# Patient Record
Sex: FEMALE | NOT HISPANIC OR LATINO | Employment: OTHER | URBAN - METROPOLITAN AREA
[De-identification: names, ages, dates, MRNs, and addresses within clinical notes are randomized per-mention and may not be internally consistent; named-entity substitution may affect disease eponyms.]

---

## 2018-05-22 LAB
ABSOL LYMPHOCYTES (HISTORICAL): 0.8 K/UL (ref 0.5–4)
ALBUMIN SERPL BCP-MCNC: 3.5 G/DL (ref 3–5.2)
ALP SERPL-CCNC: 75 U/L (ref 43–122)
ALT SERPL W P-5'-P-CCNC: 20 U/L (ref 9–52)
ANION GAP SERPL CALCULATED.3IONS-SCNC: 7 MMOL/L (ref 5–14)
AST SERPL W P-5'-P-CCNC: 19 U/L (ref 14–36)
BASOPHILS # BLD AUTO: 0.1 K/UL (ref 0–0.1)
BASOPHILS # BLD AUTO: 1 % (ref 0–1)
BILIRUB SERPL-MCNC: 0.5 MG/DL
BUN SERPL-MCNC: 10 MG/DL (ref 5–25)
CALCIUM SERPL-MCNC: 9.1 MG/DL (ref 8.4–10.2)
CHLORIDE SERPL-SCNC: 101 MEQ/L (ref 97–108)
CO2 SERPL-SCNC: 27 MMOL/L (ref 22–30)
CREATINE, SERUM (HISTORICAL): 0.88 MG/DL (ref 0.6–1.2)
DEPRECATED RDW RBC AUTO: 14.7 %
EGFR (HISTORICAL): >60 ML/MIN/1.73 M2
EOSINOPHIL # BLD AUTO: 0.1 K/UL (ref 0–0.4)
EOSINOPHIL NFR BLD AUTO: 1 % (ref 0–6)
GLUCOSE SERPL-MCNC: 181 MG/DL (ref 70–99)
HCT VFR BLD AUTO: 45.1 % (ref 36–46)
HGB BLD-MCNC: 14.8 G/DL (ref 12–16)
LYMPHOCYTES NFR BLD AUTO: 14 % (ref 25–45)
MCH RBC QN AUTO: 27.7 PG (ref 26–34)
MCHC RBC AUTO-ENTMCNC: 32.8 % (ref 31–36)
MCV RBC AUTO: 84 FL (ref 80–100)
MONOCYTES # BLD AUTO: 0.6 K/UL (ref 0.2–0.9)
MONOCYTES NFR BLD AUTO: 11 % (ref 1–10)
NEUTROPHILS ABS COUNT (HISTORICAL): 4.3 K/UL (ref 1.8–7.8)
NEUTS SEG NFR BLD AUTO: 73 % (ref 45–65)
PLATELET # BLD AUTO: 191 K/MCL (ref 150–450)
POTASSIUM SERPL-SCNC: 4.6 MEQ/L (ref 3.6–5)
RBC # BLD AUTO: 5.35 M/MCL (ref 4–5.2)
SODIUM SERPL-SCNC: 135 MEQ/L (ref 137–147)
TOTAL PROTEIN (HISTORICAL): 6.1 G/DL (ref 5.9–8.4)
TSH SERPL DL<=0.05 MIU/L-ACNC: 2.31 UIU/ML (ref 0.47–4.68)
WBC # BLD AUTO: 5.8 K/MCL (ref 4.5–11)

## 2018-05-23 LAB
EST. AVERAGE GLUCOSE BLD GHB EST-MCNC: 163 MG/DL
HBA1C MFR BLD HPLC: 7.3 %

## 2018-07-10 DIAGNOSIS — E03.9 HYPOTHYROIDISM, UNSPECIFIED TYPE: Primary | ICD-10-CM

## 2018-07-10 RX ORDER — LEVOTHYROXINE SODIUM 0.05 MG/1
TABLET ORAL
Qty: 90 TABLET | Refills: 0 | Status: SHIPPED | OUTPATIENT
Start: 2018-07-10 | End: 2018-10-13 | Stop reason: SDUPTHER

## 2018-07-10 RX ORDER — METOPROLOL SUCCINATE 25 MG/1
TABLET, EXTENDED RELEASE ORAL
Qty: 180 TABLET | Refills: 0 | Status: SHIPPED | OUTPATIENT
Start: 2018-07-10 | End: 2018-10-13 | Stop reason: SDUPTHER

## 2018-08-27 DIAGNOSIS — E11.9 TYPE 2 DIABETES MELLITUS WITHOUT COMPLICATION, WITHOUT LONG-TERM CURRENT USE OF INSULIN (HCC): Primary | ICD-10-CM

## 2018-09-05 DIAGNOSIS — E78.00 HYPERCHOLESTEROLEMIA: Primary | ICD-10-CM

## 2018-09-05 RX ORDER — EZETIMIBE AND SIMVASTATIN 10; 40 MG/1; MG/1
TABLET ORAL
Refills: 4 | COMMUNITY
Start: 2018-06-28 | End: 2018-09-05 | Stop reason: SDUPTHER

## 2018-09-05 RX ORDER — EZETIMIBE AND SIMVASTATIN 10; 40 MG/1; MG/1
TABLET ORAL
Qty: 90 TABLET | Refills: 3 | Status: SHIPPED | OUTPATIENT
Start: 2018-09-05 | End: 2019-01-22 | Stop reason: RX

## 2018-09-14 ENCOUNTER — TELEPHONE (OUTPATIENT)
Dept: FAMILY MEDICINE CLINIC | Facility: CLINIC | Age: 83
End: 2018-09-14

## 2018-09-14 NOTE — TELEPHONE ENCOUNTER
Called spoke with daughter to inform her that her mother appointment on 11/23/18 at 9 am with dr Tracy Jerez is cancelled he will not be in the office so appointment was cancelled and rescheduled to 11/16/18 at 9 am daughter aware of new appointment

## 2018-10-13 DIAGNOSIS — E03.9 HYPOTHYROIDISM, UNSPECIFIED TYPE: ICD-10-CM

## 2018-10-13 RX ORDER — METOPROLOL SUCCINATE 25 MG/1
TABLET, EXTENDED RELEASE ORAL
Qty: 180 TABLET | Refills: 3 | Status: SHIPPED | OUTPATIENT
Start: 2018-10-13 | End: 2019-11-07 | Stop reason: SDUPTHER

## 2018-10-13 RX ORDER — LEVOTHYROXINE SODIUM 0.05 MG/1
TABLET ORAL
Qty: 90 TABLET | Refills: 3 | Status: SHIPPED | OUTPATIENT
Start: 2018-10-13 | End: 2019-11-07 | Stop reason: SDUPTHER

## 2018-11-15 ENCOUNTER — TELEPHONE (OUTPATIENT)
Dept: FAMILY MEDICINE CLINIC | Facility: CLINIC | Age: 83
End: 2018-11-15

## 2018-11-15 NOTE — TELEPHONE ENCOUNTER
Patient daughter called she said to cancel her appointment on 11/16/18 at 9 am and reschedule to another day so the appointment was cancelled and rescheduled to 12/5/18 at 930 am per daughter request to change the appointment

## 2018-12-04 RX ORDER — ATENOLOL 100 MG/1
TABLET ORAL
Refills: 2 | COMMUNITY
Start: 2018-11-02 | End: 2019-02-01 | Stop reason: SDUPTHER

## 2018-12-05 ENCOUNTER — OFFICE VISIT (OUTPATIENT)
Dept: FAMILY MEDICINE CLINIC | Facility: CLINIC | Age: 83
End: 2018-12-05
Payer: MEDICARE

## 2018-12-05 VITALS
SYSTOLIC BLOOD PRESSURE: 140 MMHG | RESPIRATION RATE: 18 BRPM | WEIGHT: 200.2 LBS | DIASTOLIC BLOOD PRESSURE: 90 MMHG | BODY MASS INDEX: 36.84 KG/M2 | HEART RATE: 80 BPM | HEIGHT: 62 IN

## 2018-12-05 DIAGNOSIS — E11.9 TYPE 2 DIABETES MELLITUS WITHOUT COMPLICATION, WITHOUT LONG-TERM CURRENT USE OF INSULIN (HCC): Primary | Chronic | ICD-10-CM

## 2018-12-05 DIAGNOSIS — I10 ESSENTIAL HYPERTENSION: Chronic | ICD-10-CM

## 2018-12-05 DIAGNOSIS — E03.8 OTHER SPECIFIED HYPOTHYROIDISM: Chronic | ICD-10-CM

## 2018-12-05 DIAGNOSIS — E78.49 OTHER HYPERLIPIDEMIA: Chronic | ICD-10-CM

## 2018-12-05 DIAGNOSIS — E55.9 VITAMIN D DEFICIENCY: Chronic | ICD-10-CM

## 2018-12-05 PROCEDURE — 99214 OFFICE O/P EST MOD 30 MIN: CPT | Performed by: FAMILY MEDICINE

## 2018-12-05 PROCEDURE — G0008 ADMIN INFLUENZA VIRUS VAC: HCPCS | Performed by: FAMILY MEDICINE

## 2018-12-05 PROCEDURE — 90662 IIV NO PRSV INCREASED AG IM: CPT | Performed by: FAMILY MEDICINE

## 2018-12-05 PROCEDURE — 83036 HEMOGLOBIN GLYCOSYLATED A1C: CPT | Performed by: FAMILY MEDICINE

## 2018-12-05 PROCEDURE — 36415 COLL VENOUS BLD VENIPUNCTURE: CPT | Performed by: FAMILY MEDICINE

## 2018-12-05 NOTE — PROGRESS NOTES
Assessment/Plan:    No problem-specific Assessment & Plan notes found for this encounter  Diagnoses and all orders for this visit:    Type 2 diabetes mellitus without complication, without long-term current use of insulin ([address] )  -     influenza vaccine, 3035-0490, high-dose, PF 0 5 mL, for patients 65 yr+ (FLUZONE HIGH-DOSE)  -     Hemoglobin A1C    Other specified hypothyroidism    Essential hypertension    Vitamin D deficiency    Other hyperlipidemia          Subjective:      Patient ID: Perla Serra is a 80 y o  female  PATIENT RETURNS FOR FOLLOW-UP OF CHRONIC MEDICAL CONDITIONS  NO HOSPITAL STAYS OR EMERGENCY VISITS RECENTLY  MEDS WERE REVIEWED AND NO SIDE EFFECTS  NO NEW ISSUES  UNLESS NOTED BELOW  NO NEW MEDICAL PROVIDER REPORTED  The following portions of the patient's history were reviewed and updated as appropriate: allergies, current medications, past family history, past medical history, past social history, past surgical history and problem list     Review of Systems   Constitutional: Negative for activity change and appetite change  HENT: Negative for voice change  Eyes: Negative for visual disturbance  Respiratory: Negative for chest tightness and shortness of breath  Cardiovascular: Negative for chest pain, palpitations and leg swelling  Gastrointestinal: Negative for abdominal pain, blood in stool, constipation and diarrhea  Genitourinary: Negative for dysuria, vaginal bleeding and vaginal discharge  Skin: Negative for rash  Neurological: Negative for dizziness  Psychiatric/Behavioral: Negative for dysphoric mood  Objective:  Vitals:    12/05/18 0851   BP: 140/90   BP Location: Left arm   Patient Position: Sitting   Cuff Size: Large   Pulse: 80   Resp: 18   Weight: 90 8 kg (200 lb 3 2 oz)   Height: 5' 1 81" (1 57 m)      Physical Exam   Constitutional: She appears well-developed and well-nourished  HENT:   Head: Normocephalic and atraumatic  Eyes: Conjunctivae are normal    Neck: Neck supple  No thyromegaly present  Cardiovascular: Normal rate, regular rhythm, normal heart sounds and intact distal pulses  No murmur heard  Pulmonary/Chest: Effort normal and breath sounds normal  No respiratory distress  Musculoskeletal: She exhibits no edema  Lymphadenopathy:     She has no cervical adenopathy  Skin: Skin is warm and dry  Psychiatric: She has a normal mood and affect  Her behavior is normal          Patient's chronic problems that were reviewed today are stable  Meds reviewed and no changes made  Appropriate labs and imaging were ordered  Preventive measures appropriate for age and sex were reviewed with patient  Immunizations were updated as appropriate

## 2018-12-06 LAB
EST. AVERAGE GLUCOSE BLD GHB EST-MCNC: 174 MG/DL
HBA1C MFR BLD: 7.7 % (ref 4.2–6.3)

## 2019-01-08 ENCOUNTER — OFFICE VISIT (OUTPATIENT)
Dept: FAMILY MEDICINE CLINIC | Facility: CLINIC | Age: 84
End: 2019-01-08
Payer: MEDICARE

## 2019-01-08 VITALS
SYSTOLIC BLOOD PRESSURE: 140 MMHG | WEIGHT: 200.2 LBS | HEIGHT: 61 IN | BODY MASS INDEX: 37.8 KG/M2 | DIASTOLIC BLOOD PRESSURE: 90 MMHG | HEART RATE: 68 BPM | TEMPERATURE: 96.9 F

## 2019-01-08 DIAGNOSIS — L97.911 LEG ULCER, RIGHT, LIMITED TO BREAKDOWN OF SKIN (HCC): Primary | ICD-10-CM

## 2019-01-08 DIAGNOSIS — R60.9 EDEMA, UNSPECIFIED TYPE: ICD-10-CM

## 2019-01-08 PROCEDURE — 99213 OFFICE O/P EST LOW 20 MIN: CPT | Performed by: FAMILY MEDICINE

## 2019-01-08 RX ORDER — FUROSEMIDE 20 MG/1
TABLET ORAL
Qty: 10 TABLET | Refills: 2 | Status: SHIPPED | OUTPATIENT
Start: 2019-01-08 | End: 2019-12-16

## 2019-01-08 RX ORDER — FUROSEMIDE 20 MG/1
20 TABLET ORAL 2 TIMES DAILY
Qty: 5 TABLET | Refills: 0 | Status: SHIPPED | OUTPATIENT
Start: 2019-01-08 | End: 2019-01-08 | Stop reason: SDUPTHER

## 2019-01-08 NOTE — PROGRESS NOTES
Assessment/Plan:    No problem-specific Assessment & Plan notes found for this encounter  Diagnoses and all orders for this visit:    Leg ulcer, right, limited to breakdown of skin (HCC)    Edema, unspecified type  -     furosemide (LASIX) 20 mg tablet; Take 1 tablet (20 mg total) by mouth 2 (two) times a day for 5 days          Subjective:      Patient ID: Marla Erickson is a 80 y o  female  Marden Rain comes today with a draining wound of the right lower extremity  Duration is 1 week  No history of trauma it is not particularly painful but has been continually weeping over the last week  The following portions of the patient's history were reviewed and updated as appropriate: allergies, current medications, past family history, past medical history, past social history, past surgical history and problem list     Review of Systems      Objective:  Vitals:    01/08/19 0935   BP: 140/90   BP Location: Left arm   Patient Position: Sitting   Cuff Size: Large   Pulse: 68   Temp: (!) 96 9 °F (36 1 °C)   TempSrc: Temporal   Weight: 90 8 kg (200 lb 3 2 oz)   Height: 5' 1" (1 549 m)      Physical Exam   Skin:   Two punctate open areas on the right anterior tibial region which are leaking serous fluid  Plus two edema bilaterally but that is old  We will use local care only and 5 days of a mild diuretic

## 2019-01-08 NOTE — PATIENT INSTRUCTIONS
Keep the leg elevated above horizontal at all times when sitting  No ointment is required  Keep it covered with a dry dressing to collect the fluid    This should heal in the next 3-4 weeks

## 2019-01-10 ENCOUNTER — HOSPITAL ENCOUNTER (EMERGENCY)
Facility: HOSPITAL | Age: 84
Discharge: HOME | End: 2019-01-11
Attending: EMERGENCY MEDICINE | Admitting: EMERGENCY MEDICINE
Payer: MEDICARE

## 2019-01-10 ENCOUNTER — APPOINTMENT (EMERGENCY)
Dept: CT IMAGING | Facility: HOSPITAL | Age: 84
End: 2019-01-10
Payer: MEDICARE

## 2019-01-10 ENCOUNTER — APPOINTMENT (EMERGENCY)
Dept: RADIOLOGY | Facility: HOSPITAL | Age: 84
End: 2019-01-10
Payer: MEDICARE

## 2019-01-10 DIAGNOSIS — S09.90XA MINOR HEAD INJURY: ICD-10-CM

## 2019-01-10 DIAGNOSIS — S80.02XA CONTUSION OF LEFT KNEE: Primary | ICD-10-CM

## 2019-01-10 PROCEDURE — 70450 CT HEAD/BRAIN W/O DYE: CPT

## 2019-01-10 PROCEDURE — 99284 EMERGENCY DEPT VISIT MOD MDM: CPT

## 2019-01-10 PROCEDURE — 73564 X-RAY EXAM KNEE 4 OR MORE: CPT

## 2019-01-10 PROCEDURE — 90471 IMMUNIZATION ADMIN: CPT

## 2019-01-10 RX ORDER — ACETAMINOPHEN 325 MG/1
650 TABLET ORAL ONCE
Status: COMPLETED | OUTPATIENT
Start: 2019-01-10 | End: 2019-01-11

## 2019-01-11 ENCOUNTER — TELEPHONE (OUTPATIENT)
Dept: FAMILY MEDICINE CLINIC | Facility: CLINIC | Age: 84
End: 2019-01-11

## 2019-01-11 VITALS
BODY MASS INDEX: 37.82 KG/M2 | DIASTOLIC BLOOD PRESSURE: 78 MMHG | HEART RATE: 91 BPM | OXYGEN SATURATION: 97 % | RESPIRATION RATE: 18 BRPM | SYSTOLIC BLOOD PRESSURE: 141 MMHG | WEIGHT: 200.18 LBS | TEMPERATURE: 97.4 F

## 2019-01-11 PROCEDURE — 90715 TDAP VACCINE 7 YRS/> IM: CPT | Performed by: EMERGENCY MEDICINE

## 2019-01-11 RX ORDER — TRAMADOL HYDROCHLORIDE 50 MG/1
50 TABLET ORAL EVERY 6 HOURS PRN
Qty: 15 TABLET | Refills: 0 | Status: SHIPPED | OUTPATIENT
Start: 2019-01-11 | End: 2019-01-21

## 2019-01-11 RX ADMIN — ACETAMINOPHEN 650 MG: 325 TABLET, FILM COATED ORAL at 00:11

## 2019-01-11 RX ADMIN — TETANUS TOXOID, REDUCED DIPHTHERIA TOXOID AND ACELLULAR PERTUSSIS VACCINE, ADSORBED 0.5 ML: 5; 2.5; 8; 8; 2.5 SUSPENSION INTRAMUSCULAR at 00:12

## 2019-01-11 NOTE — ED PROVIDER NOTES
History  Chief Complaint   Patient presents with    Fall     fall earlier tonight  left lower leg pain with weightbearing  left knee replacement hx and pain to the left knee area  head abrasion to the right side of the head after falling over vacuum cord  pradaxa blood thinner  80-year-old female with a history of atrial fibrillation on Pradaxa, hypertension presents to the emergency department complaining of left knee pain after a fall earlier this evening  Patient got up and did not use her walker and tripped over a vacuum cord  She did fall to her knees and did strike her head there was no loss of consciousness  She was able to get up and ambulate, however she continues to complain of left knee pain  No meds taken for the pain  She is having increasing difficulty bearing weight on the left leg  History provided by:  Patient and relative   used: No    Fall   Mechanism of injury: fall    Injury location:  Head/neck and leg  Head/neck injury location:  Head  Leg injury location:  L knee  Incident location:  Home  Time since incident:  4 hours  Arrived directly from scene: yes    Fall:     Fall occurred:  Tripped    Impact surface:  Carpet    Point of impact:  Knees  Tetanus status:  Unknown  Prior to arrival data:     Patient ambulatory at scene: yes      Blood loss:  None    Responsiveness at scene:  Alert    Orientation at scene:  Person, place, situation and time    Loss of consciousness: no      Amnesic to event: no    Associated symptoms: no abdominal pain, no back pain, no chest pain, no difficulty breathing, no headaches, no hearing loss, no loss of consciousness, no nausea, no neck pain, no seizures and no vomiting    Risk factors: anticoagulation therapy        Prior to Admission Medications   Prescriptions Last Dose Informant Patient Reported? Taking?    PRADAXA 150 MG capsu   Yes Yes   Sig: TK 1 C PO BID   ezetimibe-simvastatin (VYTORIN) 10-40 mg per tablet   No Yes Sig: One daily   furosemide (LASIX) 20 mg tablet   No Yes   Sig: TAKE 1 TABLET(20 MG) BY MOUTH TWICE DAILY FOR 5 DAYS   levothyroxine 50 mcg tablet   No Yes   Sig: TAKE 1 TABLET BY MOUTH DAILY   metFORMIN (GLUCOPHAGE) 500 mg tablet   No Yes   Sig: TAKE 1 TABLET BY MOUTH EVERY DAY   metoprolol succinate (TOPROL-XL) 25 mg 24 hr tablet   No Yes   Sig: TAKE 1 TABLET(25 MG) BY MOUTH EVERY 12 HOURS      Facility-Administered Medications: None       Past Medical History:   Diagnosis Date    Atrial fibrillation ([address] )     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    Cholecystitis     Disease of tonsils     Glaucoma 2008    NOS       Past Surgical History:   Procedure Laterality Date    CHOLECYSTECTOMY      EYE SURGERY Right 03/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    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       History reviewed  No pertinent family history  I have reviewed and agree with the history as documented  Social History   Substance Use Topics    Smoking status: Never Smoker    Smokeless tobacco: Never Used    Alcohol use Not on file        Review of Systems   Constitutional: Negative  Negative for chills, diaphoresis, fatigue and fever  HENT: Negative  Negative for congestion, hearing loss, rhinorrhea and sore throat  Eyes: Negative  Negative for discharge, redness and itching  Respiratory: Negative  Negative for apnea, cough, chest tightness, shortness of breath and wheezing  Cardiovascular: Negative for chest pain, palpitations and leg swelling  Gastrointestinal: Negative  Negative for abdominal pain, nausea and vomiting  Endocrine: Negative  Genitourinary: Negative  Negative for flank pain, frequency and urgency  Musculoskeletal: Negative  Negative for back pain and neck pain  Skin: Negative  Allergic/Immunologic: Negative  Neurological: Negative  Negative for dizziness, seizures, loss of consciousness, syncope, weakness, light-headedness, numbness and headaches  Hematological: Negative  All other systems reviewed and are negative  Physical Exam  Physical Exam   Constitutional: She is oriented to person, place, and time  She appears well-developed and well-nourished  Non-toxic appearance  She does not have a sickly appearance  She does not appear ill  No distress  HENT:   Head: Normocephalic  Head is with abrasion  Head is without raccoon's eyes and without Macias's sign  Right Ear: Tympanic membrane and external ear normal    Left Ear: Tympanic membrane and external ear normal    Nose: Nose normal    Mouth/Throat: Oropharynx is clear and moist    Eyes: Pupils are equal, round, and reactive to light  Conjunctivae and EOM are normal  Right eye exhibits no discharge  Left eye exhibits no discharge  No scleral icterus  Neck: Normal range of motion  Neck supple  No spinous process tenderness present  Cardiovascular: Normal rate and normal heart sounds  An irregularly irregular rhythm present  Exam reveals no gallop and no friction rub  No murmur heard  Pulmonary/Chest: Effort normal and breath sounds normal  No respiratory distress  She has no wheezes  She has no rales  She exhibits no tenderness  Abdominal: Soft  Bowel sounds are normal  She exhibits no distension and no mass  There is no tenderness  No hernia  Musculoskeletal: She exhibits no edema  Right hip: Normal         Left hip: Normal         Right knee: Normal  She exhibits normal range of motion, no swelling, no effusion, no ecchymosis, no deformity, no laceration, normal alignment, no LCL laxity, normal patellar mobility, no bony tenderness, normal meniscus and no MCL laxity  No tenderness found  Left knee: She exhibits decreased range of motion, swelling, effusion and ecchymosis  She exhibits no deformity, no laceration, no erythema, normal alignment, no LCL laxity and normal patellar mobility  Tenderness found  Patellar tendon tenderness noted          Legs:  Neurological: She is alert and oriented to person, place, and time  She has normal reflexes  She displays normal reflexes  No cranial nerve deficit  She exhibits normal muscle tone  Coordination normal    Skin: Skin is warm and dry  No rash noted  She is not diaphoretic  No erythema  No pallor  Psychiatric: She has a normal mood and affect  Nursing note and vitals reviewed  Vital Signs  ED Triage Vitals [01/10/19 2118]   Temperature Pulse Respirations Blood Pressure SpO2   (!) 97 4 °F (36 3 °C) 98 18 (!) 181/75 98 %      Temp Source Heart Rate Source Patient Position - Orthostatic VS BP Location FiO2 (%)   Oral Monitor Sitting Right arm --      Pain Score       7           Vitals:    01/10/19 2118 01/11/19 0004   BP: (!) 181/75 141/78   Pulse: 98 91   Patient Position - Orthostatic VS: Sitting Lying       Visual Acuity      ED Medications  Medications   tetanus-diphtheria-acellular pertussis (BOOSTRIX) IM injection 0 5 mL (0 5 mL Intramuscular Given 1/11/19 0012)   acetaminophen (TYLENOL) tablet 650 mg (650 mg Oral Given 1/11/19 0011)       Diagnostic Studies  Results Reviewed     None                 CT head without contrast   Final Result by Josee Dwyer MD (01/11 0102)      1  No intracranial hemorrhage or calvarial fracture  2   Chronic small vessel ischemic changes  3   Scattered punctate calcifications are seen which are nonspecific however may be seen in the setting of neurocysticercosis  Workstation performed: [id number]         XR knee 4+ views left injury   ED Interpretation by Rose Limon DO (01/11 0014)   No fracture                 Procedures  Procedures       Phone Contacts  ED Phone Contact    ED Course                               MDM  Number of Diagnoses or Management Options  Diagnosis management comments: 35-year-old female presents with ongoing left knee pain after a mechanical fall earlier this evening  She is on Pradaxa and also hit her head  There was no loss of consciousness    On exam she appears well in no distress  She does have a small abrasion to the right forehead  She has a moderate size prepatellar effusion to the left knee with ecchymosis and decreased range of motion  Will x-ray left knee, provide Tylenol for pain  Will update tetanus and also CT head to rule out intracranial bleeding since she is on Pradaxa  Amount and/or Complexity of Data Reviewed  Tests in the radiology section of CPT®: ordered and reviewed      CritCare Time    Disposition  Final diagnoses:   Contusion of left knee   Minor head injury     Time reflects when diagnosis was documented in both MDM as applicable and the Disposition within this note     Time User Action Codes Description Comment    1/11/2019  1:06 AM Tai SMALL Add [S80 02XA] Contusion of left knee     1/11/2019  1:06 AM Tai SMALL Add [S09 90XA] Minor head injury       ED Disposition     ED Disposition Condition Comment    Discharge  Cammy Rodrigues discharge to home/self care  Condition at discharge: Good        Follow-up Information     Follow up With Specialties Details Why Merrill Elder MD [facility] Medicine Schedule an appointment as soon as possible for a visit in 2 days If symptoms worsen [address] [place] [address]  [phone]            Patient's Medications   Discharge Prescriptions    TRAMADOL (ULTRAM) 50 MG TABLET    Take 1 tablet (50 mg total) by mouth every 6 (six) hours as needed for moderate pain for up to 10 days       Start Date: 1/11/2019 End Date: 1/21/2019       Order Dose: 50 mg       Quantity: 15 tablet    Refills: 0     No discharge procedures on file      ED Provider  Electronically Signed by           Noemi Garcia DO  01/11/19 2706

## 2019-01-11 NOTE — TELEPHONE ENCOUNTER
Patient daughter Stephanie Holt called she said her mother fell yesterday and she hurt her left knee and she also hit her head they did a ct scan and showed nothing wrong daughter is requesting for her to have at home physical therapy ordered and she would like to speak with dr Yue Kumari  Patient daughter Stephanie Holt can be reached at [phone]   TY

## 2019-01-11 NOTE — DISCHARGE INSTRUCTIONS
Contusion in Adults   WHAT YOU NEED TO KNOW:   A contusion is a bruise that appears on your skin after an injury  A bruise happens when small blood vessels tear but skin does not  When blood vessels tear, blood leaks into nearby tissue, such as soft tissue or muscle  DISCHARGE INSTRUCTIONS:   Return to the emergency department if:   · You have new trouble moving the injured area  · You have tingling or numbness in or near the injured area  · Your hand or foot below the bruise gets cold or turns pale  Contact your healthcare provider if:   · You find a new lump in the injured area  · Your symptoms do not improve with treatment after 4 to 5 days  · You have questions or concerns about your condition or care  Medicines: You may need any of the following:  · NSAIDs  help decrease swelling and pain or fever  This medicine is available with or without a doctor's order  NSAIDs can cause stomach bleeding or kidney problems in certain people  If you take blood thinner medicine, always ask your healthcare provider if NSAIDs are safe for you  Always read the medicine label and follow directions  · Prescription pain medicine  may be given  Do not wait until the pain is severe before you take your medicine  · Take your medicine as directed  Contact your healthcare provider if you think your medicine is not helping or if you have side effects  Tell him of her if you are allergic to any medicine  Keep a list of the medicines, vitamins, and herbs you take  Include the amounts, and when and why you take them  Bring the list or the pill bottles to follow-up visits  Carry your medicine list with you in case of an emergency  Follow up with your healthcare provider as directed: You may need to return within a week to check your injury again  Write down your questions so you remember to ask them during your visits    Help a contusion heal:   · Rest the injured area  or use it less than usual  If you bruised your leg or foot, you may need crutches or a cane to help you walk  This will help you keep weight off your injured body part  · Apply ice  to decrease swelling and pain  Ice may also help prevent tissue damage  Use an ice pack, or put crushed ice in a plastic bag  Cover it with a towel and place it on your bruise for 15 to 20 minutes every hour or as directed  · Use compression  to support the area and decrease swelling  Wrap an elastic bandage around the area over the bruised muscle  Make sure the bandage is not too tight  You should be able to fit 1 finger between the bandage and your skin  · Elevate (raise) your injured body part  above the level of your heart to help decrease pain and swelling  Use pillows, blankets, or rolled towels to elevate the area as often as you can  · Do not drink alcohol  as directed  Alcohol may slow healing  · Do not stretch injured muscles  right after your injury  Ask your healthcare provider when and how you may safely stretch after your injury  Gentle stretches can help increase your flexibility  · Do not massage the area or put heating pads  on the bruise right after your injury  Heat and massage may slow healing  Your healthcare provider may tell you to apply heat after several days  At that time, heat will start to help the injury heal   Prevent another contusion:   · Stretch and warm up before you play sports or exercise  · Wear protective gear when you play sports  Examples are shin guards and padding  · If you begin a new physical activity, start slowly to give your body a chance to adjust   © 2017 [employer] Information is for End User's use only and may not be sold, redistributed or otherwise used for commercial purposes  All illustrations and images included in CareNotes® are the copyrighted property of [employer]  or Yaakov Ferris  The above information is an  only   It is not intended as medical advice for individual conditions or treatments  Talk to your doctor, nurse or pharmacist before following any medical regimen to see if it is safe and effective for you  Head Injury   WHAT YOU NEED TO KNOW:   A head injury is most often caused by a blow to the head  This may occur from a fall, bicycle injury, sports injury, being struck in the head, or a motor vehicle accident  DISCHARGE INSTRUCTIONS:   Call 911 or have someone else call for any of the following:   · You cannot be woken  · You have a seizure  · You stop responding to others or you faint  · You have blurry or double vision  · Your speech becomes slurred or confused  · You have arm or leg weakness, loss of feeling, or new problems with coordination  · Your pupils are larger than usual or one pupil is a different size than the other  · You have blood or clear fluid coming out of your ears or nose  Return to the emergency department if:   · You have repeated or forceful vomiting  · You feel confused  · Your headache gets worse or becomes severe  · You or someone caring for you notices that you are harder to wake than usual   Contact your healthcare provider if:   · Your symptoms last longer than 6 weeks after the injury  · You have questions or concerns about your condition or care  Medicines:   · Acetaminophen  decreases pain  Acetaminophen is available without a doctor's order  Ask how much to take and how often to take it  Follow directions  Acetaminophen can cause liver damage if not taken correctly  · Take your medicine as directed  Contact your healthcare provider if you think your medicine is not helping or if you have side effects  Tell him or her if you are allergic to any medicine  Keep a list of the medicines, vitamins, and herbs you take  Include the amounts, and when and why you take them  Bring the list or the pill bottles to follow-up visits   Carry your medicine list with you in case of an emergency  Self-care:   · Rest  or do quiet activities for 24 to 48 hours  Limit your time watching TV, using the computer, or doing tasks that require a lot of thinking  Slowly return to your normal activities as directed  Do not play sports or do activities that may cause you to get hit in the head  Ask your healthcare provider when you can return to sports  · Apply ice  on your head for 15 to 20 minutes every hour or as directed  Use an ice pack, or put crushed ice in a plastic bag  Cover it with a towel before you apply it to your skin  Ice helps prevent tissue damage and decreases swelling and pain  · Have someone stay with you for 24 hours  or as directed  This person can monitor you for complications and call 160  When you are awake the person should ask you a few questions to see if you are thinking clearly  An example would be to ask your name or your address  Prevent another head injury:   · Wear a helmet that fits properly  Do this when you play sports, or ride a bike, scooter, or skateboard  Helmets help decrease your risk of a serious head injury  Talk to your healthcare provider about other ways you can protect yourself if you play sports  · Wear your seat belt every time you are in a car  This helps to decrease your risk for a head injury if you are in a car accident  Follow up with your healthcare provider as directed:  Write down your questions so you remember to ask them during your visits  © 2017 [employer] Information is for End User's use only and may not be sold, redistributed or otherwise used for commercial purposes  All illustrations and images included in CareNotes® are the copyrighted property of A D [employer] M [employer]  or [employer]akov Ferris  The above information is an  only  It is not intended as medical advice for individual conditions or treatments   Talk to your doctor, nurse or pharmacist before following any medical regimen to see if it is safe and effective for you

## 2019-01-22 ENCOUNTER — TELEPHONE (OUTPATIENT)
Dept: FAMILY MEDICINE CLINIC | Facility: CLINIC | Age: 84
End: 2019-01-22

## 2019-01-22 ENCOUNTER — TREATMENT (OUTPATIENT)
Dept: FAMILY MEDICINE CLINIC | Facility: CLINIC | Age: 84
End: 2019-01-22

## 2019-01-22 DIAGNOSIS — E78.49 OTHER HYPERLIPIDEMIA: Primary | ICD-10-CM

## 2019-01-22 DIAGNOSIS — E78.00 HYPERCHOLESTEROLEMIA: ICD-10-CM

## 2019-01-22 RX ORDER — ATORVASTATIN CALCIUM 40 MG/1
40 TABLET, FILM COATED ORAL DAILY
Qty: 90 TABLET | Refills: 3 | Status: SHIPPED | OUTPATIENT
Start: 2019-01-22 | End: 2019-02-13 | Stop reason: SDUPTHER

## 2019-01-22 NOTE — TELEPHONE ENCOUNTER
Ines Molina [phone] calling for Joshua Archer was notified that med was changed from ezetimibe-simvastatin to atorvastatin     erx to [employer]

## 2019-02-01 DIAGNOSIS — I48.91 ATRIAL FIBRILLATION, UNSPECIFIED TYPE (HCC): Primary | ICD-10-CM

## 2019-02-01 RX ORDER — ATENOLOL 100 MG/1
TABLET ORAL
Qty: 180 CAPSULE | Refills: 0 | Status: SHIPPED | OUTPATIENT
Start: 2019-02-01 | End: 2019-05-09 | Stop reason: SDUPTHER

## 2019-02-13 DIAGNOSIS — E78.00 HYPERCHOLESTEROLEMIA: ICD-10-CM

## 2019-02-13 DIAGNOSIS — E78.49 OTHER HYPERLIPIDEMIA: ICD-10-CM

## 2019-02-13 RX ORDER — EZETIMIBE AND SIMVASTATIN 10; 40 MG/1; MG/1
TABLET ORAL
Qty: 90 TABLET | Refills: 3 | Status: SHIPPED | OUTPATIENT
Start: 2019-02-13 | End: 2019-02-13

## 2019-02-13 RX ORDER — ATORVASTATIN CALCIUM 40 MG/1
40 TABLET, FILM COATED ORAL DAILY
Qty: 90 TABLET | Refills: 3 | Status: SHIPPED | OUTPATIENT
Start: 2019-02-13 | End: 2019-08-05 | Stop reason: SDUPTHER

## 2019-02-13 NOTE — TELEPHONE ENCOUNTER
[facility] is calling patient is currently taking Vytorin 10/40 and pharmacy received a rx for Atrovastin they will like to confirm it? ?  Which med should she take?    devendra alaniz

## 2019-02-16 DIAGNOSIS — R60.9 EDEMA, UNSPECIFIED TYPE: ICD-10-CM

## 2019-02-17 RX ORDER — FUROSEMIDE 20 MG/1
TABLET ORAL
Qty: 30 TABLET | Refills: 6 | Status: SHIPPED | OUTPATIENT
Start: 2019-02-17 | End: 2019-12-16

## 2019-02-18 DIAGNOSIS — E78.49 OTHER HYPERLIPIDEMIA: Primary | Chronic | ICD-10-CM

## 2019-02-18 RX ORDER — EZETIMIBE AND SIMVASTATIN 10; 40 MG/1; MG/1
1 TABLET ORAL DAILY
Qty: 90 TABLET | Refills: 3 | Status: SHIPPED | OUTPATIENT
Start: 2019-02-18 | End: 2019-12-16

## 2019-02-18 RX ORDER — EZETIMIBE AND SIMVASTATIN 10; 40 MG/1; MG/1
TABLET ORAL
COMMUNITY
Start: 2018-01-30 | End: 2019-02-18 | Stop reason: SDUPTHER

## 2019-02-27 LAB — LEFT EYE DIABETIC RETINOPATHY: NORMAL

## 2019-05-09 DIAGNOSIS — I48.91 ATRIAL FIBRILLATION, UNSPECIFIED TYPE (HCC): ICD-10-CM

## 2019-05-09 RX ORDER — DABIGATRAN ETEXILATE 150 MG/1
150 CAPSULE, COATED PELLETS ORAL 2 TIMES DAILY
Qty: 180 CAPSULE | Refills: 3 | Status: SHIPPED | OUTPATIENT
Start: 2019-05-09 | End: 2020-09-08 | Stop reason: ALTCHOICE

## 2019-06-07 LAB
LEFT EYE DIABETIC RETINOPATHY: NORMAL
RIGHT EYE DIABETIC RETINOPATHY: NORMAL

## 2019-06-10 PROBLEM — E11.621 TYPE 2 DIABETES MELLITUS WITH FOOT ULCER (CODE) (HCC): Status: ACTIVE | Noted: 2019-06-10

## 2019-07-03 RX ORDER — BLOOD-GLUCOSE METER
KIT MISCELLANEOUS
COMMUNITY
Start: 2014-07-21 | End: 2019-12-16

## 2019-07-03 RX ORDER — IPRATROPIUM BROMIDE 21 UG/1
SPRAY, METERED NASAL 3 TIMES DAILY
COMMUNITY
Start: 2016-10-20 | End: 2019-07-09

## 2019-07-03 RX ORDER — CHOLECALCIFEROL (VITAMIN D3) 25 MCG
1 CAPSULE ORAL
COMMUNITY
Start: 2015-05-21

## 2019-07-09 ENCOUNTER — OFFICE VISIT (OUTPATIENT)
Dept: FAMILY MEDICINE CLINIC | Facility: CLINIC | Age: 84
End: 2019-07-09
Payer: MEDICARE

## 2019-07-09 VITALS
DIASTOLIC BLOOD PRESSURE: 80 MMHG | RESPIRATION RATE: 18 BRPM | HEART RATE: 60 BPM | BODY MASS INDEX: 36.82 KG/M2 | SYSTOLIC BLOOD PRESSURE: 140 MMHG | HEIGHT: 61 IN | WEIGHT: 195 LBS

## 2019-07-09 DIAGNOSIS — I48.20 CHRONIC ATRIAL FIBRILLATION (HCC): Chronic | ICD-10-CM

## 2019-07-09 DIAGNOSIS — E78.49 OTHER HYPERLIPIDEMIA: Chronic | ICD-10-CM

## 2019-07-09 DIAGNOSIS — E11.621 TYPE 2 DIABETES MELLITUS WITH FOOT ULCER (CODE) (HCC): ICD-10-CM

## 2019-07-09 DIAGNOSIS — E11.9 TYPE 2 DIABETES MELLITUS WITHOUT COMPLICATION, WITHOUT LONG-TERM CURRENT USE OF INSULIN (HCC): Primary | Chronic | ICD-10-CM

## 2019-07-09 DIAGNOSIS — E03.8 OTHER SPECIFIED HYPOTHYROIDISM: Chronic | ICD-10-CM

## 2019-07-09 DIAGNOSIS — I10 ESSENTIAL HYPERTENSION: Chronic | ICD-10-CM

## 2019-07-09 DIAGNOSIS — E55.9 VITAMIN D DEFICIENCY: Chronic | ICD-10-CM

## 2019-07-09 LAB
ALBUMIN SERPL BCP-MCNC: 2.7 G/DL (ref 3.5–5)
ALP SERPL-CCNC: 417 U/L (ref 46–116)
ALT SERPL W P-5'-P-CCNC: 37 U/L (ref 12–78)
ANION GAP SERPL CALCULATED.3IONS-SCNC: 6 MMOL/L (ref 4–13)
AST SERPL W P-5'-P-CCNC: 39 U/L (ref 5–45)
BASOPHILS # BLD AUTO: 0.04 THOUSANDS/ΜL (ref 0–0.1)
BASOPHILS NFR BLD AUTO: 1 % (ref 0–1)
BILIRUB SERPL-MCNC: 0.64 MG/DL (ref 0.2–1)
BUN SERPL-MCNC: 9 MG/DL (ref 5–25)
CALCIUM SERPL-MCNC: 9.1 MG/DL (ref 8.3–10.1)
CHLORIDE SERPL-SCNC: 100 MMOL/L (ref 100–108)
CO2 SERPL-SCNC: 27 MMOL/L (ref 21–32)
CREAT SERPL-MCNC: 1.05 MG/DL (ref 0.6–1.3)
EOSINOPHIL # BLD AUTO: 0.09 THOUSAND/ΜL (ref 0–0.61)
EOSINOPHIL NFR BLD AUTO: 2 % (ref 0–6)
ERYTHROCYTE [DISTWIDTH] IN BLOOD BY AUTOMATED COUNT: 14.5 % (ref 11.6–15.1)
EST. AVERAGE GLUCOSE BLD GHB EST-MCNC: 192 MG/DL
GFR SERPL CREATININE-BSD FRML MDRD: 47 ML/MIN/1.73SQ M
GLUCOSE SERPL-MCNC: 254 MG/DL (ref 65–140)
HBA1C MFR BLD: 8.3 % (ref 4.2–6.3)
HCT VFR BLD AUTO: 47.9 % (ref 34.8–46.1)
HGB BLD-MCNC: 14.9 G/DL (ref 11.5–15.4)
IMM GRANULOCYTES # BLD AUTO: 0.03 THOUSAND/UL (ref 0–0.2)
IMM GRANULOCYTES NFR BLD AUTO: 1 % (ref 0–2)
LYMPHOCYTES # BLD AUTO: 0.85 THOUSANDS/ΜL (ref 0.6–4.47)
LYMPHOCYTES NFR BLD AUTO: 17 % (ref 14–44)
MCH RBC QN AUTO: 26.8 PG (ref 26.8–34.3)
MCHC RBC AUTO-ENTMCNC: 31.1 G/DL (ref 31.4–37.4)
MCV RBC AUTO: 86 FL (ref 82–98)
MONOCYTES # BLD AUTO: 0.63 THOUSAND/ΜL (ref 0.17–1.22)
MONOCYTES NFR BLD AUTO: 13 % (ref 4–12)
NEUTROPHILS # BLD AUTO: 3.36 THOUSANDS/ΜL (ref 1.85–7.62)
NEUTS SEG NFR BLD AUTO: 66 % (ref 43–75)
NRBC BLD AUTO-RTO: 0 /100 WBCS
PLATELET # BLD AUTO: 181 THOUSANDS/UL (ref 149–390)
PMV BLD AUTO: 11.7 FL (ref 8.9–12.7)
POTASSIUM SERPL-SCNC: 4.3 MMOL/L (ref 3.5–5.3)
PROT SERPL-MCNC: 7.1 G/DL (ref 6.4–8.2)
RBC # BLD AUTO: 5.57 MILLION/UL (ref 3.81–5.12)
SODIUM SERPL-SCNC: 133 MMOL/L (ref 136–145)
TSH SERPL DL<=0.05 MIU/L-ACNC: 2.81 UIU/ML (ref 0.36–3.74)
WBC # BLD AUTO: 5 THOUSAND/UL (ref 4.31–10.16)

## 2019-07-09 PROCEDURE — 36415 COLL VENOUS BLD VENIPUNCTURE: CPT | Performed by: FAMILY MEDICINE

## 2019-07-09 PROCEDURE — 83036 HEMOGLOBIN GLYCOSYLATED A1C: CPT | Performed by: FAMILY MEDICINE

## 2019-07-09 PROCEDURE — 80053 COMPREHEN METABOLIC PANEL: CPT | Performed by: FAMILY MEDICINE

## 2019-07-09 PROCEDURE — 99214 OFFICE O/P EST MOD 30 MIN: CPT | Performed by: FAMILY MEDICINE

## 2019-07-09 PROCEDURE — 84443 ASSAY THYROID STIM HORMONE: CPT | Performed by: FAMILY MEDICINE

## 2019-07-09 PROCEDURE — 85025 COMPLETE CBC W/AUTO DIFF WBC: CPT | Performed by: FAMILY MEDICINE

## 2019-07-09 NOTE — PROGRESS NOTES
Assessment/Plan:    No problem-specific Assessment & Plan notes found for this encounter  Diagnoses and all orders for this visit:    Type 2 diabetes mellitus without complication, without long-term current use of insulin (HCC)    Other specified hypothyroidism    Essential hypertension    Vitamin D deficiency    Other hyperlipidemia          Subjective:      Patient ID: Patricio Emmanuel is a 80 y o  female  PATIENT RETURNS FOR FOLLOW-UP OF CHRONIC MEDICAL CONDITIONS  NO HOSPITAL STAYS OR EMERGENCY VISITS RECENTLY  MEDS WERE REVIEWED AND NO SIDE EFFECTS  NO NEW ISSUES  UNLESS NOTED BELOW  NO NEW MEDICAL PROVIDER REPORTED  THE CHRONIC DISEASES LISTED ABOVE ARE STABLE AND UNCHANGED/ THE PLAN OF CARE FOR THOSE WILL REMAIN UNCHANGED UNLESS NOTED BELOW  Notices lump in suprapubic area ; non tender : 2 wks ; The following portions of the patient's history were reviewed and updated as appropriate: allergies, current medications, past family history, past medical history, past social history, past surgical history and problem list     Review of Systems   Constitutional: Negative for activity change and appetite change  HENT: Negative for voice change  Eyes: Negative for visual disturbance  Respiratory: Negative for chest tightness and shortness of breath  Cardiovascular: Negative for chest pain, palpitations and leg swelling  Gastrointestinal: Negative for abdominal pain, blood in stool, constipation and diarrhea  Genitourinary: Negative for dysuria, vaginal bleeding and vaginal discharge  Skin: Negative for rash  Neurological: Negative for dizziness  Psychiatric/Behavioral: Negative for dysphoric mood           Objective:  Vitals:    07/09/19 1429   BP: 140/80   BP Location: Left arm   Patient Position: Sitting   Cuff Size: Large   Pulse: 60   Resp: 18   Weight: 88 5 kg (195 lb)   Height: 5' 1" (1 549 m)      Physical Exam   Constitutional: She appears well-developed and well-nourished  HENT:   Head: Normocephalic and atraumatic  Eyes: Conjunctivae are normal    Neck: Neck supple  No thyromegaly present  Cardiovascular: Normal rate, regular rhythm, normal heart sounds and intact distal pulses  No murmur heard  Pulmonary/Chest: Effort normal and breath sounds normal  No respiratory distress  Musculoskeletal: She exhibits no edema  Lymphadenopathy:     She has no cervical adenopathy  Skin: Skin is warm and dry  Psychiatric: She has a normal mood and affect  Her behavior is normal          Patient's chronic problems that were reviewed today are stable  Meds reviewed and no changes made  Appropriate labs and imaging were ordered  Preventive measures appropriate for age and sex were reviewed with patient  Immunizations were updated as appropriate

## 2019-07-10 ENCOUNTER — TELEPHONE (OUTPATIENT)
Dept: FAMILY MEDICINE CLINIC | Facility: CLINIC | Age: 84
End: 2019-07-10

## 2019-07-10 ENCOUNTER — TREATMENT (OUTPATIENT)
Dept: FAMILY MEDICINE CLINIC | Facility: CLINIC | Age: 84
End: 2019-07-10

## 2019-07-10 DIAGNOSIS — R79.89 ABNORMAL LIVER FUNCTION TEST: Primary | ICD-10-CM

## 2019-07-10 NOTE — RESULT ENCOUNTER NOTE
Spoke with patient daughter Maura Hicks and informed her on patient lab results, also gave daughter address to have blood drawn in [facility]

## 2019-07-17 ENCOUNTER — APPOINTMENT (OUTPATIENT)
Dept: LAB | Facility: CLINIC | Age: 84
End: 2019-07-17
Payer: MEDICARE

## 2019-07-17 DIAGNOSIS — R79.89 ABNORMAL LIVER FUNCTION TEST: ICD-10-CM

## 2019-07-17 PROCEDURE — 84080 ASSAY ALKALINE PHOSPHATASES: CPT

## 2019-07-17 PROCEDURE — 36415 COLL VENOUS BLD VENIPUNCTURE: CPT

## 2019-07-17 PROCEDURE — 84075 ASSAY ALKALINE PHOSPHATASE: CPT

## 2019-07-19 LAB
ALP BONE CFR SERPL: 25 % (ref 14–68)
ALP INTEST CFR SERPL: 2 % (ref 0–18)
ALP LIVER CFR SERPL: 73 % (ref 18–85)
ALP SERPL-CCNC: 488 IU/L (ref 39–117)

## 2019-07-21 ENCOUNTER — TREATMENT (OUTPATIENT)
Dept: FAMILY MEDICINE CLINIC | Facility: CLINIC | Age: 84
End: 2019-07-21

## 2019-07-21 DIAGNOSIS — R79.89 ELEVATED LIVER FUNCTION TESTS: Primary | Chronic | ICD-10-CM

## 2019-07-22 ENCOUNTER — TELEPHONE (OUTPATIENT)
Dept: FAMILY MEDICINE CLINIC | Facility: CLINIC | Age: 84
End: 2019-07-22

## 2019-07-22 NOTE — TELEPHONE ENCOUNTER
----- Message from Sandra Rose MD sent at 7/21/2019  2:07 PM EDT -----  Call patient's daughter  I scheduled her for lab work in about 3-4 weeks she is aware  Expedite getting it done

## 2019-07-22 NOTE — TELEPHONE ENCOUNTER
Spoke to patients daughter Víctor Esteban  Advised of previous bloodwork message   Lab script mailed out

## 2019-08-05 DIAGNOSIS — E78.49 OTHER HYPERLIPIDEMIA: ICD-10-CM

## 2019-08-05 RX ORDER — ATORVASTATIN CALCIUM 40 MG/1
40 TABLET, FILM COATED ORAL DAILY
Qty: 90 TABLET | Refills: 3 | Status: SHIPPED | OUTPATIENT
Start: 2019-08-05 | End: 2019-12-16

## 2019-08-21 ENCOUNTER — APPOINTMENT (OUTPATIENT)
Dept: LAB | Facility: CLINIC | Age: 84
End: 2019-08-21
Payer: MEDICARE

## 2019-08-21 DIAGNOSIS — R79.89 ELEVATED LIVER FUNCTION TESTS: Chronic | ICD-10-CM

## 2019-08-21 LAB
ALBUMIN SERPL BCP-MCNC: 2.5 G/DL (ref 3.5–5)
ALP SERPL-CCNC: 608 U/L (ref 46–116)
ALT SERPL W P-5'-P-CCNC: 65 U/L (ref 12–78)
ANION GAP SERPL CALCULATED.3IONS-SCNC: 5 MMOL/L (ref 4–13)
AST SERPL W P-5'-P-CCNC: 73 U/L (ref 5–45)
BILIRUB SERPL-MCNC: 1.38 MG/DL (ref 0.2–1)
BUN SERPL-MCNC: 9 MG/DL (ref 5–25)
CALCIUM SERPL-MCNC: 8.9 MG/DL (ref 8.3–10.1)
CHLORIDE SERPL-SCNC: 103 MMOL/L (ref 100–108)
CO2 SERPL-SCNC: 28 MMOL/L (ref 21–32)
CREAT SERPL-MCNC: 0.97 MG/DL (ref 0.6–1.3)
GFR SERPL CREATININE-BSD FRML MDRD: 51 ML/MIN/1.73SQ M
GGT SERPL-CCNC: 916 U/L (ref 5–85)
GLUCOSE P FAST SERPL-MCNC: 181 MG/DL (ref 65–99)
POTASSIUM SERPL-SCNC: 4.1 MMOL/L (ref 3.5–5.3)
PROT SERPL-MCNC: 7.1 G/DL (ref 6.4–8.2)
SODIUM SERPL-SCNC: 136 MMOL/L (ref 136–145)

## 2019-08-21 PROCEDURE — 80053 COMPREHEN METABOLIC PANEL: CPT

## 2019-08-21 PROCEDURE — 82977 ASSAY OF GGT: CPT

## 2019-08-21 PROCEDURE — 36415 COLL VENOUS BLD VENIPUNCTURE: CPT

## 2019-08-22 ENCOUNTER — TREATMENT (OUTPATIENT)
Dept: FAMILY MEDICINE CLINIC | Facility: CLINIC | Age: 84
End: 2019-08-22

## 2019-08-22 DIAGNOSIS — R79.89 ABNORMAL LIVER FUNCTION TEST: Primary | Chronic | ICD-10-CM

## 2019-08-22 NOTE — PROGRESS NOTES
Assessment/Plan:     Patient has demonstrated progressive increase in liver function studies over the last several months  She is asymptomatic  We did however change her statin a back in February  Because of expense  We will secure a liver ultrasound and for follow up from there we will hold the statin   There are no diagnoses linked to this encounter  Subjective:     Patient ID: Lazaro Sams is a 80 y o  female      HPI    Review of Systems      Objective:     Physical Exam

## 2019-08-30 ENCOUNTER — TELEPHONE (OUTPATIENT)
Dept: FAMILY MEDICINE CLINIC | Facility: CLINIC | Age: 84
End: 2019-08-30

## 2019-08-30 NOTE — TELEPHONE ENCOUNTER
Pt scheduled for u/s right upper quad on 09/03/19 at [facility]  Pt is aware

## 2019-09-03 ENCOUNTER — HOSPITAL ENCOUNTER (OUTPATIENT)
Dept: ULTRASOUND IMAGING | Facility: HOSPITAL | Age: 84
Discharge: HOME | End: 2019-09-03
Payer: MEDICARE

## 2019-09-03 DIAGNOSIS — R79.89 ABNORMAL LIVER FUNCTION TEST: ICD-10-CM

## 2019-09-03 PROCEDURE — 76705 ECHO EXAM OF ABDOMEN: CPT

## 2019-09-05 ENCOUNTER — TREATMENT (OUTPATIENT)
Dept: FAMILY MEDICINE CLINIC | Facility: CLINIC | Age: 84
End: 2019-09-05

## 2019-09-05 DIAGNOSIS — R79.89 ABNORMAL LIVER FUNCTION TEST: Primary | Chronic | ICD-10-CM

## 2019-09-05 NOTE — PROGRESS NOTES
Assessment/Plan:    No problem-specific Assessment & Plan notes found for this encounter  Diagnoses and all orders for this visit:    Abnormal liver function test        Her are:  Phosphatase continues to rise now we see a beginning rise in bilirubin  She remains asymptomatic ultrasound does not show any obvious reason for same  At this point I believe she may have an obstructing lesion in her common bile duct that has not show any dilation to this point  I discussed at length with the daughter  Our plan is to repeat the liver function studies and is still elevating plan a GI consult for possible ERCP I did tell the daughter that we could be dealing with either a stone or possibly a common bile duct obstructing neoplasm  Obviously at her age a major GI procedure would not be advisable  Subjective:      Patient ID: Dutch Camp is a 80 y o  female  HPI    The following portions of the patient's history were reviewed and updated as appropriate: allergies, current medications, past family history, past medical history, past social history, past surgical history and problem list     Review of Systems      Objective: There were no vitals filed for this visit     Physical Exam

## 2019-09-06 ENCOUNTER — APPOINTMENT (OUTPATIENT)
Dept: LAB | Facility: CLINIC | Age: 84
End: 2019-09-06
Payer: MEDICARE

## 2019-09-06 ENCOUNTER — TELEPHONE (OUTPATIENT)
Dept: OTHER | Facility: OTHER | Age: 84
End: 2019-09-06

## 2019-09-06 DIAGNOSIS — R79.89 ABNORMAL LIVER FUNCTION TEST: Chronic | ICD-10-CM

## 2019-09-06 LAB
ALBUMIN SERPL BCP-MCNC: 2.5 G/DL (ref 3.5–5)
ALP SERPL-CCNC: 644 U/L (ref 46–116)
ALT SERPL W P-5'-P-CCNC: 78 U/L (ref 12–78)
ANION GAP SERPL CALCULATED.3IONS-SCNC: 8 MMOL/L (ref 4–13)
AST SERPL W P-5'-P-CCNC: 81 U/L (ref 5–45)
BILIRUB SERPL-MCNC: 2.15 MG/DL (ref 0.2–1)
BUN SERPL-MCNC: 10 MG/DL (ref 5–25)
CALCIUM SERPL-MCNC: 8.9 MG/DL (ref 8.3–10.1)
CHLORIDE SERPL-SCNC: 100 MMOL/L (ref 100–108)
CO2 SERPL-SCNC: 26 MMOL/L (ref 21–32)
CREAT SERPL-MCNC: 0.96 MG/DL (ref 0.6–1.3)
GFR SERPL CREATININE-BSD FRML MDRD: 52 ML/MIN/1.73SQ M
GLUCOSE SERPL-MCNC: 282 MG/DL (ref 65–140)
POTASSIUM SERPL-SCNC: 4.2 MMOL/L (ref 3.5–5.3)
PROT SERPL-MCNC: 6.9 G/DL (ref 6.4–8.2)
SODIUM SERPL-SCNC: 134 MMOL/L (ref 136–145)

## 2019-09-06 PROCEDURE — 36415 COLL VENOUS BLD VENIPUNCTURE: CPT

## 2019-09-06 PROCEDURE — 80053 COMPREHEN METABOLIC PANEL: CPT

## 2019-09-09 ENCOUNTER — DOCUMENTATION (OUTPATIENT)
Dept: FAMILY MEDICINE CLINIC | Facility: CLINIC | Age: 84
End: 2019-09-09

## 2019-09-11 ENCOUNTER — TELEPHONE (OUTPATIENT)
Dept: FAMILY MEDICINE CLINIC | Facility: CLINIC | Age: 84
End: 2019-09-11

## 2019-10-07 ENCOUNTER — APPOINTMENT (OUTPATIENT)
Dept: LAB | Facility: CLINIC | Age: 84
End: 2019-10-07
Payer: MEDICARE

## 2019-10-07 ENCOUNTER — TRANSCRIBE ORDERS (OUTPATIENT)
Dept: LAB | Facility: CLINIC | Age: 84
End: 2019-10-07

## 2019-10-07 DIAGNOSIS — R94.5 ABNORMAL RESULTS OF LIVER FUNCTION STUDIES: Primary | ICD-10-CM

## 2019-10-07 DIAGNOSIS — R94.5 ABNORMAL RESULTS OF LIVER FUNCTION STUDIES: ICD-10-CM

## 2019-10-07 LAB
ALBUMIN SERPL BCP-MCNC: 2.7 G/DL (ref 3.5–5)
ALP SERPL-CCNC: 361 U/L (ref 46–116)
ALT SERPL W P-5'-P-CCNC: 33 U/L (ref 12–78)
ANION GAP SERPL CALCULATED.3IONS-SCNC: 4 MMOL/L (ref 4–13)
AST SERPL W P-5'-P-CCNC: 38 U/L (ref 5–45)
BILIRUB SERPL-MCNC: 1.08 MG/DL (ref 0.2–1)
BUN SERPL-MCNC: 9 MG/DL (ref 5–25)
CALCIUM SERPL-MCNC: 9.5 MG/DL (ref 8.3–10.1)
CHLORIDE SERPL-SCNC: 97 MMOL/L (ref 100–108)
CO2 SERPL-SCNC: 29 MMOL/L (ref 21–32)
CREAT SERPL-MCNC: 0.89 MG/DL (ref 0.6–1.3)
GFR SERPL CREATININE-BSD FRML MDRD: 57 ML/MIN/1.73SQ M
GLUCOSE P FAST SERPL-MCNC: 280 MG/DL (ref 65–99)
POTASSIUM SERPL-SCNC: 4.6 MMOL/L (ref 3.5–5.3)
PROT SERPL-MCNC: 7.2 G/DL (ref 6.4–8.2)
SODIUM SERPL-SCNC: 130 MMOL/L (ref 136–145)

## 2019-10-07 PROCEDURE — 36415 COLL VENOUS BLD VENIPUNCTURE: CPT

## 2019-10-07 PROCEDURE — 84165 PROTEIN E-PHORESIS SERUM: CPT | Performed by: PATHOLOGY

## 2019-10-07 PROCEDURE — 86038 ANTINUCLEAR ANTIBODIES: CPT

## 2019-10-07 PROCEDURE — 86235 NUCLEAR ANTIGEN ANTIBODY: CPT

## 2019-10-07 PROCEDURE — 80053 COMPREHEN METABOLIC PANEL: CPT

## 2019-10-07 PROCEDURE — 86256 FLUORESCENT ANTIBODY TITER: CPT

## 2019-10-07 PROCEDURE — 84165 PROTEIN E-PHORESIS SERUM: CPT

## 2019-10-08 LAB
ACTIN IGG SERPL-ACNC: 9 UNITS (ref 0–19)
ALBUMIN SERPL ELPH-MCNC: 3.36 G/DL (ref 3.5–5)
ALBUMIN SERPL ELPH-MCNC: 49.4 % (ref 52–65)
ALPHA1 GLOB SERPL ELPH-MCNC: 0.39 G/DL (ref 0.1–0.4)
ALPHA1 GLOB SERPL ELPH-MCNC: 5.8 % (ref 2.5–5)
ALPHA2 GLOB SERPL ELPH-MCNC: 0.84 G/DL (ref 0.4–1.2)
ALPHA2 GLOB SERPL ELPH-MCNC: 12.3 % (ref 7–13)
BETA GLOB ABNORMAL SERPL ELPH-MCNC: 0.42 G/DL (ref 0.4–0.8)
BETA1 GLOB SERPL ELPH-MCNC: 6.2 % (ref 5–13)
BETA2 GLOB SERPL ELPH-MCNC: 6.4 % (ref 2–8)
BETA2+GAMMA GLOB SERPL ELPH-MCNC: 0.44 G/DL (ref 0.2–0.5)
GAMMA GLOB ABNORMAL SERPL ELPH-MCNC: 1.35 G/DL (ref 0.5–1.6)
GAMMA GLOB SERPL ELPH-MCNC: 19.9 % (ref 12–22)
IGG/ALB SER: 0.98 {RATIO} (ref 1.1–1.8)
MITOCHONDRIA M2 IGG SER-ACNC: 34 UNITS (ref 0–20)
PROT PATTERN SERPL ELPH-IMP: ABNORMAL
PROT SERPL-MCNC: 6.8 G/DL (ref 6.4–8.2)

## 2019-10-09 LAB — RYE IGE QN: NEGATIVE

## 2019-11-07 DIAGNOSIS — E11.9 TYPE 2 DIABETES MELLITUS WITHOUT COMPLICATION, WITHOUT LONG-TERM CURRENT USE OF INSULIN (HCC): ICD-10-CM

## 2019-11-07 DIAGNOSIS — E03.9 HYPOTHYROIDISM, UNSPECIFIED TYPE: ICD-10-CM

## 2019-11-07 RX ORDER — LEVOTHYROXINE SODIUM 0.05 MG/1
50 TABLET ORAL DAILY
Qty: 90 TABLET | Refills: 3 | Status: SHIPPED | OUTPATIENT
Start: 2019-11-07 | End: 2019-12-31 | Stop reason: SDUPTHER

## 2019-11-07 RX ORDER — METOPROLOL SUCCINATE 25 MG/1
25 TABLET, EXTENDED RELEASE ORAL EVERY 12 HOURS
Qty: 180 TABLET | Refills: 3 | Status: SHIPPED | OUTPATIENT
Start: 2019-11-07 | End: 2021-05-11 | Stop reason: ALTCHOICE

## 2019-11-29 ENCOUNTER — TELEPHONE (OUTPATIENT)
Dept: FAMILY MEDICINE CLINIC | Facility: CLINIC | Age: 84
End: 2019-11-29

## 2019-11-29 NOTE — TELEPHONE ENCOUNTER
Daughter called that patient has a bad cough and when she woke up this morning she had a blood mess in her diaper and went through to the bed  I advised Daughter to take her to [facility] or ER due to the bleeding  Patient seemed to be alert when speaking to the daughter now, patient was sitting in her chair  Daughter will call with an update if something should change

## 2019-12-16 ENCOUNTER — OFFICE VISIT (OUTPATIENT)
Dept: FAMILY MEDICINE CLINIC | Facility: CLINIC | Age: 84
End: 2019-12-16
Payer: MEDICARE

## 2019-12-16 ENCOUNTER — APPOINTMENT (OUTPATIENT)
Dept: LAB | Facility: CLINIC | Age: 84
End: 2019-12-16
Payer: MEDICARE

## 2019-12-16 VITALS
WEIGHT: 186 LBS | HEIGHT: 60 IN | DIASTOLIC BLOOD PRESSURE: 70 MMHG | RESPIRATION RATE: 17 BRPM | BODY MASS INDEX: 36.52 KG/M2 | HEART RATE: 79 BPM | SYSTOLIC BLOOD PRESSURE: 130 MMHG | OXYGEN SATURATION: 97 %

## 2019-12-16 DIAGNOSIS — R79.89 ABNORMAL LIVER FUNCTION TEST: ICD-10-CM

## 2019-12-16 DIAGNOSIS — E11.9 TYPE 2 DIABETES MELLITUS WITHOUT COMPLICATION, WITHOUT LONG-TERM CURRENT USE OF INSULIN (HCC): Primary | Chronic | ICD-10-CM

## 2019-12-16 DIAGNOSIS — I87.2 CHRONIC VENOUS STASIS DERMATITIS: ICD-10-CM

## 2019-12-16 DIAGNOSIS — Z74.09 MOBILITY POOR: ICD-10-CM

## 2019-12-16 DIAGNOSIS — R26.2 AMBULATORY DYSFUNCTION: ICD-10-CM

## 2019-12-16 DIAGNOSIS — E03.9 HYPOTHYROIDISM, UNSPECIFIED TYPE: ICD-10-CM

## 2019-12-16 DIAGNOSIS — E11.9 TYPE 2 DIABETES MELLITUS WITHOUT COMPLICATION, WITHOUT LONG-TERM CURRENT USE OF INSULIN (HCC): Chronic | ICD-10-CM

## 2019-12-16 DIAGNOSIS — E78.49 OTHER HYPERLIPIDEMIA: ICD-10-CM

## 2019-12-16 LAB
ALBUMIN SERPL BCP-MCNC: 2.9 G/DL (ref 3.5–5)
ALP SERPL-CCNC: 137 U/L (ref 46–116)
ALT SERPL W P-5'-P-CCNC: 20 U/L (ref 12–78)
ANION GAP SERPL CALCULATED.3IONS-SCNC: 7 MMOL/L (ref 4–13)
AST SERPL W P-5'-P-CCNC: 18 U/L (ref 5–45)
BASOPHILS # BLD AUTO: 0.06 THOUSANDS/ΜL (ref 0–0.1)
BASOPHILS NFR BLD AUTO: 1 % (ref 0–1)
BILIRUB SERPL-MCNC: 1.04 MG/DL (ref 0.2–1)
BUN SERPL-MCNC: 13 MG/DL (ref 5–25)
CALCIUM SERPL-MCNC: 9.3 MG/DL (ref 8.3–10.1)
CHLORIDE SERPL-SCNC: 102 MMOL/L (ref 100–108)
CHOLEST SERPL-MCNC: 273 MG/DL (ref 50–200)
CO2 SERPL-SCNC: 27 MMOL/L (ref 21–32)
CREAT SERPL-MCNC: 1 MG/DL (ref 0.6–1.3)
EOSINOPHIL # BLD AUTO: 0.07 THOUSAND/ΜL (ref 0–0.61)
EOSINOPHIL NFR BLD AUTO: 1 % (ref 0–6)
ERYTHROCYTE [DISTWIDTH] IN BLOOD BY AUTOMATED COUNT: 13.8 % (ref 11.6–15.1)
EST. AVERAGE GLUCOSE BLD GHB EST-MCNC: 192 MG/DL
GFR SERPL CREATININE-BSD FRML MDRD: 49 ML/MIN/1.73SQ M
GLUCOSE P FAST SERPL-MCNC: 216 MG/DL (ref 65–99)
HBA1C MFR BLD: 8.3 % (ref 4.2–6.3)
HCT VFR BLD AUTO: 47.1 % (ref 34.8–46.1)
HDLC SERPL-MCNC: 59 MG/DL
HGB BLD-MCNC: 14.8 G/DL (ref 11.5–15.4)
IMM GRANULOCYTES # BLD AUTO: 0.11 THOUSAND/UL (ref 0–0.2)
IMM GRANULOCYTES NFR BLD AUTO: 1 % (ref 0–2)
LDLC SERPL CALC-MCNC: 194 MG/DL (ref 0–100)
LYMPHOCYTES # BLD AUTO: 0.72 THOUSANDS/ΜL (ref 0.6–4.47)
LYMPHOCYTES NFR BLD AUTO: 7 % (ref 14–44)
MCH RBC QN AUTO: 27.5 PG (ref 26.8–34.3)
MCHC RBC AUTO-ENTMCNC: 31.4 G/DL (ref 31.4–37.4)
MCV RBC AUTO: 88 FL (ref 82–98)
MONOCYTES # BLD AUTO: 1.09 THOUSAND/ΜL (ref 0.17–1.22)
MONOCYTES NFR BLD AUTO: 11 % (ref 4–12)
NEUTROPHILS # BLD AUTO: 8.01 THOUSANDS/ΜL (ref 1.85–7.62)
NEUTS SEG NFR BLD AUTO: 79 % (ref 43–75)
NONHDLC SERPL-MCNC: 214 MG/DL
NRBC BLD AUTO-RTO: 0 /100 WBCS
PLATELET # BLD AUTO: 221 THOUSANDS/UL (ref 149–390)
PMV BLD AUTO: 11.1 FL (ref 8.9–12.7)
POTASSIUM SERPL-SCNC: 4.4 MMOL/L (ref 3.5–5.3)
PROT SERPL-MCNC: 7.3 G/DL (ref 6.4–8.2)
RBC # BLD AUTO: 5.38 MILLION/UL (ref 3.81–5.12)
SODIUM SERPL-SCNC: 136 MMOL/L (ref 136–145)
T4 FREE SERPL-MCNC: 1.39 NG/DL (ref 0.76–1.46)
TRIGL SERPL-MCNC: 100 MG/DL
TSH SERPL DL<=0.05 MIU/L-ACNC: 4.39 UIU/ML (ref 0.36–3.74)
WBC # BLD AUTO: 10.06 THOUSAND/UL (ref 4.31–10.16)

## 2019-12-16 PROCEDURE — 85025 COMPLETE CBC W/AUTO DIFF WBC: CPT

## 2019-12-16 PROCEDURE — 99204 OFFICE O/P NEW MOD 45 MIN: CPT | Performed by: PHYSICIAN ASSISTANT

## 2019-12-16 PROCEDURE — 83036 HEMOGLOBIN GLYCOSYLATED A1C: CPT

## 2019-12-16 PROCEDURE — 84443 ASSAY THYROID STIM HORMONE: CPT

## 2019-12-16 PROCEDURE — 84439 ASSAY OF FREE THYROXINE: CPT

## 2019-12-16 PROCEDURE — 36415 COLL VENOUS BLD VENIPUNCTURE: CPT

## 2019-12-16 PROCEDURE — 80053 COMPREHEN METABOLIC PANEL: CPT

## 2019-12-16 PROCEDURE — 80061 LIPID PANEL: CPT

## 2019-12-16 RX ORDER — TRIAMCINOLONE ACETONIDE 5 MG/G
CREAM TOPICAL DAILY PRN
Qty: 30 G | Refills: 0 | Status: SHIPPED | OUTPATIENT
Start: 2019-12-16 | End: 2020-01-14 | Stop reason: HOSPADM

## 2019-12-16 RX ORDER — URSODIOL 500 MG/1
500 TABLET, FILM COATED ORAL 2 TIMES DAILY
Refills: 5 | COMMUNITY
Start: 2019-11-07 | End: 2019-12-16

## 2019-12-16 NOTE — PROGRESS NOTES
New Patient    Gideon Finn 80 y o  female   Date:  12/16/2019      Assessment and Plan:    Sue Peter was seen today for establish care, leg pain and knee pain  Diagnoses and all orders for this visit:    Type 2 diabetes mellitus without complication, without long-term current use of insulin (HCC)  -     Microalbumin / creatinine urine ratio  -     Lipid panel; Future  -     Comprehensive metabolic panel; Future  -     HEMOGLOBIN A1C W/ EAG ESTIMATION; Future  - check labs, pending results would encourage BG monitoring at home     Hypothyroidism, unspecified type  -     TSH, 3rd generation; Future  -     T4, free; Future  - synthroid    Other hyperlipidemia  - off statin due to LFTs    Abnormal liver function test  -     CBC and differential; Future  - obtain records from GI     Chronic venous stasis dermatitis  -     triamcinolone (KENALOG) 0 5 % cream; Apply topically daily as needed (dry skin of lower extremities)  - elevated legs as able  - encourage compression stockings  - keeping legs moisturized    Ambulatory dysfunction  -     Ambulatory referral to Physical Therapy; Future    Mobility poor  -     Ambulatory referral to Physical Therapy; Future  - very inactive and deconditioned  - daughter explains - "I sit mostly all day so I let her do the same"         HPI:  Chief Complaint   Patient presents with   [address]     pt is fasting- would like to do A1C as lab--- red spot on right arm    Leg Pain     left leg as been bothering her off and on- will get dressed while sitting to eat breakfast will complain it is hurting- from leg- hip - has happned a couple of time in the last week     Knee Pain     daughter stated she has a hard time getting up and going- had two new knees in the 66's      HPI  Patient is a 81 yo female who presents with daughter whom she lives with to establish care  She was seeing Dr Frieda Knox, daughter thinks last seen him maybe in June   She has been living with daughter since Feb  She goes to eye doctor regularly  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 and L eye with injection for glaucoma  She does not check BG regularly  "My last pcp said dont worry about it"  She takes metformin with breakfast  She does not have a heart doctor  She has been rate controlled and anticoagulated for a fib  She has a red spot on hand and was reassured that is was a small bruise  She recently went to [facility] in [facility] for elevated LFTs  She was sent for an abd MRI  She was put on ursodiol  She is no longer taking lipitor due to LFTs  She had foot exam in Aug 2019 at podiatry  She goes every 3 months  She walks with walker  She has refills through [facility]  She [facility] center  She goes to senior center often, not as much lately due to cold weather  No falls  She sits most days  ROS: Review of Systems   Reason unable to perform ROS: Lummi, daughter answers all questions  Past Medical History:   Diagnosis Date    [facility]     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    Cholecystitis     Disease of tonsils     Glaucoma 2008    NOS     Patient Active Problem List   Diagnosis    Type 2 diabetes mellitus without complication, without long-term current use of insulin ([facility] )    Other specified hypothyroidism    Essential hypertension    Other hyperlipidemia    Vitamin D deficiency    Leg ulcer, right, limited to breakdown of skin ([address] )    Type 2 diabetes mellitus with foot ulcer (CODE) ([facility] [address] )    Chronic atrial fibrillation    Abnormal liver function test    Generalized osteoarthritis       Past Surgical History:   Procedure Laterality Date    CHOLECYSTECTOMY      EYE SURGERY Right 03/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    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       Social History     Socioeconomic History    Marital status:       Spouse name: None    Number of children: None    Years of education: None    Highest education level: None   Occupational History    None   Social Needs    Financial resource strain: None    Food insecurity:     Worry: None     Inability: None    Transportation needs:     Medical: None     Non-medical: None   Tobacco Use    Smoking status: Never Smoker    Smokeless tobacco: Never Used   Substance and Sexual Activity    Alcohol use: Not Currently    Drug use: Never    Sexual activity: Not Currently   Lifestyle    Physical activity:     Days per week: None     Minutes per session: None    Stress: None   Relationships    Social connections:     Talks on phone: None     Gets together: None     Attends Congregational service: None     Active member of club or organization: None     Attends meetings of clubs or organizations: None     Relationship status: None    Intimate partner violence:     Fear of current or ex partner: None     Emotionally abused: None     Physically abused: None     Forced sexual activity: None   Other Topics Concern    None   Social History Narrative    None       Family History   Problem Relation Age of Onset    Breast cancer Sister     No Known Problems Mother     No Known Problems Father        Allergies   Allergen Reactions    Aspirin GI Intolerance    Penicillins          Current Outpatient Medications:     Cholecalciferol (VITAMIN D-3) 1000 units CAPS, ONE DAILY, Disp: , Rfl:     dabigatran etexilate (PRADAXA) 150 mg capsu, Take 1 capsule (150 mg total) by mouth 2 (two) times a day for 90 days, Disp: 180 capsule, Rfl: 3    glucose blood test strip, tests once daily by fingerstick route, Disp: , Rfl:     levothyroxine 50 mcg tablet, Take 1 tablet (50 mcg total) by mouth daily, Disp: 90 tablet, Rfl: 3    metFORMIN (GLUCOPHAGE) 500 mg tablet, Take 1 tablet (500 mg total) by mouth daily, Disp: 90 tablet, Rfl: 3    metoprolol succinate (TOPROL-XL) 25 mg 24 hr tablet, Take 1 tablet (25 mg total) by mouth every 12 (twelve) hours, Disp: 180 tablet, Rfl: 3    OMEGA-3 FATTY ACIDS PO, 2 (two) times a day , Disp: , Rfl:     triamcinolone (KENALOG) 0 5 % cream, Apply topically daily as needed (dry skin of lower extremities), Disp: 30 g, Rfl: 0      Physical Exam:  /70   Pulse 79   Resp 17   Ht 5' (1 524 m)   Wt 84 4 kg (186 lb)   SpO2 97%   BMI 36 33 kg/m²     Physical Exam   Constitutional: She is oriented to person, place, and time  She appears well-developed and well-nourished  No distress  obese   HENT:   Head: Normocephalic and atraumatic  Right Ear: External ear normal    Left Ear: External ear normal    Nose: Nose normal    Mouth/Throat: Oropharynx is clear and moist    Eyes: Pupils are equal, round, and reactive to light  Conjunctivae are normal    Neck: Normal range of motion  Neck supple  No JVD present  Cardiovascular: Normal rate and normal heart sounds  Exam reveals no friction rub  Irregularly irregular   Pulmonary/Chest: Effort normal  No respiratory distress  She has no wheezes  Abdominal: Soft  She exhibits no distension  There is no tenderness  There is no guarding  Musculoskeletal: She exhibits edema (1+ nonpitting)  She exhibits no deformity  Ambulates with walker   Neurological: She is alert and oriented to person, place, and time  Skin: Skin is warm and dry  No rash noted  eccymosis of hands  Venous stasis with dermatities b/l LE   Psychiatric: She has a normal mood and affect   Her behavior is normal          Labs:  Lab Results   Component Value Date    WBC 10 06 12/16/2019    HGB 14 8 12/16/2019    HCT 47 1 (H) 12/16/2019    MCV 88 12/16/2019     12/16/2019     Lab Results   Component Value Date     (L) 05/22/2018    K 4 4 12/16/2019     12/16/2019    CO2 27 12/16/2019    ANIONGAP 7 05/22/2018    BUN 13 12/16/2019    CREATININE 1 00 12/16/2019    GLUCOSE 181 (H) 05/22/2018    GLUF 216 (H) 12/16/2019    CALCIUM 9 3 12/16/2019    AST 18 12/16/2019    ALT 20 12/16/2019    ALKPHOS 137 (H) 12/16/2019    PROT 6 1 05/22/2018    BILITOT 0 5 05/22/2018    EGFR 49 12/16/2019

## 2019-12-19 ENCOUNTER — TRANSCRIBE ORDERS (OUTPATIENT)
Dept: LAB | Facility: CLINIC | Age: 84
End: 2019-12-19

## 2019-12-19 ENCOUNTER — APPOINTMENT (OUTPATIENT)
Dept: LAB | Facility: CLINIC | Age: 84
End: 2019-12-19
Payer: MEDICARE

## 2019-12-19 DIAGNOSIS — E13.69 OTHER SPECIFIED DIABETES MELLITUS WITH OTHER SPECIFIED COMPLICATION, UNSPECIFIED WHETHER LONG TERM INSULIN USE (HCC): Primary | ICD-10-CM

## 2019-12-19 LAB
CREAT UR-MCNC: 98.5 MG/DL
MICROALBUMIN UR-MCNC: 10.6 MG/L (ref 0–20)
MICROALBUMIN/CREAT 24H UR: 11 MG/G CREATININE (ref 0–30)

## 2019-12-19 PROCEDURE — 82043 UR ALBUMIN QUANTITATIVE: CPT | Performed by: PHYSICIAN ASSISTANT

## 2019-12-19 PROCEDURE — 82570 ASSAY OF URINE CREATININE: CPT | Performed by: PHYSICIAN ASSISTANT

## 2019-12-31 ENCOUNTER — OFFICE VISIT (OUTPATIENT)
Dept: FAMILY MEDICINE CLINIC | Facility: CLINIC | Age: 84
End: 2019-12-31
Payer: MEDICARE

## 2019-12-31 VITALS
TEMPERATURE: 97.8 F | HEART RATE: 72 BPM | RESPIRATION RATE: 18 BRPM | SYSTOLIC BLOOD PRESSURE: 132 MMHG | WEIGHT: 188 LBS | OXYGEN SATURATION: 97 % | DIASTOLIC BLOOD PRESSURE: 70 MMHG | BODY MASS INDEX: 36.72 KG/M2

## 2019-12-31 DIAGNOSIS — H54.40 BLINDNESS OF ONE EYE: ICD-10-CM

## 2019-12-31 DIAGNOSIS — E11.65 TYPE 2 DIABETES MELLITUS WITH HYPERGLYCEMIA, WITHOUT LONG-TERM CURRENT USE OF INSULIN (HCC): ICD-10-CM

## 2019-12-31 DIAGNOSIS — N18.30 CKD (CHRONIC KIDNEY DISEASE) STAGE 3, GFR 30-59 ML/MIN (HCC): ICD-10-CM
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
## 2019-12-31 DIAGNOSIS — Z74.1 REQUIRES ASSISTANCE WITH ACTIVITIES OF DAILY LIVING (ADL): ICD-10-CM

## 2019-12-31 DIAGNOSIS — R79.89 HIGH SERUM THYROID STIMULATING HORMONE (TSH): ICD-10-CM

## 2019-12-31 DIAGNOSIS — E78.2 MIXED HYPERLIPIDEMIA: Primary | ICD-10-CM

## 2019-12-31 DIAGNOSIS — E03.9 HYPOTHYROIDISM, UNSPECIFIED TYPE: ICD-10-CM

## 2019-12-31 DIAGNOSIS — Z74.09 POOR MOBILITY: ICD-10-CM

## 2019-12-31 PROCEDURE — G0439 PPPS, SUBSEQ VISIT: HCPCS | Performed by: PHYSICIAN ASSISTANT

## 2019-12-31 PROCEDURE — 99215 OFFICE O/P EST HI 40 MIN: CPT | Performed by: PHYSICIAN ASSISTANT

## 2019-12-31 RX ORDER — LANCETS
EACH MISCELLANEOUS
Qty: 204 EACH | Refills: 0 | Status: ON HOLD | OUTPATIENT
Start: 2019-12-31 | End: 2020-01-10 | Stop reason: SDUPTHER

## 2019-12-31 RX ORDER — URSODIOL 500 MG/1
500 TABLET, FILM COATED ORAL 2 TIMES DAILY
COMMUNITY
End: 2020-01-14 | Stop reason: HOSPADM

## 2019-12-31 RX ORDER — LANCETS
EACH MISCELLANEOUS
Qty: 200 EACH | Refills: 0 | Status: SHIPPED | OUTPATIENT
Start: 2019-12-31 | End: 2019-12-31

## 2019-12-31 RX ORDER — LEVOTHYROXINE SODIUM 0.07 MG/1
75 TABLET ORAL DAILY
Qty: 90 TABLET | Refills: 1 | Status: SHIPPED | OUTPATIENT
Start: 2019-12-31

## 2019-12-31 NOTE — PROGRESS NOTES
Routine Follow-up    Dayanna Limon 80 y o  female   Date:  12/31/2019      Assessment and Plan:    Diego Saucedo was seen today for follow-up and medicare wellness visit  Diagnoses and all orders for this visit:    Mixed hyperlipidemia  - off statin due to LFT abnormalities    Hypothyroidism, unspecified type; high tsh  -     levothyroxine 75 mcg tablet; Take 1 tablet (75 mcg total) by mouth daily  - increase from 50 mcg, repeat labs in 3 months     Type 2 diabetes mellitus with hyperglycemia, without long-term current use of insulin ([facility])  -     Glucometer  -     Discontinue: Lancets (ACCU-CHEK MULTICLIX) lancets; Check BG twice daily  -     Glucometer test strips  -     Ambulatory referral to complex care management program; Future  - start BG monitoring at home, daughter is a diabetic herself and can check patient's BG twice daily    CKD (chronic kidney disease) stage 3, GFR 30-59 ml/min ([facility])  -     Ambulatory referral to complex care management program; Future  - not on NSAIDs due to blood thinner  - improve diabetes and ensure BP at goal     Poor mobility  -     Ambulatory referral to complex care management program; Future  - recommend PT, will have attending place home PT orders    Blindness of one eye  -     Ambulatory referral to complex care management program; Future    Requires assistance with activities of daily living (ADL)  -     Ambulatory referral to complex care management program; Future    Caregiving-bathing  -     Ambulatory referral to complex care management program; Future      Depression Screening Follow-up Plan: Patient's depression screening was positive with a PHQ-2 score of 3  Their PHQ-9 score was 11  Patient assessed for underlying major depression  They have no active suicidal ideations  Brief counseling provided and recommend additional follow-up/re-evaluation next office visit  Patient declines further evaluation by mental health professional and/or medications   They have no active suicidal ideations  Brief counseling provided and recommend additional follow-up/re-evaluation at next office visit  Patient's depressive symptoms likely due to other medical condition  Would recommend treatment of underlying condition  Will continue to monitor at next office visit  BMI Counseling: Body mass index is 36 72 kg/m²  The BMI is above normal  Nutrition recommendations include decreasing portion sizes, encouraging healthy choices of fruits and vegetables, limiting drinks that contain sugar, moderation in carbohydrate intake and reducing intake of saturated and trans fat  HPI:  Chief Complaint   Patient presents with    Follow-up     Wondering if taking Vitamin D and Fish Oil is necessary    Medicare Wellness Visit     HPI   Patient is a 81 yo female who presents with daughter to review labs after establishing care 2 weeks ago  Patient was seeing Dr Glynn in the past but has been living with her daughter since February and this is closer to home  Her labs reveal poor control of diabetes, has been on metformin 500 daily and has not had BG testing supplies at home  Her LFTs have continued to improve  She has follow up with GI in January  She has been off the statin but unfortunately her cholesterol is high now  The daughter dropped off the MRI abd that she had done recently, awaiting to be scanned into her chart as it needed to be sent to medical records  Patient's labs reveal GFR 40-50s at baseline but normal Cr  At last appt, the daughter was given referral for PT due to poor mobility and lack of physical activity  The daughter was contacted but did not arrange anything yet as she is worried about transport due to patient being legally blind and difficulty of patient getting in and out of car  The daughter had to help her raise her legs to get in car   The patient has been living with daughter who lives on a one story home with 3 steps to get into house which patient uses walker with assistance of daughter to get into home  The daughter does her shopping and helps her bathe  The patient and daughter admits that she is depressed at times - "how could you not be when you cant do anything and can hardly see?"  They both are against being on more medication and defer antidepressant  The daughter and son are looking into assisted living facilities  The daughter became tearful because she hates to do that for mom but daughter expresses fatigue and burden  ROS: Review of Systems   Reason unable to perform ROS: daughter provides most of history, patient hard of hearing  Constitutional: Negative for appetite change, diaphoresis, fever and unexpected weight change  HENT: Positive for hearing loss  Eyes: Positive for visual disturbance  Respiratory: Negative  Cardiovascular: Positive for leg swelling  Negative for chest pain and palpitations  Gastrointestinal: Negative  Genitourinary: Negative for decreased urine volume, dysuria and hematuria  Skin: Negative for rash (dry skin along LE improved with steroid cream) and wound  Neurological: Positive for weakness (uses walker to ambulate)  Negative for dizziness, seizures and syncope  Psychiatric/Behavioral: Positive for dysphoric mood  Negative for agitation, hallucinations, self-injury and suicidal ideas  Confusion: forgetful at times  The patient is not nervous/anxious          Past Medical History:   Diagnosis Date    Atrial fibrillation ([facility] [address] )     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    Cholecystitis     Disease of tonsils     Glaucoma 2008    NOS     Patient Active Problem List   Diagnosis    Type 2 diabetes mellitus with hyperglycemia, without long-term current use of insulin ([address] )    Other specified hypothyroidism    Essential hypertension    Other hyperlipidemia    Vitamin D deficiency    Leg ulcer, right, limited to breakdown of skin ([address] )    Type 2 diabetes mellitus with [facility] ulcer (CODE) ([facility])    Chronic atrial fibrillation    Abnormal liver function test    Generalized osteoarthritis    CKD (chronic kidney disease) stage 3, GFR 30-59 ml/min ([facility])       Past Surgical History:   Procedure Laterality Date    CHOLECYSTECTOMY      EYE SURGERY Right 03/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    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       Social History     Socioeconomic History    Marital status:       Spouse name: None    Number of children: None    Years of education: None    Highest education level: None   Occupational History    None   Social Needs    Financial resource strain: None    Food insecurity:     Worry: None     Inability: None    Transportation needs:     Medical: No     Non-medical: No   Tobacco Use    Smoking status: Never Smoker    Smokeless tobacco: Never Used   Substance and Sexual Activity    Alcohol use: Not Currently    Drug use: Never    Sexual activity: Not Currently   Lifestyle    Physical activity:     Days per week: None     Minutes per session: None    Stress: None   Relationships    Social connections:     Talks on phone: None     Gets together: None     Attends Druze service: None     Active member of club or organization: None     Attends meetings of clubs or organizations: None     Relationship status: None    Intimate partner violence:     Fear of current or ex partner: None     Emotionally abused: None     Physically abused: None     Forced sexual activity: None   Other Topics Concern    None   Social History Narrative    None       Family History   Problem Relation Age of Onset    Breast cancer Sister     No Known Problems Mother     No Known Problems Father        Allergies   Allergen Reactions    Aspirin GI Intolerance    Penicillins          Current Outpatient Medications:     Cholecalciferol (VITAMIN D-3) 1000 units CAPS, ONE DAILY, Disp: , Rfl:     dabigatran etexilate (PRADAXA) 150 mg capsu, Take 1 capsule (150 mg total) by mouth 2 (two) times a day for 90 days, Disp: 180 capsule, Rfl: 3    levothyroxine 75 mcg tablet, Take 1 tablet (75 mcg total) by mouth daily, Disp: 90 tablet, Rfl: 1    metFORMIN (GLUCOPHAGE) 500 mg tablet, Take 1 tablet (500 mg total) by mouth daily (Patient taking differently: Take 500 mg by mouth 2 (two) times a day with meals ), Disp: 90 tablet, Rfl: 3    metoprolol succinate (TOPROL-XL) 25 mg 24 hr tablet, Take 1 tablet (25 mg total) by mouth every 12 (twelve) hours, Disp: 180 tablet, Rfl: 3    OMEGA-3 FATTY ACIDS PO, 2 (two) times a day , Disp: , Rfl:     triamcinolone (KENALOG) 0 5 % cream, Apply topically daily as needed (dry skin of lower extremities), Disp: 30 g, Rfl: 0    ursodiol (ACTIGALL) 500 MG tablet, Take 500 mg by mouth 2 (two) times a day, Disp: , Rfl:     ACCU-CHEK FASTCLIX LANCETS MISC, CHECK BG TWICE DAILY, Disp: 204 each, Rfl: 0    glucose blood test strip, tests once daily by fingerstick route, Disp: , Rfl:       Physical Exam:  /70 (BP Location: Left arm, Patient Position: Sitting)   Pulse 72   Temp 97 8 °F (36 6 °C) (Oral)   Resp 18   Wt 85 3 kg (188 lb)   SpO2 97%   BMI 36 72 kg/m²     Physical Exam   Constitutional: She is oriented to person, place, and time  She appears well-developed and well-nourished  No distress  obese   HENT:   Head: Normocephalic and atraumatic  Right Ear: External ear normal    Left Ear: External ear normal    Mouth/Throat: Oropharynx is clear and moist    Eyes: Conjunctivae are normal    strabismus   Cardiovascular: Normal rate and regular rhythm  No murmur heard  Pulmonary/Chest: Effort normal  No respiratory distress  She has no wheezes  Abdominal: Soft  She exhibits no distension  Musculoskeletal: She exhibits edema (1+ )  Slow to stand from seated position, ambulates with walker   Neurological: She is alert and oriented to person, place, and time  Skin: Skin is warm and dry  No rash noted  Psychiatric: She has a normal mood and affect   Her behavior is normal  Labs:  Lab Results   Component Value Date    WBC 10 06 12/16/2019    HGB 14 8 12/16/2019    HCT 47 1 (H) 12/16/2019    MCV 88 12/16/2019     12/16/2019     Lab Results   Component Value Date     (L) 05/22/2018    K 4 4 12/16/2019     12/16/2019    CO2 27 12/16/2019    ANIONGAP 7 05/22/2018    BUN 13 12/16/2019    CREATININE 1 00 12/16/2019    GLUCOSE 181 (H) 05/22/2018    GLUF 216 (H) 12/16/2019    CALCIUM 9 3 12/16/2019    AST 18 12/16/2019    ALT 20 12/16/2019    ALKPHOS 137 (H) 12/16/2019    PROT 6 1 05/22/2018    BILITOT 0 5 05/22/2018    EGFR 49 12/16/2019

## 2019-12-31 NOTE — PATIENT INSTRUCTIONS
Medicare Preventive Visit Patient Instructions  Thank you for completing your Welcome to Medicare Visit or Medicare Annual Wellness Visit today  Your next wellness visit will be due in one year (12/31/2020)  The screening/preventive services that you may require over the next 5-10 years are detailed below  Some tests may not apply to you based off risk factors and/or age  Screening tests ordered at today's visit but not completed yet may show as past due  Also, please note that scanned in results may not display below  Preventive Screenings:  Service Recommendations Previous Testing/Comments   Colorectal Cancer Screening  * Colonoscopy    * Fecal Occult Blood Test (FOBT)/Fecal Immunochemical Test (FIT)  * Fecal DNA/Cologuard Test  * Flexible Sigmoidoscopy Age: 54-65 years old   Colonoscopy: every 10 years (may be performed more frequently if at higher risk)  OR  FOBT/FIT: every 1 year  OR  Cologuard: every 3 years  OR  Sigmoidoscopy: every 5 years  Screening may be recommended earlier than age 48 if at higher risk for colorectal cancer  Also, an individualized decision between you and your healthcare provider will decide whether screening between the ages of 74-80 would be appropriate  Colonoscopy: Not on file  FOBT/FIT: Not on file  Cologuard: Not on file  Sigmoidoscopy: Not on file    Screening Not Indicated     Breast Cancer Screening Age: 36 years old  Frequency: every 1-2 years  Not required if history of left and right mastectomy Mammogram: Not on file       Cervical Cancer Screening Between the ages of 21-29, pap smear recommended once every 3 years  Between the ages of 33-67, can perform pap smear with HPV co-testing every 5 years     Recommendations may differ for women with a history of total hysterectomy, cervical cancer, or abnormal pap smears in past  Pap Smear: Not on file    Screening Not Indicated   Hepatitis C Screening Once for adults born between 1945 and 1965  More frequently in patients at high risk for Hepatitis C Hep C Antibody: Not on file       Diabetes Screening 1-2 times per year if you're at risk for diabetes or have pre-diabetes Fasting glucose: 216 mg/dL   A1C: 8 3 %    Screening Not Indicated  History Diabetes   Cholesterol Screening Once every 5 years if you don't have a lipid disorder  May order more often based on risk factors  Lipid panel: 12/16/2019    Screening Not Indicated  History Lipid Disorder     Other Preventive Screenings Covered by Medicare:  1  Abdominal Aortic Aneurysm (AAA) Screening: covered once if your at risk  You're considered to be at risk if you have a family history of AAA  2  Lung Cancer Screening: covers low dose CT scan once per year if you meet all of the following conditions: (1) Age 50-69; (2) No signs or symptoms of lung cancer; (3) Current smoker or have quit smoking within the last 15 years; (4) You have a tobacco smoking history of at least 30 pack years (packs per day multiplied by number of years you smoked); (5) You get a written order from a healthcare provider  3  Glaucoma Screening: covered annually if you're considered high risk: (1) You have diabetes OR (2) Family history of glaucoma OR (3)  aged 48 and older OR (3)  American aged 72 and older  3  Osteoporosis Screening: covered every 2 years if you meet one of the following conditions: (1) You're estrogen deficient and at risk for osteoporosis based off medical history and other findings; (2) Have a vertebral abnormality; (3) On glucocorticoid therapy for more than 3 months; (4) Have primary hyperparathyroidism; (5) On osteoporosis medications and need to assess response to drug therapy  · Last bone density test (DXA Scan): Not on file  5  HIV Screening: covered annually if you're between the age of 12-76  Also covered annually if you are younger than 13 and older than 72 with risk factors for HIV infection   For pregnant patients, it is covered up to 3 times per pregnancy  Immunizations:  Immunization Recommendations   Influenza Vaccine Annual influenza vaccination during flu season is recommended for all persons aged >= 6 months who do not have contraindications   Pneumococcal Vaccine (Prevnar and Pneumovax)  * Prevnar = PCV13  * Pneumovax = PPSV23   Adults 25-60 years old: 1-3 doses may be recommended based on certain risk factors  Adults 72 years old: Prevnar (PCV13) vaccine recommended followed by Pneumovax (PPSV23) vaccine  If already received PPSV23 since turning 65, then PCV13 recommended at least one year after PPSV23 dose  Hepatitis B Vaccine 3 dose series if at intermediate or high risk (ex: diabetes, end stage renal disease, liver disease)   Tetanus (Td) Vaccine - COST NOT COVERED BY MEDICARE PART B Following completion of primary series, a booster dose should be given every 10 years to maintain immunity against tetanus  Td may also be given as tetanus wound prophylaxis  Tdap Vaccine - COST NOT COVERED BY MEDICARE PART B Recommended at least once for all adults  For pregnant patients, recommended with each pregnancy  Shingles Vaccine (Shingrix) - COST NOT COVERED BY MEDICARE PART B  2 shot series recommended in those aged 48 and above     Health Maintenance Due:  There are no preventive care reminders to display for this patient  Immunizations Due:      Topic Date Due    Hepatitis B Vaccine (1 of 3 - Risk 3-dose series) 11/03/1946     Advance Directives   What are advance directives? Advance directives are legal documents that state your wishes and plans for medical care  These plans are made ahead of time in case you lose your ability to make decisions for yourself  Advance directives can apply to any medical decision, such as the treatments you want, and if you want to donate organs  What are the types of advance directives? There are many types of advance directives, and each state has rules about how to use them   You may choose a combination of any of the following:  · Living will: This is a written record of the treatment you want  You can also choose which treatments you do not want, which to limit, and which to stop at a certain time  This includes surgery, medicine, IV fluid, and tube feedings  · Durable power of  for healthcare [facility]): This is a written record that states who you want to make healthcare choices for you when you are unable to make them for yourself  This person, called a proxy, is usually a family member or a friend  You may choose more than 1 proxy  · Do not resuscitate (DNR) order:  A DNR order is used in case your heart stops beating or you stop breathing  It is a request not to have certain forms of treatment, such as CPR  A DNR order may be included in other types of advance directives  · Medical directive: This covers the care that you want if you are in a coma, near death, or unable to make decisions for yourself  You can list the treatments you want for each condition  Treatment may include pain medicine, surgery, blood transfusions, dialysis, IV or tube feedings, and a ventilator (breathing machine)  · Values history: This document has questions about your views, beliefs, and how you feel and think about life  This information can help others choose the care that you would choose  Why are advance directives important? An advance directive helps you control your care  Although spoken wishes may be used, it is better to have your wishes written down  Spoken wishes can be misunderstood, or not followed  Treatments may be given even if you do not want them  An advance directive may make it easier for your family to make difficult choices about your care  Depression   Depression  is a medical condition that causes feelings of sadness or hopelessness that do not go away  Depression may cause you to lose interest in things you used to enjoy  These feelings may interfere with your daily life    Call your local emergency number (911 in the [address]) if:   · You think about harming yourself or someone else  · You have done something on purpose to hurt yourself  The following resources are available at any time to help you, if needed:   · [address]: 7-223.955.4171 (6-034-130-BDPE)   · Suicide Hotline: 6-481.572.3345 (7-814-FQSINGB)   · For a list of international numbers: https://save org/find-help/international-resources/  Treatment for depression may include medicine to relieve depression  Medicine is often used together with therapy  Therapy is a way for you to talk about your feelings and anything that may be causing depression  Therapy can be done alone or in a group  It may also be done with family members or a significant other  · Get regular physical activity  · Create a regular sleep schedule  · Eat a variety of healthy foods  · Do not drink alcohol or use drugs  Urinary Incontinence   Urinary incontinence (UI)  is when you lose control of your bladder  UI develops because your bladder cannot store or empty urine properly  The 3 most common types of UI are stress incontinence, urge incontinence, or both  Medicines:   · May be given to help strengthen your bladder control  Report any side effects of medication to your healthcare provider  Do pelvic muscle exercises often:  Your pelvic muscles help you stop urinating  Squeeze these muscles tight for 5 seconds, then relax for 5 seconds  Gradually work up to squeezing for 10 seconds  Do 3 sets of 15 repetitions a day, or as directed  This will help strengthen your pelvic muscles and improve bladder control  Train your bladder:  Go to the bathroom at set times, such as every 2 hours, even if you do not feel the urge to go  You can also try to hold your urine when you feel the urge to go  For example, hold your urine for 5 minutes when you feel the urge to go  As that becomes easier, hold your urine for 10 minutes     Self-care: · Keep a UI record  Write down how often you leak urine and how much you leak  Make a note of what you were doing when you leaked urine  · Drink liquids as directed  You may need to limit the amount of liquid you drink to help control your urine leakage  Do not drink any liquid right before you go to bed  Limit or do not have drinks that contain caffeine or alcohol  · Prevent constipation  Eat a variety of high-fiber foods  Good examples are high-fiber cereals, beans, vegetables, and whole-grain breads  Walking is the best way to trigger your intestines to have a bowel movement  · Exercise regularly and maintain a healthy weight  Weight loss and exercise will decrease pressure on your bladder and help you control your leakage  · Use a catheter as directed  to help empty your bladder  A catheter is a tiny, plastic tube that is put into your bladder to drain your urine  · Go to behavior therapy as directed  Behavior therapy may be used to help you learn to control your urge to urinate  Weight Management   Why it is important to manage your weight:  Being overweight increases your risk of health conditions such as heart disease, high blood pressure, type 2 diabetes, and certain types of cancer  It can also increase your risk for osteoarthritis, sleep apnea, and other respiratory problems  Aim for a slow, steady weight loss  Even a small amount of weight loss can lower your risk of health problems  How to lose weight safely:  A safe and healthy way to lose weight is to eat fewer calories and get regular exercise  You can lose up about 1 pound a week by decreasing the number of calories you eat by 500 calories each day  Healthy meal plan for weight management:  A healthy meal plan includes a variety of foods, contains fewer calories, and helps you stay healthy  A healthy meal plan includes the following:  · Eat whole-grain foods more often  A healthy meal plan should contain fiber   Fiber is the part of grains, fruits, and vegetables that is not broken down by your body  Whole-grain foods are healthy and provide extra fiber in your diet  Some examples of whole-grain foods are whole-wheat breads and pastas, oatmeal, brown rice, and bulgur  · Eat a variety of vegetables every day  Include dark, leafy greens such as spinach, kale, meliton greens, and mustard greens  Eat yellow and orange vegetables such as carrots, sweet potatoes, and winter squash  · Eat a variety of fruits every day  Choose fresh or canned fruit (canned in its own juice or light syrup) instead of juice  Fruit juice has very little or no fiber  · Eat low-fat dairy foods  Drink fat-free (skim) milk or 1% milk  Eat fat-free yogurt and low-fat cottage cheese  Try low-fat cheeses such as mozzarella and other reduced-fat cheeses  · Choose meat and other protein foods that are low in fat  Choose beans or other legumes such as split peas or lentils  Choose fish, skinless poultry (chicken or turkey), or lean cuts of red meat (beef or pork)  Before you cook meat or poultry, cut off any visible fat  · Use less fat and oil  Try baking foods instead of frying them  Add less fat, such as margarine, sour cream, regular salad dressing and mayonnaise to foods  Eat fewer high-fat foods  Some examples of high-fat foods include french fries, doughnuts, ice cream, and cakes  · Eat fewer sweets  Limit foods and drinks that are high in sugar  This includes candy, cookies, regular soda, and sweetened drinks  Exercise:  Exercise at least 30 minutes per day on most days of the week  Some examples of exercise include walking, biking, dancing, and swimming  You can also fit in more physical activity by taking the stairs instead of the elevator or parking farther away from stores  Ask your healthcare provider about the best exercise plan for you        © Copyright [employer] 2018 Information is for End User's use only and may not be sold, redistributed or otherwise used for commercial purposes   All illustrations and images included in CareNotes® are the copyrighted property of A D A M , Inc  or [facility] Yessica Hilton

## 2019-12-31 NOTE — PROGRESS NOTES
Assessment and Plan:     Problem List Items Addressed This Visit        Endocrine    Type 2 diabetes mellitus with hyperglycemia, without long-term current use of insulin ([facility])    Relevant Orders    Glucometer    Glucometer test strips    Ambulatory referral to complex care management program    Hemoglobin A1C       Genitourinary    CKD (chronic kidney disease) stage 3, GFR 30-59 ml/min ([facility])    Relevant Orders    Ambulatory referral to complex care management program       Other    Abnormal liver function test - Primary (Chronic)      Other Visit Diagnoses     Hypothyroidism, unspecified type        Relevant Medications    levothyroxine 75 mcg tablet    Other Relevant Orders    TSH, 3rd generation    T4, free    Poor mobility        Relevant Orders    Ambulatory referral to complex care management program    Blindness of one eye        Relevant Orders    Ambulatory referral to complex care management program    Requires assistance with activities of daily living (ADL)        Relevant Orders    Ambulatory referral to complex care management program    Caregiving-bathing        Relevant Orders    Ambulatory referral to complex care management program    High serum thyroid stimulating hormone (TSH)        Relevant Orders    TSH, 3rd generation    T4, free           Preventive health issues were discussed with patient, and age appropriate screening tests were ordered as noted in patient's After Visit Summary  Personalized health advice and appropriate referrals for health education or preventive services given if needed, as noted in patient's After Visit Summary       History of Present Illness:     Patient presents for Medicare Annual Wellness visit    Patient Care Team:  Laura Aquino PA-C as PCP - General (Family Medicine)     Problem List:     Patient Active Problem List   Diagnosis    Type 2 diabetes mellitus with hyperglycemia, without long-term current use of insulin ([facility] [address] )    Other specified hypothyroidism    Essential hypertension    Other hyperlipidemia    Vitamin D deficiency    Leg ulcer, right, limited to breakdown of skin (HCC)    Type 2 diabetes mellitus with foot ulcer (CODE) ([facility])    Chronic atrial fibrillation    Abnormal liver function test    Generalized osteoarthritis    CKD (chronic kidney disease) stage 3, GFR 30-59 ml/min ([facility])      Past Medical and Surgical History:     Past Medical History:   Diagnosis Date    Atrial fibrillation ([address] )     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    Cholecystitis     Disease of tonsils     Glaucoma 2008    NOS     Past Surgical History:   Procedure Laterality Date    CHOLECYSTECTOMY      EYE SURGERY Right 03/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    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      Family History:     Family History   Problem Relation Age of Onset    Breast cancer Sister     No Known Problems Mother     No Known Problems Father       Social History:     Social History     Socioeconomic History    Marital status:       Spouse name: None    Number of children: None    Years of education: None    Highest education level: None   Occupational History    None   Social Needs    Financial resource strain: None    Food insecurity:     Worry: None     Inability: None    Transportation needs:     Medical: No     Non-medical: No   Tobacco Use    Smoking status: Never Smoker    Smokeless tobacco: Never Used   Substance and Sexual Activity    Alcohol use: Not Currently    Drug use: Never    Sexual activity: Not Currently   Lifestyle    Physical activity:     Days per week: None     Minutes per session: None    Stress: None   Relationships    Social connections:     Talks on phone: None     Gets together: None     Attends Buddhist service: None     Active member of club or organization: None     Attends meetings of clubs or organizations: None     Relationship status: None    Intimate partner violence:     Fear of current or ex partner: None Emotionally abused: None     Physically abused: None     Forced sexual activity: None   Other Topics Concern    None   Social History Narrative    None       Medications and Allergies:     Current Outpatient Medications   Medication Sig Dispense Refill    Cholecalciferol (VITAMIN D-3) 1000 units CAPS ONE DAILY      dabigatran etexilate (PRADAXA) 150 mg capsu Take 1 capsule (150 mg total) by mouth 2 (two) times a day for 90 days 180 capsule 3    levothyroxine 75 mcg tablet Take 1 tablet (75 mcg total) by mouth daily 90 tablet 1    metFORMIN (GLUCOPHAGE) 500 mg tablet Take 1 tablet (500 mg total) by mouth daily (Patient taking differently: Take 500 mg by mouth 2 (two) times a day with meals ) 90 tablet 3    metoprolol succinate (TOPROL-XL) 25 mg 24 hr tablet Take 1 tablet (25 mg total) by mouth every 12 (twelve) hours 180 tablet 3    OMEGA-3 FATTY ACIDS PO 2 (two) times a day       triamcinolone (KENALOG) 0 5 % cream Apply topically daily as needed (dry skin of lower extremities) 30 g 0    ursodiol (ACTIGALL) 500 MG tablet Take 500 mg by mouth 2 (two) times a day      ACCU-CHEK FASTCLIX LANCETS MISC CHECK BG TWICE DAILY 204 each 0    glucose blood test strip tests once daily by fingerstick route       No current facility-administered medications for this visit  Allergies   Allergen Reactions    Aspirin GI Intolerance    Penicillins       Immunizations:     Immunization History   Administered Date(s) Administered    INFLUENZA 09/22/2015, 10/20/2016, 10/27/2017, 12/05/2018    Influenza Split 10/01/2010, 10/07/2011, 11/07/2013    Influenza Split High Dose Preservative Free IM 10/11/2019    Influenza TIV (IM) 09/14/2007, 10/16/2008    Influenza, high dose seasonal 0 5 mL 12/05/2018    Pneumococcal Conjugate 13-Valent 04/23/2015    Pneumococcal Polysaccharide PPV23 10/06/2003    Tdap 01/11/2019      Health Maintenance: There are no preventive care reminders to display for this patient  Topic Date Due    Hepatitis B Vaccine (1 of 3 - Risk 3-dose series) 11/03/1946      Medicare Health Risk Assessment:     /70 (BP Location: Left arm, Patient Position: Sitting)   Pulse 72   Temp 97 8 °F (36 6 °C) (Oral)   Resp 18   Wt 85 3 kg (188 lb)   SpO2 97%   BMI 36 72 kg/m²      Sana Perez is here for her Subsequent Wellness visit  Health Risk Assessment:   Patient rates overall health as good  Patient feels that their physical health rating is slightly worse  Eyesight was rated as slightly worse  Hearing was rated as slightly worse  Patient feels that their emotional and mental health rating is same  Pain experienced in the last 7 days has been some  Patient's pain rating has been 5/10  Patient states that she has experienced no weight loss or gain in last 6 months  Depression Screening:   PHQ-2 Score: 3  PHQ-9 Score: 11      Fall Risk Screening: In the past year, patient has experienced: no history of falling in past year      Urinary Incontinence Screening:   Patient has leaked urine accidently in the last six months  Home Safety:  Patient does not have trouble with stairs inside or outside of their home  Patient has working smoke alarms and has working carbon monoxide detector  Home safety hazards include: none  Nutrition:   Current diet is Frequent junk food and Regular  Medications:   Patient is currently taking over-the-counter supplements  OTC medications include: see medication list  Patient is not able to manage medications  Activities of Daily Living (ADLs)/Instrumental Activities of Daily Living (IADLs):   Walk and transfer into and out of bed and chair?: Yes  Dress and groom yourself?: Yes    Bathe or shower yourself?: No    Feed yourself?  Yes  Do your laundry/housekeeping?: No  Manage your money, pay your bills and track your expenses?: No  Make your own meals?: No    Do your own shopping?: No    Previous Hospitalizations:   Any hospitalizations or ED visits within the last 12 months?: No      Advance Care Planning:   Living will: Yes    Advanced directive: Yes      Comments: Daughter is going to get living will from sibling who has copy of it   Given AD sheet to complete    PREVENTIVE SCREENINGS      Cardiovascular Screening:    General: Screening Not Indicated and History Lipid Disorder      Diabetes Screening:     General: Screening Not Indicated and History Diabetes      Colorectal Cancer Screening:     General: Screening Not Indicated      Breast Cancer Screening:     General: Screening Not Indicated      Cervical Cancer Screening:    General: Screening Not Indicated      Osteoporosis Screening:    General: Risks and Benefits Discussed and Patient Declines      Lung Cancer Screening:     General: Screening Not Indicated      Hepatitis C Screening:    General: Patient Declines      Preventive Screening Comments: Daughter unsure of patient had aaa screen done in past, MRI abd awaiting upload to chart and will review       Mahendra Smith PA-C

## 2020-01-02 DIAGNOSIS — Z71.89 COMPLEX CARE COORDINATION: Primary | ICD-10-CM

## 2020-01-04 ENCOUNTER — APPOINTMENT (EMERGENCY)
Dept: CT IMAGING | Facility: HOSPITAL | Age: 85
DRG: 689 | End: 2020-01-04
Payer: MEDICARE

## 2020-01-04 ENCOUNTER — APPOINTMENT (EMERGENCY)
Dept: RADIOLOGY | Facility: HOSPITAL | Age: 85
DRG: 689 | End: 2020-01-04
Payer: MEDICARE

## 2020-01-04 ENCOUNTER — HOSPITAL ENCOUNTER (INPATIENT)
Facility: HOSPITAL | Age: 85
LOS: 3 days | Discharge: NURSING, REHAB OR LONG-TERM CARE | DRG: 689 | End: 2020-01-07
Attending: EMERGENCY MEDICINE | Admitting: INTERNAL MEDICINE
Payer: MEDICARE

## 2020-01-04 DIAGNOSIS — N39.0 ACUTE UTI: Primary | ICD-10-CM

## 2020-01-04 DIAGNOSIS — R41.0 DELIRIUM: ICD-10-CM

## 2020-01-04 DIAGNOSIS — E11.65 TYPE 2 DIABETES MELLITUS WITH HYPERGLYCEMIA, WITHOUT LONG-TERM CURRENT USE OF INSULIN (HCC): ICD-10-CM

## 2020-01-04 DIAGNOSIS — R47.01 MIXED APHASIA: ICD-10-CM

## 2020-01-04 DIAGNOSIS — N30.00 ACUTE CYSTITIS WITHOUT HEMATURIA: ICD-10-CM

## 2020-01-04 DIAGNOSIS — R73.9 HYPERGLYCEMIA: ICD-10-CM

## 2020-01-04 DIAGNOSIS — R29.90 STROKE-LIKE SYMPTOMS: ICD-10-CM

## 2020-01-04 DIAGNOSIS — I48.91 A-FIB (HCC): ICD-10-CM

## 2020-01-04 PROBLEM — R41.82 CHANGE IN MENTAL STATUS: Status: ACTIVE | Noted: 2020-01-04

## 2020-01-04 PROBLEM — N18.30 CKD (CHRONIC KIDNEY DISEASE) STAGE 3, GFR 30-59 ML/MIN (HCC): Status: RESOLVED | Noted: 2019-12-31 | Resolved: 2020-01-04

## 2020-01-04 LAB
ANION GAP SERPL CALCULATED.3IONS-SCNC: 5 MMOL/L (ref 4–13)
BACTERIA UR QL AUTO: ABNORMAL /HPF
BASOPHILS # BLD MANUAL: 0 THOUSAND/UL (ref 0–0.1)
BASOPHILS NFR MAR MANUAL: 0 % (ref 0–1)
BILIRUB UR QL STRIP: ABNORMAL
BUN SERPL-MCNC: 12 MG/DL (ref 5–25)
CALCIUM SERPL-MCNC: 9.2 MG/DL (ref 8.3–10.1)
CHLORIDE SERPL-SCNC: 97 MMOL/L (ref 100–108)
CLARITY UR: ABNORMAL
CO2 SERPL-SCNC: 30 MMOL/L (ref 21–32)
COLOR UR: ABNORMAL
CREAT SERPL-MCNC: 0.99 MG/DL (ref 0.6–1.3)
EOSINOPHIL # BLD MANUAL: 0.07 THOUSAND/UL (ref 0–0.4)
EOSINOPHIL NFR BLD MANUAL: 1 % (ref 0–6)
ERYTHROCYTE [DISTWIDTH] IN BLOOD BY AUTOMATED COUNT: 14.4 % (ref 11.6–15.1)
GFR SERPL CREATININE-BSD FRML MDRD: 50 ML/MIN/1.73SQ M
GLUCOSE SERPL-MCNC: 154 MG/DL (ref 65–140)
GLUCOSE SERPL-MCNC: 178 MG/DL (ref 65–140)
GLUCOSE SERPL-MCNC: 239 MG/DL (ref 65–140)
GLUCOSE UR STRIP-MCNC: ABNORMAL MG/DL
HCT VFR BLD AUTO: 42.1 % (ref 34.8–46.1)
HGB BLD-MCNC: 13.4 G/DL (ref 11.5–15.4)
HGB UR QL STRIP.AUTO: ABNORMAL
KETONES UR STRIP-MCNC: ABNORMAL MG/DL
LEUKOCYTE ESTERASE UR QL STRIP: NEGATIVE
LYMPHOCYTES # BLD AUTO: 0.82 THOUSAND/UL (ref 0.6–4.47)
LYMPHOCYTES # BLD AUTO: 11 % (ref 14–44)
MCH RBC QN AUTO: 27.3 PG (ref 26.8–34.3)
MCHC RBC AUTO-ENTMCNC: 31.8 G/DL (ref 31.4–37.4)
MCV RBC AUTO: 86 FL (ref 82–98)
MONOCYTES # BLD AUTO: 0.52 THOUSAND/UL (ref 0–1.22)
MONOCYTES NFR BLD: 7 % (ref 4–12)
MYELOCYTES NFR BLD MANUAL: 1 % (ref 0–1)
NEUTROPHILS # BLD MANUAL: 5.96 THOUSAND/UL (ref 1.85–7.62)
NEUTS BAND NFR BLD MANUAL: 1 % (ref 0–8)
NEUTS SEG NFR BLD AUTO: 79 % (ref 43–75)
NITRITE UR QL STRIP: POSITIVE
NON-SQ EPI CELLS URNS QL MICRO: ABNORMAL /HPF
NRBC BLD AUTO-RTO: 0 /100 WBCS
NT-PROBNP SERPL-MCNC: 2611 PG/ML
PH UR STRIP.AUTO: 7 [PH] (ref 4.5–8)
PLATELET # BLD AUTO: 226 THOUSANDS/UL (ref 149–390)
PLATELET BLD QL SMEAR: ADEQUATE
PMV BLD AUTO: 11.3 FL (ref 8.9–12.7)
POTASSIUM SERPL-SCNC: 4.3 MMOL/L (ref 3.5–5.3)
PROT UR STRIP-MCNC: ABNORMAL MG/DL
RBC # BLD AUTO: 4.91 MILLION/UL (ref 3.81–5.12)
RBC #/AREA URNS AUTO: ABNORMAL /HPF
RBC MORPH BLD: NORMAL
SODIUM SERPL-SCNC: 132 MMOL/L (ref 136–145)
SP GR UR STRIP.AUTO: 1.02 (ref 1–1.03)
TOTAL CELLS COUNTED SPEC: 100
TROPONIN I SERPL-MCNC: <0.02 NG/ML
UROBILINOGEN UR QL STRIP.AUTO: >=8 E.U./DL
WBC # BLD AUTO: 7.45 THOUSAND/UL (ref 4.31–10.16)
WBC #/AREA URNS AUTO: ABNORMAL /HPF

## 2020-01-04 PROCEDURE — 99223 1ST HOSP IP/OBS HIGH 75: CPT | Performed by: INTERNAL MEDICINE

## 2020-01-04 PROCEDURE — 87186 SC STD MICRODIL/AGAR DIL: CPT | Performed by: INTERNAL MEDICINE

## 2020-01-04 PROCEDURE — 71046 X-RAY EXAM CHEST 2 VIEWS: CPT

## 2020-01-04 PROCEDURE — 81001 URINALYSIS AUTO W/SCOPE: CPT

## 2020-01-04 PROCEDURE — 81003 URINALYSIS AUTO W/O SCOPE: CPT

## 2020-01-04 PROCEDURE — 99285 EMERGENCY DEPT VISIT HI MDM: CPT

## 2020-01-04 PROCEDURE — 84484 ASSAY OF TROPONIN QUANT: CPT | Performed by: EMERGENCY MEDICINE

## 2020-01-04 PROCEDURE — 85027 COMPLETE CBC AUTOMATED: CPT | Performed by: EMERGENCY MEDICINE

## 2020-01-04 PROCEDURE — 99285 EMERGENCY DEPT VISIT HI MDM: CPT | Performed by: EMERGENCY MEDICINE

## 2020-01-04 PROCEDURE — 82948 REAGENT STRIP/BLOOD GLUCOSE: CPT

## 2020-01-04 PROCEDURE — 87086 URINE CULTURE/COLONY COUNT: CPT | Performed by: INTERNAL MEDICINE

## 2020-01-04 PROCEDURE — 85007 BL SMEAR W/DIFF WBC COUNT: CPT | Performed by: EMERGENCY MEDICINE

## 2020-01-04 PROCEDURE — 36415 COLL VENOUS BLD VENIPUNCTURE: CPT | Performed by: EMERGENCY MEDICINE

## 2020-01-04 PROCEDURE — 87077 CULTURE AEROBIC IDENTIFY: CPT | Performed by: INTERNAL MEDICINE

## 2020-01-04 PROCEDURE — 83880 ASSAY OF NATRIURETIC PEPTIDE: CPT | Performed by: EMERGENCY MEDICINE

## 2020-01-04 PROCEDURE — 93005 ELECTROCARDIOGRAM TRACING: CPT

## 2020-01-04 PROCEDURE — 80048 BASIC METABOLIC PNL TOTAL CA: CPT | Performed by: EMERGENCY MEDICINE

## 2020-01-04 PROCEDURE — 70450 CT HEAD/BRAIN W/O DYE: CPT

## 2020-01-04 RX ORDER — SODIUM CHLORIDE 9 MG/ML
50 INJECTION, SOLUTION INTRAVENOUS CONTINUOUS
Status: DISCONTINUED | OUTPATIENT
Start: 2020-01-04 | End: 2020-01-07 | Stop reason: HOSPADM

## 2020-01-04 RX ORDER — URSODIOL 300 MG/1
300 CAPSULE ORAL 2 TIMES DAILY
Status: DISCONTINUED | OUTPATIENT
Start: 2020-01-04 | End: 2020-01-07 | Stop reason: HOSPADM

## 2020-01-04 RX ORDER — ATORVASTATIN CALCIUM 10 MG/1
20 TABLET, FILM COATED ORAL
Status: DISCONTINUED | OUTPATIENT
Start: 2020-01-04 | End: 2020-01-07 | Stop reason: HOSPADM

## 2020-01-04 RX ORDER — LEVOTHYROXINE SODIUM 0.07 MG/1
75 TABLET ORAL
Status: DISCONTINUED | OUTPATIENT
Start: 2020-01-05 | End: 2020-01-07 | Stop reason: HOSPADM

## 2020-01-04 RX ORDER — DABIGATRAN ETEXILATE 150 MG/1
150 CAPSULE, COATED PELLETS ORAL 2 TIMES DAILY
Status: DISCONTINUED | OUTPATIENT
Start: 2020-01-04 | End: 2020-01-07 | Stop reason: HOSPADM

## 2020-01-04 RX ORDER — LEVOFLOXACIN 5 MG/ML
250 INJECTION, SOLUTION INTRAVENOUS EVERY 24 HOURS
Status: DISCONTINUED | OUTPATIENT
Start: 2020-01-04 | End: 2020-01-06

## 2020-01-04 RX ORDER — LORAZEPAM 2 MG/ML
1 INJECTION INTRAMUSCULAR AS NEEDED
Status: COMPLETED | OUTPATIENT
Start: 2020-01-04 | End: 2020-01-06

## 2020-01-04 RX ORDER — LABETALOL 20 MG/4 ML (5 MG/ML) INTRAVENOUS SYRINGE
10 EVERY 6 HOURS PRN
Status: DISCONTINUED | OUTPATIENT
Start: 2020-01-04 | End: 2020-01-07

## 2020-01-04 RX ORDER — HYDRALAZINE HYDROCHLORIDE 20 MG/ML
10 INJECTION INTRAMUSCULAR; INTRAVENOUS EVERY 6 HOURS PRN
Status: DISCONTINUED | OUTPATIENT
Start: 2020-01-04 | End: 2020-01-04

## 2020-01-04 RX ORDER — METOPROLOL SUCCINATE 25 MG/1
25 TABLET, EXTENDED RELEASE ORAL DAILY
Status: DISCONTINUED | OUTPATIENT
Start: 2020-01-05 | End: 2020-01-07 | Stop reason: HOSPADM

## 2020-01-04 RX ORDER — POLYETHYLENE GLYCOL 3350 17 G/17G
17 POWDER, FOR SOLUTION ORAL DAILY PRN
Status: DISCONTINUED | OUTPATIENT
Start: 2020-01-04 | End: 2020-01-07 | Stop reason: HOSPADM

## 2020-01-04 RX ADMIN — SODIUM CHLORIDE 50 ML/HR: 0.9 INJECTION, SOLUTION INTRAVENOUS at 17:19

## 2020-01-04 RX ADMIN — DABIGATRAN ETEXILATE MESYLATE 150 MG: 150 CAPSULE ORAL at 17:16

## 2020-01-04 RX ADMIN — INSULIN LISPRO 1 UNITS: 100 INJECTION, SOLUTION INTRAVENOUS; SUBCUTANEOUS at 18:15

## 2020-01-04 RX ADMIN — URSODIOL 300 MG: 300 CAPSULE ORAL at 17:16

## 2020-01-04 RX ADMIN — INSULIN LISPRO 1 UNITS: 100 INJECTION, SOLUTION INTRAVENOUS; SUBCUTANEOUS at 21:43

## 2020-01-04 RX ADMIN — LEVOFLOXACIN 250 MG: 5 INJECTION, SOLUTION INTRAVENOUS at 17:19

## 2020-01-04 RX ADMIN — ATORVASTATIN CALCIUM 20 MG: 10 TABLET, FILM COATED ORAL at 17:15

## 2020-01-04 NOTE — H&P
H&P- Kylie Mehta 11/3/1927, 80 y o  female MRN: [id number]    Unit/Bed#: ED 23 Encounter: [id number]    Primary Care Provider: Edilma Herrmann PA-C   Date and time admitted to hospital: 1/4/2020 12:11 PM    * Stroke-like symptoms  Assessment & Plan  Presents with confusion and drowsiness  Head CT normal   No focal weakness  Admit with stroke pathway  MRI pending  Permissive hypertension for now  labetalol 10 mg IV PRN for SBP>220and DBP>120  Continue Pradaxa and statin  Neurology consult and follow recommendation  Change in mental status  Assessment & Plan  Presents with sudden onset of altered mental status with confusion of 1 day duration  Admitted with stroke pathway  DDx:  UTI, antibiotics initiated  Gentle hydration  Medication list revised  No new medication or OTC  Check bladder scan and give bowel regimen  Abnormal liver function test  Assessment & Plan  Has established GI follow-up as an outpatient  Continue to monitor  Chronic atrial fibrillation  Assessment & Plan  Rate controlled on metoprolol, continue anticoagulation with Pradaxa  Essential hypertension  Assessment & Plan  BP stable, continue current management  Type 2 diabetes mellitus with hyperglycemia, without long-term current use of insulin (HCC)  Assessment & Plan  Lab Results   Component Value Date    HGBA1C 8 3 (H) 12/16/2019     No results for input(s): POCGLU in the last 72 hours  Blood Sugar Average: Last 72 hrs:  Hold home medications  Check glucose 4 times daily with corrective insulin for now and if random blood glucose is persistently greater than 200 initiate long-acting insulin 15 units HS  VTE Prophylaxis: Dabigatran (Pradaxa)  / sequential compression device   Code Status:  DNR DNI  POLST: There is no POLST form on file for this patient (pre-hospital)    Anticipated Length of Stay:  Patient will be admitted on an Inpatient basis with an anticipated length of stay of  greater than 2 midnights  Justification for Hospital Stay:  Altered mental status, UTI    Total Time for Visit, including Counseling / Coordination of Care: 45 minutes  Greater than 50% of this total time spent on direct patient counseling and coordination of care  History of Present Illness:    Enid Silver is a 80 y o  female who presents with change in mental status  Patient was in her usual state of health until yesterday afternoon when she started to have confusion  Patient had no prior stroke or TIA in the past  No focal weakness reported  No fall  Past medical history significant for AFib, hypertension, diabetes, hyperlipidemia, hypothyroidism  Patient evaluated in ED with head CT and showed no acute abnormality and suggested to observe patient for acute confusional state secondary to possibly UTI  During my evaluation a patient was confused and disoriented to place person and time, sleepy and easily awakeable  Review of Systems:    Review of Systems   Constitutional: Positive for activity change  Negative for appetite change, chills, diaphoresis, fever and unexpected weight change  HENT: Negative  Eyes: Negative  Respiratory: Negative  Cardiovascular: Negative  Gastrointestinal: Negative  Genitourinary: Negative  Musculoskeletal: Negative  Neurological: Negative for dizziness, tremors, seizures, syncope, facial asymmetry, speech difficulty, weakness, light-headedness, numbness and headaches  Psychiatric/Behavioral: Positive for confusion  Negative for agitation, behavioral problems, decreased concentration, dysphoric mood, self-injury and sleep disturbance  The patient is not nervous/anxious and is not hyperactive          Past Medical and Surgical History:     Past Medical History:   Diagnosis Date    Atrial fibrillation ([address] )     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 Authorizing Provider   Cholecalciferol (VITAMIN D-3) 1000 units CAPS ONE DAILY 5/21/15  Yes Historical Provider, MD   dabigatran etexilate (PRADAXA) 150 mg capsu Take 1 capsule (150 mg total) by mouth 2 (two) times a day for 90 days 5/9/19 1/4/20 Yes Simran Hardwick MD   levothyroxine 75 mcg tablet Take 1 tablet (75 mcg total) by mouth daily 12/31/19  Yes Irene Dietz PA-C   metFORMIN (GLUCOPHAGE) 500 mg tablet Take 1 tablet (500 mg total) by mouth daily  Patient taking differently: Take 500 mg by mouth 2 (two) times a day with meals  11/7/19  Yes Simran Hardwick MD   metoprolol succinate (TOPROL-XL) 25 mg 24 hr tablet Take 1 tablet (25 mg total) by mouth every 12 (twelve) hours 11/7/19  Yes Simran Hardwick MD   ursodiol (ACTIGALL) 500 MG tablet Take 500 mg by mouth 2 (two) times a day   Yes Historical Provider, MD Hellen Buckley Manolo BG TWICE DAILY 12/31/19   Irene Dietz PA-C   glucose blood test strip tests once daily by fingerstick route 3/21/13   Historical Provider, MD   OMEGA-3 FATTY ACIDS PO 2 (two) times a day  6/1/05   Historical Provider, MD   triamcinolone (KENALOG) 0 5 % cream Apply topically daily as needed (dry skin of lower extremities) 12/16/19   Irene Dietz PA-C     I have reviewed home medications with patient personally  Allergies:    Allergies   Allergen Reactions    Aspirin GI Intolerance    Penicillins        Social History:     Substance Use History:   Social History     Substance and Sexual Activity   Alcohol Use Not Currently     Social History     Tobacco Use   Smoking Status Never Smoker   Smokeless Tobacco Never Used     Social History     Substance and Sexual Activity   Drug Use Never       Family History:    non-contributory    Physical Exam:     Vitals:   Blood Pressure: (!) 178/80 (01/04/20 1410)  Pulse: 91 (01/04/20 1410)  Temperature: 97 9 °F (36 6 °C) (01/04/20 1215)  Temp Source: Oral (01/04/20 1215)  Respirations: 20 (01/04/20 1410)  Weight - Scale: 84 8 kg (186 lb 15 2 oz) (01/04/20 1215)  SpO2: 97 % (01/04/20 1410)    Physical Exam    General appearance: alert, appears stated age and cooperative  Skin: Skin color, texture, turgor normal  No rashes or lesions  Head: Normocephalic, without obvious abnormality, atraumatic  Eyes: conjunctivae/corneas clear  PERRL, EOM's intact  Lungs: clear to auscultation bilaterally  Heart: regular rate and rhythm, S1, S2 normal, no murmur, click, rub or gallop  Abdomen: soft, non-tender; bowel sounds normal; no masses,  no organomegaly  Back: symmetric, no curvature  ROM normal  No CVA tenderness  Extremities: extremities normal, atraumatic, no cyanosis or edema  Neurologic: Mental status: alertness: alert, orientation: Disoriented to person place and time, affect: flat  Cranial nerves: normal  Sensory: normal  Motor: grossly normal  Reflexes: 2+ and symmetric  Psychiatric:  Flat affect    Additional Data:     Lab Results: I have personally reviewed pertinent reports  Results from last 7 days   Lab Units 01/04/20  1243   WBC Thousand/uL 7 45   HEMOGLOBIN g/dL 13 4   HEMATOCRIT % 42 1   PLATELETS Thousands/uL 226   BANDS PCT % 1   LYMPHO PCT % 11*   MONO PCT % 7   EOS PCT % 1     Results from last 7 days   Lab Units 01/04/20  1243   SODIUM mmol/L 132*   POTASSIUM mmol/L 4 3   CHLORIDE mmol/L 97*   CO2 mmol/L 30   BUN mg/dL 12   CREATININE mg/dL 0 99   ANION GAP mmol/L 5   CALCIUM mg/dL 9 2   GLUCOSE RANDOM mg/dL 239*                       Imaging: I have personally reviewed pertinent reports  CT head without contrast   Final Result by Beatriz Torres MD (01/04 5377)      No acute intracranial abnormality  Microangiopathic changes       Stable scattered parenchymal calcifications as may be seen in neurocysticercosis  Workstation performed: [id number]         XR chest 2 views   ED Interpretation by Jameson Benitez MD (01/04 3173)   Primary reviewed  No acute abnormality  EKG, Pathology, and Other Studies Reviewed on Admission: yes    Allscripts / Epic Records Reviewed: Yes     ** Please Note: This note has been constructed using a voice recognition system   **

## 2020-01-04 NOTE — ASSESSMENT & PLAN NOTE
Presents with confusion and drowsiness  Head CT normal   No focal weakness  Admit with stroke pathway  Family confirmed that patient refused for MRI in the past and asked if we can do other imaging study instead  Repeat CT in 24 hours  Continue Pradaxa and statin  Neurology consult and follow recommendation

## 2020-01-04 NOTE — ED PROVIDER NOTES
History  Chief Complaint   Patient presents with    Aphasia     Pt brought in by EMS  Daughter called reporting that pt has not been able to verbaly express herself or make sense since yesterday at 15:00  Per EMS pt's daughter reports that she thought it would just go away  Denies any injuries  Pt on Pradaxa      80year-old female last seen normal yesterday at 3:00 p m  Presents for evaluation of confusion with the family's to be aphasia  Patient is unable to express her thoughts or answer questions appropriately  Patient was also found to be trying to place her hearing aid in her eyeball  Patient unable provide any meaningful history at this time and does not answer questions appropriately  She has no dysarthria      History provided by:  Relative  History limited by:  Mental status change      Prior to Admission Medications   Prescriptions Last Dose Informant Patient Reported? Taking?    ACCU-CHEK FASTCLIX LANCETS MISC Unknown at Unknown time  No No   Sig: CHECK BG TWICE DAILY   Cholecalciferol (VITAMIN D-3) 1000 units CAPS 2020 at Unknown time  Yes Yes   Si capsule    OMEGA-3 FATTY ACIDS PO 2020 at Unknown time  Yes Yes   Si (two) times a day    dabigatran etexilate (PRADAXA) 150 mg capsu 2020 at Unknown time  No Yes   Sig: Take 1 capsule (150 mg total) by mouth 2 (two) times a day for 90 days   glucose blood test strip Unknown at Unknown time  Yes No   Sig: tests once daily by fingerstick route   levothyroxine 75 mcg tablet 2020 at Unknown time  No Yes   Sig: Take 1 tablet (75 mcg total) by mouth daily   metFORMIN (GLUCOPHAGE) 500 mg tablet 2020 at Unknown time  No Yes   Sig: Take 1 tablet (500 mg total) by mouth daily   Patient taking differently: Take 500 mg by mouth 2 (two) times a day with meals    metoprolol succinate (TOPROL-XL) 25 mg 24 hr tablet 2020 at Unknown time  No Yes   Sig: Take 1 tablet (25 mg total) by mouth every 12 (twelve) hours   triamcinolone (KENALOG) 0 5 % cream Past Week at Unknown time  No Yes   Sig: Apply topically daily as needed (dry skin of lower extremities)   ursodiol (ACTIGALL) 500 MG tablet 1/4/2020 at Unknown time  Yes Yes   Sig: Take 500 mg by mouth 2 (two) times a day      Facility-Administered Medications: None       Past Medical History:   Diagnosis Date    Atrial fibrillation ([address] )     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    Cholecystitis     Disease of tonsils     Glaucoma 2008    NOS       Past Surgical History:   Procedure Laterality Date    CHOLECYSTECTOMY      EYE SURGERY Right 03/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    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       Family History   Problem Relation Age of Onset    Breast cancer Sister     No Known Problems Mother     No Known Problems Father      I have reviewed and agree with the history as documented  Social History     Tobacco Use    Smoking status: Never Smoker    Smokeless tobacco: Never Used   Substance Use Topics    Alcohol use: Not Currently    Drug use: Never        Review of Systems   Unable to perform ROS: Mental status change       Physical Exam  Physical Exam   Constitutional: She appears well-developed and well-nourished  HENT:   Mouth/Throat: No oropharyngeal exudate  TMs normal bilaterally no pharyngeal erythema no rhinorrhea nontender palpation of sinuses, normal looking turbinates   Eyes: Conjunctivae and EOM are normal    Neck: Normal range of motion  Neck supple  No meningeal signs   Cardiovascular: Normal rate, regular rhythm, normal heart sounds and intact distal pulses  Pulmonary/Chest: Effort normal and breath sounds normal  No respiratory distress  She has no wheezes  She has no rales  She exhibits no tenderness  Abdominal: Soft  Bowel sounds are normal  She exhibits no distension and no mass  There is no tenderness  No hernia  No cvat   Musculoskeletal: Normal range of motion  She exhibits no edema  Lymphadenopathy:     She has no cervical adenopathy  Neurological: She is alert  Unable to perform adequate neuro exam at or obtain NIH stroke scale given patient is unable to follow commands  Skin: No rash noted  No erythema  No edema   Psychiatric: She has a normal mood and affect  Her behavior is normal    Nursing note and vitals reviewed        Vital Signs  ED Triage Vitals   Temperature Pulse Respirations Blood Pressure SpO2   01/04/20 1215 01/04/20 1215 01/04/20 1215 01/04/20 1216 01/04/20 1215   97 9 °F (36 6 °C) 89 16 164/85 98 %      Temp Source Heart Rate Source Patient Position - Orthostatic VS BP Location FiO2 (%)   01/04/20 1215 01/04/20 1215 01/04/20 1215 01/04/20 1215 --   Oral Monitor Sitting Right arm       Pain Score       01/04/20 1530       No Pain           Vitals:    01/04/20 1530 01/04/20 1600 01/04/20 1700 01/04/20 1800   BP: 169/82 (!) 153/112 (!) 197/95 (!) 171/94   Pulse: 83 82 76 71   Patient Position - Orthostatic VS: Sitting Sitting Sitting Sitting         Visual Acuity  Visual Acuity      Most Recent Value   L Pupil Size (mm)  2   R Pupil Size (mm)  -- [false eye]          ED Medications  Medications   dabigatran etexilate (PRADAXA) capsule 150 mg (150 mg Oral Given 1/4/20 1716)   levothyroxine tablet 75 mcg (has no administration in time range)   metoprolol succinate (TOPROL-XL) 24 hr tablet 25 mg (has no administration in time range)   ursodiol (ACTIGALL) capsule 300 mg (300 mg Oral Given 1/4/20 1716)   sodium chloride 0 9 % infusion (50 mL/hr Intravenous New Bag 1/4/20 1719)   levofloxacin (LEVAQUIN) IVPB (premix) 250 mg (250 mg Intravenous New Bag 1/4/20 1719)   atorvastatin (LIPITOR) tablet 20 mg (20 mg Oral Given 1/4/20 1715)   insulin lispro (HumaLOG) 100 units/mL subcutaneous injection 1-5 Units (1 Units Subcutaneous Given 1/4/20 1815)   polyethylene glycol (MIRALAX) packet 17 g (has no administration in time range)   LORazepam (ATIVAN) 2 mg/mL injection 1 mg (has no administration in time range)   hydrALAZINE (APRESOLINE) injection 10 mg (has no administration in time range)       Diagnostic Studies  Results Reviewed     Procedure Component Value Units Date/Time    Urine Microscopic [[id number]]  (Abnormal) Collected:  01/04/20 1407    Lab Status:  Final result Specimen:  Urine, Other Updated:  01/04/20 1429     RBC, UA None Seen /hpf      WBC, UA 1-2 /hpf      Epithelial Cells Occasional /hpf      Bacteria, UA Innumerable /hpf     POCT urinalysis dipstick [[id number]]  (Abnormal) Resulted:  01/04/20 1412    Lab Status:  Final result Specimen:  Urine Updated:  01/04/20 1412    Urine Macroscopic, POC [[id number]]  (Abnormal) Collected:  01/04/20 1407    Lab Status:  Final result Specimen:  Urine Updated:  01/04/20 1408     Color, UA Sweta     Clarity, UA Slightly Cloudy     pH, UA 7 0     Leukocytes, UA Negative     Nitrite, UA Positive     Protein, UA Trace mg/dl      Glucose,  (1/2%) mg/dl      Ketones, UA Trace mg/dl      Urobilinogen, UA >=8 0 E U /dl      Bilirubin, UA Interference- unable to analyze     Blood, UA Trace     Specific [place], UA 1 020    Narrative:       CLINITEK RESULT    CBC and differential [[id number]] Collected:  01/04/20 1243    Lab Status:  Final result Specimen:  Blood from Arm, Left Updated:  01/04/20 1351     WBC 7 45 Thousand/uL      RBC 4 91 Million/uL      Hemoglobin 13 4 g/dL      Hematocrit 42 1 %      MCV 86 fL      MCH 27 3 pg      MCHC 31 8 g/dL      RDW 14 4 %      MPV 11 3 fL      Platelets 958 Thousands/uL      nRBC 0 /100 WBCs     Narrative: This is an appended report  These results have been appended to a previously verified report      Basic metabolic panel [[id number]]  (Abnormal) Collected:  01/04/20 1243    Lab Status:  Final result Specimen:  Blood from Arm, Left Updated:  01/04/20 1323     Sodium 132 mmol/L      Potassium 4 3 mmol/L      Chloride 97 mmol/L      CO2 30 mmol/L      ANION GAP 5 mmol/L      BUN 12 mg/dL      Creatinine 0 99 mg/dL      Glucose 239 mg/dL      Calcium 9 2 mg/dL      eGFR 50 ml/min/1 73sq m     Narrative:       [place] guidelines for Chronic Kidney Disease (CKD):     Stage 1 with normal or high GFR (GFR > 90 mL/min/1 73 square meters)    Stage 2 Mild CKD (GFR = 60-89 mL/min/1 73 square meters)    Stage 3A Moderate CKD (GFR = 45-59 mL/min/1 73 square meters)    Stage 3B Moderate CKD (GFR = 30-44 mL/min/1 73 square meters)    Stage 4 Severe CKD (GFR = 15-29 mL/min/1 73 square meters)    Stage 5 End Stage CKD (GFR <15 mL/min/1 73 square meters)  Note: GFR calculation is accurate only with a steady state creatinine    NT-BNP PRO [[id number]]  (Abnormal) Collected:  01/04/20 1243    Lab Status:  Final result Specimen:  Blood from Arm, Left Updated:  01/04/20 1323     NT-proBNP 2,611 pg/mL     Troponin I [[id number]]  (Normal) Collected:  01/04/20 1243    Lab Status:  Final result Specimen:  Blood from Arm, Left Updated:  01/04/20 1321     Troponin I <0 02 ng/mL                  CT head without contrast   Final Result by Cheryl Stephens MD (01/04 1337)      No acute intracranial abnormality  Microangiopathic changes  Stable scattered parenchymal calcifications as may be seen in neurocysticercosis  Workstation performed: [id number]         XR chest 2 views   ED Interpretation by Jameson Benitez MD (01/04 4573)   Primary reviewed  No acute abnormality        VAS carotid complete study    (Results Pending)   MRI inpatient order    (Results Pending)              Procedures  ECG 12 Lead Documentation Only  Date/Time: 1/4/2020 1:08 PM  Performed by: Jameson Benitez MD  Authorized by: Jameson Benitez MD     ECG reviewed by me, the ED Provider: yes    Patient location:  ED  Rate:     ECG rate:  81    ECG rate assessment: normal    Rhythm:     Rhythm: atrial fibrillation    Ectopy:     Ectopy: none    QRS:     QRS axis:  Normal    QRS intervals:  Normal  Conduction:     Conduction: abnormal      Abnormal conduction: complete RBBB    ST segments:     ST segments:  Normal  T waves:     T waves: normal               ED Course  ED Course as of Jan 04 1903   Sat Jan 04, 2020   1431 Nitrite, UA(!): Positive                               MDM  Number of Diagnoses or Management Options  Diagnosis management comments: Aphasia/altered mental status-will do CT head rule out acute CNS pathology, cardiac/septic workup,        Disposition  Final diagnoses:   Acute UTI   Delirium   Hyperglycemia   A-fib ([address] )     Time reflects when diagnosis was documented in both MDM as applicable and the Disposition within this note     Time User Action Codes Description Comment    1/4/2020  2:39 PM Kady Loyd Add [N39 0] Acute UTI     1/4/2020  2:39 PM Kady Loyd Add [R41 0] Delirium     1/4/2020  2:39 PM Kady Harp Add [R73 9] Hyperglycemia     1/4/2020  2:39 PM Allegra Harrell J Add [I48 91] A-fib ([facility] [address] )     1/4/2020  3:09 PM Stewart Jv Add [R29 90] Stroke-like symptoms     1/4/2020  3:12 PM Stewart Carias Add [E11 65] Type 2 diabetes mellitus with hyperglycemia, without long-term current use of insulin [facility])       ED Disposition     ED Disposition Condition Date/Time Comment    Admit Stable Sat Jan 4, 2020  2:39 PM Case was discussed with Dr Celina Richards and the patient's admission status was agreed to be Admission Status: inpatient status to the service of Dr Jayy Bailey           Follow-up Information    None         Current Discharge Medication List      CONTINUE these medications which have NOT CHANGED    Details   Cholecalciferol (VITAMIN D-3) 1000 units CAPS 1 capsule       dabigatran etexilate (PRADAXA) 150 mg capsu Take 1 capsule (150 mg total) by mouth 2 (two) times a day for 90 days  Qty: 180 capsule, Refills: 3    Associated Diagnoses: Atrial fibrillation, unspecified type (HCC)      levothyroxine 75 mcg tablet Take 1 tablet (75 mcg total) by mouth daily  Qty: 90 tablet, Refills: 1    Associated Diagnoses: Hypothyroidism, unspecified type      metFORMIN (GLUCOPHAGE) 500 mg tablet Take 1 tablet (500 mg total) by mouth daily  Qty: 90 tablet, Refills: 3    Associated Diagnoses: Type 2 diabetes mellitus without complication, without long-term current use of insulin ([facility])      metoprolol succinate (TOPROL-XL) 25 mg 24 hr tablet Take 1 tablet (25 mg total) by mouth every 12 (twelve) hours  Qty: 180 tablet, Refills: 3    Associated Diagnoses: Hypothyroidism, unspecified type      OMEGA-3 FATTY ACIDS PO 2 (two) times a day       triamcinolone (KENALOG) 0 5 % cream Apply topically daily as needed (dry skin of lower extremities)  Qty: 30 g, Refills: 0    Associated Diagnoses: Chronic venous stasis dermatitis      ursodiol (ACTIGALL) 500 MG tablet Take 500 mg by mouth 2 (two) times a day      ACCU-CHEK FASTCLIX LANCETS MISC CHECK BG TWICE DAILY  Qty: 204 each, Refills: 0    Associated Diagnoses: Type 2 diabetes mellitus with hyperglycemia, without long-term current use of insulin (HCC)      glucose blood test strip tests once daily by fingerstick route           No discharge procedures on file      ED Provider  Electronically Signed by           Lazaro Almaraz MD  01/04/20 6600

## 2020-01-04 NOTE — ASSESSMENT & PLAN NOTE
Lab Results   Component Value Date    HGBA1C 8 3 (H) 12/16/2019       No results for input(s): POCGLU in the last 72 hours  Blood Sugar Average: Last 72 hrs:  Hold home medications  Check glucose 4 times daily with corrective insulin for now and if random blood glucose is persistently greater than 200 initiate long-acting insulin 15 units HS

## 2020-01-04 NOTE — ASSESSMENT & PLAN NOTE
Presents with sudden onset of altered mental status with confusion of 1 day duration  Admitted with stroke pathway  DDx:  UTI, antibiotics initiated  Gentle hydration  Medication list revised  No new medication or OTC  Check bladder scan and give bowel regimen

## 2020-01-05 PROBLEM — R47.01 MIXED APHASIA: Status: ACTIVE | Noted: 2020-01-04

## 2020-01-05 PROBLEM — R74.01 TRANSAMINITIS: Status: ACTIVE | Noted: 2019-08-22

## 2020-01-05 LAB
ALBUMIN SERPL BCP-MCNC: 2.1 G/DL (ref 3.5–5)
ALP SERPL-CCNC: 183 U/L (ref 46–116)
ALT SERPL W P-5'-P-CCNC: 28 U/L (ref 12–78)
ANION GAP SERPL CALCULATED.3IONS-SCNC: 9 MMOL/L (ref 4–13)
AST SERPL W P-5'-P-CCNC: 31 U/L (ref 5–45)
ATRIAL RATE: 288 BPM
BILIRUB SERPL-MCNC: 1.5 MG/DL (ref 0.2–1)
BUN SERPL-MCNC: 10 MG/DL (ref 5–25)
CALCIUM SERPL-MCNC: 8.6 MG/DL (ref 8.3–10.1)
CHLORIDE SERPL-SCNC: 100 MMOL/L (ref 100–108)
CHOLEST SERPL-MCNC: 234 MG/DL (ref 50–200)
CO2 SERPL-SCNC: 25 MMOL/L (ref 21–32)
CREAT SERPL-MCNC: 0.87 MG/DL (ref 0.6–1.3)
ERYTHROCYTE [DISTWIDTH] IN BLOOD BY AUTOMATED COUNT: 14.4 % (ref 11.6–15.1)
EST. AVERAGE GLUCOSE BLD GHB EST-MCNC: 197 MG/DL
GFR SERPL CREATININE-BSD FRML MDRD: 58 ML/MIN/1.73SQ M
GLUCOSE SERPL-MCNC: 134 MG/DL (ref 65–140)
GLUCOSE SERPL-MCNC: 175 MG/DL (ref 65–140)
GLUCOSE SERPL-MCNC: 209 MG/DL (ref 65–140)
GLUCOSE SERPL-MCNC: 214 MG/DL (ref 65–140)
HBA1C MFR BLD: 8.5 % (ref 4.2–6.3)
HCT VFR BLD AUTO: 38.1 % (ref 34.8–46.1)
HDLC SERPL-MCNC: 32 MG/DL
HGB BLD-MCNC: 12.1 G/DL (ref 11.5–15.4)
LDLC SERPL CALC-MCNC: 184 MG/DL (ref 0–100)
MAGNESIUM SERPL-MCNC: 1.6 MG/DL (ref 1.6–2.6)
MCH RBC QN AUTO: 27.1 PG (ref 26.8–34.3)
MCHC RBC AUTO-ENTMCNC: 31.8 G/DL (ref 31.4–37.4)
MCV RBC AUTO: 85 FL (ref 82–98)
NRBC BLD AUTO-RTO: 0 /100 WBCS
PHOSPHATE SERPL-MCNC: 2.7 MG/DL (ref 2.3–4.1)
PLATELET # BLD AUTO: 203 THOUSANDS/UL (ref 149–390)
PMV BLD AUTO: 10.6 FL (ref 8.9–12.7)
POTASSIUM SERPL-SCNC: 4.2 MMOL/L (ref 3.5–5.3)
PROT SERPL-MCNC: 6.1 G/DL (ref 6.4–8.2)
QRS AXIS: 123 DEGREES
QRSD INTERVAL: 130 MS
QT INTERVAL: 374 MS
QTC INTERVAL: 434 MS
RBC # BLD AUTO: 4.47 MILLION/UL (ref 3.81–5.12)
SODIUM SERPL-SCNC: 134 MMOL/L (ref 136–145)
T WAVE AXIS: -1 DEGREES
TRIGL SERPL-MCNC: 92 MG/DL
VENTRICULAR RATE: 81 BPM
WBC # BLD AUTO: 7.24 THOUSAND/UL (ref 4.31–10.16)

## 2020-01-05 PROCEDURE — 97163 PT EVAL HIGH COMPLEX 45 MIN: CPT

## 2020-01-05 PROCEDURE — 99232 SBSQ HOSP IP/OBS MODERATE 35: CPT | Performed by: INTERNAL MEDICINE

## 2020-01-05 PROCEDURE — 84100 ASSAY OF PHOSPHORUS: CPT | Performed by: INTERNAL MEDICINE

## 2020-01-05 PROCEDURE — 80053 COMPREHEN METABOLIC PANEL: CPT | Performed by: INTERNAL MEDICINE

## 2020-01-05 PROCEDURE — 83735 ASSAY OF MAGNESIUM: CPT | Performed by: INTERNAL MEDICINE

## 2020-01-05 PROCEDURE — 93010 ELECTROCARDIOGRAM REPORT: CPT | Performed by: INTERNAL MEDICINE

## 2020-01-05 PROCEDURE — 83036 HEMOGLOBIN GLYCOSYLATED A1C: CPT | Performed by: INTERNAL MEDICINE

## 2020-01-05 PROCEDURE — 80061 LIPID PANEL: CPT | Performed by: INTERNAL MEDICINE

## 2020-01-05 PROCEDURE — 82948 REAGENT STRIP/BLOOD GLUCOSE: CPT

## 2020-01-05 PROCEDURE — 99223 1ST HOSP IP/OBS HIGH 75: CPT | Performed by: PSYCHIATRY & NEUROLOGY

## 2020-01-05 PROCEDURE — 92610 EVALUATE SWALLOWING FUNCTION: CPT

## 2020-01-05 PROCEDURE — 85025 COMPLETE CBC W/AUTO DIFF WBC: CPT | Performed by: INTERNAL MEDICINE

## 2020-01-05 RX ADMIN — INSULIN LISPRO 1 UNITS: 100 INJECTION, SOLUTION INTRAVENOUS; SUBCUTANEOUS at 13:06

## 2020-01-05 RX ADMIN — URSODIOL 300 MG: 300 CAPSULE ORAL at 09:24

## 2020-01-05 RX ADMIN — ATORVASTATIN CALCIUM 20 MG: 10 TABLET, FILM COATED ORAL at 17:20

## 2020-01-05 RX ADMIN — LEVOFLOXACIN 250 MG: 5 INJECTION, SOLUTION INTRAVENOUS at 17:21

## 2020-01-05 RX ADMIN — URSODIOL 300 MG: 300 CAPSULE ORAL at 17:21

## 2020-01-05 RX ADMIN — DABIGATRAN ETEXILATE MESYLATE 150 MG: 150 CAPSULE ORAL at 17:21

## 2020-01-05 RX ADMIN — METOPROLOL SUCCINATE 25 MG: 25 TABLET, EXTENDED RELEASE ORAL at 09:23

## 2020-01-05 RX ADMIN — INSULIN LISPRO 2 UNITS: 100 INJECTION, SOLUTION INTRAVENOUS; SUBCUTANEOUS at 22:46

## 2020-01-05 RX ADMIN — DABIGATRAN ETEXILATE MESYLATE 150 MG: 150 CAPSULE ORAL at 09:24

## 2020-01-05 RX ADMIN — LEVOTHYROXINE SODIUM 75 MCG: 75 TABLET ORAL at 06:40

## 2020-01-05 RX ADMIN — INSULIN LISPRO 1 UNITS: 100 INJECTION, SOLUTION INTRAVENOUS; SUBCUTANEOUS at 17:28

## 2020-01-05 NOTE — ASSESSMENT & PLAN NOTE
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

## 2020-01-05 NOTE — ASSESSMENT & PLAN NOTE
She has been maintained on Pradaxa  If indeed her MRI is positive, as her clinical exam suggests, this would represent a Pradaxa failure and she will need to be transitioned to another agent

## 2020-01-05 NOTE — PHYSICAL THERAPY NOTE
PHYSICAL THERAPY EVALUATION  NAME: Gideon Finn  AGE:   80 y o  MRN:  [id number]  ADMIT DX: Delirium [R41 0]  A-fib ([facility] [address] ) [I48 91]  Hyperglycemia [R73 9]  Acute UTI [N39 0]  Stroke-like symptoms [R29 90]  Type 2 diabetes mellitus with hyperglycemia, without long-term current use of insulin ([facility] [address] ) [E11 65]    PMH:   Past Medical History:   Diagnosis Date    Atrial fibrillation ([facility] [address] )     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    Cholecystitis     Disease of tonsils     Glaucoma 2008    NOS     LENGTH OF STAY: 1       01/05/20 0910   Pain Assessment   Pain Assessment No/denies pain   Pain Score No Pain   Home Living   Type of [address] [place] [address] Two level; Able to live on main level with bedroom/bathroom;Stairs to enter with rails  (2-3 MIKIE, able to use RW on steps)   Bathroom Shower/Tub Walk-in shower   Bathroom Equipment Commode; Shower chair   [facility]  (rollator)   Additional Comments Per daughter, pt ambulates with primarily mod I and use of rollator  Occasionally requires min A for sit to stand from certain surfaces  Prior Function   Lives With Daughter; Other (Comment)  (and son in law)   Receives Help From Family   ADL Assistance Needs assistance   IADLs Needs assistance   Falls in the last 6 months 0   Comments Daughter reports pt goes to [facility] with her occasionally to play bingo  Restrictions/Precautions   Weight Bearing Precautions Per Order No   Other Precautions Impulsive;Agitated;Cognitive; Bed Alarm;Multiple lines;Telemetry; Fall Risk;Hard of hearing   General   Additional Pertinent History CT of head (-) for acute intracranial abnormality; MRI pending   Family/Caregiver Present Yes   Cognition   Overall Cognitive Status Impaired   Arousal/Participation Uncooperative   Orientation Level Oriented to person;Disoriented to place; Disoriented to time;Disoriented to situation   Memory Decreased long term memory;Decreased recall of biographical information;Decreased short term memory;Decreased recall of recent events;Decreased recall of precautions   Following Commands Follows one step commands inconsistently   Comments Pt identified by name and hospital bracelet  Becomes agitated during mobility, swinging arms at therapist    RLE Assessment   RLE Assessment X   Strength RLE   RLE Overall Strength 4/5  (functionally)   LLE Assessment   LLE Assessment X   Strength LLE   LLE Overall Strength 4-/5  (functionally)   Coordination   Movements are Fluid and Coordinated 0   Bed Mobility   Supine to Sit 3  Moderate assistance   Additional items Assist x 1;HOB elevated; Bedrails; Increased time required;Verbal cues;LE management   Sit to Supine 3  Moderate assistance   Additional items Assist x 1; Increased time required;Verbal cues;LE management   Transfers   Sit to Stand 3  Moderate assistance   Additional items Assist x 1; Increased time required;Verbal cues   Stand to Sit 4  Minimal assistance   Additional items Assist x 1; Increased time required;Verbal cues   Stand pivot 4  Minimal assistance   Additional items Assist x 1; Increased time required;Verbal cues; Impulsive  (2nd assist as standby as pt is agitated/needs redirection)   Ambulation/Elevation   Gait pattern Improper Weight shift; Antalgic; Forward Flexion;Decreased foot clearance;Decreased L stance; Inconsistent john; Shuffling; Short stride; Excessively slow   Gait Assistance 4  Minimal assist   Additional items Assist x 1;Verbal cues; Tactile cues  (significant redirection required)   Assistive Device Rolling walker   Distance 3` x1 and 4` x1 (sidesteps along EOB)   Balance   Static Sitting Good   Dynamic Sitting Fair   Static Standing Fair -   Dynamic Standing Fair -   Ambulatory Poor +   Endurance Deficit   Endurance Deficit Yes   Endurance Deficit Description limited standing tolerance, fatigue   Activity Tolerance   Activity Tolerance Patient limited by fatigue;Treatment limited secondary to agitation   Nurse Made Aware Per RN, pt appropriate to evaluate   Assessment   Prognosis Fair   Problem List Decreased strength;Decreased endurance; Impaired balance;Decreased mobility; Decreased coordination;Decreased cognition; Impaired judgement;Decreased safety awareness; Impaired hearing   Goals   Patient Goals Pt unable to state at this time due to mental status  STG Expiration Date 01/14/20   Short Term Goal #1 Pt will be able to: (1) perform bed mobility with min A (2) perform sit to stand with supervision (3) ambulate at least 100` with supervision and use of RW (4) initiate HEP (5) increase standing balance by 1 grade   PT Treatment Day 0   Plan   Treatment/Interventions Functional transfer training;LE strengthening/ROM; Therapeutic exercise; Endurance training;Cognitive reorientation;Patient/family training;Equipment eval/education; Bed mobility;Gait training   PT Frequency   (3-5x/week)   Recommendation   Recommendation Short-term skilled PT   Equipment Recommended Walker   Modified [place]ryst[place] Scale   Modified [place] Scale 4   Barthel Index   Feeding 5   Bathing 0   Grooming Score 0   Dressing Score 0   Bladder Score 0   Bowels Score 0   Toilet Use Score 5   Transfers (Bed/Chair) Score 5   Mobility (Level Surface) Score 0   Stairs Score 0   Barthel Index Score 15       Assessment: Pt is a 80 y o  female seen for PT evaluation s/p admit to [address] [place] [address] on 1/4/2020 w/ Stroke-like symptoms  Order placed for PT  Comorbidities affecting pt's physical performance at time of assessment listed above  Personal factors affecting pt at time of IE include: steps to enter environment, advanced age, behavioral pattern, inability to ambulate household distances and limited insight into impairments  Prior to admission, pt was was independent w/ all functional mobility w/ rollator, lived in one floor environment, had 2-3 MIKIE (-) railing and lived with daughter and son-in-law   Upon evaluation: Pt requires mod A for bed mobility, mod A for sit to stand, and min A for ambulation with RW  Pt is impulsive, has difficulty following commands, and requires significant redirection to task  Standby of 2nd person for safety with mobility  Pt became agitated and was swinging UEs at therapist    (Please find full objective findings from PT assessment regarding body systems outlined above)  Impairments and limitations also listed above, especially due to  weakness, impaired balance, decreased endurance, impaired coordination, gait deviations, decreased activity tolerance, decreased safety awareness, impaired judgement, fall risk and decreased cognition  The following objective measures performed on IE also reveal limitations: Barthel Index 15/100; Modified [place] 4 (moderate/severe disability)  Pt's clinical presentation is currently unstable/unpredictable seen in pt's presentation of decreased safety awareness, impulsivity, fall risk, agitation, and lack of insight into deficits  Pt to benefit from continued skilled PT tx while in hospital and upon DC to address deficits as defined above and maximize level of functional mobility  From PT/mobility standpoint, recommendation at time of d/c would be inpatient rehab pending progress in order to maximize pt's functional independence and consistency w/ mobility in order to facilitate return to PLOF  Recommend progression of ambulation and initiation of HEP as appropriate        Ronnell Silva, PT,DPT

## 2020-01-05 NOTE — PROGRESS NOTES
Progress Note - Jessica Floyd 11/3/1927, 80 y o  female MRN: [id number]    Unit/Bed#: E4 -01 Encounter: [id number]    Primary Care Provider: Nuria Benjamin PA-C   Date and time admitted to hospital: 1/4/2020 12:11 PM        * Stroke-like symptoms  Assessment & Plan  Stroke-like symptoms vs encephalopathy from UTI  MRI and neurologic consult pending  Continue levofloxacin for possibility of UTI  CKD (chronic kidney disease) stage 3, GFR 30-59 ml/min ([facility])  Assessment & Plan  CKD 3 at baseline    Results from last 7 days   Lab Units 01/05/20  0610 01/04/20  1243   BUN mg/dL 10 12   CREATININE mg/dL 0 87 0 99       Transaminitis  Assessment & Plan  Transaminitis reportedly has follow-up with gastroenterology as outpatient  Continue ursodiol    Results from last 7 days   Lab Units 01/05/20  0610   AST U/L 31   ALT U/L 28   TOTAL BILIRUBIN mg/dL 1 50*       Chronic atrial fibrillation  Assessment & Plan  Rate controlled on metoprolol, continue anticoagulation with Pradaxa  Essential hypertension  Assessment & Plan  Essential hypertension continue metoprolol    Type 2 diabetes mellitus with hyperglycemia, without long-term current use of insulin [facility])  Assessment & Plan  Lab Results   Component Value Date    HGBA1C 8 5 (H) 01/05/2020     Recent Labs     01/04/20  1738 01/04/20 2054 01/05/20  1149   POCGLU 154* 178* 209*     Diabetes mellitus on metformin PTA  Continue sliding scale only for now  VTE Pharmacologic Prophylaxis: Dabigatran (Pradaxa)    Patient Centered Rounds: I have performed bedside rounds with nursing staff today  Discussions with Specialists or Other Care Team Provider:   Education and Discussions with Family / Patient:  Daughter    Time Spent for Care: 25 mins  More than 50% of total time spent on counseling and coordination of care as described above      Current Length of Stay: 1 day(s)  Current Patient Status: Inpatient     Certification Statement: The patient will continue to require additional inpatient hospital stay due to Stroke-like symptoms  Discharge Plan / Estimated Discharge Date:     Code Status: Level 3 - DNAR and DNI  ______________________________________________________________________________    Subjective:   Patient seen and examined  More awake today  Ate breakfast    Objective:   Vitals: Blood pressure 150/82, pulse 86, temperature 97 6 °F (36 4 °C), temperature source Tympanic Core, resp  rate 18, height 5' (1 524 m), weight 88 3 kg (194 lb 10 7 oz), SpO2 94 %      Physical Exam:   General appearance: alert, appears stated age and cooperative  Head: Normocephalic, without obvious abnormality, atraumatic  Lungs: diminished breath sounds  Heart: irregularly irregular rhythm  Abdomen: soft, non-tender, positive bowel sounds   Back: negative  Extremities: extremities atraumatic, no cyanosis or edema  Neurologic:  Awake and conversant    Additional Data:   Labs:  Results from last 7 days   Lab Units 01/05/20  0610 01/04/20  1243   WBC Thousand/uL 7 24 7 45   HEMOGLOBIN g/dL 12 1 13 4   HEMATOCRIT % 38 1 42 1   MCV fL 85 86   TOTAL NEUT ABS Thousand/uL  --  5 96   BANDS PCT %  --  1   PLATELETS Thousands/uL 203 226     Results from last 7 days   Lab Units 01/05/20  0610 01/04/20  1243   SODIUM mmol/L 134* 132*   POTASSIUM mmol/L 4 2 4 3   CHLORIDE mmol/L 100 97*   CO2 mmol/L 25 30   ANION GAP mmol/L 9 5   BUN mg/dL 10 12   CREATININE mg/dL 0 87 0 99   CALCIUM mg/dL 8 6 9 2   ALBUMIN g/dL 2 1*  --    TOTAL BILIRUBIN mg/dL 1 50*  --    ALK PHOS U/L 183*  --    ALT U/L 28  --    AST U/L 31  --    EGFR ml/min/1 73sq m 58 50   GLUCOSE RANDOM mg/dL 134 239*     Results from last 7 days   Lab Units 01/05/20  0610   MAGNESIUM mg/dL 1 6   PHOSPHORUS mg/dL 2 7     Results from last 7 days   Lab Units 01/04/20  1243   TROPONIN I ng/mL <0 02     Results from last 7 days   Lab Units 01/04/20  1243   NT-PRO BNP pg/mL 2,611*          Results from last 7 days   Lab Units 01/05/20  1149 01/04/20 2054 01/04/20  1738   POC GLUCOSE mg/dl 209* 178* 154*     Results from last 7 days   Lab Units 01/05/20  0610   HEMOGLOBIN A1C % 8 5*         * I Have Reviewed All Lab Data Listed Above  Cultures:               Imaging:  Imaging Reports Reviewed Today Include:   Xr Chest 2 Views    Result Date: 1/5/2020  Impression: No acute cardiopulmonary disease  Workstation performed: [id number]     Ct Head Without Contrast    Result Date: 1/4/2020  Impression: No acute intracranial abnormality  Microangiopathic changes  Stable scattered parenchymal calcifications as may be seen in neurocysticercosis  Workstation performed: [id number]     Scheduled Meds:  Current Facility-Administered Medications:  atorvastatin 20 mg Oral Daily With Pacheco Metz MD    dabigatran etexilate 150 mg Oral BID Jens Whipple MD    insulin lispro 1-5 Units Subcutaneous 4x Daily (AC & HS) Jens Whipple MD    Labetalol HCl 10 mg Intravenous Q6H PRN Jens Whipple MD    levofloxacin 250 mg Intravenous Q24H Jens Whipple MD Last Rate: 250 mg (01/04/20 1719)   levothyroxine 75 mcg Oral Early Morning Jens Whipple MD    LORazepam 1 mg Intravenous PRN Terry Sharpe DO    metoprolol succinate 25 mg Oral Daily Jens Whipple MD    polyethylene glycol 17 g Oral Daily PRN Jens Whipple MD    sodium chloride 50 mL/hr Intravenous Continuous Jens Whipple MD Last Rate: 50 mL/hr (01/04/20 1719)   ursodiol 300 mg Oral BID MD Jasper Corey DO  [facility] Internal Medicine  Hospitalist    ** Please Note: This note has been constructed using a voice recognition system   **

## 2020-01-05 NOTE — ASSESSMENT & PLAN NOTE
Lab Results   Component Value Date    HGBA1C 8 5 (H) 01/05/2020     Recent Labs     01/04/20  1738 01/04/20 2054 01/05/20  1149   POCGLU 154* 178* 209*     Diabetes mellitus on metformin PTA  Continue sliding scale only for now

## 2020-01-05 NOTE — CONSULTS
Consulted for stroke  During visit PT didn't answer any questions, dtr present who provided diet hx  PT currently with decreased po intake compared to diet hx  Prior to admission PT with good appetite, eats 3 meals + snack  Reviewed weight hx records, no wt  change concerns  Reviewed labs: 1/5/20 Na 134, total bili 1 50, , 178, cholesterol 234, HDL 32,   Goal diet CCD1, cardiac  For now decreased CCD2 to [address], continue with 2gm Na and added glucerna shakes BID to aid with meeting estimated needs  Will continue to monitor po intake

## 2020-01-05 NOTE — SPEECH THERAPY NOTE
Speech Language/Pathology  Speech/Language Pathology  Assessment    Patient Name: Wilfrido Cole  ZAJWH'B Date: 1/5/2020     Problem List  Principal Problem:    Stroke-like symptoms  Active Problems:    Type 2 diabetes mellitus with hyperglycemia, without long-term current use of insulin (HCC)    Essential hypertension    Chronic atrial fibrillation    Abnormal liver function test    Change in mental status    Past Medical History  Past Medical History:   Diagnosis Date    Atrial fibrillation ([address] )     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    Cholecystitis     Disease of tonsils     Glaucoma 2008    NOS     Past Surgical History  Past Surgical History:   Procedure Laterality Date    CHOLECYSTECTOMY      EYE SURGERY Right 03/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    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        Bedside Swallow Evaluation:    Summary:  Pt presents w/ no facial asymmetry at rest  Not following most commands  Hearing aids are at home (pt also has glass R eye and poor vision in left)  Pt tolerating dry cheerios (her preference) , banana, pills w/ thin, and thin liquids  Daughter states she has never had any swallowing issues  States pt does not talk much bit has not been speaking since this episode  Nurse reported UTI  Recommendations:  Diet: Regular (daughter is ordering her preferences)  Liquid: thin  Meds:whole w/ thin as tolerated  Supervision: full  Visual impairment  Positioning:Upright  Strategies: Pt to take PO/Meds only when fully alert and upright  Oral care  Aspiration precautions  Eval only, No f/u tx indicated  Pleae reconsult if status changesor concerns arise  Posted sign re visual and hearing impaired  Consider consult w/:  Nutrition    Reason for consult:  R/o aspiration  Determine safest and least restrictive diet  Change in mental status  New neuro event  Current diet:  regular  Premorbid diet[de-identified]  regular  Previous VBS:  none  O2 requirement:  none  Voice/Speech:  Vocalized x 1  [place]  Social:  Home w/ daughter  Follows commands:  Inconsistent w/ basic            Cognitive Status:  alert  Oral [facility] exam:  Dentition:upper and lower dentures  Labial strength and ROM:appears wnl  Lingual strength and ROM:appears wnl  Mandibular strength and ROM:appears wnl  Secretion management:wnl    Items administered:  Noted above  Oral stage: wnl  Lip closure:+  Mastication:+  Bolus formation:+  Bolus control:+  Transfer:+  Oral residue:-  Pocketing:-    Pharyngeal stage:wnl  Swallow promptness:+  Laryngeal rise:+  Wet voice:-  Throat clear:-  Cough:-  Secondary swallows:-  Audible swallows:-  No overt s/s aspiration    Esophageal stage:  No s/s reported    Results d/w:  Pt, nursing, family                      History of Present Illness:  Elham Benton is a 80 y o  female who presents with change in mental status  Patient was in her usual state of health until yesterday afternoon when she started to have confusion  Patient had no prior stroke or TIA in the past  No focal weakness reported  No fall  Past medical history significant for AFib, hypertension, diabetes, hyperlipidemia, hypothyroidism  Patient evaluated in ED with head CT and showed no acute abnormality and suggested to observe patient for acute confusional state secondary to possibly UTI  During my evaluation a patient was confused and disoriented to place person and time, sleepy and easily awakeable  CT head neg acute  CXR and  MRI pending

## 2020-01-05 NOTE — PLAN OF CARE
Problem: Potential for Falls  Goal: Patient will remain free of falls  Description  INTERVENTIONS:  - Assess patient frequently for physical needs  -  Identify cognitive and physical deficits and behaviors that affect risk of falls  -  [place] fall precautions as indicated by assessment   - Educate patient/family on patient safety including physical limitations  - Instruct patient to call for assistance with activity based on assessment  - Modify environment to reduce risk of injury  - Consider OT/PT consult to assist with strengthening/mobility  Outcome: Progressing     Problem: Prexisting or High Potential for Compromised Skin Integrity  Goal: Skin integrity is maintained or improved  Description  INTERVENTIONS:  - Identify patients at risk for skin breakdown  - Assess and monitor skin integrity  - Assess and monitor nutrition and hydration status  - Monitor labs   - Assess for incontinence   - Turn and reposition patient  - Assist with mobility/ambulation  - Relieve pressure over bony prominences  - Avoid friction and shearing  - Provide appropriate hygiene as needed including keeping skin clean and dry  - Evaluate need for skin moisturizer/barrier cream  - Collaborate with interdisciplinary team   - Patient/family teaching  - Consider wound care consult   Outcome: Progressing     Problem: Nutrition/Hydration-ADULT  Goal: Nutrient/Hydration intake appropriate for improving, restoring or maintaining nutritional needs  Description  Monitor and assess patient's nutrition/hydration status for malnutrition  Collaborate with interdisciplinary team and initiate plan and interventions as ordered  Monitor patient's weight and dietary intake as ordered or per policy  Utilize nutrition screening tool and intervene as necessary  Determine patient's food preferences and provide high-protein, high-caloric foods as appropriate       INTERVENTIONS:  - Monitor oral intake, urinary output, labs, and treatment plans  - Assess nutrition and hydration status and recommend course of action  - Evaluate amount of meals eaten  - Assist patient with eating if necessary   - Allow adequate time for meals  - Recommend/ encourage appropriate diets, oral nutritional supplements, and vitamin/mineral supplements  - Order, calculate, and assess calorie counts as needed  - Recommend, monitor, and adjust tube feedings and TPN/PPN based on assessed needs  - Assess need for intravenous fluids  - Provide specific nutrition/hydration education as appropriate  - Include patient/family/caregiver in decisions related to nutrition  Outcome: Progressing     Problem: Neurological Deficit  Goal: Neurological status is stable or improving  Description  Interventions:  - Monitor and assess patient's level of consciousness, motor function, sensory function, and level of assistance needed for ADLs  - Monitor and report changes from baseline  Collaborate with interdisciplinary team to initiate plan and implement interventions as ordered  - Provide and maintain a safe environment  - Consider seizure precautions  - Consider fall precautions  - Consider aspiration precautions  - Consider bleeding precautions  Outcome: Progressing     Problem: Activity Intolerance/Impaired Mobility  Goal: Mobility/activity is maintained at optimum level for patient  Description  Interventions:  - Assess and monitor patient  barriers to mobility and need for assistive/adaptive devices  - Assess patient's emotional response to limitations  - Collaborate with interdisciplinary team and initiate plans and interventions as ordered  - Encourage independent activity per ability   - Maintain proper body alignment  - Perform active/passive rom as tolerated/ordered    - Plan activities to conserve energy   - Turn patient as appropriate  Outcome: Progressing     Problem: Communication Impairment  Goal: Ability to express needs and understand communication  Description  Assess patient's communication skills and ability to understand information  Patient will demonstrate use of effective communication techniques, alternative methods of communication and understanding even if not able to speak  - Encourage communication and provide alternate methods of communication as needed  - Collaborate with case management/ for discharge needs  - Include patient/family/caregiver in decisions related to communication  Outcome: Progressing     Problem: Potential for Aspiration  Goal: Non-ventilated patient's risk of aspiration is minimized  Description  Assess and monitor vital signs, respiratory status, and labs (WBC)  Monitor for signs of aspiration (tachypnea, cough, rales, wheezing, cyanosis, fever)  - Assess and monitor patient's ability to swallow  - Place patient up in chair to eat if possible  - HOB up at 90 degrees to eat if unable to get patient up into chair   - Supervise patient during oral intake  - Instruct patient/ family to take small bites  - Instruct patient/ family to take small single sips when taking liquids  - Follow patient-specific strategies generated by speech pathologist   Outcome: Progressing     Problem: Nutrition  Goal: Nutrition/Hydration status is improving  Description  Monitor and assess patient's nutrition/hydration status for malnutrition (ex- brittle hair, bruises, dry skin, pale skin and conjunctiva, muscle wasting, smooth red tongue, and disorientation)  Collaborate with interdisciplinary team and initiate plan and interventions as ordered  Monitor patient's weight and dietary intake as ordered or per policy  Utilize nutrition screening tool and intervene per policy  Determine patient's food preferences and provide high-protein, high-caloric foods as appropriate  - Assist patient with eating   - Allow adequate time for meals   - Encourage patient to take dietary supplement as ordered    - Collaborate with clinical nutritionist   - Include patient/family/caregiver in decisions related to nutrition    Outcome: Progressing     Problem: CARDIOVASCULAR - ADULT  Goal: Absence of cardiac dysrhythmias or at baseline rhythm  Description  INTERVENTIONS:  - Continuous cardiac monitoring, vital signs, obtain 12 lead EKG if ordered  - Administer antiarrhythmic and heart rate control medications as ordered  - Monitor electrolytes and administer replacement therapy as ordered  Outcome: Progressing     Problem: GENITOURINARY - ADULT  Goal: Maintains or returns to baseline urinary function  Description  INTERVENTIONS:  - Assess urinary function  - Encourage oral fluids to ensure adequate hydration if ordered  - Administer IV fluids as ordered to ensure adequate hydration  - Administer ordered medications as needed  - Offer frequent toileting  - Follow urinary retention protocol if ordered  Outcome: Progressing  Goal: Absence of urinary retention  Description  INTERVENTIONS:  - Assess patient's ability to void and empty bladder  - Monitor I/O  - Bladder scan as needed  - Discuss with physician/AP medications to alleviate retention as needed  - Discuss catheterization for long term situations as appropriate   Outcome: Progressing     Problem: METABOLIC, FLUID AND ELECTROLYTES - ADULT  Goal: Fluid balance maintained  Description  INTERVENTIONS:  - Monitor labs   - Monitor I/O and WT  - Instruct patient on fluid and nutrition as appropriate  - Assess for signs & symptoms of volume excess or deficit  Outcome: Progressing     Problem: SKIN/TISSUE INTEGRITY - ADULT  Goal: Skin integrity remains intact  Description  INTERVENTIONS  - Identify patients at risk for skin breakdown  - Assess and monitor skin integrity  - Assess and monitor nutrition and hydration status  - Monitor labs (i e  albumin)  - Assess for incontinence   - Turn and reposition patient  - Assist with mobility/ambulation  - Relieve pressure over bony prominences  - Avoid friction and shearing  - Provide appropriate hygiene as needed including keeping skin clean and dry  - Evaluate need for skin moisturizer/barrier cream  - Collaborate with interdisciplinary team (i e  Nutrition, Rehabilitation, etc )   - Patient/family teaching  Outcome: Progressing

## 2020-01-05 NOTE — ASSESSMENT & PLAN NOTE
Transaminitis reportedly has follow-up with gastroenterology as outpatient    Continue ursodiol    Results from last 7 days   Lab Units 01/05/20  0610   AST U/L 31   ALT U/L 28   TOTAL BILIRUBIN mg/dL 1 50*

## 2020-01-05 NOTE — ASSESSMENT & PLAN NOTE
Stroke-like symptoms vs encephalopathy from UTI  MRI and neurologic consult pending  Continue levofloxacin for possibility of UTI

## 2020-01-05 NOTE — PLAN OF CARE
Problem: Potential for Falls  Goal: Patient will remain free of falls  Description  INTERVENTIONS:  - Assess patient frequently for physical needs  -  Identify cognitive and physical deficits and behaviors that affect risk of falls  -  [place] fall precautions as indicated by assessment   - Educate patient/family on patient safety including physical limitations  - Instruct patient to call for assistance with activity based on assessment  - Modify environment to reduce risk of injury  - Consider OT/PT consult to assist with strengthening/mobility  Outcome: Progressing     Problem: Prexisting or High Potential for Compromised Skin Integrity  Goal: Skin integrity is maintained or improved  Description  INTERVENTIONS:  - Identify patients at risk for skin breakdown  - Assess and monitor skin integrity  - Assess and monitor nutrition and hydration status  - Monitor labs   - Assess for incontinence   - Turn and reposition patient  - Assist with mobility/ambulation  - Relieve pressure over bony prominences  - Avoid friction and shearing  - Provide appropriate hygiene as needed including keeping skin clean and dry  - Evaluate need for skin moisturizer/barrier cream  - Collaborate with interdisciplinary team   - Patient/family teaching  - Consider wound care consult   Outcome: Progressing     Problem: Nutrition/Hydration-ADULT  Goal: Nutrient/Hydration intake appropriate for improving, restoring or maintaining nutritional needs  Description  Monitor and assess patient's nutrition/hydration status for malnutrition  Collaborate with interdisciplinary team and initiate plan and interventions as ordered  Monitor patient's weight and dietary intake as ordered or per policy  Utilize nutrition screening tool and intervene as necessary  Determine patient's food preferences and provide high-protein, high-caloric foods as appropriate       INTERVENTIONS:  - Monitor oral intake, urinary output, labs, and treatment plans  - Assess nutrition and hydration status and recommend course of action  - Evaluate amount of meals eaten  - Assist patient with eating if necessary   - Allow adequate time for meals  - Recommend/ encourage appropriate diets, oral nutritional supplements, and vitamin/mineral supplements  - Order, calculate, and assess calorie counts as needed  - Recommend, monitor, and adjust tube feedings and TPN/PPN based on assessed needs  - Assess need for intravenous fluids  - Provide specific nutrition/hydration education as appropriate  - Include patient/family/caregiver in decisions related to nutrition  Outcome: Progressing     Problem: Neurological Deficit  Goal: Neurological status is stable or improving  Description  Interventions:  - Monitor and assess patient's level of consciousness, motor function, sensory function, and level of assistance needed for ADLs  - Monitor and report changes from baseline  Collaborate with interdisciplinary team to initiate plan and implement interventions as ordered  - Provide and maintain a safe environment  - Consider seizure precautions  - Consider fall precautions  - Consider aspiration precautions  - Consider bleeding precautions  Outcome: Progressing     Problem: Activity Intolerance/Impaired Mobility  Goal: Mobility/activity is maintained at optimum level for patient  Description  Interventions:  - Assess and monitor patient  barriers to mobility and need for assistive/adaptive devices  - Assess patient's emotional response to limitations  - Collaborate with interdisciplinary team and initiate plans and interventions as ordered  - Encourage independent activity per ability   - Maintain proper body alignment  - Perform active/passive rom as tolerated/ordered    - Plan activities to conserve energy   - Turn patient as appropriate  Outcome: Progressing     Problem: Communication Impairment  Goal: Ability to express needs and understand communication  Description  Assess patient's communication skills and ability to understand information  Patient will demonstrate use of effective communication techniques, alternative methods of communication and understanding even if not able to speak  - Encourage communication and provide alternate methods of communication as needed  - Collaborate with case management/ for discharge needs  - Include patient/family/caregiver in decisions related to communication  Outcome: Progressing     Problem: Potential for Aspiration  Goal: Non-ventilated patient's risk of aspiration is minimized  Description  Assess and monitor vital signs, respiratory status, and labs (WBC)  Monitor for signs of aspiration (tachypnea, cough, rales, wheezing, cyanosis, fever)  - Assess and monitor patient's ability to swallow  - Place patient up in chair to eat if possible  - HOB up at 90 degrees to eat if unable to get patient up into chair   - Supervise patient during oral intake  - Instruct patient/ family to take small bites  - Instruct patient/ family to take small single sips when taking liquids  - Follow patient-specific strategies generated by speech pathologist   Outcome: Progressing     Problem: Nutrition  Goal: Nutrition/Hydration status is improving  Description  Monitor and assess patient's nutrition/hydration status for malnutrition (ex- brittle hair, bruises, dry skin, pale skin and conjunctiva, muscle wasting, smooth red tongue, and disorientation)  Collaborate with interdisciplinary team and initiate plan and interventions as ordered  Monitor patient's weight and dietary intake as ordered or per policy  Utilize nutrition screening tool and intervene per policy  Determine patient's food preferences and provide high-protein, high-caloric foods as appropriate  - Assist patient with eating   - Allow adequate time for meals   - Encourage patient to take dietary supplement as ordered    - Collaborate with clinical nutritionist   - Include patient/family/caregiver in decisions related to nutrition    Outcome: Progressing     Problem: CARDIOVASCULAR - ADULT  Goal: Absence of cardiac dysrhythmias or at baseline rhythm  Description  INTERVENTIONS:  - Continuous cardiac monitoring, vital signs, obtain 12 lead EKG if ordered  - Administer antiarrhythmic and heart rate control medications as ordered  - Monitor electrolytes and administer replacement therapy as ordered  Outcome: Progressing     Problem: GENITOURINARY - ADULT  Goal: Maintains or returns to baseline urinary function  Description  INTERVENTIONS:  - Assess urinary function  - Encourage oral fluids to ensure adequate hydration if ordered  - Administer IV fluids as ordered to ensure adequate hydration  - Administer ordered medications as needed  - Offer frequent toileting  - Follow urinary retention protocol if ordered  Outcome: Progressing  Goal: Absence of urinary retention  Description  INTERVENTIONS:  - Assess patient's ability to void and empty bladder  - Monitor I/O  - Bladder scan as needed  - Discuss with physician/AP medications to alleviate retention as needed  - Discuss catheterization for long term situations as appropriate   Outcome: Progressing     Problem: METABOLIC, FLUID AND ELECTROLYTES - ADULT  Goal: Fluid balance maintained  Description  INTERVENTIONS:  - Monitor labs   - Monitor I/O and WT  - Instruct patient on fluid and nutrition as appropriate  - Assess for signs & symptoms of volume excess or deficit  Outcome: Progressing     Problem: SKIN/TISSUE INTEGRITY - ADULT  Goal: Skin integrity remains intact  Description  INTERVENTIONS  - Identify patients at risk for skin breakdown  - Assess and monitor skin integrity  - Assess and monitor nutrition and hydration status  - Monitor labs (i e  albumin)  - Assess for incontinence   - Turn and reposition patient  - Assist with mobility/ambulation  - Relieve pressure over bony prominences  - Avoid friction and shearing  - Provide appropriate hygiene as needed including keeping skin clean and dry  - Evaluate need for skin moisturizer/barrier cream  - Collaborate with interdisciplinary team (i e  Nutrition, Rehabilitation, etc )   - Patient/family teaching  Outcome: Progressing

## 2020-01-05 NOTE — ASSESSMENT & PLAN NOTE
There is no previous documented baseline cognitive exam on this patient that I was able to review in her outpatient chart  I suspected baseline this patient has deficits in both memory, processing, as well as problem-solving  The change in mental status suggested by her family is actually related to her aphasia, see above

## 2020-01-05 NOTE — ASSESSMENT & PLAN NOTE
CKD 3 at baseline    Results from last 7 days   Lab Units 01/05/20  0610 01/04/20  1243   BUN mg/dL 10 12   CREATININE mg/dL 0 87 0 99

## 2020-01-05 NOTE — CONSULTS
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 as well as hearing  Review of previous medical records was completed  Family, specifically the patient's daughter and other family members, were present at the bedside for history and examination    * Mixed aphasia, global  Assessment & Plan  The patient's exam at this time is consistent with that of a global/mixed component aphasia  She had very little spontaneous speech, there was some dysarthria  She however had even less understanding of the use of language or even pantomining at this point  She also appeared to have a field cut however given her difficulty hearing at baseline coupled with her current aphasia and her pre-existing vision loss in her right eye it was difficult to assess her field cut  As noted below her MRI is pending  Is likely however that she has sustained a cortical infarct  Her family's report of compliant medication use suggest and this would be a Pradaxa failure  We will wait for her MRI before switching her  Change in mental status  Assessment & Plan  There is no previous documented baseline cognitive exam on this patient that I was able to review in her outpatient chart  I suspected baseline this patient has deficits in both memory, processing, as well as problem-solving  The change in mental status suggested by her family is actually related to her aphasia, see above  Chronic atrial fibrillation  Assessment & Plan  She has been maintained on Pradaxa    If indeed her MRI is positive, as her clinical exam suggests, this would represent a Pradaxa failure and she will need to be transitioned to another agent  Type 2 diabetes mellitus with hyperglycemia, without long-term current use of insulin [facility])  Assessment & Plan  Her family doctor has been following her blood sugars  Her A1c is currently a 8 5  HPI: Alexandra Jimenez is a right handed  80 y o  female who  currently lives with her daughter in their family home  The family do report that or the mother is beginning to have some memory loss, But they report that she he is to be doing well, considering her hearing and vision difficulties  They have recently noticed that her ambulation has declined as well and although they have a script to try and take her to PT it is difficult the again because of her sensory loss  It is in the background of this that the patient was brought to the ED about mid day yesterday on the 4th because they report that is since January 3rd she clearly seemed to be confused and was not talking to them she did not seem to be responding to things that they word telling her or talking with her about  There report that they have been compliant with her meds and they thought this might pass if she rested and had a good night's sleep on the night of the 3rd  However when she still remains at uncommunicative on the 4th they brought her in  The patient is unable to contribute any history  ROS: 12 system cued query:  She is unable to participate either through of verbal or nonverbal means of query and answers  She appears comfortable in the bed the staff nurse reports there have been no adverse events        Historical Information     Past Medical History:   Diagnosis Date    Atrial fibrillation ([address] )     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    Cholecystitis     Disease of tonsils     Glaucoma 2008    NOS     Past Surgical History:   Procedure Laterality Date    CHOLECYSTECTOMY      EYE SURGERY Right 03/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    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 Social History  she is  she lives with her daughter  Family History:   Family History   Problem Relation Age of Onset    Breast cancer Sister     No Known Problems Mother     No Known Problems Father          Allergies   Allergen Reactions    Aspirin GI Intolerance    Penicillins      Meds:all current active meds have been reviewed and She is only on Pradaxa  She is compliant outpatient notes indicate that they had been changing her statin around as the patient was reporting complaints of abdominal pain and her LFT is were increasing  Scheduled Meds:  Current Facility-Administered Medications:  atorvastatin 20 mg Oral Daily With Dinner   dabigatran etexilate 150 mg Oral BID   insulin lispro 1-5 Units Subcutaneous 4x Daily (AC & HS)   Labetalol HCl 10 mg Intravenous Q6H PRN   levofloxacin 250 mg Intravenous Q24H   levothyroxine 75 mcg Oral Early Morning   LORazepam 1 mg Intravenous PRN   metoprolol succinate 25 mg Oral Daily   polyethylene glycol 17 g Oral Daily PRN   sodium chloride 50 mL/hr Intravenous Continuous   ursodiol 300 mg Oral BID     PRN Meds: Labetalol HCl    LORazepam    polyethylene glycol      Physical Exam:   Objective   Vitals:Blood pressure 150/82, pulse 86, temperature 97 6 °F (36 4 °C), temperature source Tympanic Core, resp  rate 18, height 5' (1 524 m), weight 88 3 kg (194 lb 10 7 oz), SpO2 94 %  ,Body mass index is 38 02 kg/m²  Patient was examined in bed her family is at the bedside  General:  She appeared sleeping initially  , appears stated age   Head: Normocephalic, without obvious abnormality, atraumatic  Oral exam: lips, mucosa, and tongue moist;   Neck: no carotid bruit,   Lungs: clear to auscultation ant  bilaterally  Heart: regular rate and rhythm, S1, S2 normal, no murmur appreciated,   Abdomen: soft, +BS    Extremities: atraumatic, +1 edema her feet bilaterally      Neurologic:   Mental status:  With tactile stimulation she clearly was more awake and attentive  She was largely nonverbal with the exception of reporting no when the examiner assist her great toes and feet bilaterally  Other than that she remained silent  She did not show any frustration despite attempts the family and the examiner to communicate with this patient  She did not follow-up pantomime gestures  Because of her vision loss she is unable to read  Also she has significant get hearing loss bilaterally  CN Exam:  She has a glass OD, the left had a clouded cornea we were unable to scan her EOMs there traditional meth that is she appeared to have a neglect or a field cut of her right side during examination  No clear sensory loss or acute volitional motor weakness was appreciated  (No PP on face), her family reports her hearing is markedly diminished we were unable to assess  I was unable to assess her oral cavity or her shoulder shrug completely  Her speech "no! " When her feet were touched was clear  Motor:  She appeared to have age-appropriate power x 4 limbs, she had a more controlled descent with her left arm than her right  She briskly withdrew both feet with Peretich stimulation  Sensory: intact  X 4 limbs,  (not PP tested)  Cerebellar:  No gross ataxia appreciated  DTR's:  Diminished x4,  Plantars:  Reflexively withdrew bilaterally  Gait:  Her gait is limited at baseline  It was not attempted at this time  Lab Results:   I have personally reviewed pertinent reports    , CBC:   Results from last 7 days   Lab Units 01/05/20  0610 01/04/20  1243   WBC Thousand/uL 7 24 7 45   RBC Million/uL 4 47 4 91   HEMOGLOBIN g/dL 12 1 13 4   HEMATOCRIT % 38 1 42 1   MCV fL 85 86   PLATELETS Thousands/uL 203 226   , BMP/CMP:   Results from last 7 days   Lab Units 01/05/20  0610 01/04/20  1243   SODIUM mmol/L 134* 132*   POTASSIUM mmol/L 4 2 4 3   CHLORIDE mmol/L 100 97*   CO2 mmol/L 25 30   BUN mg/dL 10 12   CREATININE mg/dL 0 87 0 99   CALCIUM mg/dL 8 6 9 2   AST U/L 31  --    ALT U/L 28  --    ALK PHOS U/L 183*  --    EGFR ml/min/1 73sq m 58 50   , Vitamin B12:   , HgBA1C:   Results from last 7 days   Lab Units 01/05/20  0610   HEMOGLOBIN A1C % 8 5*   , TSH:   , Coagulation:   , Lipid Profile:   Results from last 7 days   Lab Units 01/05/20  0610   HDL mg/dL 32*   LDL CALC mg/dL 184*   TRIGLYCERIDES mg/dL 92        Imaging Studies: I have personally reviewed pertinent films in PACS and Note on her CT head that there is generalized atrophy appreciate but no large or lacunar territory infarcts noted  her MRI is pending for likely tomorrow  Carotid Dopplers are also pending  EEG, Echo, Pathology, and Other Studies: Her echocardiogram is pending  Dictation voice to text software has been used in the creation of this document  Please consider this in light of any contextual or grammatical errors

## 2020-01-05 NOTE — PLAN OF CARE
Problem: PHYSICAL THERAPY ADULT  Goal: Performs mobility at highest level of function for planned discharge setting  See evaluation for individualized goals  Description  Treatment/Interventions: Functional transfer training, LE strengthening/ROM, Therapeutic exercise, Endurance training, Cognitive reorientation, Patient/family training, Equipment eval/education, Bed mobility, Gait training  Equipment Recommended: Nyra Deal       See flowsheet documentation for full assessment, interventions and recommendations  Note:   Prognosis: Fair  Problem List: Decreased strength, Decreased endurance, Impaired balance, Decreased mobility, Decreased coordination, Decreased cognition, Impaired judgement, Decreased safety awareness, Impaired hearing  Assessment: Pt is a 80 y o  female seen for PT evaluation s/p admit to [address] [place] [address] on 1/4/2020 w/ Stroke-like symptoms  Order placed for PT  Comorbidities affecting pt's physical performance at time of assessment listed above  Personal factors affecting pt at time of IE include: steps to enter environment, advanced age, behavioral pattern, inability to ambulate household distances and limited insight into impairments  Prior to admission, pt was was independent w/ all functional mobility w/ rollator, lived in one floor environment, had 2-3 MIKIE (-) railing and lived with daughter and son-in-law  Upon evaluation: Pt requires mod A for bed mobility, mod A for sit to stand, and min A for ambulation with RW  Pt is impulsive, has difficulty following commands, and requires significant redirection to task  Standby of 2nd person for safety with mobility  Pt became agitated and was swinging UEs at therapist    (Please find full objective findings from PT assessment regarding body systems outlined above)   Impairments and limitations also listed above, especially due to  weakness, impaired balance, decreased endurance, impaired coordination, gait deviations, decreased activity tolerance, decreased safety awareness, impaired judgement, fall risk and decreased cognition  The following objective measures performed on IE also reveal limitations: Barthel Index 15/100; Modified Krystyna 4 (moderate/severe disability)  Pt's clinical presentation is currently unstable/unpredictable seen in pt's presentation of decreased safety awareness, impulsivity, fall risk, agitation, and lack of insight into deficits  Pt to benefit from continued skilled PT tx while in hospital and upon DC to address deficits as defined above and maximize level of functional mobility  From PT/mobility standpoint, recommendation at time of d/c would be inpatient rehab pending progress in order to maximize pt's functional independence and consistency w/ mobility in order to facilitate return to PLOF  Recommend progression of ambulation and initiation of HEP as appropriate  Recommendation: Short-term skilled PT          See flowsheet documentation for full assessment

## 2020-01-06 ENCOUNTER — APPOINTMENT (INPATIENT)
Dept: NON INVASIVE DIAGNOSTICS | Facility: HOSPITAL | Age: 85
DRG: 689 | End: 2020-01-06
Payer: MEDICARE

## 2020-01-06 ENCOUNTER — APPOINTMENT (INPATIENT)
Dept: MRI IMAGING | Facility: HOSPITAL | Age: 85
DRG: 689 | End: 2020-01-06
Payer: MEDICARE

## 2020-01-06 PROBLEM — N39.0 UTI (URINARY TRACT INFECTION): Status: ACTIVE | Noted: 2020-01-06

## 2020-01-06 PROBLEM — G92.8 TOXIC METABOLIC ENCEPHALOPATHY: Status: ACTIVE | Noted: 2020-01-06

## 2020-01-06 LAB
ANION GAP SERPL CALCULATED.3IONS-SCNC: 8 MMOL/L (ref 4–13)
BUN SERPL-MCNC: 9 MG/DL (ref 5–25)
CALCIUM SERPL-MCNC: 8.5 MG/DL (ref 8.3–10.1)
CHLORIDE SERPL-SCNC: 99 MMOL/L (ref 100–108)
CO2 SERPL-SCNC: 27 MMOL/L (ref 21–32)
CREAT SERPL-MCNC: 0.85 MG/DL (ref 0.6–1.3)
ERYTHROCYTE [DISTWIDTH] IN BLOOD BY AUTOMATED COUNT: 14.6 % (ref 11.6–15.1)
GFR SERPL CREATININE-BSD FRML MDRD: 60 ML/MIN/1.73SQ M
GLUCOSE SERPL-MCNC: 151 MG/DL (ref 65–140)
GLUCOSE SERPL-MCNC: 160 MG/DL (ref 65–140)
GLUCOSE SERPL-MCNC: 164 MG/DL (ref 65–140)
GLUCOSE SERPL-MCNC: 238 MG/DL (ref 65–140)
GLUCOSE SERPL-MCNC: 266 MG/DL (ref 65–140)
HCT VFR BLD AUTO: 42 % (ref 34.8–46.1)
HGB BLD-MCNC: 13.5 G/DL (ref 11.5–15.4)
MCH RBC QN AUTO: 27.3 PG (ref 26.8–34.3)
MCHC RBC AUTO-ENTMCNC: 32.1 G/DL (ref 31.4–37.4)
MCV RBC AUTO: 85 FL (ref 82–98)
PLATELET # BLD AUTO: 231 THOUSANDS/UL (ref 149–390)
PMV BLD AUTO: 9.9 FL (ref 8.9–12.7)
POTASSIUM SERPL-SCNC: 3.8 MMOL/L (ref 3.5–5.3)
RBC # BLD AUTO: 4.94 MILLION/UL (ref 3.81–5.12)
SODIUM SERPL-SCNC: 134 MMOL/L (ref 136–145)
WBC # BLD AUTO: 6.47 THOUSAND/UL (ref 4.31–10.16)

## 2020-01-06 PROCEDURE — 97116 GAIT TRAINING THERAPY: CPT

## 2020-01-06 PROCEDURE — 99232 SBSQ HOSP IP/OBS MODERATE 35: CPT | Performed by: INTERNAL MEDICINE

## 2020-01-06 PROCEDURE — 93880 EXTRACRANIAL BILAT STUDY: CPT | Performed by: SURGERY

## 2020-01-06 PROCEDURE — 82948 REAGENT STRIP/BLOOD GLUCOSE: CPT

## 2020-01-06 PROCEDURE — 85027 COMPLETE CBC AUTOMATED: CPT | Performed by: INTERNAL MEDICINE

## 2020-01-06 PROCEDURE — 99233 SBSQ HOSP IP/OBS HIGH 50: CPT | Performed by: PSYCHIATRY & NEUROLOGY

## 2020-01-06 PROCEDURE — 70551 MRI BRAIN STEM W/O DYE: CPT

## 2020-01-06 PROCEDURE — 93306 TTE W/DOPPLER COMPLETE: CPT

## 2020-01-06 PROCEDURE — 97530 THERAPEUTIC ACTIVITIES: CPT

## 2020-01-06 PROCEDURE — 93880 EXTRACRANIAL BILAT STUDY: CPT

## 2020-01-06 PROCEDURE — 97110 THERAPEUTIC EXERCISES: CPT

## 2020-01-06 PROCEDURE — 97167 OT EVAL HIGH COMPLEX 60 MIN: CPT

## 2020-01-06 PROCEDURE — 80048 BASIC METABOLIC PNL TOTAL CA: CPT | Performed by: INTERNAL MEDICINE

## 2020-01-06 RX ORDER — LEVOFLOXACIN 250 MG/1
250 TABLET ORAL EVERY 24 HOURS
Status: DISCONTINUED | OUTPATIENT
Start: 2020-01-07 | End: 2020-01-07 | Stop reason: HOSPADM

## 2020-01-06 RX ORDER — NYSTATIN 100000 [USP'U]/G
1 POWDER TOPICAL 2 TIMES DAILY
Status: DISCONTINUED | OUTPATIENT
Start: 2020-01-06 | End: 2020-01-07 | Stop reason: HOSPADM

## 2020-01-06 RX ORDER — INSULIN GLARGINE 100 [IU]/ML
10 INJECTION, SOLUTION SUBCUTANEOUS
Status: DISCONTINUED | OUTPATIENT
Start: 2020-01-06 | End: 2020-01-07 | Stop reason: HOSPADM

## 2020-01-06 RX ADMIN — INSULIN LISPRO 2 UNITS: 100 INJECTION, SOLUTION INTRAVENOUS; SUBCUTANEOUS at 13:18

## 2020-01-06 RX ADMIN — SODIUM CHLORIDE 50 ML/HR: 0.9 INJECTION, SOLUTION INTRAVENOUS at 12:09

## 2020-01-06 RX ADMIN — METOPROLOL SUCCINATE 25 MG: 25 TABLET, EXTENDED RELEASE ORAL at 08:16

## 2020-01-06 RX ADMIN — INSULIN LISPRO 1 UNITS: 100 INJECTION, SOLUTION INTRAVENOUS; SUBCUTANEOUS at 08:16

## 2020-01-06 RX ADMIN — DABIGATRAN ETEXILATE MESYLATE 150 MG: 150 CAPSULE ORAL at 08:16

## 2020-01-06 RX ADMIN — DABIGATRAN ETEXILATE MESYLATE 150 MG: 150 CAPSULE ORAL at 17:48

## 2020-01-06 RX ADMIN — LORAZEPAM 1 MG: 2 INJECTION INTRAMUSCULAR; INTRAVENOUS at 12:29

## 2020-01-06 RX ADMIN — URSODIOL 300 MG: 300 CAPSULE ORAL at 17:48

## 2020-01-06 RX ADMIN — ATORVASTATIN CALCIUM 20 MG: 10 TABLET, FILM COATED ORAL at 17:48

## 2020-01-06 RX ADMIN — LEVOTHYROXINE SODIUM 75 MCG: 75 TABLET ORAL at 05:50

## 2020-01-06 RX ADMIN — NYSTATIN 1 APPLICATION: 100000 POWDER TOPICAL at 17:49

## 2020-01-06 RX ADMIN — INSULIN GLARGINE 10 UNITS: 100 INJECTION, SOLUTION SUBCUTANEOUS at 22:34

## 2020-01-06 RX ADMIN — INSULIN LISPRO 1 UNITS: 100 INJECTION, SOLUTION INTRAVENOUS; SUBCUTANEOUS at 22:34

## 2020-01-06 RX ADMIN — URSODIOL 300 MG: 300 CAPSULE ORAL at 08:16

## 2020-01-06 RX ADMIN — LORAZEPAM 1 MG: 2 INJECTION INTRAMUSCULAR; INTRAVENOUS at 19:38

## 2020-01-06 RX ADMIN — LEVOFLOXACIN 250 MG: 5 INJECTION, SOLUTION INTRAVENOUS at 16:43

## 2020-01-06 RX ADMIN — INSULIN LISPRO 2 UNITS: 100 INJECTION, SOLUTION INTRAVENOUS; SUBCUTANEOUS at 17:48

## 2020-01-06 NOTE — PLAN OF CARE
Problem: PHYSICAL THERAPY ADULT  Goal: Performs mobility at highest level of function for planned discharge setting  See evaluation for individualized goals  Description  Treatment/Interventions: Functional transfer training, LE strengthening/ROM, Therapeutic exercise, Endurance training, Cognitive reorientation, Patient/family training, Equipment eval/education, Bed mobility, Gait training  Equipment Recommended: Shaw Bass       See flowsheet documentation for full assessment, interventions and recommendations  Outcome: Progressing  Note:   Prognosis: Fair  Problem List: Decreased strength, Decreased endurance, Impaired balance, Decreased mobility, Decreased cognition, Impaired judgement, Decreased safety awareness, Obesity, Pain, Impaired hearing, Impaired vision  Assessment: Pt  Continues to demonstrates impaired ability to perform transfers and ambulation on levels with use of rw  PT is requiring min assist x2 for transfers and mod assist x2 for ambulation on levels with use of rw due to gait deviaitions, decreased activity tolerance, impaired balance, safety mobility, and  Discomfort in R knee  Pt able to progress with ambulation distances to 10' x2 with one seated rest beaks due to fatigue  Pt tolerated sitting activities at edge of bed x 15 minutes with close supervision-cg x1  Pt demonstrates l lateral lean which pt is able to correct with verbal and or tactile cues  Pt demonstrates stable sitting balance with forward reaching activities at edge of bed  Pt performs seated b/l le arom exercises with verbal cues  Improved command following when hearing aides inserted  Pt remained seated out of bed in recliner with chair  alram activated, call bell in reach  Recommend STR at d/c  Recommendation: Short-term skilled PT     PT - OK to Discharge: Yes(to rehab when medically cleared  )    See flowsheet documentation for full assessment

## 2020-01-06 NOTE — PROGRESS NOTES
Progress Note - Neurology   Shaylee Martini 80 y o  female [id number]  Unit/Bed#: East 4 /E4 -*    Assessment:  Shaylee Martini is a 80 y o  female with a history of Afib compliant on Pradaxa, DM2 (A1C 8 5), HLD () and memory loss who presented with new onset mixed global aphasia, visual field cut and decreased ambulation  Aphasia has since resolved with minimal residual dysarthria  Left visual field cut and difficulty ambulating; however, remain  Suspect UTI to be the etiology of altered state/aphasia; however continue to be concerned about possible infarct with Pradaxa failure with residual neurological deficits  Stroke work-up pending  Plan:  - Acute ischemic stroke protocol  - Continue Pradaxa 150mg bid  - Continue Atorvastatin   - Carotid duplex pending  - MRI brain without contrast pending   - Telemetry  - Secondary risk factor modification  - Goal of normotension and euglycemia   - PT/OT/ST  - Stroke Education  - Frequent neurological checks  - Stat CT head with acute worsening in neuro exam/mental status  - Notify neurology with any changes in examination  Results:  - CTH negative for acute abnormality  -   - HbA1C 8 5  - TSH obtained 12/16: 4 39  - Ecoli UTI  Continue Levofloxacin    Subjective:   I feel like my usual self  The patient's daughter-in-law is at bedside  Urine culture positive for Ecoli    Past Medical History:   Diagnosis Date    Atrial fibrillation ([address] )     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    Cholecystitis     Disease of tonsils     Glaucoma 2008    NOS     Past Surgical History:   Procedure Laterality Date    CHOLECYSTECTOMY      EYE SURGERY Right 03/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    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     Family History   Problem Relation Age of Onset    Breast cancer Sister     No Known Problems Mother     No Known Problems Father      Social History     Socioeconomic History    Marital status:       Spouse name: None    Number of children: None    Years of education: None    Highest education level: None   Occupational History    None   Social Needs    Financial resource strain: None    Food insecurity:     Worry: None     Inability: None    Transportation needs:     Medical: No     Non-medical: No   Tobacco Use    Smoking status: Never Smoker    Smokeless tobacco: Never Used   Substance and Sexual Activity    Alcohol use: Not Currently    Drug use: Never    Sexual activity: Not Currently   Lifestyle    Physical activity:     Days per week: None     Minutes per session: None    Stress: None   Relationships    Social connections:     Talks on phone: None     Gets together: None     Attends [employer] service: None     Active member of club or organization: None     Attends meetings of clubs or organizations: None     Relationship status: None    Intimate partner violence:     Fear of current or ex partner: None     Emotionally abused: None     Physically abused: None     Forced sexual activity: None   Other Topics Concern    None   Social History Narrative    None     Medications: Reviewed in detail by me  ROS: Review of Systems A 12 point ROS was completed and though limited due to patient's memory impairment all systems were negative aside from those listed below:  - fatigue  - poor chronic vision with R glass eye  - Bilateral hearing deficit  - Difficulty with ambulation  - Poor memory    Vitals: /79 (BP Location: Left arm)   Pulse 87   Temp (!) 97 2 °F (36 2 °C) (Tympanic)   Resp 18   Ht 5' (1 524 m)   Wt 88 3 kg (194 lb 10 7 oz)   SpO2 97%   BMI 38 02 kg/m²     Physical Exam:   Physical Exam   Constitutional: She appears well-developed and well-nourished  No distress  HENT:   Head: Normocephalic and atraumatic  Eyes: Conjunctivae are normal  Right eye exhibits no discharge  Left eye exhibits no discharge  No scleral icterus  Glass right eye  Left eye PERRL   Neck: Normal range of motion   Neck supple  Cardiovascular: Exam reveals no gallop and no friction rub  No murmur heard  Irregularly irregular   Pulmonary/Chest: Effort normal and breath sounds normal  No stridor  No respiratory distress  She has no wheezes  She has no rales  She exhibits no tenderness  Musculoskeletal: Normal range of motion  She exhibits no edema, tenderness or deformity  Lymphadenopathy:     She has no cervical adenopathy  Neurological: She is alert  She has normal strength  Finger-nose-finger test: Unable to complete due to poor vision  Skin: Skin is warm and dry  No rash noted  No erythema  Psychiatric:   Cooperative, pleasant     Neurologic Exam     Mental Status   Attention: normal  Concentration: normal    Speech: (Minimal dysarthria  No aphasia )  Level of consciousness: alert  Able to perform simple calculations  Able to name object  Able to repeat  Oriented to person and place  Initially not oriented to year, year of birth or age; however upon repeat exam was able to recall year and knew that her prior answer about her age was incorrect  Able to follow one step commands without difficulty (as long as she could hear the direction)  Able to spell WORLD forwards but not backwards  Unable to interpret proverb correctly  Cranial Nerves     CN II   Visual acuity: (Poor vision at baseline  Glass R eye  )  Left visual field deficit: upper temporal and lower temporal quadrant(s)    CN III, IV, VI   Pupils: (L eye PERRL)    CN V   Facial sensation intact  CN VII   Facial expression full, symmetric  CN VIII   Hearing: impaired    Motor Exam   Muscle bulk: normal  Overall muscle tone: normal    Strength   Strength 5/5 throughout       Sensory Exam   Light touch intact throughout with single stimulus  Extinction with light touch to BLEs     Gait, Coordination, and Reflexes     Gait  Gait: (Deferred at this time)    Coordination   Finger-nose-finger test: Unable to complete due to poor vision  Reflexes   Right plantar: normal  Left plantar: normal      Labs: Reviewed in detail by me  Imaging: I have personally reviewed pertinent imaging and PACS reports  VTE Prophylaxis: Pradaxa    Total time spent today 35 minutes  Greater than 50% of total time was spent with the patient and / or family counseling and / or coordination of care   A description of the counseling / coordination of care: discussing patient's progress with patient, daughter in law and primary team, as well as discussing suspected etiology and plan of care

## 2020-01-06 NOTE — PLAN OF CARE
Problem: Potential for Falls  Goal: Patient will remain free of falls  Description  INTERVENTIONS:  - Assess patient frequently for physical needs  -  Identify cognitive and physical deficits and behaviors that affect risk of falls  -  [place] fall precautions as indicated by assessment   - Educate patient/family on patient safety including physical limitations  - Instruct patient to call for assistance with activity based on assessment  - Modify environment to reduce risk of injury  - Consider OT/PT consult to assist with strengthening/mobility  1/6/2020 0110 by Peter Saini RN  Outcome: Progressing  1/6/2020 0102 by Peter Saini RN  Outcome: Progressing     Problem: Prexisting or High Potential for Compromised Skin Integrity  Goal: Skin integrity is maintained or improved  Description  INTERVENTIONS:  - Identify patients at risk for skin breakdown  - Assess and monitor skin integrity  - Assess and monitor nutrition and hydration status  - Monitor labs   - Assess for incontinence   - Turn and reposition patient  - Assist with mobility/ambulation  - Relieve pressure over bony prominences  - Avoid friction and shearing  - Provide appropriate hygiene as needed including keeping skin clean and dry  - Evaluate need for skin moisturizer/barrier cream  - Collaborate with interdisciplinary team   - Patient/family teaching  - Consider wound care consult   1/6/2020 0110 by Peter Saini RN  Outcome: Progressing  1/6/2020 0102 by Peter Saini RN  Outcome: Progressing     Problem: Nutrition/Hydration-ADULT  Goal: Nutrient/Hydration intake appropriate for improving, restoring or maintaining nutritional needs  Description  Monitor and assess patient's nutrition/hydration status for malnutrition  Collaborate with interdisciplinary team and initiate plan and interventions as ordered  Monitor patient's weight and dietary intake as ordered or per policy   Utilize nutrition screening tool and intervene as necessary  Determine patient's food preferences and provide high-protein, high-caloric foods as appropriate  INTERVENTIONS:  - Monitor oral intake, urinary output, labs, and treatment plans  - Assess nutrition and hydration status and recommend course of action  - Evaluate amount of meals eaten  - Assist patient with eating if necessary   - Allow adequate time for meals  - Recommend/ encourage appropriate diets, oral nutritional supplements, and vitamin/mineral supplements  - Order, calculate, and assess calorie counts as needed  - Recommend, monitor, and adjust tube feedings and TPN/PPN based on assessed needs  - Assess need for intravenous fluids  - Provide specific nutrition/hydration education as appropriate  - Include patient/family/caregiver in decisions related to nutrition  1/6/2020 0110 by Weldon Boeck, RN  Outcome: Progressing  1/6/2020 0102 by Weldon Boeck, RN  Outcome: Progressing     Problem: Neurological Deficit  Goal: Neurological status is stable or improving  Description  Interventions:  - Monitor and assess patient's level of consciousness, motor function, sensory function, and level of assistance needed for ADLs  - Monitor and report changes from baseline  Collaborate with interdisciplinary team to initiate plan and implement interventions as ordered  - Provide and maintain a safe environment  - Consider seizure precautions  - Consider fall precautions  - Consider aspiration precautions  - Consider bleeding precautions  1/6/2020 0110 by Weldon Boeck, RN  Outcome: Progressing  1/6/2020 0102 by Weldon Boeck, RN  Outcome: Progressing     Problem: Activity Intolerance/Impaired Mobility  Goal: Mobility/activity is maintained at optimum level for patient  Description  Interventions:  - Assess and monitor patient  barriers to mobility and need for assistive/adaptive devices  - Assess patient's emotional response to limitations    - Collaborate with interdisciplinary team and initiate plans and interventions as ordered  - Encourage independent activity per ability   - Maintain proper body alignment  - Perform active/passive rom as tolerated/ordered  - Plan activities to conserve energy   - Turn patient as appropriate  1/6/2020 0110 by Ashley Ferrell RN  Outcome: Progressing  1/6/2020 0102 by Ashley Ferrell RN  Outcome: Progressing     Problem: Communication Impairment  Goal: Ability to express needs and understand communication  Description  Assess patient's communication skills and ability to understand information  Patient will demonstrate use of effective communication techniques, alternative methods of communication and understanding even if not able to speak  - Encourage communication and provide alternate methods of communication as needed  - Collaborate with case management/ for discharge needs  - Include patient/family/caregiver in decisions related to communication  1/6/2020 0110 by Ashley Ferrell RN  Outcome: Progressing  1/6/2020 0102 by Ashley Ferrell RN  Outcome: Progressing     Problem: Potential for Aspiration  Goal: Non-ventilated patient's risk of aspiration is minimized  Description  Assess and monitor vital signs, respiratory status, and labs (WBC)  Monitor for signs of aspiration (tachypnea, cough, rales, wheezing, cyanosis, fever)  - Assess and monitor patient's ability to swallow  - Place patient up in chair to eat if possible  - HOB up at 90 degrees to eat if unable to get patient up into chair   - Supervise patient during oral intake  - Instruct patient/ family to take small bites  - Instruct patient/ family to take small single sips when taking liquids    - Follow patient-specific strategies generated by speech pathologist   1/6/2020 0110 by Ashley Ferrell RN  Outcome: Progressing  1/6/2020 0102 by Ashley Ferrell RN  Outcome: Progressing     Problem: Nutrition  Goal: Nutrition/Hydration status is improving  Description  Monitor and assess patient's nutrition/hydration status for malnutrition (ex- brittle hair, bruises, dry skin, pale skin and conjunctiva, muscle wasting, smooth red tongue, and disorientation)  Collaborate with interdisciplinary team and initiate plan and interventions as ordered  Monitor patient's weight and dietary intake as ordered or per policy  Utilize nutrition screening tool and intervene per policy  Determine patient's food preferences and provide high-protein, high-caloric foods as appropriate  - Assist patient with eating   - Allow adequate time for meals   - Encourage patient to take dietary supplement as ordered  - Collaborate with clinical nutritionist   - Include patient/family/caregiver in decisions related to nutrition    1/6/2020 0110 by Maverick Valdivia RN  Outcome: Progressing  1/6/2020 0102 by Maverick Valdivia RN  Outcome: Progressing     Problem: CARDIOVASCULAR - ADULT  Goal: Absence of cardiac dysrhythmias or at baseline rhythm  Description  INTERVENTIONS:  - Continuous cardiac monitoring, vital signs, obtain 12 lead EKG if ordered  - Administer antiarrhythmic and heart rate control medications as ordered  - Monitor electrolytes and administer replacement therapy as ordered  1/6/2020 0110 by Maverick Valdivia RN  Outcome: Progressing  1/6/2020 0102 by Maverick Valdivia RN  Outcome: Progressing     Problem: GENITOURINARY - ADULT  Goal: Maintains or returns to baseline urinary function  Description  INTERVENTIONS:  - Assess urinary function  - Encourage oral fluids to ensure adequate hydration if ordered  - Administer IV fluids as ordered to ensure adequate hydration  - Administer ordered medications as needed  - Offer frequent toileting  - Follow urinary retention protocol if ordered  1/6/2020 0110 by Maverick Valdivia RN  Outcome: Progressing  1/6/2020 0102 by Maverick Valdivia RN  Outcome: Progressing  Goal: Absence of urinary retention  Description  INTERVENTIONS:  - Assess patient's ability to void and empty bladder  - Monitor I/O  - Bladder scan as needed  - Discuss with physician/AP medications to alleviate retention as needed  - Discuss catheterization for long term situations as appropriate   1/6/2020 0110 by Georges Miner RN  Outcome: Progressing  1/6/2020 0102 by Georges Miner RN  Outcome: Progressing     Problem: METABOLIC, FLUID AND ELECTROLYTES - ADULT  Goal: Fluid balance maintained  Description  INTERVENTIONS:  - Monitor labs   - Monitor I/O and WT  - Instruct patient on fluid and nutrition as appropriate  - Assess for signs & symptoms of volume excess or deficit  1/6/2020 0110 by Georges Miner RN  Outcome: Progressing  1/6/2020 0102 by Georges Miner RN  Outcome: Progressing     Problem: SKIN/TISSUE INTEGRITY - ADULT  Goal: Skin integrity remains intact  Description  INTERVENTIONS  - Identify patients at risk for skin breakdown  - Assess and monitor skin integrity  - Assess and monitor nutrition and hydration status  - Monitor labs (i e  albumin)  - Assess for incontinence   - Turn and reposition patient  - Assist with mobility/ambulation  - Relieve pressure over bony prominences  - Avoid friction and shearing  - Provide appropriate hygiene as needed including keeping skin clean and dry  - Evaluate need for skin moisturizer/barrier cream  - Collaborate with interdisciplinary team (i e  Nutrition, Rehabilitation, etc )   - Patient/family teaching  1/6/2020 0110 by Georges Miner RN  Outcome: Progressing  1/6/2020 0102 by Georges Miner RN  Outcome: Progressing

## 2020-01-06 NOTE — PLAN OF CARE
Problem: Potential for Falls  Goal: Patient will remain free of falls  Description  INTERVENTIONS:  - Assess patient frequently for physical needs  -  Identify cognitive and physical deficits and behaviors that affect risk of falls  -  [place] fall precautions as indicated by assessment   - Educate patient/family on patient safety including physical limitations  - Instruct patient to call for assistance with activity based on assessment  - Modify environment to reduce risk of injury  - Consider OT/PT consult to assist with strengthening/mobility  Outcome: Progressing     Problem: Prexisting or High Potential for Compromised Skin Integrity  Goal: Skin integrity is maintained or improved  Description  INTERVENTIONS:  - Identify patients at risk for skin breakdown  - Assess and monitor skin integrity  - Assess and monitor nutrition and hydration status  - Monitor labs   - Assess for incontinence   - Turn and reposition patient  - Assist with mobility/ambulation  - Relieve pressure over bony prominences  - Avoid friction and shearing  - Provide appropriate hygiene as needed including keeping skin clean and dry  - Evaluate need for skin moisturizer/barrier cream  - Collaborate with interdisciplinary team   - Patient/family teaching  - Consider wound care consult   Outcome: Progressing     Problem: Nutrition/Hydration-ADULT  Goal: Nutrient/Hydration intake appropriate for improving, restoring or maintaining nutritional needs  Description  Monitor and assess patient's nutrition/hydration status for malnutrition  Collaborate with interdisciplinary team and initiate plan and interventions as ordered  Monitor patient's weight and dietary intake as ordered or per policy  Utilize nutrition screening tool and intervene as necessary  Determine patient's food preferences and provide high-protein, high-caloric foods as appropriate       INTERVENTIONS:  - Monitor oral intake, urinary output, labs, and treatment plans  - Assess nutrition and hydration status and recommend course of action  - Evaluate amount of meals eaten  - Assist patient with eating if necessary   - Allow adequate time for meals  - Recommend/ encourage appropriate diets, oral nutritional supplements, and vitamin/mineral supplements  - Order, calculate, and assess calorie counts as needed  - Recommend, monitor, and adjust tube feedings and TPN/PPN based on assessed needs  - Assess need for intravenous fluids  - Provide specific nutrition/hydration education as appropriate  - Include patient/family/caregiver in decisions related to nutrition  Outcome: Progressing     Problem: Neurological Deficit  Goal: Neurological status is stable or improving  Description  Interventions:  - Monitor and assess patient's level of consciousness, motor function, sensory function, and level of assistance needed for ADLs  - Monitor and report changes from baseline  Collaborate with interdisciplinary team to initiate plan and implement interventions as ordered  - Provide and maintain a safe environment  - Consider seizure precautions  - Consider fall precautions  - Consider aspiration precautions  - Consider bleeding precautions  Outcome: Progressing     Problem: Activity Intolerance/Impaired Mobility  Goal: Mobility/activity is maintained at optimum level for patient  Description  Interventions:  - Assess and monitor patient  barriers to mobility and need for assistive/adaptive devices  - Assess patient's emotional response to limitations  - Collaborate with interdisciplinary team and initiate plans and interventions as ordered  - Encourage independent activity per ability   - Maintain proper body alignment  - Perform active/passive rom as tolerated/ordered    - Plan activities to conserve energy   - Turn patient as appropriate  Outcome: Progressing     Problem: Communication Impairment  Goal: Ability to express needs and understand communication  Description  Assess patient's communication skills and ability to understand information  Patient will demonstrate use of effective communication techniques, alternative methods of communication and understanding even if not able to speak  - Encourage communication and provide alternate methods of communication as needed  - Collaborate with case management/ for discharge needs  - Include patient/family/caregiver in decisions related to communication  Outcome: Progressing     Problem: Potential for Aspiration  Goal: Non-ventilated patient's risk of aspiration is minimized  Description  Assess and monitor vital signs, respiratory status, and labs (WBC)  Monitor for signs of aspiration (tachypnea, cough, rales, wheezing, cyanosis, fever)  - Assess and monitor patient's ability to swallow  - Place patient up in chair to eat if possible  - HOB up at 90 degrees to eat if unable to get patient up into chair   - Supervise patient during oral intake  - Instruct patient/ family to take small bites  - Instruct patient/ family to take small single sips when taking liquids  - Follow patient-specific strategies generated by speech pathologist   Outcome: Progressing     Problem: Nutrition  Goal: Nutrition/Hydration status is improving  Description  Monitor and assess patient's nutrition/hydration status for malnutrition (ex- brittle hair, bruises, dry skin, pale skin and conjunctiva, muscle wasting, smooth red tongue, and disorientation)  Collaborate with interdisciplinary team and initiate plan and interventions as ordered  Monitor patient's weight and dietary intake as ordered or per policy  Utilize nutrition screening tool and intervene per policy  Determine patient's food preferences and provide high-protein, high-caloric foods as appropriate  - Assist patient with eating   - Allow adequate time for meals   - Encourage patient to take dietary supplement as ordered    - Collaborate with clinical nutritionist   - Include patient/family/caregiver in decisions related to nutrition    Outcome: Progressing     Problem: CARDIOVASCULAR - ADULT  Goal: Absence of cardiac dysrhythmias or at baseline rhythm  Description  INTERVENTIONS:  - Continuous cardiac monitoring, vital signs, obtain 12 lead EKG if ordered  - Administer antiarrhythmic and heart rate control medications as ordered  - Monitor electrolytes and administer replacement therapy as ordered  Outcome: Progressing     Problem: GENITOURINARY - ADULT  Goal: Maintains or returns to baseline urinary function  Description  INTERVENTIONS:  - Assess urinary function  - Encourage oral fluids to ensure adequate hydration if ordered  - Administer IV fluids as ordered to ensure adequate hydration  - Administer ordered medications as needed  - Offer frequent toileting  - Follow urinary retention protocol if ordered  Outcome: Progressing  Goal: Absence of urinary retention  Description  INTERVENTIONS:  - Assess patient's ability to void and empty bladder  - Monitor I/O  - Bladder scan as needed  - Discuss with physician/AP medications to alleviate retention as needed  - Discuss catheterization for long term situations as appropriate   Outcome: Progressing     Problem: METABOLIC, FLUID AND ELECTROLYTES - ADULT  Goal: Fluid balance maintained  Description  INTERVENTIONS:  - Monitor labs   - Monitor I/O and WT  - Instruct patient on fluid and nutrition as appropriate  - Assess for signs & symptoms of volume excess or deficit  Outcome: Progressing     Problem: SKIN/TISSUE INTEGRITY - ADULT  Goal: Skin integrity remains intact  Description  INTERVENTIONS  - Identify patients at risk for skin breakdown  - Assess and monitor skin integrity  - Assess and monitor nutrition and hydration status  - Monitor labs (i e  albumin)  - Assess for incontinence   - Turn and reposition patient  - Assist with mobility/ambulation  - Relieve pressure over bony prominences  - Avoid friction and shearing  - Provide appropriate hygiene as needed including keeping skin clean and dry  - Evaluate need for skin moisturizer/barrier cream  - Collaborate with interdisciplinary team (i e  Nutrition, Rehabilitation, etc )   - Patient/family teaching  Outcome: Progressing

## 2020-01-06 NOTE — SOCIAL WORK
Cm met with pt and two daughters at bedside to discuss cm role and dcp needs  Pt lives with daughter Tequila Fernandes in a [address] with [address] to enter the house  PT uses a rollator and a standard walker to ambulate  Independent with most ADL's  Daughter helps shower, driving and cooking  Pt has no prior hx of SNF or HHC  Denies any hx of MH or D/A IP stays  PT uses [facility] pharmacy in [facility] for her rx needs  Pt has a living and her POA is he daughter Jeanette Pereyra and son Reji aCnales  Copy is in the chart  PCP is Jim Gonzales  Patient/caregiver received discharge checklist   Content reviewed  Patient/caregiver encouraged to participate in discharge plan of care prior to discharge home  A post acute care recommendation was made by your care team for STR  Discussed Freedom of Choice with caregiver  List of agencies given to caregiver via in person  caregiver aware the list is custom filtered for them by preference  and that [facility] post acute providers are designated  Choices given by daughter as follow: 1) [employer] 2) [facility] 3) [facility] if pt does not meet criteria for [facility]  Cece form  ARC made aware and is following for appropriateness  CM reviewed d/c planning process including the following: identifying help at home, patient preference for d/c planning needs, [facility] to [facility] program, availability of treatment team to discuss questions or concerns patient and/or family may have regarding understanding medications and recognizing signs and symptoms once discharged  CM also encouraged patient to follow up with all recommended appointments after discharge  Patient advised of importance for patient and family to participate in managing patients medical well being

## 2020-01-06 NOTE — PHYSICAL THERAPY NOTE
Physical Therapy Treatment Note     01/06/20 1025   Pain Assessment   Pain Assessment No/denies pain   Pain Score No Pain   Restrictions/Precautions   Other Precautions Cognitive; Bed Alarm; Chair Alarm;Multiple lines;Telemetry; Fall Risk;Hard of hearing;Visual impairment   General   Chart Reviewed Yes   Family/Caregiver Present Yes  (daughter)   Cognition   Overall Cognitive Status Impaired   Arousal/Participation Responsive; Cooperative   Attention Attends with cues to redirect   Orientation Level Oriented to person;Oriented to place   Memory Decreased recall of precautions;Decreased recall of recent events;Decreased short term memory   Following Commands Follows one step commands with increased time or repetition   Subjective   Subjective Pt  offers no c /o pain  Pt seateda t edge of bed upon arrival pt agreeable to PT  Transfers   Sit to Stand 4  Minimal assistance   Additional items Assist x 2; Increased time required;Verbal cues   Stand to Sit 4  Minimal assistance   Additional items Assist x 2;Armrests; Increased time required;Verbal cues   Ambulation/Elevation   Gait pattern Improper Weight shift; Forward Flexion;Decreased foot clearance; Foward flexed; Short stride; Step to;Excessively slow;Decreased R stance; Antalgic   Gait Assistance 3  Moderate assist   Additional items Assist x 2;Verbal cues   Assistive Device Rolling walker   Distance 10' x2 one seasted rest break between gait trials  Balance   Static Sitting Fair   Dynamic Sitting Fair   Static Standing Poor +   Dynamic Standing Poor   Ambulatory Poor   Endurance Deficit   Endurance Deficit Yes   Endurance Deficit Description fatigue, pain   Activity Tolerance   Activity Tolerance Patient limited by pain; Patient limited by fatigue   Nurse Made Aware VASU Lester   Exercises   Hip Flexion Sitting;10 reps;AROM; Bilateral   Hip Abduction Sitting;10 reps;AROM; Bilateral   Hip Adduction Sitting;10 reps;AROM; Bilateral  (isometric hip add  )   Knee AROM Long Arc Quad Sitting;10 reps;AROM; Bilateral   Ankle Pumps Sitting;10 reps;AROM; Bilateral  (heel/ toe raises seated at edge of bed  )   Assessment   Prognosis Fair   Problem List Decreased strength;Decreased endurance; Impaired balance;Decreased mobility; Decreased cognition; Impaired judgement;Decreased safety awareness; Obesity;Pain; Impaired hearing; Impaired vision   Assessment Pt  Continues to demonstrates impaired ability to perform transfers and ambulation on levels with use of rw  PT is requiring min assist x2 for transfers and mod assist x2 for ambulation on levels with use of rw due to gait deviaitions, decreased activity tolerance, impaired balance, safety mobility, and  Discomfort in R knee  Pt able to progress with ambulation distances to 10' x2 with one seated rest beaks due to fatigue  Pt tolerated sitting activities at edge of bed x 15 minutes with close supervision-cg x1  Pt demonstrates l lateral lean which pt is able to correct with verbal and or tactile cues  Pt demonstrates stable sitting balance with forward reaching activities at edge of bed  Pt performs seated b/l le arom exercises with verbal cues  Improved command following when hearing aides inserted  Pt remained seated out of bed in recliner with chair  alram activated, call bell in reach  Recommend STR at d/c  Goals   Patient Goals pt dd not state, daughter goals is for pt to go to rehab  STG Expiration Date 01/14/20   PT Treatment Day 1   Plan   Treatment/Interventions Functional transfer training;LE strengthening/ROM; Therapeutic exercise; Endurance training;Patient/family training;Equipment eval/education; Bed mobility;Gait training;Spoke to nursing;OT;Family   PT Frequency   (3-5x/week)   Recommendation   Recommendation Short-term skilled PT   PT - OK to Discharge Yes  (to rehab when medically cleared  )        01/06/20 1025   Pain Assessment   Pain Assessment No/denies pain   Pain Score No Pain   Restrictions/Precautions   Other Precautions Cognitive; Bed Alarm; Chair Alarm;Multiple lines;Telemetry; Fall Risk;Hard of hearing;Visual impairment   General   Chart Reviewed Yes   Family/Caregiver Present Yes  (daughter)   Cognition   Overall Cognitive Status Impaired   Arousal/Participation Responsive; Cooperative   Attention Attends with cues to redirect   Orientation Level Oriented to person;Oriented to place   Memory Decreased recall of precautions;Decreased recall of recent events;Decreased short term memory   Following Commands Follows one step commands with increased time or repetition   Subjective   Subjective Pt  offers no c /o pain  Pt seateda t edge of bed upon arrival pt agreeable to PT  Transfers   Sit to Stand 4  Minimal assistance   Additional items Assist x 2; Increased time required;Verbal cues   Stand to Sit 4  Minimal assistance   Additional items Assist x 2;Armrests; Increased time required;Verbal cues   Ambulation/Elevation   Gait pattern Improper Weight shift; Forward Flexion;Decreased foot clearance; Foward flexed; Short stride; Step to;Excessively slow;Decreased R stance; Antalgic   Gait Assistance 3  Moderate assist   Additional items Assist x 2;Verbal cues   Assistive Device Rolling walker   Distance 10' x2 one seasted rest break between gait trials  Balance   Static Sitting Fair   Dynamic Sitting Fair   Static Standing Poor +   Dynamic Standing Poor   Ambulatory Poor   Endurance Deficit   Endurance Deficit Yes   Endurance Deficit Description fatigue, pain   Activity Tolerance   Activity Tolerance Patient limited by pain; Patient limited by fatigue   Nurse Made Aware VASU Lester   Exercises   Hip Flexion Sitting;10 reps;AROM; Bilateral   Hip Abduction Sitting;10 reps;AROM; Bilateral   Hip Adduction Sitting;10 reps;AROM; Bilateral  (isometric hip add  )   Knee AROM Long Arc Quad Sitting;10 reps;AROM; Bilateral   Ankle Pumps Sitting;10 reps;AROM; Bilateral  (heel/ toe raises seated at edge of bed  )   Assessment   Prognosis Fair Problem List Decreased strength;Decreased endurance; Impaired balance;Decreased mobility; Decreased cognition; Impaired judgement;Decreased safety awareness; Obesity;Pain; Impaired hearing; Impaired vision   Assessment Pt  Continues to demonstrates impaired ability to perform transfers and ambulation on levels with use of rw  PT is requiring min assist x2 for transfers and mod assist x2 for ambulation on levels with use of rw due to gait deviaitions, decreased activity tolerance, impaired balance, safety mobility, and  Discomfort in R knee  Pt able to progress with ambulation distances to 10' x2 with one seated rest beaks due to fatigue  Pt tolerated sitting activities at edge of bed x 15 minutes with close supervision-cg x1  Pt demonstrates l lateral lean which pt is able to correct with verbal and or tactile cues  Pt demonstrates stable sitting balance with forward reaching activities at edge of bed  Pt performs seated b/l le arom exercises with verbal cues  Improved command following when hearing aides inserted  Pt remained seated out of bed in recliner with chair  alram activated, call bell in reach  Recommend STR at d/c  Goals   Patient Goals pt dd not state, daughter goals is for pt to go to rehab  STG Expiration Date 01/14/20   PT Treatment Day 1   Plan   Treatment/Interventions Functional transfer training;LE strengthening/ROM; Therapeutic exercise; Endurance training;Patient/family training;Equipment eval/education; Bed mobility;Gait training;Spoke to nursing;OT;Family   PT Frequency   (3-5x/week)   Recommendation   Recommendation Short-term skilled PT   PT - OK to Discharge Yes  (to rehab when medically cleared  )         Ya Luke PTA

## 2020-01-06 NOTE — OCCUPATIONAL THERAPY NOTE
[address] Evaluation     Patient Name: Gideon Finn  ACQLO'N Date: 1/6/2020  Problem List  Principal Problem:    Mixed aphasia, global  Active Problems:    Type 2 diabetes mellitus with hyperglycemia, without long-term current use of insulin ([facility] [address] )    Essential hypertension    Chronic atrial fibrillation    Transaminitis    CKD (chronic kidney disease) stage 3, GFR 30-59 ml/min ([facility])    Change in mental status    Past Medical History  Past Medical History:   Diagnosis Date    Atrial fibrillation ([facility] [address] )     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    Cholecystitis     Disease of tonsils     Glaucoma 2008    NOS     Past Surgical History  Past Surgical History:   Procedure Laterality Date    CHOLECYSTECTOMY      EYE SURGERY Right 03/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    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 01/06/20 1019   Note Type   Note type Eval/Treat   Restrictions/Precautions   Weight Bearing Precautions Per Order No   Other Precautions Multiple lines;Telemetry; Fall Risk;Pain; Impulsive; Chair Alarm;Cognitive; Bed Alarm;Hard of hearing;Visual impairment   Pain Assessment   Pain Assessment No/denies pain   Pain Score No Pain   Home Living   Type of Home House   Home Layout Two level; Able to live on main level with bedroom/bathroom; Performs ADLs on one level;Stairs to enter with rails  (2-3 MIKIE w/ able to position RW on steps)   Bathroom Shower/Tub Walk-in shower   Bathroom Toilet Standard   Bathroom Equipment Grab bars in shower; Shower chair;Grab bars around toilet   [address]  (rollator)   Additional Comments pt reports is supervision w/ use of rollator in the home; pt w/ supervision for ADLs from daughter   Prior Function   Level of [place] Independent with ADLs and functional mobility   Lives With Daughter  (son in law)   Receives Help From [address]  (supervision)   IADLs Needs assistance   Falls in the last 6 months 0   Vocational Retired   Comments pt goes to senior center w/ daughter several times a week however lately daughter reports more difficulty in getting her out of the house   Lifestyle   Autonomy per pt supervision w/ ADLs, supervision functional transfers and mobility w/ Rollator, assist w/ IADLs and transport   Reciprocal Relationships daughters, son in law   Service to Others retired raised the kids   Intrinsic Gratification watching and listening to TV   Subjective   Subjective "I can't hear very well"   ADL   Where Assessed Chair   Eating Assistance 5  Supervision/Setup   Grooming Assistance 4  Minimal Assistance   UB Bathing Assistance 4  Minimal Assistance   LB [address] 3  Moderate Assistance    [facility] [address] 4  Minimal Assistance   LB Dressing Assistance 3  Moderate [address]  3  Moderate Assistance   Bed Mobility   Supine to Sit 3  Moderate assistance   Additional items Assist x 1; Increased time required;Verbal cues;LE management;HOB elevated; Bedrails   Additional Comments increased time to complete w/ cues for safety; lateral lean to the left   Transfers   Sit to Stand 4  Minimal assistance   Additional items Assist x 2; Increased time required;Verbal cues;Armrests   Stand to Sit 4  Minimal assistance   Additional items Assist x 2; Increased time required;Verbal cues;Armrests   Additional Comments VCs for hand placement and positioning and safety   Functional Mobility   Functional Mobility 4  Minimal assistance   Additional Comments assist x2 w/ increased time to complete   Additional items Rolling walker   Balance   Static Sitting Fair +   Dynamic Sitting Fair   Static Standing Fair -   Dynamic Standing Fair -   Ambulatory Poor +   Activity Tolerance   Activity Tolerance Patient limited by fatigue;Treatment limited secondary to medical complications (Comment)   Nurse Made Aware appropriate to see per Amber BALDWIN Assessment   RUE Assessment WFL  (3+/5) LUE Assessment   LUE Assessment WFL  (3+/5)   Hand Function   Gross Motor Coordination Functional   Fine Motor Coordination Impaired  (increased time to complete)   Sensation   Light Touch No apparent deficits   Vision-Basic Assessment   Current Vision   (pt has an artificial eye in Left, poorer vision in R)   Vision - Complex Assessment   Ocular Range of Motion [facility]   Additional Comments reports can see body shapes but not the clock    Cognition   Overall Cognitive Status Impaired   Arousal/Participation Responsive; Cooperative   Attention Attends with cues to redirect   Orientation Level Oriented to person;Oriented to time;Oriented to place; Disoriented to situation   Memory Decreased short term memory;Decreased recall of recent events;Decreased recall of precautions   Following Commands Follows one step commands with increased time or repetition   Comments pt very [place], daughter came in mid session and provided hearing aides, decreased insight and safety awareness   Assessment   Limitation Decreased ADL status; Decreased Safe judgement during ADL;Decreased UE strength;Decreased cognition;Decreased sensation;Decreased endurance;Decreased self-care trans;Decreased high-level ADLs   Prognosis Good   Assessment Pt is a 80 y o  female seen for OT evaluation s/p admit to [facility] on 1/4/2020 w/ change In mental status and confusion; Mixed aphasia  Comorbidities affecting pt's functional performance at time of assessment include: a-fib, HTN, DM, hyperlipidemia, possible UTI, vision loss R eye  Ct head: (-) acute, MRI pending  Personal factors affecting pt at time of IE include: impaired vision artifical eye in L eye; hard of hearing  Prior to admission, pt was living w/ daughter only alone 1 hour at time at most, daughter reports supervision for ADLs, supervision functional transfers and mobility w/ RW, assist w/ IADLs   Upon evaluation: Pt requires MIN assist x2 sit<>stand w/ VCs for hand placement and positioning, MIN assist x2 functional mobility w/ RW w/ increased time, MOD assist x1 supine>sit bed mobility, MIN assist UB ADLs, MOD assist LB ADLs, MAX assist toileting 2* the following deficits impacting occupational performance: decreased strength and endurance, impaired balance, impaired functional reach, visual deficits, hard of hearing, expressive and receptive aphasia, impaired STM, increased processing time  Pt to benefit from continued skilled OT tx while in the hospital to address deficits as defined above and maximize level of functional independence w ADL's and functional mobility  Occupational Performance areas to address include: grooming, bathing/shower, toilet hygiene, dressing, health maintenance, functional mobility, clothing management, cleaning and meal prep, home safety education, formal cognitive assessment  From OT standpoint, recommendation at time of d/c would be short term rehab  Goals   Patient Goals pt did not state goal, daughter reports goal is have pt to go to rehab   LTG Time Frame 10-14   Long Term Goal please see below goals   Plan   Treatment Interventions ADL retraining;Functional transfer training;UE strengthening/ROM; Endurance training;Cognitive reorientation;Patient/family training;Equipment evaluation/education; Compensatory technique education; Activityengagement; Energy conservation   Goal Expiration Date 01/20/20   OT Treatment Day 1   OT Frequency 3-5x/wk   Additional Treatment Session   Start Time 1006   End Time 1019   Treatment Assessment Pt while seated EOB during session completed functional reaching tasks w/ increased time due to visual deficits  Pt w/ left lateral lean at times and daughter reports she has been doing that the past few days  Pt w/ MIN assist x2 sit<>stand w/ VCs for hand placement and positioning and safety and pt w/ MIN assist x2 functional mobility w/ transfer to recliner chair  Pt seated in recliner chair w/ LEs elevated at end of session and chair alarm intact   Pt continues to be limited due to decreased strength and endurance, impaired balance, impaired activity tolerance, impaired vision, decreased safety awareness, impulsive  Recommend STR when medically stable     Additional Treatment Day 1   Recommendation   OT Discharge Recommendation Short Term Rehab   OT - OK to Discharge Yes  (to rehab when medically stable)   Barthel Index   Feeding 5   Bathing 0   Grooming Score 0   Dressing Score 5   Bladder Score 5   Bowels Score 5   Toilet Use Score 5   Transfers (Bed/Chair) Score 5   Mobility (Level Surface) Score 0   Stairs Score 0   Barthel Index Score 30   Modified Sebastian Scale   Modified Sebastian Scale 4     Occupational Therapy Goals to be met in 10-14 days:  1) Pt will improve activity tolerance to G for 30 min txment sessions to enhance ADLs  2) Pt will complete ADLs/self care w/ supervision  3) Pt will complete toileting w/ supervision w/ G hygiene/thoroughness using DME PRN  4) Pt will improve functional transfers on/off all surfaces using DME PRN w/ G balance/safety including toileting w/ supervision  5) Pt will improve fx'l mobility during I/ADl/leisure tasks using DME PRN w/ g balance/safety w/ supervision  6) Pt will engage in ongoing cognitive assessment w/ G participation to A w/ safe d/c planning/recommendations  7) Pt will demonstrate G carryover of pt/caregiver education and training as appropriate w/ mod I  w/ G tolerance  8) Pt will engage in depression screen/leisure interest checklist w/ G participation to monitor s/s depression and ID 3 positive coping strategies to A w/ emotional regulation and management  9) Pt will demonstrate 100% carryover of E C  techniques w/ mod I t/o fx'l I/ADL/leisure tasks w/o cues s/p skilled education  10) Pt will demonstrate improved bed mobility to Min assist to enhance ADLs  11) Pt will demonstrate 100% carryover of LHAE for LB ADLs/self care and leisure s/p skilled education w/ mod I and G participation  12) Pt will demonstrate improved standing tolerance to 3-5 minutes during functional tasks w/ no LOB to enhance ADL performance  13) Pt will demonstrate improved b/l UE strength by 1 MMT grade to enhance ADLS and functional transfers     Documentation completed by: Baldomero Martin MS, OTR/L

## 2020-01-06 NOTE — PLAN OF CARE
Problem: OCCUPATIONAL THERAPY ADULT  Goal: Performs self-care activities at highest level of function for planned discharge setting  See evaluation for individualized goals  Description  Treatment Interventions: ADL retraining, Functional transfer training, UE strengthening/ROM, Endurance training, Cognitive reorientation, Patient/family training, Equipment evaluation/education, Compensatory technique education, Activityengagement, Energy conservation          See flowsheet documentation for full assessment, interventions and recommendations  Note:   Limitation: Decreased ADL status, Decreased Safe judgement during ADL, Decreased UE strength, Decreased cognition, Decreased sensation, Decreased endurance, Decreased self-care trans, Decreased high-level ADLs  Prognosis: Good  Assessment: Pt is a 80 y o  female seen for OT evaluation s/p admit to [facility] on 1/4/2020 w/ change In mental status and confusion; Mixed aphasia  Comorbidities affecting pt's functional performance at time of assessment include: a-fib, HTN, DM, hyperlipidemia, possible UTI, vision loss R eye  Ct head: (-) acute, MRI pending  Personal factors affecting pt at time of IE include: impaired vision artifical eye in L eye; hard of hearing  Prior to admission, pt was living w/ daughter only alone 1 hour at time at most, daughter reports supervision for ADLs, supervision functional transfers and mobility w/ RW, assist w/ IADLs  Upon evaluation: Pt requires MIN assist x2 sit<>stand w/ VCs for hand placement and positioning, MIN assist x2 functional mobility w/ RW w/ increased time, MOD assist x1 supine>sit bed mobility, MIN assist UB ADLs, MOD assist LB ADLs, MAX assist toileting 2* the following deficits impacting occupational performance: decreased strength and endurance, impaired balance, impaired functional reach, visual deficits, hard of hearing, expressive and receptive aphasia, impaired STM, increased processing time   Pt to benefit from continued skilled OT tx while in the hospital to address deficits as defined above and maximize level of functional independence w ADL's and functional mobility  Occupational Performance areas to address include: grooming, bathing/shower, toilet hygiene, dressing, health maintenance, functional mobility, clothing management, cleaning and meal prep, home safety education, formal cognitive assessment  From OT standpoint, recommendation at time of d/c would be short term rehab       OT Discharge Recommendation: Short Term Rehab  OT - OK to Discharge: Yes(to rehab when medically stable)

## 2020-01-06 NOTE — PLAN OF CARE
Problem: Potential for Falls  Goal: Patient will remain free of falls  Description  INTERVENTIONS:  - Assess patient frequently for physical needs  -  Identify cognitive and physical deficits and behaviors that affect risk of falls  -  [place] fall precautions as indicated by assessment   - Educate patient/family on patient safety including physical limitations  - Instruct patient to call for assistance with activity based on assessment  - Modify environment to reduce risk of injury  - Consider OT/PT consult to assist with strengthening/mobility  Outcome: Progressing     Problem: Prexisting or High Potential for Compromised Skin Integrity  Goal: Skin integrity is maintained or improved  Description  INTERVENTIONS:  - Identify patients at risk for skin breakdown  - Assess and monitor skin integrity  - Assess and monitor nutrition and hydration status  - Monitor labs   - Assess for incontinence   - Turn and reposition patient  - Assist with mobility/ambulation  - Relieve pressure over bony prominences  - Avoid friction and shearing  - Provide appropriate hygiene as needed including keeping skin clean and dry  - Evaluate need for skin moisturizer/barrier cream  - Collaborate with interdisciplinary team   - Patient/family teaching  - Consider wound care consult   Outcome: Progressing     Problem: Nutrition/Hydration-ADULT  Goal: Nutrient/Hydration intake appropriate for improving, restoring or maintaining nutritional needs  Description  Monitor and assess patient's nutrition/hydration status for malnutrition  Collaborate with interdisciplinary team and initiate plan and interventions as ordered  Monitor patient's weight and dietary intake as ordered or per policy  Utilize nutrition screening tool and intervene as necessary  Determine patient's food preferences and provide high-protein, high-caloric foods as appropriate       INTERVENTIONS:  - Monitor oral intake, urinary output, labs, and treatment plans  - Assess nutrition and hydration status and recommend course of action  - Evaluate amount of meals eaten  - Assist patient with eating if necessary   - Allow adequate time for meals  - Recommend/ encourage appropriate diets, oral nutritional supplements, and vitamin/mineral supplements  - Order, calculate, and assess calorie counts as needed  - Recommend, monitor, and adjust tube feedings and TPN/PPN based on assessed needs  - Assess need for intravenous fluids  - Provide specific nutrition/hydration education as appropriate  - Include patient/family/caregiver in decisions related to nutrition  Outcome: Progressing     Problem: Neurological Deficit  Goal: Neurological status is stable or improving  Description  Interventions:  - Monitor and assess patient's level of consciousness, motor function, sensory function, and level of assistance needed for ADLs  - Monitor and report changes from baseline  Collaborate with interdisciplinary team to initiate plan and implement interventions as ordered  - Provide and maintain a safe environment  - Consider seizure precautions  - Consider fall precautions  - Consider aspiration precautions  - Consider bleeding precautions  Outcome: Progressing     Problem: Activity Intolerance/Impaired Mobility  Goal: Mobility/activity is maintained at optimum level for patient  Description  Interventions:  - Assess and monitor patient  barriers to mobility and need for assistive/adaptive devices  - Assess patient's emotional response to limitations  - Collaborate with interdisciplinary team and initiate plans and interventions as ordered  - Encourage independent activity per ability   - Maintain proper body alignment  - Perform active/passive rom as tolerated/ordered    - Plan activities to conserve energy   - Turn patient as appropriate  Outcome: Progressing     Problem: Communication Impairment  Goal: Ability to express needs and understand communication  Description  Assess patient's communication skills and ability to understand information  Patient will demonstrate use of effective communication techniques, alternative methods of communication and understanding even if not able to speak  - Encourage communication and provide alternate methods of communication as needed  - Collaborate with case management/ for discharge needs  - Include patient/family/caregiver in decisions related to communication  Outcome: Progressing     Problem: Potential for Aspiration  Goal: Non-ventilated patient's risk of aspiration is minimized  Description  Assess and monitor vital signs, respiratory status, and labs (WBC)  Monitor for signs of aspiration (tachypnea, cough, rales, wheezing, cyanosis, fever)  - Assess and monitor patient's ability to swallow  - Place patient up in chair to eat if possible  - HOB up at 90 degrees to eat if unable to get patient up into chair   - Supervise patient during oral intake  - Instruct patient/ family to take small bites  - Instruct patient/ family to take small single sips when taking liquids  - Follow patient-specific strategies generated by speech pathologist   Outcome: Progressing     Problem: Nutrition  Goal: Nutrition/Hydration status is improving  Description  Monitor and assess patient's nutrition/hydration status for malnutrition (ex- brittle hair, bruises, dry skin, pale skin and conjunctiva, muscle wasting, smooth red tongue, and disorientation)  Collaborate with interdisciplinary team and initiate plan and interventions as ordered  Monitor patient's weight and dietary intake as ordered or per policy  Utilize nutrition screening tool and intervene per policy  Determine patient's food preferences and provide high-protein, high-caloric foods as appropriate  - Assist patient with eating   - Allow adequate time for meals   - Encourage patient to take dietary supplement as ordered    - Collaborate with clinical nutritionist   - Include patient/family/caregiver in decisions related to nutrition    Outcome: Progressing     Problem: CARDIOVASCULAR - ADULT  Goal: Absence of cardiac dysrhythmias or at baseline rhythm  Description  INTERVENTIONS:  - Continuous cardiac monitoring, vital signs, obtain 12 lead EKG if ordered  - Administer antiarrhythmic and heart rate control medications as ordered  - Monitor electrolytes and administer replacement therapy as ordered  Outcome: Progressing     Problem: GENITOURINARY - ADULT  Goal: Maintains or returns to baseline urinary function  Description  INTERVENTIONS:  - Assess urinary function  - Encourage oral fluids to ensure adequate hydration if ordered  - Administer IV fluids as ordered to ensure adequate hydration  - Administer ordered medications as needed  - Offer frequent toileting  - Follow urinary retention protocol if ordered  Outcome: Progressing  Goal: Absence of urinary retention  Description  INTERVENTIONS:  - Assess patient's ability to void and empty bladder  - Monitor I/O  - Bladder scan as needed  - Discuss with physician/AP medications to alleviate retention as needed  - Discuss catheterization for long term situations as appropriate   Outcome: Progressing     Problem: METABOLIC, FLUID AND ELECTROLYTES - ADULT  Goal: Fluid balance maintained  Description  INTERVENTIONS:  - Monitor labs   - Monitor I/O and WT  - Instruct patient on fluid and nutrition as appropriate  - Assess for signs & symptoms of volume excess or deficit  Outcome: Progressing     Problem: SKIN/TISSUE INTEGRITY - ADULT  Goal: Skin integrity remains intact  Description  INTERVENTIONS  - Identify patients at risk for skin breakdown  - Assess and monitor skin integrity  - Assess and monitor nutrition and hydration status  - Monitor labs (i e  albumin)  - Assess for incontinence   - Turn and reposition patient  - Assist with mobility/ambulation  - Relieve pressure over bony prominences  - Avoid friction and shearing  - Provide appropriate hygiene as needed including keeping skin clean and dry  - Evaluate need for skin moisturizer/barrier cream  - Collaborate with interdisciplinary team (i e  Nutrition, Rehabilitation, etc )   - Patient/family teaching  Outcome: Progressing

## 2020-01-07 ENCOUNTER — HOSPITAL ENCOUNTER (INPATIENT)
Facility: HOSPITAL | Age: 85
LOS: 7 days | Discharge: NURSING, REHAB OR LONG-TERM CARE | DRG: 092 | End: 2020-01-14
Attending: FAMILY MEDICINE | Admitting: FAMILY MEDICINE
Payer: MEDICARE

## 2020-01-07 VITALS
OXYGEN SATURATION: 98 % | BODY MASS INDEX: 38.22 KG/M2 | RESPIRATION RATE: 20 BRPM | SYSTOLIC BLOOD PRESSURE: 161 MMHG | HEIGHT: 60 IN | WEIGHT: 194.67 LBS | HEART RATE: 88 BPM | TEMPERATURE: 97.9 F | DIASTOLIC BLOOD PRESSURE: 89 MMHG

## 2020-01-07 DIAGNOSIS — K59.01 SLOW TRANSIT CONSTIPATION: Primary | ICD-10-CM

## 2020-01-07 DIAGNOSIS — E78.2 MIXED HYPERLIPIDEMIA: ICD-10-CM

## 2020-01-07 DIAGNOSIS — R41.0 DISORIENTATION: ICD-10-CM

## 2020-01-07 DIAGNOSIS — E11.65 TYPE 2 DIABETES MELLITUS WITH HYPERGLYCEMIA, WITHOUT LONG-TERM CURRENT USE OF INSULIN (HCC): ICD-10-CM

## 2020-01-07 LAB
BACTERIA UR CULT: ABNORMAL
GLUCOSE SERPL-MCNC: 127 MG/DL (ref 65–140)
GLUCOSE SERPL-MCNC: 139 MG/DL (ref 65–140)
GLUCOSE SERPL-MCNC: 161 MG/DL (ref 65–140)
GLUCOSE SERPL-MCNC: 227 MG/DL (ref 65–140)

## 2020-01-07 PROCEDURE — 1123F ACP DISCUSS/DSCN MKR DOCD: CPT | Performed by: FAMILY MEDICINE

## 2020-01-07 PROCEDURE — 99233 SBSQ HOSP IP/OBS HIGH 50: CPT | Performed by: PHYSICIAN ASSISTANT

## 2020-01-07 PROCEDURE — 82948 REAGENT STRIP/BLOOD GLUCOSE: CPT

## 2020-01-07 PROCEDURE — 99239 HOSP IP/OBS DSCHRG MGMT >30: CPT | Performed by: INTERNAL MEDICINE

## 2020-01-07 PROCEDURE — 99223 1ST HOSP IP/OBS HIGH 75: CPT | Performed by: PHYSICAL MEDICINE & REHABILITATION

## 2020-01-07 RX ORDER — ATORVASTATIN CALCIUM 20 MG/1
20 TABLET, FILM COATED ORAL
Status: DISCONTINUED | OUTPATIENT
Start: 2020-01-08 | End: 2020-01-08

## 2020-01-07 RX ORDER — POLYETHYLENE GLYCOL 3350 17 G/17G
17 POWDER, FOR SOLUTION ORAL DAILY PRN
Status: DISCONTINUED | OUTPATIENT
Start: 2020-01-07 | End: 2020-01-14 | Stop reason: HOSPADM

## 2020-01-07 RX ORDER — LEVOFLOXACIN 250 MG/1
250 TABLET ORAL EVERY 24 HOURS
Status: CANCELLED | OUTPATIENT
Start: 2020-01-08 | End: 2020-01-08

## 2020-01-07 RX ORDER — URSODIOL 300 MG/1
300 CAPSULE ORAL 2 TIMES DAILY
Status: DISCONTINUED | OUTPATIENT
Start: 2020-01-08 | End: 2020-01-14 | Stop reason: HOSPADM

## 2020-01-07 RX ORDER — LEVOTHYROXINE SODIUM 0.07 MG/1
75 TABLET ORAL
Status: DISCONTINUED | OUTPATIENT
Start: 2020-01-08 | End: 2020-01-14 | Stop reason: HOSPADM

## 2020-01-07 RX ORDER — ATORVASTATIN CALCIUM 10 MG/1
20 TABLET, FILM COATED ORAL
Status: CANCELLED | OUTPATIENT
Start: 2020-01-08

## 2020-01-07 RX ORDER — LEVOFLOXACIN 250 MG/1
250 TABLET ORAL EVERY 24 HOURS
Qty: 1 TABLET | Refills: 0 | Status: SHIPPED | OUTPATIENT
Start: 2020-01-08 | End: 2020-01-14 | Stop reason: HOSPADM

## 2020-01-07 RX ORDER — DABIGATRAN ETEXILATE 75 MG/1
150 CAPSULE, COATED PELLETS ORAL 2 TIMES DAILY
Status: DISCONTINUED | OUTPATIENT
Start: 2020-01-08 | End: 2020-01-14 | Stop reason: HOSPADM

## 2020-01-07 RX ORDER — URSODIOL 300 MG/1
300 CAPSULE ORAL 2 TIMES DAILY
Status: CANCELLED | OUTPATIENT
Start: 2020-01-07

## 2020-01-07 RX ORDER — LEVOTHYROXINE SODIUM 0.07 MG/1
75 TABLET ORAL
Status: CANCELLED | OUTPATIENT
Start: 2020-01-08

## 2020-01-07 RX ORDER — INSULIN GLARGINE 100 [IU]/ML
10 INJECTION, SOLUTION SUBCUTANEOUS
Status: DISCONTINUED | OUTPATIENT
Start: 2020-01-07 | End: 2020-01-14 | Stop reason: HOSPADM

## 2020-01-07 RX ORDER — INSULIN GLARGINE 100 [IU]/ML
10 INJECTION, SOLUTION SUBCUTANEOUS
Status: CANCELLED | OUTPATIENT
Start: 2020-01-07

## 2020-01-07 RX ORDER — NYSTATIN 100000 [USP'U]/G
1 POWDER TOPICAL 2 TIMES DAILY
Status: CANCELLED | OUTPATIENT
Start: 2020-01-07

## 2020-01-07 RX ORDER — DABIGATRAN ETEXILATE 150 MG/1
150 CAPSULE, COATED PELLETS ORAL 2 TIMES DAILY
Status: CANCELLED | OUTPATIENT
Start: 2020-01-07

## 2020-01-07 RX ORDER — METOPROLOL SUCCINATE 25 MG/1
25 TABLET, EXTENDED RELEASE ORAL DAILY
Status: DISCONTINUED | OUTPATIENT
Start: 2020-01-08 | End: 2020-01-14 | Stop reason: HOSPADM

## 2020-01-07 RX ORDER — LEVOFLOXACIN 250 MG/1
250 TABLET ORAL EVERY 24 HOURS
Status: DISCONTINUED | OUTPATIENT
Start: 2020-01-08 | End: 2020-01-08

## 2020-01-07 RX ORDER — NYSTATIN 100000 [USP'U]/G
1 POWDER TOPICAL 2 TIMES DAILY
Status: DISCONTINUED | OUTPATIENT
Start: 2020-01-08 | End: 2020-01-14 | Stop reason: HOSPADM

## 2020-01-07 RX ORDER — POLYETHYLENE GLYCOL 3350 17 G/17G
17 POWDER, FOR SOLUTION ORAL DAILY PRN
Status: CANCELLED | OUTPATIENT
Start: 2020-01-07

## 2020-01-07 RX ORDER — METOPROLOL SUCCINATE 25 MG/1
25 TABLET, EXTENDED RELEASE ORAL DAILY
Status: CANCELLED | OUTPATIENT
Start: 2020-01-08

## 2020-01-07 RX ORDER — ATORVASTATIN CALCIUM 20 MG/1
20 TABLET, FILM COATED ORAL
Qty: 30 TABLET | Refills: 0 | Status: SHIPPED | OUTPATIENT
Start: 2020-01-08 | End: 2020-09-24

## 2020-01-07 RX ADMIN — METOPROLOL SUCCINATE 25 MG: 25 TABLET, EXTENDED RELEASE ORAL at 09:57

## 2020-01-07 RX ADMIN — URSODIOL 300 MG: 300 CAPSULE ORAL at 09:57

## 2020-01-07 RX ADMIN — LEVOFLOXACIN 250 MG: 250 TABLET, FILM COATED ORAL at 16:59

## 2020-01-07 RX ADMIN — ATORVASTATIN CALCIUM 20 MG: 10 TABLET, FILM COATED ORAL at 16:58

## 2020-01-07 RX ADMIN — INSULIN LISPRO 1 UNITS: 100 INJECTION, SOLUTION INTRAVENOUS; SUBCUTANEOUS at 09:56

## 2020-01-07 RX ADMIN — LEVOTHYROXINE SODIUM 75 MCG: 75 TABLET ORAL at 06:00

## 2020-01-07 RX ADMIN — DABIGATRAN ETEXILATE MESYLATE 150 MG: 150 CAPSULE ORAL at 16:58

## 2020-01-07 RX ADMIN — INSULIN LISPRO 2 UNITS: 100 INJECTION, SOLUTION INTRAVENOUS; SUBCUTANEOUS at 13:36

## 2020-01-07 RX ADMIN — POLYETHYLENE GLYCOL 3350 17 G: 17 POWDER, FOR SOLUTION ORAL at 21:56

## 2020-01-07 RX ADMIN — URSODIOL 300 MG: 300 CAPSULE ORAL at 16:59

## 2020-01-07 RX ADMIN — NYSTATIN 1 APPLICATION: 100000 POWDER TOPICAL at 09:56

## 2020-01-07 RX ADMIN — INSULIN GLARGINE 10 UNITS: 100 INJECTION, SOLUTION SUBCUTANEOUS at 21:48

## 2020-01-07 RX ADMIN — TUBERCULIN PURIFIED PROTEIN DERIVATIVE 5 UNITS: 5 INJECTION INTRADERMAL at 20:16

## 2020-01-07 RX ADMIN — DABIGATRAN ETEXILATE MESYLATE 150 MG: 150 CAPSULE ORAL at 09:57

## 2020-01-07 NOTE — PROGRESS NOTES
Progress Note - Neurology   Paige Aleman 80 y o  female [id number]  Unit/Bed#: [facility] 4 /E4 -*    Assessment:  Paige Aleman is a 80 y o  female who presented with Acute encephalopathy with mixed global aphasia, visual field cut and decreased ambulation  Patient's symptoms resolved last evening and she was at baseline  MRI negative for acute ischemia  Etiology of initial encephalopathy secondary to UTI, now likely secondary to recent Ativan  Patient has chronic visual impairment and suspect left sided deficit to also be chronic  No further neurologic recommendations at this time  Plan:  Acute Metabolic Encephalopathy in the setting of UTI, improved  - Continue Levofloxacin  - Will discontinue MRA head and neck    Visual deficit  - Chronic in the setting of glaucoma    Afib  - Continue Pradaxa 150mg bid    HLD  - Continue Atorvastatin     Ambulation dysfunction  - Likely worsened in the setting of acute encephalopathy  - PT/OT/ST    Results:  - CTH negative for acute abnormality  -   - HbA1C 8 5  - TSH obtained 12/16: 4 39  - Ecoli UTI  Continue Levofloxacin  - Carotid duplex with <50% stenosis bilaterally  - MRI brain negative for acute ischemia    Subjective:   Patient's daughter and daughter in law present at bedside  Daughter Sulaiman Le states patient was back to baseline last night  They note that she was then given Ativan and has been confused since  Patient oriented to person and hospital  More confused today than yesterday  No new complaints      Past Medical History:   Diagnosis Date    Atrial fibrillation ([address] )     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    Cholecystitis     Disease of tonsils     Glaucoma 2008    NOS     Past Surgical History:   Procedure Laterality Date    CHOLECYSTECTOMY      EYE SURGERY Right 03/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    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     Family History   Problem Relation Age of Onset    Breast cancer Sister     No Known Problems Mother  No Known Problems Father      Social History     Socioeconomic History    Marital status:      Spouse name: None    Number of children: None    Years of education: None    Highest education level: None   Occupational History    None   Social Needs    Financial resource strain: None    Food insecurity:     Worry: None     Inability: None    Transportation needs:     Medical: No     Non-medical: No   Tobacco Use    Smoking status: Never Smoker    Smokeless tobacco: Never Used   Substance and Sexual Activity    Alcohol use: Not Currently    Drug use: Never    Sexual activity: Not Currently   Lifestyle    Physical activity:     Days per week: None     Minutes per session: None    Stress: None   Relationships    Social connections:     Talks on phone: None     Gets together: None     Attends Mandaen service: None     Active member of club or organization: None     Attends meetings of clubs or organizations: None     Relationship status: None    Intimate partner violence:     Fear of current or ex partner: None     Emotionally abused: None     Physically abused: None     Forced sexual activity: None   Other Topics Concern    None   Social History Narrative    None     Medications: Reviewed in detail by me  ROS: Review of Systems A 12 point ROS was completed and though limited due to patient's memory impairment all systems were negative aside from those listed below:   - fatigue  - Glaucoma in L eye, R glass eye  - Bilateral hearing deficit  - Difficulty with ambulation  - Poor memory  - confusion    Vitals: /90 (BP Location: Left arm)   Pulse 99   Temp 98 °F (36 7 °C) (Temporal)   Resp 20   Ht 5' (1 524 m)   Wt 88 3 kg (194 lb 10 7 oz)   SpO2 97%   BMI 38 02 kg/m²     Physical Exam:   Physical Exam   Constitutional: She appears well-developed and well-nourished  No distress  HENT:   Head: Normocephalic and atraumatic     Eyes: Conjunctivae are normal  Right eye exhibits no discharge  Left eye exhibits no discharge  No scleral icterus  Glass right eye  Left eye PERRL   Neck: Normal range of motion  Neck supple  Cardiovascular: Exam reveals no gallop and no friction rub  No murmur heard  Irregularly irregular   Pulmonary/Chest: Effort normal and breath sounds normal  No stridor  No respiratory distress  She has no wheezes  She has no rales  She exhibits no tenderness  Musculoskeletal: Normal range of motion  She exhibits no edema, tenderness or deformity  Lymphadenopathy:     She has no cervical adenopathy  Neurological: She is alert  She has normal strength  Finger-nose-finger test: Unable to complete due to poor vision  Skin: Skin is warm and dry  No rash noted  No erythema  Psychiatric:   Cooperative, pleasant     Neurologic Exam     Mental Status   Attention: normal  Concentration: normal    Speech: (Minimal dysarthria  No aphasia )  Level of consciousness: alert  Able to perform simple calculations  Able to name object  Able to repeat  Oriented to person and hospital  Not oriented to year or age  Able to follow one step commands without difficulty (as long as she could hear the direction)  Cranial Nerves     CN II   Visual acuity: (Poor vision at baseline  Glass R eye  )  Left visual field deficit: inconsistent  CN III, IV, VI   Pupils: (L eye PERRL)    CN VII   Facial expression full, symmetric  CN VIII   Hearing: impaired    Motor Exam   Muscle bulk: normal  Overall muscle tone: normal    Strength   Strength 5/5 throughout  Sensory Exam   Light touch normal      Gait, Coordination, and Reflexes     Gait  Gait: (Deferred at this time)    Coordination   Finger-nose-finger test: Unable to complete due to poor vision  Labs: Reviewed in detail by me  Imaging: I have personally reviewed pertinent imaging and PACS reports  VTE Prophylaxis: Pradaxa    Total time spent today 35 minutes   Greater than 50% of total time was spent with the patient and / or family counseling and / or coordination of care   A description of the counseling / coordination of care: discussing symptoms and etiology with daughter and daughter in law

## 2020-01-07 NOTE — CONSULTS
PHYSICAL MEDICINE AND REHABILITATION CONSULT NOTE  Derrick Younger 80 y o  female MRN: [id number]  Unit/Bed#: E4 -01 Encounter: [id number]    Requested by (Physician/Service): Sanjeev Ann DO  Reason for Consultation:  Assessment of rehabilitation needs  Reason for Hospitalization:  Delirium [R41 0]  A-fib ([facility] [address] ) [I48 91]  Hyperglycemia [R73 9]  Acute UTI [N39 0]  Stroke-like symptoms [R29 90]  Type 2 diabetes mellitus with hyperglycemia, without long-term current use of insulin ([facility] [address] ) [E11 65]  Rehabilitation Diagnosis: debility following UTI    History of Present Illness:  Derrick Younger is a 80 y o  female who  has a past medical history of Atrial fibrillation ([facility] [address] ), Blindness (2008), Cholecystitis, Disease of tonsils, and Glaucoma (2008)  who presented to the [address] on 1/4/20 with AMS  CTH without acute intracranial abnormalities  She was found to have UTI and started on antibiotics  Neurology was consulted for concern for CVA, and MRI without acute infarct, but did show chronic microangiopathic changes  Carotid dopplers with <50% stenosis bilaterally  PM&R consulted for rehabilitation recommendations  Restrictions include:  none    Hospital Complications/Comorbidities:   Complications: As above  Comorbidities: As above    Morbid Obesity (BMI > 40) no     Last Weight Last BMI   Wt Readings from Last 1 Encounters:   01/04/20 88 3 kg (194 lb 10 7 oz)    Body mass index is 38 02 kg/m²  Functional History:     Prior to Admission: Mod I with walker  Set up for shower/dressing        Present:  Physical Therapy Occupational Therapy Speech Therapy   01/06/20 1019    Note Type   Note type Eval/Treat   Restrictions/Precautions   Weight Bearing Precautions Per Order No   Other Precautions Multiple lines;Telemetry; Fall Risk;Pain; Impulsive; Chair Alarm;Cognitive; Bed Alarm;Hard of hearing;Visual impairment   Pain Assessment   Pain Assessment No/denies pain   Pain Score No Pain   Home Living   Type of [address] Two level; Able to live on main level with bedroom/bathroom; Performs ADLs on one level;Stairs to enter with rails  (2-3 MIKIE w/ able to position RW on steps)   Bathroom Shower/Tub Walk-in shower   Bathroom Toilet Standard   Bathroom Equipment Grab bars in shower; Shower chair;Grab bars around toilet   [address]  (rollator)   Additional Comments pt reports is supervision w/ use of rollator in the home; pt w/ supervision for ADLs from daughter   Prior Function   Level of [place] Independent with ADLs and functional mobility   Lives With Daughter  (son in law)   [place] [address] Help From [address]  (supervision)   IADLs Needs assistance   Falls in the last 6 months 0   Vocational Retired   Comments pt goes to senior center w/ daughter several times a week however lately daughter reports more difficulty in getting her out of the house   Lifestyle   Autonomy per pt supervision w/ ADLs, supervision functional transfers and mobility w/ Rollator, assist w/ IADLs and transport   Reciprocal Relationships daughters, son in law   Service to Others retired raised the kids   Intrinsic Gratification watching and listening to TV   Subjective   Subjective "I can't hear very well"   ADL   Where Assessed Chair   Eating Assistance 5  Supervision/Setup   Grooming Assistance 4  Minimal Assistance   UB [address] 4  Minimal Assistance   LB [address] 3  Moderate Assistance    [facility] [address] 4  Minimal Assistance   LB Dressing Assistance 3  Moderate [address]  3  Moderate Assistance   Bed Mobility   Supine to Sit 3  Moderate assistance   Additional items Assist x 1; Increased time required;Verbal cues;LE management;HOB elevated; Bedrails   Additional Comments increased time to complete w/ cues for safety; lateral lean to the left   Transfers   Sit to Stand 4  Minimal assistance   Additional items Assist x 2; Increased time required;Verbal cues;Armrests   Stand to Sit 4  Minimal assistance   Additional items Assist x 2; Increased time required;Verbal cues;Armrests   Additional Comments VCs for hand placement and positioning and safety   Functional Mobility   Functional Mobility 4  Minimal assistance   Additional Comments assist x2 w/ increased time to complete   Additional items Rolling walker        01/05/20 0910   Pain Assessment   Pain Assessment No/denies pain   Pain Score No Pain   Home Living   Type of Home House   Home Layout Two level; Able to live on main level with bedroom/bathroom;Stairs to enter with rails  (2-3 MIKIE, able to use RW on steps)   Bathroom Shower/Tub Walk-in shower   Bathroom Equipment Commode; Shower chair   [address]  (rollator)   Additional Comments Per daughter, pt ambulates with primarily mod I and use of rollator  Occasionally requires min A for sit to stand from certain surfaces  Prior Function   Lives With Daughter; Other (Comment)  (and son in law)   Receives Help From Family   ADL Assistance Needs assistance   IADLs Needs assistance   Falls in the last 6 months 0   Comments Daughter reports pt goes to [employer] with her occasionally to play bingo  Restrictions/Precautions   Weight Bearing Precautions Per Order No   Other Precautions Impulsive;Agitated;Cognitive; Bed Alarm;Multiple lines;Telemetry; Fall Risk;Hard of hearing   General   Additional Pertinent History CT of head (-) for acute intracranial abnormality; MRI pending   Family/Caregiver Present Yes   Cognition   Overall Cognitive Status Impaired   Arousal/Participation Uncooperative   Orientation Level Oriented to person;Disoriented to place; Disoriented to time;Disoriented to situation   Memory Decreased long term memory;Decreased recall of biographical information;Decreased short term memory;Decreased recall of recent events;Decreased recall of precautions   Following Commands Follows one step commands inconsistently   Comments Pt identified by name and hospital bracelet  Becomes agitated during mobility, swinging arms at therapist    RLE Assessment   RLE Assessment X   Strength RLE   RLE Overall Strength 4/5  (functionally)   LLE Assessment   LLE Assessment X   Strength LLE   LLE Overall Strength 4-/5  (functionally)   Coordination   Movements are Fluid and Coordinated 0   Bed Mobility   Supine to Sit 3  Moderate assistance   Additional items Assist x 1;HOB elevated; Bedrails; Increased time required;Verbal cues;LE management   Sit to Supine 3  Moderate assistance   Additional items Assist x 1; Increased time required;Verbal cues;LE management   Transfers   Sit to Stand 3  Moderate assistance   Additional items Assist x 1; Increased time required;Verbal cues   Stand to Sit 4  Minimal assistance   Additional items Assist x 1; Increased time required;Verbal cues   Stand pivot 4  Minimal assistance   Additional items Assist x 1; Increased time required;Verbal cues; Impulsive  (2nd assist as standby as pt is agitated/needs redirection)   Ambulation/Elevation   Gait pattern Improper Weight shift; Antalgic; Forward Flexion;Decreased foot clearance;Decreased L stance; Inconsistent john; Shuffling; Short stride; Excessively slow   Gait Assistance 4  Minimal assist   Additional items Assist x 1;Verbal cues; Tactile cues  (significant redirection required)   Assistive Device Rolling walker   Distance 3` x1 and 4` x1 (sidesteps along EOB)           Past Medical History:   Past Surgical History:   Family History:     Past Medical History:   Diagnosis Date    Atrial fibrillation ([facility] [address] )     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    Cholecystitis     Disease of tonsils     Glaucoma 2008    NOS    Past Surgical History:   Procedure Laterality Date    CHOLECYSTECTOMY      EYE SURGERY Right 03/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    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     Family History   Problem Relation Age of Onset    Breast cancer Sister     No Known Problems Mother     No Known Problems Father           Medications:    Current Facility-Administered Medications:     atorvastatin (LIPITOR) tablet 20 mg, 20 mg, Oral, Daily With Sharad Rhodes MD, 20 mg at 01/06/20 1748    dabigatran etexilate (PRADAXA) capsule 150 mg, 150 mg, Oral, BID, Kat Deshpande MD, 150 mg at 01/07/20 0957    insulin glargine (LANTUS) subcutaneous injection 10 Units 0 1 mL, 10 Units, Subcutaneous, HS, Bart Perdomo DO, 10 Units at 01/06/20 2234    insulin lispro (HumaLOG) 100 units/mL subcutaneous injection 1-5 Units, 1-5 Units, Subcutaneous, 4x Daily (AC & HS), 2 Units at 01/07/20 1336 **AND** Fingerstick Glucose (POCT), , , 4x Daily AC and at bedtime, Kat Deshpande MD    Labetalol HCl (NORMODYNE) injection 10 mg, 10 mg, Intravenous, Q6H PRN, Kat Deshpande MD    levofloxacin [facility]) tablet 250 mg, 250 mg, Oral, Q24H, Bart ePrdomo DO    levothyroxine tablet 75 mcg, 75 mcg, Oral, Early Morning, Kat Deshpande MD, 75 mcg at 01/07/20 0600    metoprolol succinate (TOPROL-XL) 24 hr tablet 25 mg, 25 mg, Oral, Daily, Kat Deshpande MD, 25 mg at 01/07/20 0957    nystatin (MYCOSTATIN) powder 1 application, 1 application, Topical, BID, Bart Perdomo DO, 1 application at 92/21/14 0956    polyethylene glycol (MIRALAX) packet 17 g, 17 g, Oral, Daily PRN, Kat Deshpande MD    sodium chloride 0 9 % infusion, 50 mL/hr, Intravenous, Continuous, Kat Deshpande MD, Last Rate: 50 mL/hr at 01/06/20 2241, 50 mL/hr at 01/06/20 2241    ursodiol (ACTIGALL) capsule 300 mg, 300 mg, Oral, BID, Kat Deshpande MD, 300 mg at 01/07/20 0957    Allergies: Allergies   Allergen Reactions    Aspirin GI Intolerance    Penicillins         Social History:    Social History     Socioeconomic History    Marital status:       Spouse name: None    Number of children: None    Years of education: None    Highest education level: None   Occupational History    None   Social Needs    Financial resource strain: None    Food insecurity:     Worry: None     Inability: None    Transportation needs:     Medical: No     Non-medical: No   Tobacco Use    Smoking status: Never Smoker    Smokeless tobacco: Never Used   Substance and Sexual Activity    Alcohol use: Not Currently    Drug use: Never    Sexual activity: Not Currently   Lifestyle    Physical activity:     Days per week: None     Minutes per session: None    Stress: None   Relationships    Social connections:     Talks on phone: None     Gets together: None     Attends Temple service: None     Active member of club or organization: None     Attends meetings of clubs or organizations: None     Relationship status: None    Intimate partner violence:     Fear of current or ex partner: None     Emotionally abused: None     Physically abused: None     Forced sexual activity: None   Other Topics Concern    None   Social History Narrative    None        Elo Arguello lives with daughter and son in-law  3 MIKIE  1st floor setup  24/7 supervision available  Review of Systems: no fever/chills, chest pain SOB, cough, nausea, vomiting  Complete ROS obtained and otherwise negative       Physical Exam:  Vital Signs:      Temp:  [97 8 °F (36 6 °C)-98 6 °F (37 °C)] 98 1 °F (36 7 °C)  HR:  [] 96  Resp:  [18-20] 20  BP: (148-181)/() 152/88   Intake/Output Summary (Last 24 hours) at 1/7/2020 1447  Last data filed at 1/7/2020 0701  Gross per 24 hour   Intake 806 67 ml   Output    Net 806 67 ml        Laboratory:      Lab Results   Component Value Date    HGB 13 5 01/06/2020    HGB 14 8 05/22/2018    HCT 42 0 01/06/2020    HCT 45 1 05/22/2018    WBC 6 47 01/06/2020    WBC 5 8 05/22/2018     Lab Results   Component Value Date    BUN 9 01/06/2020    BUN 10 05/22/2018     (L) 05/22/2018    K 3 8 01/06/2020    K 4 6 05/22/2018    CL 99 (L) 01/06/2020     05/22/2018    GLUCOSE 181 (H) 05/22/2018 CREATININE 0 85 01/06/2020     No results found for: PROTIME, INR     GEN: NAD, sitting comfortably in bed  Head: NCAT, no gross lesions  Eyes: right glass eye; left eye pupil reactive, EOMI  Throat: clear, no thrush, MM dry  Pulm: CTAB, no rales/wheezes  CV: RRR, normal s1/s2  Abd: soft, NTND, obese  Ext: no pedal edema bilaterally, distal extremities warm and well perfused  Psych: appearing confused  Skin: no observable rashes  Neuro: alert, oriented to person, month and year; not oriented to place (says "Scientology")  Follows simple commands with repeated verbal and visual cues  Right  Left  Site  Right  Left  Site    5 5  S Ab: Shoulder Abductors  4  4  HF: Hip Flexors    5 5  EF: Elbow Flexors       5  5  EE: Elbow Extensors  5-  5-  KE: Knee Extensors    5  5  WE: Wrist Extensors  5  5  DR: Dorsi Flexors    5  5  FF: Finger Flexors  5  5  PF: Plantar Flexors    5  5  HI: Hand Intrinsics  4  4  EHL: Extensor Hallucis Longus     Able to perform finger to nose without dysmetria  Negative marie/babinski bilateral    Imaging: Reviewed  Xr Chest 2 Views    Result Date: 1/5/2020  Impression: No acute cardiopulmonary disease  Workstation performed: [id number]     Ct Head Without Contrast    Result Date: 1/4/2020  Impression: No acute intracranial abnormality  Microangiopathic changes  Stable scattered parenchymal calcifications as may be seen in neurocysticercosis  Workstation performed: [id number]     Mri Brain Wo Contrast    Result Date: 1/7/2020  Impression: Examination significantly degraded by patient motion  The patient could not tolerate completion of the examination  Diffusion imaging is diagnostic and demonstrates no evidence of acute ischemia  No mass or acute hemorrhage identified  Moderate to advanced chronic microangiopathic change within the brain parenchyma   Workstation performed: [id number]       Assessment and Recommendations:  80 y o  female with afib, CKD, HTN, DM2, who was admitted for confusion, likely secondary to UTI  MRI brain without acute infarct  PM&R consulted for rehabilitation recommendations  Impairments:  Impaired functional mobility and ability to perform ADL's  Impaired cognition and speech    Recommendations:  1  Rehab  - continue PT/OT/SLP while inpatient  - pending medical stability, recommend discharge to subacute facility (TCF vs SNF), versus home with home therapies    - at this time, does not meet medical necessity for inpatient acute rehabilitation   - goal supervision or mod I for mobility and ADLs    2  DM2  - on lantus,SSI    3  UTI - likely cause of encephalopathy  - on levaquin   - monitor for urinary retention; recommend check PVRs   - anticipate mentation to improve with treatment of UTI; hold sedating medications     4  afib  - on pradaxa  - on toprol-XL     5  DVT ppx  - on pradaxa  - SCDs      Thank you for allowing the PM&R service to participate in the care of this patient  We will continue to follow [address] [place] [address] progress with you  Please do not hesitate to call with questions or concerns    ** Please Note: Fluency Direct voice to text software may have been used in the creation of this document   **

## 2020-01-07 NOTE — PROGRESS NOTES
RN called report to recveiving facility [facility]  RN gave report to receiving RN, reported pt's increased confusion from receiving a dose of Ativan prior to her MRI, and has since remains confused  Reported pt's pmh, bs, utd on vaccinations  Daughter also historian for mother     between 430 or 530

## 2020-01-07 NOTE — PLAN OF CARE
Problem: Potential for Falls  Goal: Patient will remain free of falls  Description  INTERVENTIONS:  - Assess patient frequently for physical needs  -  Identify cognitive and physical deficits and behaviors that affect risk of falls  -  [place] fall precautions as indicated by assessment   - Educate patient/family on patient safety including physical limitations  - Instruct patient to call for assistance with activity based on assessment  - Modify environment to reduce risk of injury  - Consider OT/PT consult to assist with strengthening/mobility  Outcome: Progressing     Problem: Prexisting or High Potential for Compromised Skin Integrity  Goal: Skin integrity is maintained or improved  Description  INTERVENTIONS:  - Identify patients at risk for skin breakdown  - Assess and monitor skin integrity  - Assess and monitor nutrition and hydration status  - Monitor labs   - Assess for incontinence   - Turn and reposition patient  - Assist with mobility/ambulation  - Relieve pressure over bony prominences  - Avoid friction and shearing  - Provide appropriate hygiene as needed including keeping skin clean and dry  - Evaluate need for skin moisturizer/barrier cream  - Collaborate with interdisciplinary team   - Patient/family teaching  - Consider wound care consult   Outcome: Progressing     Problem: Nutrition/Hydration-ADULT  Goal: Nutrient/Hydration intake appropriate for improving, restoring or maintaining nutritional needs  Description  Monitor and assess patient's nutrition/hydration status for malnutrition  Collaborate with interdisciplinary team and initiate plan and interventions as ordered  Monitor patient's weight and dietary intake as ordered or per policy  Utilize nutrition screening tool and intervene as necessary  Determine patient's food preferences and provide high-protein, high-caloric foods as appropriate       INTERVENTIONS:  - Monitor oral intake, urinary output, labs, and treatment plans  - Assess nutrition and hydration status and recommend course of action  - Evaluate amount of meals eaten  - Assist patient with eating if necessary   - Allow adequate time for meals  - Recommend/ encourage appropriate diets, oral nutritional supplements, and vitamin/mineral supplements  - Order, calculate, and assess calorie counts as needed  - Recommend, monitor, and adjust tube feedings and TPN/PPN based on assessed needs  - Assess need for intravenous fluids  - Provide specific nutrition/hydration education as appropriate  - Include patient/family/caregiver in decisions related to nutrition  Outcome: Progressing     Problem: Neurological Deficit  Goal: Neurological status is stable or improving  Description  Interventions:  - Monitor and assess patient's level of consciousness, motor function, sensory function, and level of assistance needed for ADLs  - Monitor and report changes from baseline  Collaborate with interdisciplinary team to initiate plan and implement interventions as ordered  - Provide and maintain a safe environment  - Consider seizure precautions  - Consider fall precautions  - Consider aspiration precautions  - Consider bleeding precautions  Outcome: Progressing     Problem: Activity Intolerance/Impaired Mobility  Goal: Mobility/activity is maintained at optimum level for patient  Description  Interventions:  - Assess and monitor patient  barriers to mobility and need for assistive/adaptive devices  - Assess patient's emotional response to limitations  - Collaborate with interdisciplinary team and initiate plans and interventions as ordered  - Encourage independent activity per ability   - Maintain proper body alignment  - Perform active/passive rom as tolerated/ordered    - Plan activities to conserve energy   - Turn patient as appropriate  Outcome: Progressing     Problem: Communication Impairment  Goal: Ability to express needs and understand communication  Description  Assess patient's communication skills and ability to understand information  Patient will demonstrate use of effective communication techniques, alternative methods of communication and understanding even if not able to speak  - Encourage communication and provide alternate methods of communication as needed  - Collaborate with case management/ for discharge needs  - Include patient/family/caregiver in decisions related to communication  Outcome: Progressing     Problem: Potential for Aspiration  Goal: Non-ventilated patient's risk of aspiration is minimized  Description  Assess and monitor vital signs, respiratory status, and labs (WBC)  Monitor for signs of aspiration (tachypnea, cough, rales, wheezing, cyanosis, fever)  - Assess and monitor patient's ability to swallow  - Place patient up in chair to eat if possible  - HOB up at 90 degrees to eat if unable to get patient up into chair   - Supervise patient during oral intake  - Instruct patient/ family to take small bites  - Instruct patient/ family to take small single sips when taking liquids  - Follow patient-specific strategies generated by speech pathologist   Outcome: Progressing     Problem: Nutrition  Goal: Nutrition/Hydration status is improving  Description  Monitor and assess patient's nutrition/hydration status for malnutrition (ex- brittle hair, bruises, dry skin, pale skin and conjunctiva, muscle wasting, smooth red tongue, and disorientation)  Collaborate with interdisciplinary team and initiate plan and interventions as ordered  Monitor patient's weight and dietary intake as ordered or per policy  Utilize nutrition screening tool and intervene per policy  Determine patient's food preferences and provide high-protein, high-caloric foods as appropriate  - Assist patient with eating   - Allow adequate time for meals   - Encourage patient to take dietary supplement as ordered    - Collaborate with clinical nutritionist   - Include patient/family/caregiver in decisions related to nutrition    Outcome: Progressing     Problem: CARDIOVASCULAR - ADULT  Goal: Absence of cardiac dysrhythmias or at baseline rhythm  Description  INTERVENTIONS:  - Continuous cardiac monitoring, vital signs, obtain 12 lead EKG if ordered  - Administer antiarrhythmic and heart rate control medications as ordered  - Monitor electrolytes and administer replacement therapy as ordered  Outcome: Progressing     Problem: GENITOURINARY - ADULT  Goal: Maintains or returns to baseline urinary function  Description  INTERVENTIONS:  - Assess urinary function  - Encourage oral fluids to ensure adequate hydration if ordered  - Administer IV fluids as ordered to ensure adequate hydration  - Administer ordered medications as needed  - Offer frequent toileting  - Follow urinary retention protocol if ordered  Outcome: Progressing  Goal: Absence of urinary retention  Description  INTERVENTIONS:  - Assess patient's ability to void and empty bladder  - Monitor I/O  - Bladder scan as needed  - Discuss with physician/AP medications to alleviate retention as needed  - Discuss catheterization for long term situations as appropriate   Outcome: Progressing     Problem: METABOLIC, FLUID AND ELECTROLYTES - ADULT  Goal: Fluid balance maintained  Description  INTERVENTIONS:  - Monitor labs   - Monitor I/O and WT  - Instruct patient on fluid and nutrition as appropriate  - Assess for signs & symptoms of volume excess or deficit  Outcome: Progressing     Problem: SKIN/TISSUE INTEGRITY - ADULT  Goal: Skin integrity remains intact  Description  INTERVENTIONS  - Identify patients at risk for skin breakdown  - Assess and monitor skin integrity  - Assess and monitor nutrition and hydration status  - Monitor labs (i e  albumin)  - Assess for incontinence   - Turn and reposition patient  - Assist with mobility/ambulation  - Relieve pressure over bony prominences  - Avoid friction and shearing  - Provide appropriate hygiene as needed including keeping skin clean and dry  - Evaluate need for skin moisturizer/barrier cream  - Collaborate with interdisciplinary team (i e  Nutrition, Rehabilitation, etc )   - Patient/family teaching  Outcome: Progressing

## 2020-01-07 NOTE — DISCHARGE INSTRUCTIONS
Urinary Traction Infection in Older Adults   WHAT YOU NEED TO KNOW:   A urinary tract infection (UTI) is caused by bacteria that get inside your urinary tract  Your urinary tract includes your kidneys, ureters, bladder, and urethra  Urine is made in your kidneys, and it flows from the ureters to the bladder  Urine leaves the bladder through the urethra  A UTI is more common in your lower urinary tract, which includes your bladder and urethra  DISCHARGE INSTRUCTIONS:   Return to the emergency department if:   · You are urinating very little or not at all  · You are vomiting  · You have a high fever with shaking chills  · You have side or back pain that gets worse  Contact your healthcare provider if:   · You have a fever  · You are a woman and you have increased white or yellow discharge from your vagina  · You do not feel better after 2 days of taking antibiotics  · You have questions or concerns about your condition or care  Medicines:   · Medicines  help treat the bacterial infection or decrease pain and burning when you urinate  You may also need medicines to decrease the urge to urinate often  Your healthcare provider may recommend cranberry juice or cranberry supplements to help decrease your symptoms  · Take your medicine as directed  Contact your healthcare provider if you think your medicine is not helping or if you have side effects  Tell him or her if you are allergic to any medicine  Keep a list of the medicines, vitamins, and herbs you take  Include the amounts, and when and why you take them  Bring the list or the pill bottles to follow-up visits  Carry your medicine list with you in case of an emergency  Self-care:   · Urinate when you feel the urge  Do not hold your urine because bacteria can grow in the bladder if urine stays in the bladder too long  It may be helpful to urinate at least every 3 to 4 hours  · Drink liquids as directed    Liquids can help flush bacteria from your urinary tract  Ask how much liquid to drink each day and which liquids are best for you  You may need to drink more liquids than usual to help flush out the bacteria  Do not drink alcohol, caffeine, and citrus juices  These can irritate your bladder and increase your symptoms  · Apply heat  on your abdomen for 20 to 30 minutes every 2 hours for as many days as directed  Heat helps decrease discomfort and pressure in your bladder  Prevent a UTI:   · Women should wipe front to back  after urinating or having a bowel movement  This may prevent germs from getting into the urinary tract  · Urinate after you have sex  to flush away bacteria that can enter your urinary tract during sex  · Wear cotton underwear and clothes that fit loose  Tight pants and nylon underwear can trap moisture and cause bacteria to grow  Follow up with your healthcare provider as directed:  Write down your questions so you remember to ask them during your visits  © 2017 [employer] Information is for End User's use only and may not be sold, redistributed or otherwise used for commercial purposes  All illustrations and images included in CareNotes® are the copyrighted property of [employer]  or Yaakov Ferris  The above information is an  only  It is not intended as medical advice for individual conditions or treatments  Talk to your doctor, nurse or pharmacist before following any medical regimen to see if it is safe and effective for you

## 2020-01-07 NOTE — ASSESSMENT & PLAN NOTE
CKD 3 at baseline    Results from last 7 days   Lab Units 01/06/20  0522 01/05/20  0610 01/04/20  1243   BUN mg/dL 9 10 12   CREATININE mg/dL 0 85 0 87 0 99   Stable Creatine

## 2020-01-07 NOTE — NURSING NOTE
Pt agitated throughout the night  Worse than her norm  Trying to get out of bed  Provider made aware of her condition  No new orders at this time  Call bell within reach

## 2020-01-07 NOTE — PROGRESS NOTES
The levofloxacin has / have been converted to Oral per [facility] IV-to-PO Auto-Conversion Protocol for Adults as approved by the Pharmacy and Therapeutics Committee  The patient met all eligible criteria:  3 Age = 25years old   2) Received at least one dose of the IV form   3) Receiving at least one other scheduled oral/enteral medication   4) Tolerating an oral/enteral diet   and did not have any exclusions:   1) Critical care patient   2) Active GI bleed (IF assessing H2RAs or PPIs)   3) Continuous tube feeding (IF assessing cipro, doxycycline, levofloxacin, minocycline, rifampin, or voriconazole)   4) Receiving PO vancomycin (IF assessing metronidazole)   5) Persistent nausea and/or vomiting   6) Ileus or gastrointestinal obstruction   7) Neelam/nasogastric tube set for continuous suction   8) Specific order not to automatically convert to PO (in the order's comments or if discussed in the most recent Infectious Disease or primary team's progress notes)

## 2020-01-07 NOTE — SOCIAL WORK
Pt family was inquiring about [facility]  Referral sent to [facility] and liaison stop to talk to pt family  Pt was seen today by PM&R doc and was recommending  TCF and Pt family is in agreement  Referral sent to [facility] for possible d/c today  Awaiting for response   Cm will f/u

## 2020-01-07 NOTE — TRANSPORTATION MEDICAL NECESSITY
Section I - General Information    Name of Patient: Jessee Slade                 : 11/3/1927    Medicare #: [id number]  Transport Date: 20 (PCS is valid for round trips on this date and for all repetitive trips in the 60-day range as noted below )  Origin: [address]                                                         Destination: [address] [place] [address]  Is the pt's stay covered under Medicare Part A (PPS/DRG)   [x]     Closest appropriate facility? If no, why is transport to more distant facility required? Yes  If hospice pt, is this transport related to pt's terminal illness? No       Section II - Medical Necessity Questionnaire  Ambulance transportation is medically necessary only if other means of transport are contraindicated or would be potentially harmful to the patient  To meet this requirement, the patient must either be "bed confined" or suffer from a condition such that transport by means other than ambulance is contraindicated by the patient's condition  The following questions must be answered by the medical professional signing below for this form to be valid:    1)  Describe the MEDICAL CONDITION (physical and/or mental) of this patient AT [address] [place] [address] that requires the patient to be transported in an ambulance and why transport by other means is contraindicated by the patient's condition:Toxic metabolic encephalopathy, confusion due to UTI, ambulatory dysfuction    2) Is the patient "bed confined" as defined below? No  To be "be confined" the patient must satisfy all three of the following conditions: (1) unable to get up from bed without Assistance; AND (2) unable to ambulate; AND (3) unable to sit in a chair or wheelchair  3) Can this patient safely be transported by car or wheelchair van (i e , seated during transport without a medical attendant or monitoring)?    No    4) In addition to completing questions 1-3 above, please check any of the following conditions that apply*:   *Note: supporting documentation for any boxes checked must be maintained in the patient's medical records  If hosp-hosp transfer, describe services needed at 2nd facility not available at 1st facility? Patient is confused      Section III - Signature of Physician or Healthcare Professional  I certify that the above information is true and correct based on my evaluation of this patient, and represent that the patient requires transport by ambulance and that other forms of transport are contraindicated  I understand that this information will be used by the Centers for Medicare and Medicaid Services (CMS) to support the determination of medical necessity for ambulance services, and I represent that I have personal knowledge of the patient's condition at time of transport  [x]  If this box is checked, I also certify that the patient is physically or mentally incapable of signing the ambulance service's claim and that the institution with which I am affiliated has furnished care, services, or assistance to the patient  My signature below is made on behalf of the patient pursuant to 42 CFR §424 36(b)(4)  In accordance with 42 CFR §424 37, the specific reason(s) that the patient is physically or mentally incapable of signing the claim form is as follows:       Signature of Physician* or Healthcare Professional______________________________________________________________  Signature Date 01/07/20 (For scheduled repetitive transports, this form is not valid for transports performed more than 60 days after this date)    Printed Name & Credentials of Physician or Healthcare Professional (MD, DO, RN, etc )________________________________  *Form must be signed by patient's attending physician for scheduled, repetitive transports   For non-repetitive, unscheduled ambulance transports, if unable to obtain the signature of the attending physician, any of the following may sign (choose appropriate option below)  [] Physician Assistant []  Clinical Nurse Specialist []  Registered Nurse  []  Nurse Practitioner  [x] Discharge Planner

## 2020-01-07 NOTE — SOCIAL WORK
[facility] is accepting pt for today  Informed daughter and she is agreeable with plan  Cm called [facility] for BLS transport, awaiting for p/u time  Notified Doc Jaymie Wei RN and UC

## 2020-01-07 NOTE — PROGRESS NOTES
Progress Note - Elham Benton 11/3/1927, 80 y o  female MRN: [id number]    Unit/Bed#: E4 -01 Encounter: [id number]    Primary Care Provider: Skylar Trivedi PA-C   Date and time admitted to hospital: 1/4/2020 12:11 PM        UTI (urinary tract infection)  Assessment & Plan  E  Coli present    Sensitivites pending    Toxic metabolic encephalopathy  Assessment & Plan  Suspected from infectious etiology (UTI) - Mental status improving - Doubt stroke in the setting of chronic anti-coagulation      Change in mental status  Assessment & Plan  TME : Likely secondary to UTI    CKD (chronic kidney disease) stage 3, GFR 30-59 ml/min ([facility] [place])  Assessment & Plan  CKD 3 at baseline    Results from last 7 days   Lab Units 01/06/20  0522 01/05/20  0610 01/04/20  1243   BUN mg/dL 9 10 12   CREATININE mg/dL 0 85 0 87 0 99   Stable Creatine    Transaminitis  Assessment & Plan  Transaminitis reportedly has follow-up with gastroenterology as outpatient  Continue ursodiol    Results from last 7 days   Lab Units 01/05/20  0610   AST U/L 31   ALT U/L 28   TOTAL BILIRUBIN mg/dL 1 50*       Chronic atrial fibrillation  Assessment & Plan  Rate controlled on metoprolol, continue anticoagulation with Pradaxa  Essential hypertension  Assessment & Plan  Essential hypertension continue metoprolol    Type 2 diabetes mellitus with hyperglycemia, without long-term current use of insulin [facility])  Assessment & Plan  Lab Results   Component Value Date    HGBA1C 8 5 (H) 01/05/2020     Recent Labs     01/05/20  2120 01/06/20  0723 01/06/20  1124 01/06/20  1546   POCGLU 214* 151* 266* 238*     Diabetes mellitus on metformin PTA  Continue sliding scale only for now  Add lantus at bedtime    * Mixed aphasia, global  Assessment & Plan  Stroke-like symptoms vs encephalopathy from UTI  Day #2 of Levaquin - E coli present    Day #1/3      [facility] Progress Note  Patient: Elham Benton 80 y o  female   MRN: [id number]  PCP: Silver Chilel PA-C  Unit/Bed#: E4 -12 Encounter: [id number]  Date Of Visit: 20    Assessment:    Principal Problem:    Mixed aphasia, global  Active Problems:    Type 2 diabetes mellitus with hyperglycemia, without long-term current use of insulin ([facility])    Essential hypertension    Chronic atrial fibrillation    Transaminitis    CKD (chronic kidney disease) stage 3, GFR 30-59 ml/min ([facility])    Change in mental status    Toxic metabolic encephalopathy    UTI (urinary tract infection)      Plan:    · Start Lantus 10 units  · MRI of the Brain  · Discharge planning       VTE Pharmacologic Prophylaxis:   Pharmacologic: Dabigatran (Pradaxa)  Mechanical VTE Prophylaxis in Place: Yes    Patient Centered Rounds: I have performed bedside rounds with nursing staff today  Discussions with Specialists or Other Care Team Provider:  Neurology    Education and Discussions with Family / Patient:  Patient    Time Spent for Care: 20 minutes  More than 50% of total time spent on counseling and coordination of care as described above  Current Length of Stay: 2 day(s)    Current Patient Status: Inpatient   Certification Statement: The patient will continue to require additional inpatient hospital stay due to Stroke workup    Discharge Plan / Estimated Discharge Date:  24-48 hours    Code Status: Level 3 - DNAR and DNI      Subjective:   Patient seen and examined, she is confused  Provides no usable information, per nursing staff however she is much better than admission  Moving all extremities      A complete and comprehensive 14 point organ system review has been performed and all other systems are negative other than stated above, these are unreliable secondary to patient acuity    Objective:     Vitals:   Temp (24hrs), Av 6 °F (36 4 °C), Min:96 6 °F (35 9 °C), Max:98 6 °F (37 °C)    Temp:  [96 6 °F (35 9 °C)-98 6 °F (37 °C)] 98 °F (36 7 °C)  HR:  [78-91] 91  Resp:  [18-20] 20  BP: (148-189)/() 162/84  SpO2:  [95 %-99 %] 99 %  Body mass index is 38 02 kg/m²  Input and Output Summary (last 24 hours): Intake/Output Summary (Last 24 hours) at 1/6/2020 2009  Last data filed at 1/6/2020 1209  Gross per 24 hour   Intake 2951 67 ml   Output 571 ml   Net 2380 67 ml       Physical Exam:     General: well appearing, no acute distress  HEENT: atraumatic,  Neck: Trachea midline, no carotid bruit, no masses  Respiratory: normal chest wall expansion, CTA B, no r/r/w, no rubs  Cardiovascular:  Irregularly irregular  Abdomen: Soft, non-tender, non-distended, normal bowel sounds in all quadrants, no hepatosplenomegaly, no tympany  Rectal: deferred  Musculoskeletal:  Moves all  Integumentary: warm, dry, and pink, with no rash, purpura, or petechia  Heme/Lymph: no lymphadenopathy, no bruises  Neurological:  Patient with glass eye right, difficult to assess neurologic exam  Psychiatric:  Apparent dementia      Additional Data:     Labs:    Results from last 7 days   Lab Units 01/06/20  0522  01/04/20  1243   WBC Thousand/uL 6 47   < > 7 45   HEMOGLOBIN g/dL 13 5   < > 13 4   HEMATOCRIT % 42 0   < > 42 1   PLATELETS Thousands/uL 231   < > 226   LYMPHO PCT %  --   --  11*   MONO PCT %  --   --  7   EOS PCT %  --   --  1    < > = values in this interval not displayed  Results from last 7 days   Lab Units 01/06/20  0522 01/05/20  0610   POTASSIUM mmol/L 3 8 4 2   CHLORIDE mmol/L 99* 100   CO2 mmol/L 27 25   BUN mg/dL 9 10   CREATININE mg/dL 0 85 0 87   CALCIUM mg/dL 8 5 8 6   ALK PHOS U/L  --  183*   ALT U/L  --  28   AST U/L  --  31           * I Have Reviewed All Lab Data Listed Above  * Additional Pertinent Lab Tests Reviewed:  Joaqu[facility] 66 Admission Reviewed    Imaging:  MRI pending  Recent Cultures (last 7 days):     Results from last 7 days   Lab Units 01/04/20  1407   URINE CULTURE  >100,000 cfu/ml Escherichia coli*       Last 24 Hours Medication List:     Current Facility-Administered Medications:  atorvastatin 20 mg Oral Daily With Mark Arias MD    dabigatran etexilate 150 mg Oral BID Muareen Dunlap MD    insulin glargine 10 Units Subcutaneous HS Bart Perdomo DO    insulin lispro 1-5 Units Subcutaneous 4x Daily (AC & HS) Maureen Dunlap MD    Labetalol HCl 10 mg Intravenous Q6H PRN Maureen Dunlap MD    levofloxacin 250 mg Intravenous Q24H Maureen Dunlap MD Last Rate: 250 mg (01/06/20 1643)   levothyroxine 75 mcg Oral Early Morning Maureen Dunlap MD    metoprolol succinate 25 mg Oral Daily Maureen Dunlap MD    nystatin 1 application Topical BID Bart Perdomo DO    polyethylene glycol 17 g Oral Daily PRN Maureen Dunlap MD    sodium chloride 50 mL/hr Intravenous Continuous Maureen Dunlap MD Last Rate: 50 mL/hr (01/06/20 1209)   ursodiol 300 mg Oral BID Maureen Dunlap MD         Today, Patient Was Seen By: Juan Russell DO    ** Please Note: This note has been constructed using a voice recognition system   **

## 2020-01-07 NOTE — PLAN OF CARE
Problem: Potential for Falls  Goal: Patient will remain free of falls  Description  INTERVENTIONS:  - Assess patient frequently for physical needs  -  Identify cognitive and physical deficits and behaviors that affect risk of falls  -  [place] fall precautions as indicated by assessment   - Educate patient/family on patient safety including physical limitations  - Instruct patient to call for assistance with activity based on assessment  - Modify environment to reduce risk of injury  - Consider OT/PT consult to assist with strengthening/mobility  Outcome: Progressing     Problem: Prexisting or High Potential for Compromised Skin Integrity  Goal: Skin integrity is maintained or improved  Description  INTERVENTIONS:  - Identify patients at risk for skin breakdown  - Assess and monitor skin integrity  - Assess and monitor nutrition and hydration status  - Monitor labs   - Assess for incontinence   - Turn and reposition patient  - Assist with mobility/ambulation  - Relieve pressure over bony prominences  - Avoid friction and shearing  - Provide appropriate hygiene as needed including keeping skin clean and dry  - Evaluate need for skin moisturizer/barrier cream  - Collaborate with interdisciplinary team   - Patient/family teaching  - Consider wound care consult   Outcome: Progressing     Problem: Nutrition/Hydration-ADULT  Goal: Nutrient/Hydration intake appropriate for improving, restoring or maintaining nutritional needs  Description  Monitor and assess patient's nutrition/hydration status for malnutrition  Collaborate with interdisciplinary team and initiate plan and interventions as ordered  Monitor patient's weight and dietary intake as ordered or per policy  Utilize nutrition screening tool and intervene as necessary  Determine patient's food preferences and provide high-protein, high-caloric foods as appropriate       INTERVENTIONS:  - Monitor oral intake, urinary output, labs, and treatment plans  - Assess nutrition and hydration status and recommend course of action  - Evaluate amount of meals eaten  - Assist patient with eating if necessary   - Allow adequate time for meals  - Recommend/ encourage appropriate diets, oral nutritional supplements, and vitamin/mineral supplements  - Order, calculate, and assess calorie counts as needed  - Recommend, monitor, and adjust tube feedings and TPN/PPN based on assessed needs  - Assess need for intravenous fluids  - Provide specific nutrition/hydration education as appropriate  - Include patient/family/caregiver in decisions related to nutrition  Outcome: Progressing     Problem: Neurological Deficit  Goal: Neurological status is stable or improving  Description  Interventions:  - Monitor and assess patient's level of consciousness, motor function, sensory function, and level of assistance needed for ADLs  - Monitor and report changes from baseline  Collaborate with interdisciplinary team to initiate plan and implement interventions as ordered  - Provide and maintain a safe environment  - Consider seizure precautions  - Consider fall precautions  - Consider aspiration precautions  - Consider bleeding precautions  Outcome: Progressing     Problem: Activity Intolerance/Impaired Mobility  Goal: Mobility/activity is maintained at optimum level for patient  Description  Interventions:  - Assess and monitor patient  barriers to mobility and need for assistive/adaptive devices  - Assess patient's emotional response to limitations  - Collaborate with interdisciplinary team and initiate plans and interventions as ordered  - Encourage independent activity per ability   - Maintain proper body alignment  - Perform active/passive rom as tolerated/ordered    - Plan activities to conserve energy   - Turn patient as appropriate  Outcome: Progressing     Problem: Communication Impairment  Goal: Ability to express needs and understand communication  Description  Assess patient's communication skills and ability to understand information  Patient will demonstrate use of effective communication techniques, alternative methods of communication and understanding even if not able to speak  - Encourage communication and provide alternate methods of communication as needed  - Collaborate with case management/ for discharge needs  - Include patient/family/caregiver in decisions related to communication  Outcome: Progressing     Problem: Potential for Aspiration  Goal: Non-ventilated patient's risk of aspiration is minimized  Description  Assess and monitor vital signs, respiratory status, and labs (WBC)  Monitor for signs of aspiration (tachypnea, cough, rales, wheezing, cyanosis, fever)  - Assess and monitor patient's ability to swallow  - Place patient up in chair to eat if possible  - HOB up at 90 degrees to eat if unable to get patient up into chair   - Supervise patient during oral intake  - Instruct patient/ family to take small bites  - Instruct patient/ family to take small single sips when taking liquids  - Follow patient-specific strategies generated by speech pathologist   Outcome: Progressing     Problem: Nutrition  Goal: Nutrition/Hydration status is improving  Description  Monitor and assess patient's nutrition/hydration status for malnutrition (ex- brittle hair, bruises, dry skin, pale skin and conjunctiva, muscle wasting, smooth red tongue, and disorientation)  Collaborate with interdisciplinary team and initiate plan and interventions as ordered  Monitor patient's weight and dietary intake as ordered or per policy  Utilize nutrition screening tool and intervene per policy  Determine patient's food preferences and provide high-protein, high-caloric foods as appropriate  - Assist patient with eating   - Allow adequate time for meals   - Encourage patient to take dietary supplement as ordered    - Collaborate with clinical nutritionist   - Include patient/family/caregiver in decisions related to nutrition    Outcome: Progressing     Problem: CARDIOVASCULAR - ADULT  Goal: Absence of cardiac dysrhythmias or at baseline rhythm  Description  INTERVENTIONS:  - Continuous cardiac monitoring, vital signs, obtain 12 lead EKG if ordered  - Administer antiarrhythmic and heart rate control medications as ordered  - Monitor electrolytes and administer replacement therapy as ordered  Outcome: Progressing     Problem: GENITOURINARY - ADULT  Goal: Maintains or returns to baseline urinary function  Description  INTERVENTIONS:  - Assess urinary function  - Encourage oral fluids to ensure adequate hydration if ordered  - Administer IV fluids as ordered to ensure adequate hydration  - Administer ordered medications as needed  - Offer frequent toileting  - Follow urinary retention protocol if ordered  Outcome: Progressing  Goal: Absence of urinary retention  Description  INTERVENTIONS:  - Assess patient's ability to void and empty bladder  - Monitor I/O  - Bladder scan as needed  - Discuss with physician/AP medications to alleviate retention as needed  - Discuss catheterization for long term situations as appropriate   Outcome: Progressing     Problem: METABOLIC, FLUID AND ELECTROLYTES - ADULT  Goal: Fluid balance maintained  Description  INTERVENTIONS:  - Monitor labs   - Monitor I/O and WT  - Instruct patient on fluid and nutrition as appropriate  - Assess for signs & symptoms of volume excess or deficit  Outcome: Progressing     Problem: SKIN/TISSUE INTEGRITY - ADULT  Goal: Skin integrity remains intact  Description  INTERVENTIONS  - Identify patients at risk for skin breakdown  - Assess and monitor skin integrity  - Assess and monitor nutrition and hydration status  - Monitor labs (i e  albumin)  - Assess for incontinence   - Turn and reposition patient  - Assist with mobility/ambulation  - Relieve pressure over bony prominences  - Avoid friction and shearing  - Provide appropriate hygiene as needed including keeping skin clean and dry  - Evaluate need for skin moisturizer/barrier cream  - Collaborate with interdisciplinary team (i e  Nutrition, Rehabilitation, etc )   - Patient/family teaching  Outcome: Progressing

## 2020-01-07 NOTE — ASSESSMENT & PLAN NOTE
Suspected from infectious etiology (UTI) - Mental status improving - Doubt stroke in the setting of chronic anti-coagulation

## 2020-01-07 NOTE — PLAN OF CARE
Problem: Potential for Falls  Goal: Patient will remain free of falls  Description  INTERVENTIONS:  - Assess patient frequently for physical needs  -  Identify cognitive and physical deficits and behaviors that affect risk of falls  -  [place] fall precautions as indicated by assessment   - Educate patient/family on patient safety including physical limitations  - Instruct patient to call for assistance with activity based on assessment  - Modify environment to reduce risk of injury  - Consider OT/PT consult to assist with strengthening/mobility  1/7/2020 1700 by An Rosenberg RN  Outcome: Adequate for Discharge  1/7/2020 0751 by An Rosenberg RN  Outcome: Progressing     Problem: Prexisting or High Potential for Compromised Skin Integrity  Goal: Skin integrity is maintained or improved  Description  INTERVENTIONS:  - Identify patients at risk for skin breakdown  - Assess and monitor skin integrity  - Assess and monitor nutrition and hydration status  - Monitor labs   - Assess for incontinence   - Turn and reposition patient  - Assist with mobility/ambulation  - Relieve pressure over bony prominences  - Avoid friction and shearing  - Provide appropriate hygiene as needed including keeping skin clean and dry  - Evaluate need for skin moisturizer/barrier cream  - Collaborate with interdisciplinary team   - Patient/family teaching  - Consider wound care consult   1/7/2020 1700 by An Rosenberg RN  Outcome: Adequate for Discharge  1/7/2020 0751 by An Rosenberg RN  Outcome: Progressing     Problem: Nutrition/Hydration-ADULT  Goal: Nutrient/Hydration intake appropriate for improving, restoring or maintaining nutritional needs  Description  Monitor and assess patient's nutrition/hydration status for malnutrition  Collaborate with interdisciplinary team and initiate plan and interventions as ordered  Monitor patient's weight and dietary intake as ordered or per policy   Utilize nutrition screening tool and intervene as necessary  Determine patient's food preferences and provide high-protein, high-caloric foods as appropriate  INTERVENTIONS:  - Monitor oral intake, urinary output, labs, and treatment plans  - Assess nutrition and hydration status and recommend course of action  - Evaluate amount of meals eaten  - Assist patient with eating if necessary   - Allow adequate time for meals  - Recommend/ encourage appropriate diets, oral nutritional supplements, and vitamin/mineral supplements  - Order, calculate, and assess calorie counts as needed  - Recommend, monitor, and adjust tube feedings and TPN/PPN based on assessed needs  - Assess need for intravenous fluids  - Provide specific nutrition/hydration education as appropriate  - Include patient/family/caregiver in decisions related to nutrition  1/7/2020 1700 by Melanie Faith RN  Outcome: Adequate for Discharge  1/7/2020 0751 by Melanie Faith RN  Outcome: Progressing     Problem: Neurological Deficit  Goal: Neurological status is stable or improving  Description  Interventions:  - Monitor and assess patient's level of consciousness, motor function, sensory function, and level of assistance needed for ADLs  - Monitor and report changes from baseline  Collaborate with interdisciplinary team to initiate plan and implement interventions as ordered  - Provide and maintain a safe environment  - Consider seizure precautions  - Consider fall precautions  - Consider aspiration precautions  - Consider bleeding precautions  1/7/2020 1700 by Melanie Faith RN  Outcome: Adequate for Discharge  1/7/2020 0751 by Melanie Faith RN  Outcome: Progressing     Problem: Activity Intolerance/Impaired Mobility  Goal: Mobility/activity is maintained at optimum level for patient  Description  Interventions:  - Assess and monitor patient  barriers to mobility and need for assistive/adaptive devices  - Assess patient's emotional response to limitations    - Collaborate with interdisciplinary team and initiate plans and interventions as ordered  - Encourage independent activity per ability   - Maintain proper body alignment  - Perform active/passive rom as tolerated/ordered  - Plan activities to conserve energy   - Turn patient as appropriate  1/7/2020 1700 by Jessika Ocampo RN  Outcome: Adequate for Discharge  1/7/2020 0751 by Jessika Ocampo RN  Outcome: Progressing     Problem: Communication Impairment  Goal: Ability to express needs and understand communication  Description  Assess patient's communication skills and ability to understand information  Patient will demonstrate use of effective communication techniques, alternative methods of communication and understanding even if not able to speak  - Encourage communication and provide alternate methods of communication as needed  - Collaborate with case management/ for discharge needs  - Include patient/family/caregiver in decisions related to communication  1/7/2020 1700 by Jessika Ocampo RN  Outcome: Adequate for Discharge  1/7/2020 0751 by Jessika Ocampo RN  Outcome: Progressing     Problem: Potential for Aspiration  Goal: Non-ventilated patient's risk of aspiration is minimized  Description  Assess and monitor vital signs, respiratory status, and labs (WBC)  Monitor for signs of aspiration (tachypnea, cough, rales, wheezing, cyanosis, fever)  - Assess and monitor patient's ability to swallow  - Place patient up in chair to eat if possible  - HOB up at 90 degrees to eat if unable to get patient up into chair   - Supervise patient during oral intake  - Instruct patient/ family to take small bites  - Instruct patient/ family to take small single sips when taking liquids    - Follow patient-specific strategies generated by speech pathologist   1/7/2020 1700 by Jessika Ocampo RN  Outcome: Adequate for Discharge  1/7/2020 0751 by Jessika Ocampo RN  Outcome: Progressing     Problem: Nutrition  Goal: Nutrition/Hydration status is improving  Description  Monitor and assess patient's nutrition/hydration status for malnutrition (ex- brittle hair, bruises, dry skin, pale skin and conjunctiva, muscle wasting, smooth red tongue, and disorientation)  Collaborate with interdisciplinary team and initiate plan and interventions as ordered  Monitor patient's weight and dietary intake as ordered or per policy  Utilize nutrition screening tool and intervene per policy  Determine patient's food preferences and provide high-protein, high-caloric foods as appropriate  - Assist patient with eating   - Allow adequate time for meals   - Encourage patient to take dietary supplement as ordered  - Collaborate with clinical nutritionist   - Include patient/family/caregiver in decisions related to nutrition    1/7/2020 1700 by Sandra Dorado RN  Outcome: Adequate for Discharge  1/7/2020 0751 by Sandra Dorado RN  Outcome: Progressing     Problem: CARDIOVASCULAR - ADULT  Goal: Absence of cardiac dysrhythmias or at baseline rhythm  Description  INTERVENTIONS:  - Continuous cardiac monitoring, vital signs, obtain 12 lead EKG if ordered  - Administer antiarrhythmic and heart rate control medications as ordered  - Monitor electrolytes and administer replacement therapy as ordered  1/7/2020 1700 by Sandra Dorado RN  Outcome: Adequate for Discharge  1/7/2020 0751 by Sandra Dorado RN  Outcome: Progressing     Problem: GENITOURINARY - ADULT  Goal: Maintains or returns to baseline urinary function  Description  INTERVENTIONS:  - Assess urinary function  - Encourage oral fluids to ensure adequate hydration if ordered  - Administer IV fluids as ordered to ensure adequate hydration  - Administer ordered medications as needed  - Offer frequent toileting  - Follow urinary retention protocol if ordered  1/7/2020 1700 by Sandra Dorado RN  Outcome: Adequate for Discharge  1/7/2020 0751 by Sandra Dorado RN  Outcome: Progressing  Goal: Absence of urinary retention  Description  INTERVENTIONS:  - Assess patient's ability to void and empty bladder  - Monitor I/O  - Bladder scan as needed  - Discuss with physician/AP medications to alleviate retention as needed  - Discuss catheterization for long term situations as appropriate   1/7/2020 1700 by Austin Cruz RN  Outcome: Adequate for Discharge  1/7/2020 0751 by Austin Cruz RN  Outcome: Progressing     Problem: METABOLIC, FLUID AND ELECTROLYTES - ADULT  Goal: Fluid balance maintained  Description  INTERVENTIONS:  - Monitor labs   - Monitor I/O and WT  - Instruct patient on fluid and nutrition as appropriate  - Assess for signs & symptoms of volume excess or deficit  1/7/2020 1700 by Austin Cruz RN  Outcome: Adequate for Discharge  1/7/2020 0751 by Austin Cruz RN  Outcome: Progressing     Problem: SKIN/TISSUE INTEGRITY - ADULT  Goal: Skin integrity remains intact  Description  INTERVENTIONS  - Identify patients at risk for skin breakdown  - Assess and monitor skin integrity  - Assess and monitor nutrition and hydration status  - Monitor labs (i e  albumin)  - Assess for incontinence   - Turn and reposition patient  - Assist with mobility/ambulation  - Relieve pressure over bony prominences  - Avoid friction and shearing  - Provide appropriate hygiene as needed including keeping skin clean and dry  - Evaluate need for skin moisturizer/barrier cream  - Collaborate with interdisciplinary team (i e  Nutrition, Rehabilitation, etc )   - Patient/family teaching  1/7/2020 1700 by Austin Cruz RN  Outcome: Adequate for Discharge  1/7/2020 0751 by Austin Cruz RN  Outcome: Progressing

## 2020-01-07 NOTE — ASSESSMENT & PLAN NOTE
Lab Results   Component Value Date    HGBA1C 8 5 (H) 01/05/2020     Recent Labs     01/05/20  2120 01/06/20  0723 01/06/20  1124 01/06/20  1546   POCGLU 214* 151* 266* 238*     Diabetes mellitus on metformin PTA  Continue sliding scale only for now      Add lantus at bedtime

## 2020-01-08 ENCOUNTER — TRANSITIONAL CARE MANAGEMENT (OUTPATIENT)
Dept: FAMILY MEDICINE CLINIC | Facility: CLINIC | Age: 85
End: 2020-01-08

## 2020-01-08 PROBLEM — E03.9 ACQUIRED HYPOTHYROIDISM: Status: ACTIVE | Noted: 2018-12-05

## 2020-01-08 PROBLEM — E78.2 MIXED HYPERLIPIDEMIA: Status: ACTIVE | Noted: 2018-12-05

## 2020-01-08 PROBLEM — I11.9 HYPERTENSIVE HEART DISEASE WITHOUT HEART FAILURE: Status: ACTIVE | Noted: 2018-12-05

## 2020-01-08 LAB
GLUCOSE SERPL-MCNC: 103 MG/DL (ref 65–140)
GLUCOSE SERPL-MCNC: 151 MG/DL (ref 65–140)
GLUCOSE SERPL-MCNC: 254 MG/DL (ref 65–140)
GLUCOSE SERPL-MCNC: 262 MG/DL (ref 65–140)

## 2020-01-08 PROCEDURE — 99306 1ST NF CARE HIGH MDM 50: CPT | Performed by: FAMILY MEDICINE

## 2020-01-08 PROCEDURE — 97167 OT EVAL HIGH COMPLEX 60 MIN: CPT

## 2020-01-08 PROCEDURE — 97163 PT EVAL HIGH COMPLEX 45 MIN: CPT

## 2020-01-08 PROCEDURE — 97535 SELF CARE MNGMENT TRAINING: CPT

## 2020-01-08 PROCEDURE — 82948 REAGENT STRIP/BLOOD GLUCOSE: CPT

## 2020-01-08 PROCEDURE — 97116 GAIT TRAINING THERAPY: CPT

## 2020-01-08 RX ORDER — ATORVASTATIN CALCIUM 10 MG/1
10 TABLET, FILM COATED ORAL
Status: DISCONTINUED | OUTPATIENT
Start: 2020-01-08 | End: 2020-01-08

## 2020-01-08 RX ORDER — LEVOFLOXACIN 500 MG/1
500 TABLET, FILM COATED ORAL EVERY OTHER DAY
Status: COMPLETED | OUTPATIENT
Start: 2020-01-08 | End: 2020-01-10

## 2020-01-08 RX ORDER — LEVOFLOXACIN 250 MG/1
250 TABLET ORAL EVERY 24 HOURS
Status: DISCONTINUED | OUTPATIENT
Start: 2020-01-08 | End: 2020-01-08 | Stop reason: ALTCHOICE

## 2020-01-08 RX ORDER — EZETIMIBE 10 MG/1
10 TABLET ORAL
Status: DISCONTINUED | OUTPATIENT
Start: 2020-01-08 | End: 2020-01-14 | Stop reason: HOSPADM

## 2020-01-08 RX ADMIN — DABIGATRAN ETEXILATE MESYLATE 150 MG: 75 CAPSULE ORAL at 17:21

## 2020-01-08 RX ADMIN — LEVOTHYROXINE SODIUM 75 MCG: 75 TABLET ORAL at 06:11

## 2020-01-08 RX ADMIN — URSOSIOL 300 MG: 300 CAPSULE ORAL at 09:07

## 2020-01-08 RX ADMIN — NYSTATIN 1 APPLICATION: 100000 POWDER TOPICAL at 17:21

## 2020-01-08 RX ADMIN — URSOSIOL 300 MG: 300 CAPSULE ORAL at 17:21

## 2020-01-08 RX ADMIN — EZETIMIBE 10 MG: 10 TABLET ORAL at 22:08

## 2020-01-08 RX ADMIN — INSULIN LISPRO 1 UNITS: 100 INJECTION, SOLUTION INTRAVENOUS; SUBCUTANEOUS at 17:21

## 2020-01-08 RX ADMIN — INSULIN LISPRO 2 UNITS: 100 INJECTION, SOLUTION INTRAVENOUS; SUBCUTANEOUS at 22:09

## 2020-01-08 RX ADMIN — INSULIN LISPRO 2 UNITS: 100 INJECTION, SOLUTION INTRAVENOUS; SUBCUTANEOUS at 12:09

## 2020-01-08 RX ADMIN — INSULIN GLARGINE 10 UNITS: 100 INJECTION, SOLUTION SUBCUTANEOUS at 22:08

## 2020-01-08 RX ADMIN — LEVOFLOXACIN 500 MG: 500 TABLET, FILM COATED ORAL at 14:20

## 2020-01-08 RX ADMIN — DABIGATRAN ETEXILATE MESYLATE 150 MG: 75 CAPSULE ORAL at 09:07

## 2020-01-08 RX ADMIN — METOPROLOL SUCCINATE 25 MG: 25 TABLET, EXTENDED RELEASE ORAL at 09:07

## 2020-01-08 RX ADMIN — NYSTATIN 1 APPLICATION: 100000 POWDER TOPICAL at 09:09

## 2020-01-08 NOTE — PLAN OF CARE
Problem: PHYSICAL THERAPY ADULT  Goal: Performs mobility at highest level of function for planned discharge setting  See evaluation for individualized goals  Description  Treatment/Interventions: Functional transfer training, LE strengthening/ROM, Elevations, Therapeutic exercise, Endurance training, Cognitive reorientation, Patient/family training, Equipment eval/education, Bed mobility, Gait training, Compensatory technique education, Spoke to nursing, Spoke to case management, OT, Family  Equipment Recommended: (TBD w/ rehab progress)       See flowsheet documentation for full assessment, interventions and recommendations  Note:   Prognosis: Fair  Problem List: Decreased strength, Decreased range of motion, Decreased endurance, Impaired balance, Decreased mobility, Decreased coordination, Decreased cognition, Impaired judgement, Decreased safety awareness, Impaired vision, Impaired hearing, Impaired sensation, Obesity, Pain  Assessment: see below  Barriers to Discharge: Inaccessible home environment  Barriers to Discharge Comments: steps to enter/ lethargy  cognition  Recommendation: Home with family support, Home PT(pending progress )     PT - OK to Discharge: No    See flowsheet documentation for full assessment

## 2020-01-08 NOTE — ASSESSMENT & PLAN NOTE
Stroke-like symptoms vs encephalopathy from UTI  Resolved, MRI is negative for acute ischemia    Day #2 of Levaquin - E coli present    Day #2/3 - one additional dose at [facility]

## 2020-01-08 NOTE — ASSESSMENT & PLAN NOTE
Patient has uncontrolled hyperlipidemia  Avoid statin secondary to previous transaminitis history    Placed on Zetia 10 mg daily

## 2020-01-08 NOTE — ASSESSMENT & PLAN NOTE
Patient has history of transaminitis  Currently liver numbers are stable  Avoid statins as she had reaction to them in the past  Continue Ursodiol

## 2020-01-08 NOTE — H&P
H&P- Letty Gilbert 11/3/1927, 80 y o  female MRN: [id number]    Unit/Bed#: -01 Encounter: [id number]    Primary Care Provider: Tala Rivera PA-C   Date and time admitted to hospital: 1/7/2020  6:31 PM        * Toxic metabolic encephalopathy  Assessment & Plan  Patient recently had acute toxic metabolic encephalopathy and acute urinary tract infection  Patient is elderly with known history of dementia without behavioral disturbance  She is very somnolent during the daytime  Continue course of Levaquin  Continue to observe  Recent MRI brain showed moderate to advanced chronic microangiopathic changes however no acute stroke or mass  Check TSH, CBC, CMP, B12 level tomorrow  If Patient remains very somnolent and fatigued may try a low-dose Ritalin during the daytime to help improve level of alertness    UTI (urinary tract infection)  Assessment & Plan  Patient recently had a urinary tract infection with E coli  Continue Levaquin to complete 5-7 day course    Transaminitis  Assessment & Plan  Patient has history of transaminitis  Currently liver numbers are stable  Avoid statins as she had reaction to them in the past  Continue Ursodiol  Chronic atrial fibrillation  Assessment & Plan  Patient has known history of AFib  Rate controlled with Toprol-XL and anticoagulated with Pradaxa    Mixed hyperlipidemia  Assessment & Plan  Patient has uncontrolled hyperlipidemia  Avoid statin secondary to previous transaminitis history  Placed on Zetia 10 mg daily    Hypertensive heart disease without heart failure  Assessment & Plan  Continue Toprol-XL  Blood pressure was elevated the last few days  Monitor for now  Keep systolic blood pressure less than 170    Acquired hypothyroidism  Assessment & Plan  Check TSH    Continue Synthroid    Type 2 diabetes mellitus with hyperglycemia, without long-term current use of insulin [facility])  Assessment & Plan    Lab Results   Component Value Date    HGBA1C 8 5 (H) 01/05/2020   Blood sugars are well controlled  Continue Lantus and insulin sliding scale for now      VTE Prophylaxis: Dabigatran (Pradaxa)  / reason for no mechanical VTE prophylaxis On Pradaxa   Code Status:  DNR/DNI as per the patient's family  POLST: There is no POLST form on file for this patient (pre-hospital)  Discussion with family:  Discussed with daughters at bedside    Anticipated Length of Stay:  Patient will be admitted on an SNF Short Term Inpatient basis with an anticipated length of stay of  more than 2 midnights  Justification for Hospital Stay:  Acute toxic metabolic encephalopathy    Total Time for Visit, including Counseling / Coordination of Care: 1 hour  Greater than 50% of this total time spent on direct patient counseling and coordination of care  Chief Complaint:   Confusion    History of Present Illness:    Elo Arguello is a 80 y o  female who presents with confusion  Patient denies any chest pain or shortness of breath  She is somnolent but arousable  Needs 1-2 people to assist her with activities of daily living at this time  She is a high fall risk  She currently denies any pain or discomfort anywhere  Very slow to respond to questions    Review of Systems:    Review of Systems   Constitutional: Positive for appetite change and fatigue  Negative for chills and fever  HENT: Negative for hearing loss, sore throat and trouble swallowing  Eyes: Negative for photophobia, discharge and visual disturbance  Respiratory: Negative for chest tightness and shortness of breath  Cardiovascular: Negative for chest pain and palpitations  Gastrointestinal: Negative for abdominal pain, blood in stool and vomiting  Endocrine: Negative for polydipsia and polyuria  Genitourinary: Negative for difficulty urinating, dysuria, flank pain and hematuria  Musculoskeletal: Positive for gait problem  Negative for back pain  Skin: Negative for rash     Allergic/Immunologic: Negative for environmental allergies and food allergies  Neurological: Negative for dizziness, seizures, syncope and headaches  Hematological: Does not bruise/bleed easily  Psychiatric/Behavioral: Positive for decreased concentration and dysphoric mood  Negative for behavioral problems  The patient is nervous/anxious  All other systems reviewed and are negative  Past Medical and Surgical History:     Past Medical History:   Diagnosis Date    Atrial fibrillation ([address] )     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  Authorizing Provider   ACCU-CHEK FASTCLIX LANCETS MISC CHECK BG TWICE DAILY 12/31/19   Jim Gonzales PA-C   atorvastatin (LIPITOR) 20 mg tablet Take 1 tablet (20 mg total) by mouth daily with dinner 1/8/20   Debbie Burns,    Cholecalciferol (VITAMIN D-3) 1000 units CAPS 1 capsule  5/21/15   Historical Provider, MD   dabigatran etexilate (PRADAXA) 150 mg capsu Take 1 capsule (150 mg total) by mouth 2 (two) times a day for 90 days 5/9/19 1/4/20  Jorge Soto MD   glucose blood test strip tests once daily by fingerstick route 3/21/13   Historical Provider, MD   levofloxacin (LEVAQUIN) 250 mg tablet Take 1 tablet (250 mg total) by mouth every 24 hours for 1 dose 1/8/20 1/9/20  Bart Perdomo DO   levothyroxine 75 mcg tablet Take 1 tablet (75 mcg total) by mouth daily 12/31/19   Jim Gonzales PA-C   metFORMIN (GLUCOPHAGE) 500 mg tablet Take 1 tablet (500 mg total) by mouth daily  Patient taking differently: Take 500 mg by mouth 2 (two) times a day with meals  11/7/19   Jorge Soto MD   metoprolol succinate (TOPROL-XL) 25 mg 24 hr tablet Take 1 tablet (25 mg total) by mouth every 12 (twelve) hours 11/7/19   Jennifer Hernandez MD   OMEGA-3 FATTY ACIDS PO 2 (two) times a day  6/1/05   Historical Provider, MD   triamcinolone (KENALOG) 0 5 % cream Apply topically daily as needed (dry skin of lower extremities) 12/16/19   Silver Chilel PA-C   ursodiol (ACTIGALL) 500 MG tablet Take 500 mg by mouth 2 (two) times a day    Historical Provider, MD     I have reviewed home medications with patient family member  Allergies: Allergies   Allergen Reactions    Aspirin GI Intolerance    Penicillins        Social History:     Marital Status:    Social History     Substance and Sexual Activity   Alcohol Use Not Currently     Social History     Tobacco Use   Smoking Status Never Smoker   Smokeless Tobacco Never Used     Social History     Substance and Sexual Activity   Drug Use Never       Family History:    Family History   Problem Relation Age of Onset    Breast cancer Sister     No Known Problems Mother     No Known Problems Father        Physical Exam:     Vitals:   Blood Pressure: 158/82 (01/08/20 0704)  Pulse: 89 (01/08/20 0704)  Temperature: (!) 97 1 °F (36 2 °C) (01/08/20 0704)  Temp Source: Temporal (01/08/20 0704)  Respirations: 17 (01/08/20 0704)  Height: 5' 2" (157 5 cm) (01/07/20 1845)  Weight - Scale: 84 4 kg (186 lb 1 1 oz) (01/07/20 1845)  SpO2: 97 % (01/08/20 0704)    Physical Exam   Constitutional: She appears well-developed and well-nourished  HENT:   Head: Normocephalic and atraumatic  Right Ear: External ear normal    Left Ear: External ear normal    Mouth/Throat: Oropharynx is clear and moist    Eyes: Conjunctivae and EOM are normal    Neck: Normal range of motion  Neck supple  Cardiovascular: Normal rate, regular rhythm and normal heart sounds  Pulmonary/Chest: Effort normal    Moderate air entry bilaterally with mild decreased breath sounds bilateral bases   Abdominal: Soft  Bowel sounds are normal  She exhibits no mass  There is no tenderness   There is no rebound and no guarding  Genitourinary:   Genitourinary Comments: deferred   Musculoskeletal: She exhibits no edema  Power is 4 x 5 bilateral upper extremity and lower extremity   Neurological: She has normal reflexes  Cranial nerves 2-12 are normal   Glass eye  Psychomotor retardation noted  Somnolent but arousable  Oriented to person and time   Skin: Skin is warm and dry  No rash noted  Psychiatric:   Psychomotor slowing noted and flat affect   Nursing note and vitals reviewed  Additional Data:     Lab Results: I have personally reviewed pertinent reports  Results from last 7 days   Lab Units 01/06/20  0522  01/04/20  1243   WBC Thousand/uL 6 47   < > 7 45   HEMOGLOBIN g/dL 13 5   < > 13 4   HEMATOCRIT % 42 0   < > 42 1   PLATELETS Thousands/uL 231   < > 226   BANDS PCT %  --   --  1   LYMPHO PCT %  --   --  11*   MONO PCT %  --   --  7   EOS PCT %  --   --  1    < > = values in this interval not displayed  Results from last 7 days   Lab Units 01/06/20  0522 01/05/20  0610   SODIUM mmol/L 134* 134*   POTASSIUM mmol/L 3 8 4 2   CHLORIDE mmol/L 99* 100   CO2 mmol/L 27 25   BUN mg/dL 9 10   CREATININE mg/dL 0 85 0 87   ANION GAP mmol/L 8 9   CALCIUM mg/dL 8 5 8 6   ALBUMIN g/dL  --  2 1*   TOTAL BILIRUBIN mg/dL  --  1 50*   ALK PHOS U/L  --  183*   ALT U/L  --  28   AST U/L  --  31   GLUCOSE RANDOM mg/dL 164* 134         Results from last 7 days   Lab Units 01/08/20  1156 01/08/20  0559 01/07/20  2035 01/07/20  1617 01/07/20  1109 01/07/20  0728 01/06/20  2101 01/06/20  1546 01/06/20  1124 01/06/20  0723 01/05/20  2120 01/05/20  1658   POC GLUCOSE mg/dl 262* 103 127 139 227* 161* 160* 238* 266* 151* 214* 175*     Results from last 7 days   Lab Units 01/05/20  0610   HEMOGLOBIN A1C % 8 5*           Imaging: I have personally reviewed pertinent reports        No orders to display       EKG, Pathology, and Other Studies Reviewed on Admission:   · EKG:  Reviewed    Allscripts / Epic Records Reviewed: Yes     ** Please Note: This note has been constructed using a voice recognition system   **

## 2020-01-08 NOTE — PLAN OF CARE
Problem: OCCUPATIONAL THERAPY ADULT  Goal: Performs self-care activities at highest level of function for planned discharge setting  See evaluation for individualized goals  Description  Treatment Interventions: ADL retraining, Visual perceptual retraining, Functional transfer training, UE strengthening/ROM, Endurance training, Cognitive reorientation, Patient/family training, Equipment evaluation/education, Neuromuscular reeducation, Fine motor coordination activities, Compensatory technique education, Continued evaluation, Energy conservation, Activityengagement          See flowsheet documentation for full assessment, interventions and recommendations  Outcome: Progressing  Note:   Limitation: Decreased ADL status, Decreased UE ROM, Decreased UE strength, Decreased Safe judgement during ADL, Decreased cognition, Decreased endurance, Decreased sensation, Visual deficit, Decreased fine motor control, Decreased self-care trans, Decreased high-level ADLs  Prognosis: Good, Fair  Assessment: Pt is a 80 y o  female seen for OT evaluation s/p admit to [facility] on [facility]/7/2020 w/ change in mental status and confusion; UTI  Initially presented to [facility] with stroke-like symptoms, MRI (-) acute ischemia  OT completed an extensive review of pt's medical and social history  Comorbidities affecting pt's functional performance at time of assessment include: Toxic metabolic encephalopathy, Mixed aphasia-global, CKD (chronic kidney disease) stage 3, Generalized osteoarthritis, Transaminitis, Chronic atrial fibrillation, DM2 with foot ulcer, HTN, see additional PMH in pt chart    Personal factors affecting pt at time of IE include:steps to enter environment, behavioral pattern, difficulty performing ADLS, difficulty performing IADLS , limited insight into deficits, flat affect, decreased initiation and engagement  and environment  Pt's daughters present and able to A with gathering home setup and PLOF   Pta pt living with daughter and son in law in [address] with 1st floor setup (2-3 long MIKIE- suitable for RW mgmt; No HR's)  Pt receives S/A with ADL tasks, family completes all IADLs-- someone is home 24/7  Pt ambulates in home and far community distances with rollator (ind), utilizes RW to exit home (to and from [employer])  Pt sleeps in ergo bed without rails  (-) drives -- family A with all transportation  [facility] and is an active participant in exercise/activity  Family reporting pt is below baseline with physical capability and cognitive status  Pt currently requires Min A UB ADLs, Max A LB ADLs, Max Ax1 Min A x1 toileting, and Mod A x1 chair follow x1 mobility with RW 2* the following deficits impacting occupational performance  Pt presents to OT below baseline due to the following performance deficits: weakness, decreased ROM, decreased strength, decreased balance, decreased tolerance, impaired GMC, impaired arousal, impaired attention, impaired initiation, impaired memory, impaired problem solving, increased pain and decreased coping skills  Pt to benefit from continued skilled OT services while in the hospital to address deficits as defined above and maximize level of functional independence w ADL's and functional mobility  Occupational performance areas to address include: grooming, bathing/shower, toilet hygiene, dressing, socialization, health maintenance, functional mobility, community mobility, clothing management and social participation  Pt with score of 30/100 on the Barthel Index   Based on OT evaluation, asses     OT Discharge Recommendation: Home OT(with 24/7 supervision/assist from family )  OT - OK to Discharge: No

## 2020-01-08 NOTE — ASSESSMENT & PLAN NOTE
Lab Results   Component Value Date    HGBA1C 8 5 (H) 01/05/2020   Blood sugars are well controlled    Continue Lantus and insulin sliding scale for now

## 2020-01-08 NOTE — OCCUPATIONAL THERAPY NOTE
OccupationalTherapy Evaluation   [id number] 15 min eval 15 min txt      Patient Name: Dayanna Limon  HXQCD'D Date: 1/8/2020  Problem List  Principal Problem:    Toxic metabolic encephalopathy  Active Problems:    Type 2 diabetes mellitus with hyperglycemia, without long-term current use of insulin (HCC)    Acquired hypothyroidism    Essential hypertension    Chronic atrial fibrillation    Transaminitis    UTI (urinary tract infection)    Past Medical History  Past Medical History:   Diagnosis Date    Atrial fibrillation ([address] )     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    Cholecystitis     Disease of tonsils     Glaucoma 2008    NOS     Past Surgical History  Past Surgical History:   Procedure Laterality Date    CHOLECYSTECTOMY      EYE SURGERY Right 03/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    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        Time- [id number] 15 min eval 15 min txt   Vitals- 129/70 HR97 O2 >95%  Standardized Assessment- Barthel 30/100  Home Evaluation (virtual)- Daughters present and able to provided appropriate home setup  Will obtain photos as needed  Family/Caregiver Training- Will initiate family training as needed  Pt remains in room with all needs at end of session          01/08/20 1115   Note Type   Note type Eval/Treat   Restrictions/Precautions   Weight Bearing Precautions Per Order No   Other Precautions Cognitive; Chair Alarm; Bed Alarm; Fall Risk;Pain;Hard of hearing;Visual impairment  (glass eye (R) low vision )   Pain Assessment   Pain Assessment No/denies pain  (no pain at rest; hypersensitive B/L LE's and BP cuff )   Home Living   Type of Home House  (2-3 long MIKIE no HR )   Home Layout Two level;Performs ADLs on one level; Able to live on main level with bedroom/bathroom;Stairs to enter without rails   Bathroom Shower/Tub Walk-in shower  (5-6 inch threshold (cut out) )   Bathroom Toilet Standard   Bathroom Equipment Grab bars in shower; Shower chair;Commode   [address] Maur[address] [place]; Wheelchair-manual;Grab bars  (RW; Rollator; ergo bed without rails )   Additional Comments Daughters present to A with home setup information    Prior Function   Level of [place] Needs assistance with IADLs; Needs assistance with ADLs and functional mobility  (ind with mobility using rollator in home )   Lives With Daughter  (Son in law)   Receives Help From Family   ADL Assistance Needs assistance  (A with toileting and bathing )   IADLs Needs assistance  (completed by family )   Falls in the last 6 months 0   Vocational Retired   Comments Pt's daughters present and able to A with gathering home setup and PLOF  Pta pt living with daughter and son in law in [facility] with 1st floor setup (2-3 long MIKIE- suitable for RW mgmt; No HR's)  Pt receives S/A with ADL tasks, family completes all IADLs-- someone is home 24/7  Pt ambulates in home and far community distances with rollator (ind), utilizes RW to exit home (to and from [employer])  Pt sleeps in ergo bed without rails  (-) drives -- family A with all transportation  [facility] ([facility] and is an active participant in exercise/activity  Family reporting pt is below baseline with physical capability and cognitive status  Lifestyle   Intrinsic Gratification participating in [facility] activities/exercise    Psychosocial   Psychosocial (WDL) WDL   Subjective   Subjective verbalized need to use bathroom    ADL   Eating Assistance 4  Minimal Assistance   Grooming Assistance 4  Minimal Assistance   UB Bathing Assistance 4  Minimal Assistance   LB Bathing Assistance 2  Maximal Assistance   UB Dressing Assistance 4  Minimal Assistance   LB Dressing Assistance 2  Maximal [address]  2  Maximal Assistance  (Max x1; Min A x1 )   Bed Mobility   Additional Comments Pt received OOB -- d/t signficatn fatigue did not attempt bed mobility at this time   As per nursing report, AM sup<>sit Mod A x1    Transfers   Sit to Stand 3  Moderate assistance   Additional items Assist x 1; Increased time required;Verbal cues   Stand to Sit 3  Moderate assistance   Additional items Assist x 1; Increased time required;Verbal cues   Stand pivot 3  Moderate assistance   Additional items Assist x 1; Increased time required;Verbal cues   Additional Comments [facility]; VC's/TC's for appropriate hand placements; SBA with all txfrs 2* fatigue    Functional Mobility   Functional Mobility 3  Moderate assistance   Additional Comments x1 with w/c follow of another ~6ft    Additional items Rolling walker   Balance   Static Sitting Fair   Dynamic Sitting Fair -   Static Standing Poor +   Dynamic Standing Poor   Ambulatory Poor   Activity Tolerance   Activity Tolerance Patient limited by fatigue  (extreme fatigue/difficult to arouse )   Medical Staff Made Aware PT Sunil Dawson   Nurse Made Aware RN cleared pt for eval    RUE Assessment   RUE Assessment WFL  (strength WFL-- observations 2* arousal/cog )   LUE Assessment   LUE Assessment WFL  (strength WFL-- observations 2* arousal/cog )   Hand Function   Gross Motor Coordination Functional   Fine Motor Coordination Functional   Sensation   Light Touch No apparent deficits   Additional Comments Hypersensitivity to B/L LE's; increased pain with BP reading    Proprioception   Proprioception No apparent deficits   Vision-Basic Assessment   Current Vision Other (Comment)  (glass eye (R) low vision (L))   Vision - Complex Assessment   Ocular Range of Motion   (WFL (R only) )   Perception   Inattention/Neglect Appears intact  (limited by visual impairments )   Cognition   Overall Cognitive Status Impaired   Arousal/Participation Arousable; Cooperative;Lethargic   Attention Attends with cues to redirect   Orientation Level Oriented to person;Oriented to time   Memory Decreased short term memory;Decreased recall of recent events;Decreased recall of precautions   Following Commands Follows one step commands with increased time or repetition  [facility]; increased time for processing)   Comments lethargic; difficult to arouse at initation however able to participate    Assessment   Limitation Decreased ADL status; Decreased UE ROM; Decreased UE strength;Decreased Safe judgement during ADL;Decreased cognition;Decreased endurance;Decreased sensation;Visual deficit; Decreased fine motor control;Decreased self-care trans;Decreased high-level ADLs   Prognosis Good;Fair   Assessment Pt is a 80 y o  female seen for OT evaluation s/p admit to [facility] on 1/7/2020 w/ change in mental status and confusion; UTI  [facility]ially presented to [facility] with stroke-like symptoms, MRI (-) acute ischemia  OT completed an extensive review of pt's medical and social history  Comorbidities affecting pt's functional performance at time of assessment include: Toxic metabolic encephalopathy, Mixed aphasia-global, CKD (chronic kidney disease) stage 3, Generalized osteoarthritis, Transaminitis, Chronic atrial fibrillation, DM2 with foot ulcer, HTN, see additional PMH in pt chart    Personal factors affecting pt at time of IE include:steps to enter environment, behavioral pattern, difficulty performing ADLS, difficulty performing IADLS , limited insight into deficits, flat affect, decreased initiation and engagement  and environment  Pt's daughters present and able to A with gathering home setup and PLOF  Pta pt living with daughter and son in law in [facility] with 1st floor setup (2-3 long MIKIE- suitable for RW mgmt; No HR's)  Pt receives S/A with ADL tasks, family completes all IADLs-- someone is home 24/7  Pt ambulates in home and far community distances with rollator (ind), utilizes RW to exit home (to and from [facility])  Pt sleeps in ergo bed without rails  (-) drives -- family A with all transportation  Pt [facility] (2x/[facility]) and is an active participant in exercise/activity  Family reporting pt is below baseline with physical capability and cognitive status  Pt currently requires Min A UB ADLs, Max A LB ADLs, Max Ax1 Min A x1 toileting, and Mod A x1 chair follow x1 mobility with RW 2* the following deficits impacting occupational performance  Pt presents to OT below baseline due to the following performance deficits: weakness, decreased ROM, decreased strength, decreased balance, decreased tolerance, impaired GMC, impaired arousal, impaired attention, impaired initiation, impaired memory, impaired problem solving, increased pain and decreased coping skills  Pt to benefit from continued skilled OT services while in the hospital to address deficits as defined above and maximize level of functional independence w ADL's and functional mobility  Occupational performance areas to address include: grooming, bathing/shower, toilet hygiene, dressing, socialization, health maintenance, functional mobility, community mobility, clothing management and social participation  Pt with score of 30/100 on the Barthel Index  Based on OT evaluation, assessment(s), performance deficits listed, and current level of function, pt identified as a HIGH complexity evaluation  From OT standpoint, recommendation at time of d/c would be Home OT with family support  Goals   Patient Goals No stated goal; willing to participate; family present with encouragement    Plan   Treatment Interventions ADL retraining;Visual perceptual retraining;Functional transfer training;UE strengthening/ROM; Endurance training;Cognitive reorientation;Patient/family training;Equipment evaluation/education; Neuromuscular reeducation; Fine motor coordination activities; Compensatory technique education;Continued evaluation; Energy conservation; Activityengagement   Goal Expiration Date 01/22/20   OT Treatment Day 1   OT Frequency   (5-7x/wk )   Additional Treatment Session   Start Time 1130   End Time 1145   Treatment Assessment Pt seen for OT Txt s/p eval with focus on toileting (Max Ax1, Min Ax1), functional mobility (~6 ft), and functional transfers  Pt initially difficult to arouse-- vitals stable, able to participate, limited conversation  Multiple STS txfrs completed-- requires VC's and TC's for appropriate hand placements/safety  Pt limited by cognition, decreased STM, Safety awareness, pain, ROM, weakness, fatigue, endurance, activity tolerance , standing tolerance and static/dynamic standing balance    Rox Salazar Pt/family educated on TCF POC and safe functional transfer techniques with appropriate body mechanics  Pt would benefit from continued OT sessions to further address above deficits to maximize independence and reduce caregiver burden       Additional Treatment Day 1   Recommendation   OT Discharge Recommendation Home OT  (with 24/7 supervision/assist from family )   OT - OK to Discharge No   Barthel Index   Feeding 5   Bathing 0   Grooming Score 0   Dressing Score 5   Bladder Score 5   Bowels Score 5   Toilet Use Score 5   Transfers (Bed/Chair) Score 5   Mobility (Level Surface) Score 0   Stairs Score 0   Barthel Index Score 30         Goals STG achieved within 2 weeks Performance at Initial Evaluation 1/8/2020  Current Performance (last date completed)   Grooming S Min A 1/8 Min A   ADL transfers  S Mod A x1 SBA x1  1/8 Mod A x1 SBA x1    Bathroom mobility with appropriate AD S Mod A x1 SBA x1  1/8 Mod A x1 SBA x1    UB ADL  S Min A 1/8 Min A   LB ADL, AE PRN Min A Max A 1/8 Max A   Toileting/clothing management and hygiene Min A Max A x1 Min A x1  1/8 Max A x1 Min A x1    Dynamic standing balance for increased safety when completing purposeful tasks F/F+ P 1/8 P   Increase standing tolerance for inc'd safety with standing purposeful tasks 4-7 min  1 min  1/8 1 min    Participate in therex 1-3x/week for inc'd overall stamina/activity tolerance for purposeful tasks To be completed      Participate in further cognitive testing to assist with safe d/c planning To be completed      Shower stall (5-6 inch threshold) (+) GBs and shower chair  S Unable to assess 1/8 Unable to assess   Bed mobility with HOB flat  S Unable to assess 1/8 Unable to assess     Ra Gooden, MS, OTR/L

## 2020-01-08 NOTE — NURSING NOTE
Alert with poc to place  Sleepy around lunch but easily awakens  Vision and hearing deficits noted  Patient moderate assist to stand but then ambulates minimal assist with walker but also moderate assist to get up off toilet  When returned to chair patient plopped on chair reminded her to reach back to sit and reach back to push up on chair when wanting to get up  Poor safety awareness but no attempts to get up herself  Daughter at bedside most of day  Daughter also was notified 4 side rails on a bed are a restraint per state policy and we are a restraint free facility and that we can use 2-3 side rails for positioning and bed alarm with  Mats if needed and beds go low to ground  Family agreeable to 2 side rails

## 2020-01-08 NOTE — ASSESSMENT & PLAN NOTE
Patient recently had a urinary tract infection with E coli    Continue Levaquin to complete 5-7 day course

## 2020-01-08 NOTE — PLAN OF CARE
Patient's daughter signed form to keep all 4 siderails up due to patient being restless and confused  Bed alarm is on and call light in reach, room is close to nurses station  Daughter took her 2 hearing aides home  She has a glass eye on the right  She is frequently incontinent of urine, and has zinc spray applied to sacral and perirectal areas  Miralax was given, has had no BM since Jan 4  She has red discoloration or bruising left lower leg, SCDs are on BLE  Heels are suspended off bed with pillow, are red but ruth  Will continue to observe and reposition

## 2020-01-08 NOTE — SOCIAL WORK
LOS: 1, Bundle: No, 30 day readmission: No    Patient admitted with confusion, found to have UTI  Also with transaminitis, chronic Afib, DM II,  Hypertensive heart disease without heart failure and hypothyroidism  SW met with patient and family (2 daughters, son and DIL) to review admission packet and consents  Patient's POA is daughter Vonda Colindres and Enoc Deanayeli  Consents signed  SW did not set-up [facility] at this time and primary goal is to get pt to [facility] or [facility] if [facility] if unable to accept  Family reports that they do not feel capable of providing the best care for pt anymore and are afraid of hurting themselves trying to take care of her  They report their mom also has been requesting to long-term care as she does not want to be a burden on her children any longer  SW sent referral to [facility] to determine bed status, per family  [facility] had been accepting when pt was on the acute care side  Prior to hospitalization, pt lived with her daughter Nadir Jennings in a 1 SH with 3 MIKIE  She ambulated with a rollator and also owns a standard walker  Pt was independent with ADLs and daughter helps with shower, driving and cooking  Dtr reports  Pt lives in her home and receives SSI of $984 monthly  Family is in the process of taking care of her  home with her [employer] life share  PCP is Severo Riddle PA-C and pharmacy is [facility] in [place]  No MH hx per family  Son Helena Garsia will be main contact for coordination between all family #[phone]

## 2020-01-08 NOTE — ASSESSMENT & PLAN NOTE
Lab Results   Component Value Date    HGBA1C 8 5 (H) 01/05/2020     Recent Labs     01/07/20  0728 01/07/20  1109 01/07/20  1617 01/07/20 2035   POCGLU 161* 227* 139 127     Diabetes mellitus on metformin PTA  Continue sliding scale only for now      Lantus at bedtime

## 2020-01-08 NOTE — DISCHARGE SUMMARY
Discharge- Derrick Younger 11/3/1927, 80 y o  female MRN: [id number]    Unit/Bed#: E4 -01 Encounter: [id number]    Primary Care Provider: Shorty Massey PA-C   Date and time admitted to hospital: 1/4/2020 12:11 PM        UTI (urinary tract infection)  Assessment & Plan  E  Coli present, Pansensitive    Toxic metabolic encephalopathy  Assessment & Plan  Suspected from infectious etiology (UTI) - Mental status improving - Doubt stroke in the setting of chronic anti-coagulation      Change in mental status  Assessment & Plan  TME : Likely secondary to UTI    CKD (chronic kidney disease) stage 3, GFR 30-59 ml/min ([facility])  Assessment & Plan  CKD 3 at baseline    Results from last 7 days   Lab Units 01/06/20  0522 01/05/20  0610 01/04/20  1243   BUN mg/dL 9 10 12   CREATININE mg/dL 0 85 0 87 0 99   Stable Creatine    Transaminitis  Assessment & Plan  Transaminitis reportedly has follow-up with gastroenterology as outpatient  Continue ursodiol  With Dr Myrtis Lundborg - Appt Scheduled    Results from last 7 days   Lab Units 01/05/20  0610   AST U/L 31   ALT U/L 28   TOTAL BILIRUBIN mg/dL 1 50*       Chronic atrial fibrillation  Assessment & Plan  Rate controlled on metoprolol, continue anticoagulation with Pradaxa  Essential hypertension  Assessment & Plan  Essential hypertension continue metoprolol    Type 2 diabetes mellitus with hyperglycemia, without long-term current use of insulin [facility])  Assessment & Plan  Lab Results   Component Value Date    HGBA1C 8 5 (H) 01/05/2020     Recent Labs     01/07/20  0728 01/07/20  1109 01/07/20  1617 01/07/20  2035   POCGLU 161* 227* 139 127     Diabetes mellitus on metformin PTA  Continue sliding scale only for now  Lantus at bedtime    * Mixed aphasia, global  Assessment & Plan  Stroke-like symptoms vs encephalopathy from UTI  Resolved, MRI is negative for acute ischemia    Day #2 of Levaquin - E coli present    Day #2/3 - one additional dose at [facility]        Admitting Provider:  Ozzie Griffin MD  Discharge Provider:  Meri Bocanegra DO  Admission Date: 1/4/2020       Discharge Date: 01/07/20   LOS: 3  Primary Care Physician at Discharge: Tiffany Escobedo [address] COURSE:  Guillermo Valencia is a 80 y o  female who presented with change in mental status and toxic metabolic encephalopathy  Initially there was concern for stroke and consultation was obtained to the neurology service  The patient did undergo neurologic testing as well as MRI of the brain which was negative for ischemia  The patient does have a history of baseline dementia, her mental status did improve after she was treated for urinary tract infection  She was found to have E coli which was pansensitive  At the time of discharge the patient was tolerating an oral diet, she was without any acute complaints and subsequently discharged to inpatient rehabilitation  Family members reported that she was back to her baseline mental status, and physical therapy and occupational therapy recommendations were for continued rehabilitation  Thank you for choosing [facility] for your healthcare needs  If I can be of any assistance in the future, please feel to contact me  DISCHARGE DIAGNOSES  UTI (urinary tract infection)  Assessment & Plan  E  Coli present, Pansensitive    Toxic metabolic encephalopathy  Assessment & Plan  Suspected from infectious etiology (UTI) - Mental status improving - Doubt stroke in the setting of chronic anti-coagulation      Change in mental status  Assessment & Plan  TME : Likely secondary to UTI    CKD (chronic kidney disease) stage 3, GFR 30-59 ml/min ([facility])  Assessment & Plan  CKD 3 at baseline    Results from last 7 days   Lab Units 01/06/20  0522 01/05/20  0610 01/04/20  1243   BUN mg/dL 9 10 12   CREATININE mg/dL 0 85 0 87 0 99   Stable Creatine    Transaminitis  Assessment & Plan  Transaminitis reportedly has follow-up with gastroenterology as outpatient  Continue ursodiol  With Dr Reyna Day Scheduled    Results from last 7 days   Lab Units 01/05/20  0610   AST U/L 31   ALT U/L 28   TOTAL BILIRUBIN mg/dL 1 50*       Chronic atrial fibrillation  Assessment & Plan  Rate controlled on metoprolol, continue anticoagulation with Pradaxa  Essential hypertension  Assessment & Plan  Essential hypertension continue metoprolol    Type 2 diabetes mellitus with hyperglycemia, without long-term current use of insulin [facility])  Assessment & Plan  Lab Results   Component Value Date    HGBA1C 8 5 (H) 01/05/2020     Recent Labs     01/07/20  0728 01/07/20  1109 01/07/20  1617 01/07/20  2035   POCGLU 161* 227* 139 127     Diabetes mellitus on metformin PTA  Continue sliding scale only for now  Lantus at bedtime    * Mixed aphasia, global  Assessment & Plan  Stroke-like symptoms vs encephalopathy from UTI  Resolved, MRI is negative for acute ischemia    Day #2 of Levaquin - E coli present  Day #2/3 - one additional dose at [facility]      [address]  IP CONSULT TO PHYSICAL MEDICINE REHAB    PROCEDURES PERFORMED  * No surgery found *    RADIOLOGY RESULTS  Xr Chest 2 Views    Result Date: 1/5/2020  Narrative: CHEST INDICATION:   aphasia  COMPARISON:  Chest x-ray from 12/1/2012  EXAM PERFORMED/VIEWS:  XR CHEST PA & LATERAL FINDINGS: Cardiomediastinal silhouette appears unremarkable  The lungs are clear  No pneumothorax or pleural effusion  Osseous structures appear within normal limits for patient age  Impression: No acute cardiopulmonary disease  Workstation performed: [id number]     Ct Head Without Contrast    Result Date: 1/4/2020  Narrative: CT BRAIN - WITHOUT CONTRAST INDICATION:   Neuro deficit, acute, stroke suspected aphasia  COMPARISON:  1/10/2019 TECHNIQUE:  CT examination of the brain was performed    In addition to axial images, coronal 2D reformatted images were created and submitted for interpretation  Radiation dose length product (DLP) for this visit:  786 mGy-cm   This examination, like all CT scans performed in the [facility], was performed utilizing techniques to minimize radiation dose exposure, including the use of iterative reconstruction and automated exposure control  IMAGE QUALITY:  Diagnostic  FINDINGS: PARENCHYMA: Decreased attenuation is noted in periventricular and subcortical white matter demonstrating an appearance that is statistically most likely to represent advanced microangiopathic change; this appearance is similar when compared to most recent prior examination  No CT signs of acute infarction  No intracranial mass, mass effect or midline shift  No acute parenchymal hemorrhage  Scattered parenchymal calcifications as before  VENTRICLES AND EXTRA-AXIAL SPACES:  Ventricles and extra-axial CSF spaces are prominent commensurate with the degree of volume loss  No hydrocephalus  No acute extra-axial hemorrhage  VISUALIZED ORBITS AND PARANASAL SINUSES:  Right orbital prosthesis noted  CALVARIUM AND EXTRACRANIAL SOFT TISSUES:  Normal      Impression: No acute intracranial abnormality  Microangiopathic changes  Stable scattered parenchymal calcifications as may be seen in neurocysticercosis  Workstation performed: [id number]     Mri Brain Wo Contrast    Result Date: 1/7/2020  Narrative: MRI BRAIN WITHOUT CONTRAST INDICATION: CVA  COMPARISON:   CT brain dated 1/4/2020  TECHNIQUE: Sagittal T1, axial T2, axial T1, axial gradient and axial diffusion-weighted imaging  Patient could not tolerate completion of the examination and axial flair imaging was unable to be obtained  IMAGE QUALITY:  Examination degraded by patient motion  FINDINGS: BRAIN PARENCHYMA:  Diffusion imaging is unremarkable with no evidence of acute ischemia    The remainder of the examination is significantly limited by patient motion and patient's inability to complete the exam  Confluent T2 hyperintensity seen throughout the cerebral hemispheres suggestive of moderate to advanced chronic microangiopathic change  This extends into the basal ganglia and brainstem  There is mild parenchymal volume loss  No acute hemorrhage  VENTRICLES:  Ventricles and extra-axial CSF spaces are prominent commensurate with the degree of volume loss  No hydrocephalus  No acute extra-axial hemorrhage  SELLA AND PITUITARY GLAND:  Normal  ORBITS:  There is a right globe prosthesis  No acute orbital pathology  PARANASAL SINUSES:  Normal  VASCULATURE:  Evaluation of the major intracranial vasculature demonstrates appropriate flow voids  CALVARIUM AND SKULL BASE:  Normal  EXTRACRANIAL SOFT TISSUES:  Normal      Impression: Examination significantly degraded by patient motion  The patient could not tolerate completion of the examination  Diffusion imaging is diagnostic and demonstrates no evidence of acute ischemia  No mass or acute hemorrhage identified  Moderate to advanced chronic microangiopathic change within the brain parenchyma  Workstation performed: [id number]     Vas Carotid Complete Study    Result Date: 1/6/2020  Narrative:  [facility] REPORT CLINICAL: Indications: Patient presents with to evaluate for carotid artery stenosis s/p recent CVA  Aphasia Risk Factors The patient has history of Obesity, HTN, Diabetes, A FIB, HLD and CKD  Clinical Right Pressure: IV, Left Pressure:  153/79 mm Hg  FINDINGS:  Right        Impression  PSV  EDV (cm/s)  Ratio  Dist  ICA                 74          17   1 05  Mid  ICA                  73          16   1 04  Prox   ICA    1 - 49%      69          17   0 99  Dist CCA                  57          12         Mid CCA                   70           7   0 92  Prox CCA                  76           7         Ext Carotid               31           5   0 44  Prox Vert                 42           7         Subclavian               220           9          Left Impression  PSV  EDV (cm/s)  Ratio  Dist  ICA                 67          18   0 93  Mid  ICA                  57          12   0 79  Prox  ICA    1 - 49%      57          15   0 79  Dist CCA                  63          15         Mid CCA                   72          14   1 07  Prox CCA                  68           7         Ext Carotid               62           0   0 85  Prox Vert                 52          11         Subclavian               107           0            CONCLUSION:  Impression RIGHT: There is <50% stenosis noted in the internal carotid artery  Plaque is heterogenous and irregular  Vertebral artery flow is antegrade  Mild subclavian artery disease  LEFT: There is <50% stenosis noted in the internal carotid artery  Plaque is heterogenous and irregular  Vertebral artery flow is antegrade  There is no significant subclavian artery disease  Internal carotid artery stenosis determination by consensus criteria from: Jose Mac et al  Carotid Artery Stenosis: Gray-Scale and Doppler US Diagnosis - [employer] of Radiologists in [address] [place] [address], Radiology 2003; [phone]    SIGNATURE: Electronically Signed by: Max Jenkins MD on 2020-01-06 04:11:45 PM      LABS  Results from last 7 days   Lab Units 01/06/20 0522 01/05/20 0610 01/04/20  1243   WBC Thousand/uL 6 47 7 24 7 45   HEMOGLOBIN g/dL 13 5 12 1 13 4   HEMATOCRIT % 42 0 38 1 42 1   MCV fL 85 85 86   TOTAL NEUT ABS Thousand/uL  --   --  5 96   BANDS PCT %  --   --  1   PLATELETS Thousands/uL 231 203 226     Results from last 7 days   Lab Units 01/06/20 0522 01/05/20 0610 01/04/20  1243   SODIUM mmol/L 134* 134* 132*   POTASSIUM mmol/L 3 8 4 2 4 3   CHLORIDE mmol/L 99* 100 97*   CO2 mmol/L 27 25 30   BUN mg/dL 9 10 12   CREATININE mg/dL 0 85 0 87 0 99   CALCIUM mg/dL 8 5 8 6 9 2   ALBUMIN g/dL  --  2 1*  --    TOTAL BILIRUBIN mg/dL  --  1 50*  --    ALK PHOS U/L  --  183*  --    ALT U/L  --  28  --    AST U/L  --  31  -- EGFR ml/min/1 73sq m 60 58 50   GLUCOSE RANDOM mg/dL 164* 134 239*     Results from last 7 days   Lab Units 01/04/20  1243   TROPONIN I ng/mL <0 02     Results from last 7 days   Lab Units 01/04/20  1243   NT-PRO BNP pg/mL 2,611*          Results from last 7 days   Lab Units 01/07/20  2035 01/07/20  1617 01/07/20  1109 01/07/20  0728 01/06/20  2101 01/06/20  1546 01/06/20  1124 01/06/20  0723 01/05/20  2120 01/05/20  1658   POC GLUCOSE mg/dl 127 139 227* 161* 160* 238* 266* 151* 214* 175*     Results from last 7 days   Lab Units 01/05/20  0610   HEMOGLOBIN A1C % 8 5*             Results from last 7 days   Lab Units 01/05/20  0610   TRIGLYCERIDES mg/dL 92   CHOLESTEROL mg/dL 234*   LDL CALC mg/dL 184*   HDL mg/dL 32*       Cultures:   Results from last 7 days   Lab Units 01/04/20  1407   COLOR UA  Sweta   CLARITY UA  Slightly Cloudy   SPEC GRAV UA  1 020   PH UA  7 0   LEUKOCYTES UA  Negative   NITRITE UA  Positive*   GLUCOSE UA mg/dl 500 (1/2%)*   KETONES UA mg/dl Trace*   BILIRUBIN UA  Interference- unable to analyze*   BLOOD UA  Trace*      Results from last 7 days   Lab Units 01/04/20  1407   RBC UA /hpf None Seen   WBC UA /hpf 1-2*   EPITHELIAL CELLS WET PREP /hpf Occasional   BACTERIA UA /hpf Innumerable*      Results from last 7 days   Lab Units 01/04/20  1407   URINE CULTURE  >100,000 cfu/ml Escherichia coli*       PHYSICAL EXAM:  Vitals:   Blood Pressure: 161/89 (01/07/20 1501)  Pulse: 88 (01/07/20 1501)  Temperature: 97 9 °F (36 6 °C) (01/07/20 1501)  Temp Source: Temporal (01/07/20 1501)  Respirations: 20 (01/07/20 1501)  Height: 5' (152 4 cm) (01/04/20 1636)  Weight - Scale: 88 3 kg (194 lb 10 7 oz) (01/04/20 1636)  SpO2: 98 % (01/07/20 1501)      General: well appearing, no acute distress  HEENT: atraumatic, patient has a glass right eye, pupil is not reactive as this is a artifical eye, moist mucosa, normal pharynx, normal tonsils and adenoids, normal tongue, no fluid in sinuses  Neck: Trachea midline, no carotid bruit, no masses  Respiratory: normal chest wall expansion, CTA B, no r/r/w, no rubs  Cardiovascular: RRR, no m/r/g, Normal S1 and S2  Abdomen: Soft, non-tender, non-distended, normal bowel sounds in all quadrants, no hepatosplenomegaly, no tympany  Rectal: deferred  Musculoskeletal: normal ROM in upper and lower extremities  Integumentary: warm, dry, and pink, with no rash, purpura, or petechia  Heme/Lymph: no lymphadenopathy, no bruises  Neurological: Cranial Nerves II-XII grossly intact, no tics, normal sensation to pressure and light touch  Psychiatric:  Apparent dementia      Discharge Disposition: [address]      Test Results Pending at Discharge:           Medications   · Summary of Medication Adjustments made as a result of this hospitalization:  No new home medication changes  · Medication Dosing Tapers - Please refer to Discharge Medication List for details on any medication dosing tapers (if applicable to patient)  · Discharge Medication List: See after visit summary for reconciled discharge medications  Diet restrictions: Activity restrictions: No strenuous activity  Discharge Condition: good    Outpatient Follow-Up and Discharge Instructions  See after visit summary section titled Discharge Instructions for information provided to patient and family  Code Status: Level 3 - DNAR and DNI  Discharge Statement   I spent 35 minutes discharging the patient  This time was spent on the day of discharge  Greater than 50% of total time was spent with the patient and / or family counseling and / or coordination of care  ** Please Note: This note has been constructed using a voice recognition system   **

## 2020-01-08 NOTE — ASSESSMENT & PLAN NOTE
Transaminitis reportedly has follow-up with gastroenterology as outpatient    Continue ursodiol  With Dr [place] Cavanaugh - Appt Scheduled    Results from last 7 days   Lab Units 01/05/20  0610   AST U/L 31   ALT U/L 28   TOTAL BILIRUBIN mg/dL 1 50*

## 2020-01-08 NOTE — ASSESSMENT & PLAN NOTE
Continue Toprol-XL  Blood pressure was elevated the last few days  Monitor for now    Keep systolic blood pressure less than 170

## 2020-01-08 NOTE — PHYSICAL THERAPY NOTE
PHYSICAL THERAPY  TCF EVALUATION  NAME:  Jessica Bowles  DATE: 01/08/20    AGE:   80 y o  Mrn:   [id number]  ADMIT DX:  Delirium [R41 0]    Past Medical History:   Diagnosis Date    Atrial fibrillation ([address] )     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    Cholecystitis     Disease of tonsils     Glaucoma 2008    NOS     Past Surgical History:   Procedure Laterality Date    CHOLECYSTECTOMY      EYE SURGERY Right 03/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    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       Length Of Stay: 1     Time-  Eval 9109-0312 (25')  Treatment 1832-1653 (20')   Vitals-   Vitals:    01/08/20 0704   BP: 158/82   Pulse: 89   Resp: 17   Temp: (!) 97 1 °F (36 2 °C)   SpO2: 97%     Standardized Assessment- barthel 30  Home Evaluation (virtual)-  Daughters provided verbal description and are willing to provide pictures if needed  Family/Caregiver Training-  Daughter will participate in FT prior to d/c   Additional Comments-  Pt remains in room with all needs at end of session w/ daughters at bedside- all needs in reach and MD in to assess  See below for further tx details  01/08/20 1200   Note Type   Note type Eval/Treat   Pain Assessment   Pain Assessment No/denies pain   Pain Score No Pain   Home Living   Type of [address] [place] [address] Two level;Stairs to enter without rails; Able to live on main level with bedroom/bathroom  (1 st setup - )   Bathroom Shower/Tub Walk-in shower   [facility]; Wheelchair-manual  (RW and rollator ; ergo bed w/o rails )   Additional Comments daughter proving info- lives w/ daughter in a [facility] w/ 2 MIKIE 0 rails (steps are wide and able to fit walker on for entry); pt uses rollator in home and RW to access home 2 * steps- daugther is home 24 7 and A w/ ADL's 0 falls; 0 drive; daughter A pt to BScommode during night for incontinence ;    Prior Function   Comments see above- [facility] to [facility] center [facility] w/ daughter    Restrictions/Precautions Weight Bearing Precautions Per Order No   Other Precautions Cognitive; Chair Alarm; Bed Alarm;Hard of hearing;Visual impairment  (glass eye (R) low vision)   General   Additional Pertinent History CT and MRI (-)    Family/Caregiver Present Yes  (2 daugthers present to provide info )   Cognition   Overall Cognitive Status Impaired   Arousal/Participation Lethargic  (awakes w/ stimulation / cues )   Orientation Level Oriented to person;Oriented to place   Memory Decreased recall of precautions;Decreased short term memory;Decreased recall of recent events   Following Commands Follows one step commands with increased time or repetition  (requiring increased time for processing )   Comments pt [place]- lethargic and requiring increased time for processing and initiation- lethargic but arousable    RLE Assessment   RLE Assessment WFL  (3+/5 throughout )   LLE Assessment   LLE Assessment WFL  (3+/5 throughout )   Coordination   Movements are Fluid and Coordinated 0   Light Touch   RLE Light Touch Grossly intact  (hypersensitive to touch (baseline per daugher) )   LLE Light Touch Grossly intact   Transfers   Sit to Stand 3  Moderate assistance  (SBA of another for safety and 2* lethargy )   Additional items Assist x 1   Stand to Sit 3  Moderate assistance   Additional items Assist x 1   Stand pivot 3  Moderate assistance   Additional items Assist x 1; Increased time required;Verbal cues   Toilet transfer 3  Moderate assistance   Additional items Assist x 1;Assist x 2;Trapeze bar;Verbal cues  (A of another for pants management and toileting )   Additional Comments VC for hand placement and safety  Ambulation/Elevation   Gait pattern Improper Weight shift;Narrow KAI; Forward Flexion;Decreased foot clearance;Decreased R stance;Decreased L stance;Shuffling;Excessively slow   Gait Assistance 3  Moderate assist   Additional items Assist x 1;Assist x 2; Tactile cues; Verbal cues   Assistive Device Rolling walker   Distance 7'x1 w/ RW and modA for RW negotiation and facilitation of weight shift to initiate step- max cues for staying w/i RW limits and attention to task- w/ barrier of visual barrier also impacting    Balance   Static Sitting Fair   Dynamic Sitting Fair -   Static Standing Poor +   Dynamic Standing Poor   Ambulatory Poor  (rw)   Endurance Deficit   Endurance Deficit Yes   Endurance Deficit Description fatigue; LE instablity    Activity Tolerance   Activity Tolerance Patient limited by fatigue  (lethargy and severe debility )   Medical Staff Made Aware OT RN updated- also kelly w/ Dr Samir Evans yes- cleared for PT eval and tx session    Assessment   Prognosis Fair   Problem List Decreased strength;Decreased range of motion;Decreased endurance; Impaired balance;Decreased mobility; Decreased coordination;Decreased cognition; Impaired judgement;Decreased safety awareness; Impaired vision; Impaired hearing; Impaired sensation;Obesity;Pain   Assessment see below   Barriers to Discharge Inaccessible home environment   Barriers to Discharge Comments steps to enter/ lethargy  cognition   Goals   Patient Goals did not state- confusion/ lethargy    STG Expiration Date 01/22/20   Short Term Goal #1 (refer to grid)    PT Treatment Day 1   Plan   Treatment/Interventions Functional transfer training;LE strengthening/ROM; Elevations; Therapeutic exercise; Endurance training;Cognitive reorientation;Patient/family training;Equipment eval/education; Bed mobility;Gait training; Compensatory technique education;Spoke to nursing;Spoke to case management;OT;Family   PT Frequency   (3-5x/wk )   Recommendation   Recommendation Home with family support;Home PT  (pending progress )   Equipment Recommended   (TBD w/ rehab progress)   PT - OK to Discharge No   Additional Comments seated up in chair for lunch post eval and tx session    Modified Krystyna Scale   Modified Krystyna Scale 4   Barthel Index   Feeding 5   Bathing 0   Grooming Score 0   Dressing Score 5 Bladder Score 5   Bowels Score 5   Toilet Use Score 5   Transfers (Bed/Chair) Score 5   Mobility (Level Surface) Score 0   Stairs Score 0   Barthel Index Score 30   Pt is 80 y o  female seen for PT evaluation s/p admit to [facility] on 1/7/2020  Pt [facility]ferrred from [facility] where she initially presented w/ AMS and initial concern for CVA  Acute stroke w/u was negative and pt was dx w/ toxic metabolic encephalopathy; UTI; DEJA  Pt w/ ongoing debility and ambulatory dysfunction following acute hospitalization and was xferred to [facility] for ongoing rehab to maximize functional potential prior to d/c home w/ family  Current dx/ problem list includes:  Toxic metabolic encephalopathy; AMS; UTI; debility and ambulatory dysfunction  PT now consulted for assessment of mobility and d/c needs  Comorbidities affecting pt's physical performance at time of assessment listed above and significant for low vision (glass eye R)DM II HTN; baseline dementia; OA; afib; [place] and above; [place] Pac Personal factors affecting pt at time of IE include: steps to enter environment, incontinence; lethargy; cognitive impairment; use of AD at baseline; low vision; [place]- confusion;  advanced age, past experience, inability to perform IADLs, inability to perform ADLs, inability to ambulate household distances, limited insight into impairments  Prior to admission, pt was living w/ daughter and RUT in a [facility] w/ 1st setup and 2-3 steps to enter (wide; 0 rail; can fit RW onto for negotiation/ curb step like) and using RW and rollator for mobility (rollator in home) RW for steps; 0 drive and daughter was home 24/7 to assist w/ ADL's and 2* basleine dementia; pt was able to get OOB transfer and ambualte freely in home w/ DS only  0 falls; daughter would A at night and w/ some ALDs for safety   Upon evaluation, pt currently is requiring  modA for functional transfers and modA for ambulation w/ RW ( see flow sheet)- eval and tx somewhat limited buy lethargy and fatigue w/ daughters providing most info  Pt was seen for additional tx as below for ambulation to BR for toileting  VSS throughout on RA and pt was able to interact w/ charan delay for processing  Daughters feel pt is lethargic from lack of sleep and "medications given for MRI"   Pt presents functioning below baseline and currently w/ overall mobility deficits 2* to: decreased LE strength/AROM; limited flexibility;  generalized weakness/ deconditioning; decreased endurance; decreased activity tolerance; decreased coordination; impaired balance; gait deviations; decreased safety awareness; SOB/BOLTON; fatigue; impaired safety and judgement; limited insight into current deficits; bed/ chair alarms;hearing impairment/ visual impairments; flat affect; poor initiation lethargy hypersensitivity to touch LE's  Pt currently at risk for falls  (Please find additional objective findings from PT assessment regarding body systems outlined above ) Pt will continue to benefit from skilled PT interventions to address stated impairments; to maximize functional potential; for ongoing pt/ family training; and DME needs to ensure safe d/c to home w/ daughter  Pt/ family agreeable to plan and goals as stated on grid below/ IE  Goals STG achieved within 2 weeks  1/22/20 Performance at Initial Evaluation (1/8/20) Current Performance (last date completed)   Bed mobility skills including rolling and repositioning to prevent skin breakdown and decrease caregiver burden  S NT Not addressed on IE   Supine to sit transitions to increase ease of transfer, allow pt to get out of bed, and decrease caregiver burden S (note- pt has ergonomic electric bed available)  NT  NT (presenting OOB in chair on IE- could not tolerate   Sit to supine transitions to increase ease of transfer, allow pt to get back into bed, and decrease caregiver burden S NT NT   Functional transfers to facilitate safe return to previous living environment   BOB [place] Ambulation with least restrictive AD; including at least 2 turns for safe household distance ambulation S modA 6' then 10' w/ RW w/ chair follow  modA 6' then 10' w/ RW w/ chair follow    Ascend/descend a curb step x2 , using RW and S/ safe  method, no handrails, for safe access to previous living environment CGA of daughter NT- unable/ not appropriate NT unable / not appropriate    compeltion of FT w/ daughter and RUT to facilitate safe d/c to previous home environment w/ 24/ 7 S/A DIL w/ demo appropriate techniques and body mechanics during FT sessions as needed   S/w daughter about potential need for FT prior to d/c home- daughter receptive and agreeable  Not initiated (see prior column)            PHYSICAL THERAPY TREATMENT [id number]    PT INITIATED SUBSEQUENT TX SESSION CONSISTING OF ADDITIONAL GAIT TRAINING W/ RW TO AND FROM BR FOR TOILETING W/ OT PRESENT- 2 SKILLED PERSONS REQUIRED FOR SAFETY W/ TOILET USE 2* COGNITIVE AND VISUAL IMPAIRMENT AS WELL AS LETHARGY  PT PROVIDING MIN/ CGA FOR BALANCE W/ B/L UE SUPPORT ON RW DURING PANTS MANAGEMENT AND HYGIENE  PT THEN AMBULATED AN ADDITIONAL 10' BACK TO CHAIR W/ MODAX1 AND AMX CUES FOR LOW VISION  RW NEGOTIATION FOR TURNING MODA AND CUES FOR SAFE HAND PLACEMENT ON CHAIR  PT W/O C/O DIZZINESS ' JUST TIRED" VSS STABLE THROUGHOUT SESSION  PT SEATED IN CHAIR FO LUNCH POST SESSION W/ 2 DAUGHTERS PRESENT   PT FATIGUED POST SESSION BUT NOTED TO BE MORE AWAKE AND ALERT FOLLOWING MOBILIZATION AND INTERACTIONS  FAMILY EDUCATED IN ATTEMPTING APPROPRIATE SLEEP WAKE CYCLES AND KEEPING PT AWAKE DURING DAYS FOR APPROPRIATE SLEEP HYGIENE AT NIGHT  WILLCONT TO PROGRESS AS ABLE  GOALS AS STATED ABOVE          Cierra Monroy, PT

## 2020-01-08 NOTE — ASSESSMENT & PLAN NOTE
Patient recently had acute toxic metabolic encephalopathy and acute urinary tract infection  Patient is elderly with known history of dementia without behavioral disturbance  She is very somnolent during the daytime  Continue course of Levaquin  Continue to observe    Recent MRI brain showed moderate to advanced chronic microangiopathic changes however no acute stroke or mass  Check TSH, CBC, CMP, B12 level tomorrow  If Patient remains very somnolent and fatigued may try a low-dose Ritalin during the daytime to help improve level of alertness

## 2020-01-08 NOTE — H&P
Pt is a 80 y o  female seen for OT evaluation s/p admit to [facility] on 1/7/2020 w/  Change in mental status and confusion; UTI  Initially presented to [facility] with stroke-like symptoms, MRI (-) acute ischemia  OT completed an extensive review of pt's medical and social history  Comorbidities affecting pt's functional performance at time of assessment include: {LUcomorbidities:23164}  Personal factors affecting pt at time of IE include:{tspersonalfactors:30045}  Prior to admission, pt was living with *** in a ***  As per pt report, pt was independent for transfers, ADLs, and ambulation using ***   Pt currently  requires *** UB ADLs, *** LB ADLs, *** toileting, and *** mobility with *** 2* the following deficits impacting occupational performance  Pt presents to OT below baseline due to the following performance deficits: {tsoccupationaldeficits:40554}  Pt to benefit from continued skilled OT services while in the hospital to address deficits as defined above and maximize level of functional independence w ADL's and functional mobility  Occupational performance areas to address include: {tsoccupationalperformanceareas:02711}  Pt with score of __/100 on the Barthel Index  Based on OT evaluation, assessment(s), performance deficits listed, and current level of function, pt identified as a HIGH/MODERATE/LOW complexity evaluation  From OT standpoint, recommendation at time of d/c would be ***

## 2020-01-09 LAB
ALBUMIN SERPL BCP-MCNC: 2.8 G/DL (ref 3–5.2)
ALP SERPL-CCNC: 171 U/L (ref 43–122)
ALT SERPL W P-5'-P-CCNC: 37 U/L (ref 9–52)
AMMONIA PLAS-SCNC: <9 UMOL/L (ref 9–33)
ANION GAP SERPL CALCULATED.3IONS-SCNC: 4 MMOL/L (ref 5–14)
AST SERPL W P-5'-P-CCNC: 34 U/L (ref 14–36)
BILIRUB SERPL-MCNC: 0.9 MG/DL
BUN SERPL-MCNC: 16 MG/DL (ref 5–25)
CALCIUM SERPL-MCNC: 8.8 MG/DL (ref 8.4–10.2)
CHLORIDE SERPL-SCNC: 100 MMOL/L (ref 97–108)
CO2 SERPL-SCNC: 30 MMOL/L (ref 22–30)
CREAT SERPL-MCNC: 0.97 MG/DL (ref 0.6–1.2)
ERYTHROCYTE [DISTWIDTH] IN BLOOD BY AUTOMATED COUNT: 15.8 %
GFR SERPL CREATININE-BSD FRML MDRD: 51 ML/MIN/1.73SQ M
GLUCOSE P FAST SERPL-MCNC: 110 MG/DL (ref 70–99)
GLUCOSE SERPL-MCNC: 110 MG/DL (ref 70–99)
GLUCOSE SERPL-MCNC: 111 MG/DL (ref 65–140)
GLUCOSE SERPL-MCNC: 179 MG/DL (ref 65–140)
GLUCOSE SERPL-MCNC: 278 MG/DL (ref 65–140)
GLUCOSE SERPL-MCNC: 283 MG/DL (ref 65–140)
HCT VFR BLD AUTO: 40.9 % (ref 36–46)
HGB BLD-MCNC: 13.5 G/DL (ref 12–16)
MCH RBC QN AUTO: 27.6 PG (ref 26.8–34.3)
MCHC RBC AUTO-ENTMCNC: 33 G/DL (ref 31.4–37.4)
MCV RBC AUTO: 84 FL (ref 80–100)
PLATELET # BLD AUTO: 230 THOUSANDS/UL (ref 150–450)
PMV BLD AUTO: 8.6 FL (ref 8.9–12.7)
POTASSIUM SERPL-SCNC: 4.1 MMOL/L (ref 3.6–5)
PROT SERPL-MCNC: 5.9 G/DL (ref 5.9–8.4)
RBC # BLD AUTO: 4.89 MILLION/UL (ref 4–5.2)
SODIUM SERPL-SCNC: 134 MMOL/L (ref 137–147)
TSH SERPL DL<=0.05 MIU/L-ACNC: 3 UIU/ML (ref 0.47–4.68)
VIT B12 SERPL-MCNC: 494 PG/ML (ref 100–900)
WBC # BLD AUTO: 4.9 THOUSAND/UL (ref 4.31–10.16)

## 2020-01-09 PROCEDURE — 82607 VITAMIN B-12: CPT | Performed by: FAMILY MEDICINE

## 2020-01-09 PROCEDURE — 82140 ASSAY OF AMMONIA: CPT | Performed by: FAMILY MEDICINE

## 2020-01-09 PROCEDURE — 85027 COMPLETE CBC AUTOMATED: CPT | Performed by: FAMILY MEDICINE

## 2020-01-09 PROCEDURE — 82948 REAGENT STRIP/BLOOD GLUCOSE: CPT

## 2020-01-09 PROCEDURE — 97116 GAIT TRAINING THERAPY: CPT

## 2020-01-09 PROCEDURE — 97535 SELF CARE MNGMENT TRAINING: CPT

## 2020-01-09 PROCEDURE — 97530 THERAPEUTIC ACTIVITIES: CPT

## 2020-01-09 PROCEDURE — 80053 COMPREHEN METABOLIC PANEL: CPT | Performed by: FAMILY MEDICINE

## 2020-01-09 PROCEDURE — 97110 THERAPEUTIC EXERCISES: CPT

## 2020-01-09 PROCEDURE — 84443 ASSAY THYROID STIM HORMONE: CPT | Performed by: FAMILY MEDICINE

## 2020-01-09 RX ORDER — SACCHAROMYCES BOULARDII 250 MG
250 CAPSULE ORAL 2 TIMES DAILY
Status: DISCONTINUED | OUTPATIENT
Start: 2020-01-09 | End: 2020-01-14 | Stop reason: HOSPADM

## 2020-01-09 RX ORDER — AMOXICILLIN 250 MG
1 CAPSULE ORAL 2 TIMES DAILY
Status: DISCONTINUED | OUTPATIENT
Start: 2020-01-09 | End: 2020-01-14 | Stop reason: HOSPADM

## 2020-01-09 RX ADMIN — URSOSIOL 300 MG: 300 CAPSULE ORAL at 09:24

## 2020-01-09 RX ADMIN — DABIGATRAN ETEXILATE MESYLATE 150 MG: 75 CAPSULE ORAL at 09:25

## 2020-01-09 RX ADMIN — Medication 250 MG: at 17:31

## 2020-01-09 RX ADMIN — EZETIMIBE 10 MG: 10 TABLET ORAL at 21:31

## 2020-01-09 RX ADMIN — INSULIN LISPRO 6 UNITS: 100 INJECTION, SOLUTION INTRAVENOUS; SUBCUTANEOUS at 17:32

## 2020-01-09 RX ADMIN — URSOSIOL 300 MG: 300 CAPSULE ORAL at 17:31

## 2020-01-09 RX ADMIN — NYSTATIN 1 APPLICATION: 100000 POWDER TOPICAL at 17:35

## 2020-01-09 RX ADMIN — LEVOTHYROXINE SODIUM 75 MCG: 75 TABLET ORAL at 05:44

## 2020-01-09 RX ADMIN — INSULIN LISPRO 3 UNITS: 100 INJECTION, SOLUTION INTRAVENOUS; SUBCUTANEOUS at 12:20

## 2020-01-09 RX ADMIN — POLYETHYLENE GLYCOL 3350 17 G: 17 POWDER, FOR SOLUTION ORAL at 09:24

## 2020-01-09 RX ADMIN — INSULIN GLARGINE 10 UNITS: 100 INJECTION, SOLUTION SUBCUTANEOUS at 21:31

## 2020-01-09 RX ADMIN — METOPROLOL SUCCINATE 25 MG: 25 TABLET, EXTENDED RELEASE ORAL at 09:24

## 2020-01-09 RX ADMIN — DABIGATRAN ETEXILATE MESYLATE 150 MG: 75 CAPSULE ORAL at 17:31

## 2020-01-09 RX ADMIN — CYANOCOBALAMIN TAB 1000 MCG 1000 MCG: 1000 TAB at 17:35

## 2020-01-09 RX ADMIN — NYSTATIN 1 APPLICATION: 100000 POWDER TOPICAL at 09:24

## 2020-01-09 RX ADMIN — SENNOSIDES AND DOCUSATE SODIUM 1 TABLET: 8.6; 5 TABLET ORAL at 17:31

## 2020-01-09 NOTE — PLAN OF CARE
Problem: OCCUPATIONAL THERAPY ADULT  Goal: Performs self-care activities at highest level of function for planned discharge setting  See evaluation for individualized goals  Description  Treatment Interventions: ADL retraining, Visual perceptual retraining, Functional transfer training, UE strengthening/ROM, Endurance training, Cognitive reorientation, Patient/family training, Equipment evaluation/education, Neuromuscular reeducation, Fine motor coordination activities, Compensatory technique education, Continued evaluation, Energy conservation, Activityengagement          See flowsheet documentation for full assessment, interventions and recommendations      Outcome: Progressing  Note:   Limitation: Decreased ADL status, Decreased UE ROM, Decreased UE strength, Decreased Safe judgement during ADL, Decreased cognition, Decreased endurance, Decreased sensation, Visual deficit, Decreased fine motor control, Decreased self-care trans, Decreased high-level ADLs  Prognosis: Good, Fair  Assessment: see note     OT Discharge Recommendation: Home OT(with 24/7 supervision/assist from family )  OT - OK to Discharge: No

## 2020-01-09 NOTE — PROGRESS NOTES
RECREATIONAL THERAPY PARTICIPATION LOG      ACTIVITY:    GAMES:  [employer] participation, as noted below  BINGO:        MUSIC STIM:        ARTS & CRAFTS:        EXERCISE:        CLUBS & MEETING:        SOCIALS: Resident's Daughter and Daughter-in-law visited her in her room before lunch  They accompanied resident to the [facility] for [place] [place], where resident met another lady resident and family member  Resident enjoyed the attention from her family and group interaction  Bingo Game immediately followed  Resident had two more family members join in the recreational therapy game towards the latter portion of the game, as well  Resident needed the numbers to be called loudly, due to her difficulty in hearing and she studied the numbers for a long time, because of her vision  Her family assisted her in finding her numbers  Resident's daughter became a , as well  STACI Serrato Back:        INDEPENDENT:        1:1: Resident received a recreational therapy greeting and introduction to our Activity Program   She received an Activity Calendar in the presence of her family during the group game that is mentioned above  STACI Serrato

## 2020-01-09 NOTE — PLAN OF CARE
Problem: Prexisting or High Potential for Compromised Skin Integrity  Goal: Skin integrity is maintained or improved  Description  INTERVENTIONS:  - Identify patients at risk for skin breakdown  - Assess and monitor skin integrity  - Assess and monitor nutrition and hydration status  - Monitor labs   - Assess for incontinence   - Turn and reposition patient  - Assist with mobility/ambulation  - Relieve pressure over bony prominences  - Avoid friction and shearing  - Provide appropriate hygiene as needed including keeping skin clean and dry  - Evaluate need for skin moisturizer/barrier cream  - Collaborate with interdisciplinary team   - Patient/family teaching  - Consider wound care consult   Outcome: Progressing     Problem: Potential for Falls  Goal: Patient will remain free of falls  Description  INTERVENTIONS:  - Assess patient frequently for physical needs  -  Identify cognitive and physical deficits and behaviors that affect risk of falls    -  [place] fall precautions as indicated by assessment   - Educate patient/family on patient safety including physical limitations  - Instruct patient to call for assistance with activity based on assessment  - Modify environment to reduce risk of injury  - Consider OT/PT consult to assist with strengthening/mobility  Outcome: Progressing

## 2020-01-09 NOTE — PLAN OF CARE
Problem: PHYSICAL THERAPY ADULT  Goal: Performs mobility at highest level of function for planned discharge setting  See evaluation for individualized goals  Description  Treatment/Interventions: Functional transfer training, LE strengthening/ROM, Elevations, Therapeutic exercise, Endurance training, Cognitive reorientation, Patient/family training, Equipment eval/education, Bed mobility, Gait training, Compensatory technique education, Spoke to nursing, Spoke to case management, OT, Family  Equipment Recommended: (TBD w/ rehab progress)       See flowsheet documentation for full assessment, interventions and recommendations  Outcome: Progressing  Note:   Prognosis: Good  Problem List: Decreased strength, Decreased range of motion, Decreased endurance, Impaired balance, Decreased mobility, Decreased coordination, Decreased cognition, Impaired judgement, Decreased safety awareness, Impaired vision, Impaired hearing, Obesity  Assessment: pt seen for skilled pT session this am w/ daughter present   pt was much more awake and alert and was able to participate in extended PT session consisting of seated therex; transfer and gait training w/ RW  Pt required increeased time and therapeutic rest b/t gait trials and transfers  pt required much less assistance than on IE  Pt's daughter reports they are likely looking into alternate placement for pt in LTC as they feel she will be more engaged w/ people her own age  will cont to progress w/ goals as stated on IE pt seated in cahir w/ daughter providing S- playing cards post session   Barriers to Discharge: Inaccessible home environment  Barriers to Discharge Comments: may not require stair training prior to d/c as she may d/c to SNF/ LTC as long term plan-   Recommendation: (home w/ family vs SNF (per daughter report) )     PT - OK to Discharge: No    See flowsheet documentation for full assessment

## 2020-01-09 NOTE — OCCUPATIONAL THERAPY NOTE
Occupational Therapy Treatment Note    Name:  Iesha Romero   MRN:   [id number]  Age:     80 y o  Patient Active Problem List   Diagnosis    Type 2 diabetes mellitus with hyperglycemia, without long-term current use of insulin ([facility] )    Acquired hypothyroidism    Hypertensive heart disease without heart failure    Mixed hyperlipidemia    Vitamin D deficiency    Leg ulcer, right, limited to breakdown of skin ([facility] )    Type 2 diabetes mellitus with foot ulcer (CODE) ([place] [address] )    Chronic atrial fibrillation    Transaminitis    Generalized osteoarthritis    CKD (chronic kidney disease) stage 3, GFR 30-59 ml/min ([facility])    Mixed aphasia, global    Change in mental status    Toxic metabolic encephalopathy    UTI (urinary tract infection)     Delirium [R41 0]      Subjective/Goals: "my health"    Vitals:  Stable throughout session  No reports of dizziness or lightheadedness     Pain: no reports of pain     Treatment Time: ([phone]) 56 min    Cognition: impaired  Arousable, cooperative, lethergic  Oriented to person and time, attends with cues to redirect  [employer] [place] with increased processing time needed  Precautions: Cognitive, Chair/Bed alarms, fall risk, pain, hard of hearing, visual impairment (glass eye (R) low vision)  Hypersensitive to B LE and BP cuff      EVALUATION information:   OT Goal expiration date: 1/22/2020   Treatment day: 2   Discharge recommendation: home OT (24/7 supervision/assist from family)  Toan Chopra SET UP:  o House (2-3 long MIKIE no HR)  o Able to live on main level with bed/bath, stairs to enter without rails  o Walk in shower (5-6" threshold (cut out)  o Standard toiler, + GB in shower and Shower chair  o + commode  o DME:  RW, rollator, ergo bed without rails  o Lives with Daughter and RUT  o Assist from family-- typically toileting and bathing  o 24* supervision/assist -- [facility]    Patient education: LOG ROLL TECHNIQUE, DEEP BREATHING TECHNIQUES T/O ACTIVITY, ENERGY CONSERVATION TECHNIQUES FOR CARRY OVER UPON D/C, INCREASED FAMILY SUPPORT, CONTINUE PARTICIPATION IN SELF-CARE/MOBILITY WITH STAFF WHILE IN THE HOSPITAL , OVERALL SAFETY AWARENESS, FALL PREVENTION and ENERGY CONSERVATION     Additional comments: daughter present for session     Assessment/Session details:  Patient seen this date for OT with focus on goals as set by OTR  Patient agreeable to skilled OT session with focus on ADL's (bathing, dressing, toileting), Transfers (STS, SPT), Standing tolerance/balance, Strength/ROM/HEP, Cognition, Activity tolerance, Education on safety, fall prevention and energy conservation techniques, Bathroom/kitchen/home mobility and Fine motor coordination/hand strength  Barriers to treatment include fatigue, cognition, safety, decreased strength/coordination, balance , activity tolerance and standing tolerance  Patient educated on safe functional transfers techniques, the appropriate use of AE/DME to improve functional performance, and activity modification techniques for energy conservation  Plans for d/c are home with 24* supervision and home OT  Patient is making gains toward OT goals with continued OT recommended at this time to max tolerance, safety and function for appropriate d/c planning  At end of session patient remains in room seated at recliner with all needs within reach     Session focused on eating (Setup), oral hygiene (Refused) , toileting min/mod assist, bathing (Min/Mod A), UB dressing (S), LB dressing (Min/Mod A), and don/doff footwear (Max A)  Goals STG achieved within 2 weeks Performance at Initial Evaluation 1/8/2020  Current Performance (last date completed)   Grooming S  (1/9) Min A 1/9 Set up with v/c for grooming hair  Patient refused oral care  Daughter indicated that she usually just soaks dentures in evening      1/8 Min A   ADL transfers  S Mod A x1 SBA x1  1/9 min assist STS, min/CGA SPT + safety v/c  1/8 Mod A x1 SBA x1 Bathroom mobility with appropriate AD S Mod A x1 SBA x1  1/9 min assist + RW  1/8 Mod A x1 SBA x1    UB ADL  S  (1/9) Min A 1/9 supervision + verbal cues needed to initiate/follow through  1/8 Min A   LB ADL, AE PRN Min A Max A 1/9 min/mod assist + v/c's  1/8 Max A   Toileting/clothing management and hygiene Min A  (1/9) Max A x1 Min A x1  1/9 seated hygiene and standing min assist + set up/v/c; CM min assist + v/c  1/8 Max A x1 Min A x1    Dynamic standing balance for increased safety when completing purposeful tasks F/F+ P 1/9 Fair-  1/8 P   Increase standing tolerance for inc'd safety with standing purposeful tasks 4-7 min  1 min  1/9 average 3 minutes  1/8 1 min    Participate in therex 1-3x/week for inc'd overall stamina/activity tolerance for purposeful tasks To be completed        Participate in further cognitive testing to assist with safe d/c planning To be completed        Shower stall (5-6 inch threshold) (+) GBs and shower chair  S Unable to assess 1/8 Unable to assess   Bed mobility with HOB flat  S Unable to assess 1/9 supine to sit + v/c and ed min assist      Primitivo Serra  1/9/2020

## 2020-01-09 NOTE — PHYSICAL THERAPY NOTE
Physical Therapy Treatment [id number] (50')    Patient Name: Scott HODGE Date: 1/9/2020     Problem List  Principal Problem:    Toxic metabolic encephalopathy  Active Problems:    Type 2 diabetes mellitus with hyperglycemia, without long-term current use of insulin (HCC)    Acquired hypothyroidism    Hypertensive heart disease without heart failure    Mixed hyperlipidemia    Chronic atrial fibrillation    Transaminitis    UTI (urinary tract infection)       Past Medical History  Past Medical History:   Diagnosis Date    Atrial fibrillation ([address] )     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    Cholecystitis     Disease of tonsils     Glaucoma 2008    NOS        Past Surgical History  Past Surgical History:   Procedure Laterality Date    CHOLECYSTECTOMY      EYE SURGERY Right 03/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    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 01/09/20 1126   Pain Assessment   Pain Assessment No/denies pain   Pain Score No Pain   Restrictions/Precautions   Weight Bearing Precautions Per Order No   Other Precautions Cognitive; Chair Alarm; Bed Alarm; Fall Risk;Hard of hearing;Visual impairment   General   Chart Reviewed Yes   Family/Caregiver Present Yes   Cognition   Overall Cognitive Status Impaired   Arousal/Participation Alert   Attention Attends with cues to redirect   Orientation Level Oriented to person;Oriented to place   Memory Decreased recall of precautions;Decreased recall of recent events;Decreased long term memory;Decreased recall of biographical information;Decreased short term memory   Following Commands Follows one step commands with increased time or repetition   Comments [place]   Subjective   Subjective t agreable to PT- daughter present- much more awake and alert today    Bed Mobility   Rolling R 3  Moderate assistance   Additional items Assist x 1   Rolling L 3  Moderate assistance   Additional items Assist x 1   Supine to Sit 3  Moderate assistance   Additional items Assist x 1   Sit to Supine 3  Moderate assistance   Additional items Assist x 1   Transfers   Sit to Stand 4  Minimal assistance   Additional items Assist x 1; Increased time required;Verbal cues   Stand to Sit 4  Minimal assistance   Additional items Assist x 1; Increased time required;Verbal cues   Stand pivot 4  Minimal assistance   Additional items Assist x 1; Increased time required;Verbal cues   Ambulation/Elevation   Gait pattern Excessively slow; Foward flexed; Shuffling;Decreased foot clearance   Gait Assistance 4  Minimal assist   Additional items Assist x 1;Verbal cues; Tactile cues   Assistive Device 4-wheeled walker   Distance 15'x1 10'x1 12'x1 w/ RW seated therapeutic rest b/t trials Spo2 94% on RA /78 spot check post ambulation- pt w/o c/o dizziness or SOB- increased distnaces limited by reports of LE weakness and fatigue    Balance   Static Sitting Fair +   Dynamic Sitting Fair   Static Standing Poor +   Dynamic Standing Poor   Ambulatory Poor   Endurance Deficit   Endurance Deficit Yes   Endurance Deficit Description LE weakness and fatigue    Activity Tolerance   Activity Tolerance Patient limited by fatigue   Medical Staff Made Aware yes- RN and OT    Nurse Made Aware yes- cleared for PT session    Exercises   Hip Flexion Sitting;10 reps  (x3)   Hip Abduction Sitting;10 reps  (x3)   Hip Adduction Sitting;10 reps  (x3 w/ ball )   Knee AROM Long Arc Quad Sitting;10 reps  (x3)   Ankle Pumps Sitting;25 reps  (x2)   Marching Sitting;10 reps  (x3)   Balance training  STS from w/c w/ cues for UE placement and scooting forward 4 reps w/ seated therapeutic rest b/t    Assessment   Prognosis Good   Problem List Decreased strength;Decreased range of motion;Decreased endurance; Impaired balance;Decreased mobility; Decreased coordination;Decreased cognition; Impaired judgement;Decreased safety awareness; Impaired vision; Impaired hearing;Obesity   Assessment pt seen for skilled pT session this am w/ daughter present   pt was much more awake and alert and was able to participate in extended PT session consisting of seated therex; transfer and gait training w/ RW  Pt required increeased time and therapeutic rest b/t gait trials and transfers  pt required much less assistance than on IE  Pt's daughter reports they are likely looking into alternate placement for pt in LTC as they feel she will be more engaged w/ people her own age  will cont to progress w/ goals as stated on IE pt seated in cahir w/ daughter providing S- playing cards post session    Barriers to Discharge Comments may not require stair training prior to d/c as she may d/c to SNF/ LTC as long term plan-    Goals   Patient Goals do some exercises    STG Expiration Date 01/22/20   Short Term Goal #1 see grid    PT Treatment Day 2   Plan   Treatment/Interventions Functional transfer training;LE strengthening/ROM; Elevations; Therapeutic exercise; Endurance training;Cognitive reorientation;Patient/family training;Equipment eval/education; Bed mobility;Gait training;Spoke to nursing;Spoke to case management; Family;OT   Progress Progressing toward goals   PT Frequency   (5-7x/wk )   Recommendation   Recommendation   (home w/ family vs SNF (per daughter report) )   PT - OK to Discharge No               Goals STG achieved within 2 weeks  1/22/20 Performance at Initial Evaluation (1/8/20) Current Performance (last date completed)   Bed mobility skills including rolling and repositioning to prevent skin breakdown and decrease caregiver burden   S NT 1/9/20- mod A flat surface   Supine to sit transitions to increase ease of transfer, allow pt to get out of bed, and decrease caregiver burden S (note- pt has ergonomic electric bed available)  NT  1/9/20- modA flat surface   Sit to supine transitions to increase ease of transfer, allow pt to get back into bed, and decrease caregiver burden S NT 1/9/20 modA flat surface   Functional transfers to facilitate safe return to previous living environment  S modA [place] 1/9/20   Ambulation with least restrictive AD; including at least 2 turns for safe household distance ambulation S modA 6' then 10' w/ RW w/ chair follow  [place] w/ RW 10-12' w/ RW 1/9/20   Ascend/descend a curb step x2 , using RW and S/ safe  method, no handrails, for safe access to previous living environment CGA of daughter NT- unable/ not appropriate NT unable / not appropriate   1/9/20*- daughter reports may d/c to SNF now?- may d/c goal if SNF is determined d/c plan  compeltion of FT w/ daughter and RUT to facilitate safe d/c to previous home environment w/ 24/ 7 S/A DIL w/ demo appropriate techniques and body mechanics during FT sessions as needed     S/w daughter about potential need for FT prior to d/c home- daughter receptive and agreeable  Not initiated (see prior column)

## 2020-01-09 NOTE — SOCIAL WORK
Per attending pt will not be medically cleared for transfer for 2-4 days  SW notified Ramonita, will continue to follow

## 2020-01-10 DIAGNOSIS — E11.65 TYPE 2 DIABETES MELLITUS WITH HYPERGLYCEMIA, WITHOUT LONG-TERM CURRENT USE OF INSULIN (HCC): ICD-10-CM

## 2020-01-10 LAB
GLUCOSE SERPL-MCNC: 140 MG/DL (ref 65–140)
GLUCOSE SERPL-MCNC: 167 MG/DL (ref 65–140)
GLUCOSE SERPL-MCNC: 259 MG/DL (ref 65–140)
GLUCOSE SERPL-MCNC: 282 MG/DL (ref 65–140)

## 2020-01-10 PROCEDURE — 97535 SELF CARE MNGMENT TRAINING: CPT

## 2020-01-10 PROCEDURE — 97530 THERAPEUTIC ACTIVITIES: CPT

## 2020-01-10 PROCEDURE — 97110 THERAPEUTIC EXERCISES: CPT

## 2020-01-10 PROCEDURE — 82948 REAGENT STRIP/BLOOD GLUCOSE: CPT

## 2020-01-10 RX ORDER — LANCETS
EACH MISCELLANEOUS
Qty: 204 EACH | Refills: 0 | Status: SHIPPED | OUTPATIENT
Start: 2020-01-10

## 2020-01-10 RX ADMIN — INSULIN GLARGINE 10 UNITS: 100 INJECTION, SOLUTION SUBCUTANEOUS at 21:19

## 2020-01-10 RX ADMIN — URSOSIOL 300 MG: 300 CAPSULE ORAL at 17:35

## 2020-01-10 RX ADMIN — METOPROLOL SUCCINATE 25 MG: 25 TABLET, EXTENDED RELEASE ORAL at 08:26

## 2020-01-10 RX ADMIN — LEVOFLOXACIN 500 MG: 500 TABLET, FILM COATED ORAL at 08:26

## 2020-01-10 RX ADMIN — EZETIMIBE 10 MG: 10 TABLET ORAL at 21:19

## 2020-01-10 RX ADMIN — DABIGATRAN ETEXILATE MESYLATE 150 MG: 75 CAPSULE ORAL at 17:35

## 2020-01-10 RX ADMIN — DABIGATRAN ETEXILATE MESYLATE 150 MG: 75 CAPSULE ORAL at 08:26

## 2020-01-10 RX ADMIN — Medication 250 MG: at 08:26

## 2020-01-10 RX ADMIN — NYSTATIN 1 APPLICATION: 100000 POWDER TOPICAL at 08:29

## 2020-01-10 RX ADMIN — NYSTATIN 1 APPLICATION: 100000 POWDER TOPICAL at 17:41

## 2020-01-10 RX ADMIN — CYANOCOBALAMIN TAB 1000 MCG 1000 MCG: 1000 TAB at 08:26

## 2020-01-10 RX ADMIN — INSULIN LISPRO 6 UNITS: 100 INJECTION, SOLUTION INTRAVENOUS; SUBCUTANEOUS at 12:16

## 2020-01-10 RX ADMIN — SENNOSIDES AND DOCUSATE SODIUM 1 TABLET: 8.6; 5 TABLET ORAL at 17:35

## 2020-01-10 RX ADMIN — Medication 250 MG: at 17:35

## 2020-01-10 RX ADMIN — SENNOSIDES AND DOCUSATE SODIUM 1 TABLET: 8.6; 5 TABLET ORAL at 08:26

## 2020-01-10 RX ADMIN — URSOSIOL 300 MG: 300 CAPSULE ORAL at 08:26

## 2020-01-10 RX ADMIN — INSULIN LISPRO 2 UNITS: 100 INJECTION, SOLUTION INTRAVENOUS; SUBCUTANEOUS at 08:27

## 2020-01-10 RX ADMIN — LEVOTHYROXINE SODIUM 75 MCG: 75 TABLET ORAL at 05:51

## 2020-01-10 NOTE — PLAN OF CARE
Problem: PHYSICAL THERAPY ADULT  Goal: Performs mobility at highest level of function for planned discharge setting  See evaluation for individualized goals  Description  Treatment/Interventions: Functional transfer training, LE strengthening/ROM, Elevations, Therapeutic exercise, Endurance training, Cognitive reorientation, Patient/family training, Equipment eval/education, Bed mobility, Gait training, Compensatory technique education, Spoke to nursing, Spoke to case management, OT, Family  Equipment Recommended: (TBD w/ rehab progress)       See flowsheet documentation for full assessment, interventions and recommendations  Outcome: Progressing  Note:   Prognosis: Good  Problem List: Decreased strength, Decreased range of motion, Decreased endurance, Impaired balance, Decreased mobility, Decreased coordination, Decreased cognition, Impaired judgement, Decreased safety awareness, Impaired vision, Impaired hearing, Obesity  Assessment: Pt tolerated all strengtheing activity and completed 3 bouts of ambulation with increased fatigue noted on third bout  Pt demonstrated decreased foot clearance, decreased hip/knee flexion  Barriers to Discharge: Inaccessible home environment  Barriers to Discharge Comments: may not require stair training prior to d/c as she may d/c to SNF/ LTC as long term plan-   Recommendation: Home with family support, Long-term skilled nursing home placement     PT - OK to Discharge: No    See flowsheet documentation for full assessment

## 2020-01-10 NOTE — PHYSICAL THERAPY NOTE
PT Treatment Note - 53mins (08:30 - 8:23)     01/10/20 0830   Pain Assessment   Pain Assessment No/denies pain   Pain Score No Pain   Clinical Progression Not changed   Diversional Activities Television   Response to Interventions repositioned   Restrictions/Precautions   Weight Bearing Precautions Per Order No   Other Precautions Fall Risk;Visual impairment;Hard of hearing;Cognitive; Chair Alarm; Bed Alarm   General   Chart Reviewed Yes   Family/Caregiver Present Yes   Cognition   Overall Cognitive Status Impaired   Arousal/Participation Alert   Attention Attends with cues to redirect   Orientation Level Oriented to person;Oriented to place   Memory Decreased recall of precautions;Decreased recall of recent events;Decreased long term memory;Decreased recall of biographical information;Decreased short term memory   Following Commands Follows one step commands with increased time or repetition   Subjective   Subjective Pt agreeable   Bed Mobility   Additional Comments Pt OOB in recliner at start of session   Ambulation/Elevation   Gait pattern Wide KAI; Forward Flexion   Gait Assistance 4  Minimal assist   Additional items Assist x 1;Verbal cues   Assistive Device Rolling walker   Distance 20ft, 15ft, 15ft   Balance   Static Sitting Fair +   Dynamic Sitting Fair +   Static Standing Fair -   Dynamic Standing Fair -   Ambulatory Fair -   Endurance Deficit   Endurance Deficit Yes   Activity Tolerance   Activity Tolerance Patient limited by fatigue   Medical Staff Made Aware yes   Exercises   Hip Flexion 25 reps;5 reps; Sitting   Hip Abduction 25 reps;5 reps; Sitting   Hip Adduction 25 reps;5 reps; Sitting   Knee AROM Long Arc Quad 25 reps;5 reps; Sitting   Ankle Pumps 25 reps;5 reps; Sitting   Marching 25 reps;5 reps; Sitting   Assessment   Prognosis Good   Problem List Decreased strength;Decreased range of motion;Decreased endurance; Impaired balance;Decreased mobility; Decreased coordination;Decreased cognition; Impaired judgement;Decreased safety awareness; Impaired vision; Impaired hearing;Obesity   Assessment Pt tolerated all strengtheing activity and completed 3 bouts of ambulation with increased fatigue noted on third bout  Pt demonstrated decreased foot clearance, decreased hip/knee flexion   Barriers to Discharge Inaccessible home environment   Goals   PT Treatment Day 3   Plan   Treatment/Interventions   (cont POC)   Progress Progressing toward goals   PT Frequency   (5-7x/wk)   Recommendation   Recommendation Home with family support;Long-term skilled nursing home placement   PT - OK to Discharge No   Benji Spillers, PT                 Goals STG achieved within 2 weeks  1/22/20 Performance at Initial Evaluation (1/8/20) Current Performance (last date completed)   Bed mobility skills including rolling and repositioning to prevent skin breakdown and decrease caregiver burden   S NT 1/10/20- mod A flat surface   Supine to sit transitions to increase ease of transfer, allow pt to get out of bed, and decrease caregiver burden S (note- pt has ergonomic electric bed available)  NT  1/10/20- modA flat surface   Sit to supine transitions to increase ease of transfer, allow pt to get back into bed, and decrease caregiver burden S NT 1/10/20 modA flat surface   Functional transfers to facilitate safe return to previous living environment  S modA [place] 1/10/20   Ambulation with least restrictive AD; including at least 2 turns for safe household distance ambulation S modA 6' then 10' w/ RW w/ chair follow  [place] w/ RW 20ft w/ RW 1/10/20   Ascend/descend a curb step x2 , using RW and S/ safe  method, no handrails, for safe access to previous living environment CGA of daughter NT- unable/ not appropriate NT unable / not appropriate   1/9/20*- daughter reports may d/c to SNF now?- may d/c goal if SNF is determined d/c plan      compeltion of FT w/ daughter and RUT to facilitate safe d/c to previous home environment w/ 24/ 7 S/A DIL w/ demo appropriate techniques and body mechanics during FT sessions as needed     S/w daughter about potential need for FT prior to d/c home- daughter receptive and agreeable  Not initiated (see prior column)

## 2020-01-10 NOTE — PROGRESS NOTES
Medication Regimen Review (MRR)    To promote positive health outcomes and reduce adverse consequences the patient's medication therapy has been reviewed by a pharmacist for the following potential problems:   1   documented indication and therapeutic benefits  2   appropriate dose, frequency, route, and duration of therapy  3   medication interactions, side effects, and allergies  4   medication or transcription errors  Medications are also reviewed for appropriate monitoring, duplicate therapy, and dose reduction  Based on the review please see the following recommendations  Patient information:    The patient is 80 y o  admitted for rehab      Wt Readings from Last 1 Encounters:   01/09/20 82 9 kg (182 lb 12 2 oz)       Lab Results   Component Value Date    GLUCOSE 181 (H) 05/22/2018    CALCIUM 8 8 01/09/2020     (L) 05/22/2018    K 4 1 01/09/2020    CO2 30 01/09/2020     01/09/2020    BUN 16 01/09/2020    CREATININE 0 97 01/09/2020       Pradaxa 150mg dose twice is acceptable dose for non vavular atrial fibrillation  Currently escrcl 36 9ml/min  Per [employer] :  Renal impairment (CrCl 15 to 30 mL/min) in stroke and systemic embolism prophylaxis in nonvalvular atrial fibrillation: 75 mg orally twice daily [8]    Recommendations: There are no recommendations from the pharmacy department at this time      Emmy Sarmiento, Pharmacist  [phone]

## 2020-01-10 NOTE — OCCUPATIONAL THERAPY NOTE
Occupational Therapy Treatment Note     Name:  Beryle Gillis   MRN:   [id number]  Age:     80 y o  Patient Active Problem List   Diagnosis    Type 2 diabetes mellitus with hyperglycemia, without long-term current use of insulin ([facility] )    Acquired hypothyroidism    Hypertensive heart disease without heart failure    Mixed hyperlipidemia    Vitamin D deficiency    Leg ulcer, right, limited to breakdown of skin ([facility] )    Type 2 diabetes mellitus with foot ulcer (CODE) ([facility])    Chronic atrial fibrillation    Transaminitis    Generalized osteoarthritis    CKD (chronic kidney disease) stage 3, GFR 30-59 ml/min ([facility])    Mixed aphasia, global    Change in mental status    Toxic metabolic encephalopathy    UTI (urinary tract infection)      Delirium [R41 0]        Subjective/Goals:  No specific goal given  Patient is able to make needs known for toileting and followed directions to ring for assist when completed      Vitals:  stable throughout session      Pain: no reports of pain      Treatment Time: (7409-2880) 47 minutes     Cognition: impaired  Arousable, cooperative, lethergic  Oriented to person and time, attends with cues to redirect  [place] with increased processing time needed        Precautions: Cognitive, Chair/Bed alarms, fall risk, pain, hard of hearing, visual impairment (glass eye (R) low vision)  Hypersensitive to B LE and BP cuff      EVALUATION information:  · OT Goal expiration date: 1/22/2020  · Treatment day: 3  · Discharge recommendation: home OT (24/7 supervision/assist from family)  · HOME SET UP:  ? House (2-3 long MIKIE no HR)  ? Able to live on main level with bed/bath, stairs to enter without rails  ? Walk in shower (5-6" threshold (cut out)  ? Standard toiler, + GB in shower and Shower chair  ? + commode  ? DME:  RW, rollator, ergo bed without rails  ? Lives with Daughter and RUT  ? Assist from family-- typically toileting and bathing  ?  24* supervision/assist -- goes to [facility] center 2x/[facility]  ? RECENT SW INFORMATION STATES PATIENT GOING TO SNF PLACEMENT VS HOME WITH DAUGHTER  PLAN TO D/C TO [place] WHEN MEDICALLY CLEARED (APPROX 2-4 DAYS FROM 1/9/2020 NOTES)     Patient education: LOG ROLL TECHNIQUE, DEEP BREATHING TECHNIQUES T/O ACTIVITY, ENERGY CONSERVATION TECHNIQUES FOR CARRY OVER UPON D/C, INCREASED FAMILY SUPPORT, CONTINUE PARTICIPATION IN SELF-CARE/MOBILITY WITH STAFF WHILE IN THE HOSPITAL , OVERALL SAFETY AWARENESS, FALL PREVENTION and ENERGY CONSERVATION      Additional comments:      Assessment/Session details:  Patient seen this date for OT with focus on goals as set by OTR  Patient agreeable to skilled OT session with focus on ADL's (bathing, dressing, toileting), Transfers (STS, SPT), Standing tolerance/balance, Strength/ROM/HEP, Cognition, Activity tolerance, Education on safety, fall prevention and energy conservation techniques, Bathroom/kitchen/home mobility and Fine motor coordination/hand strength  Barriers to treatment include fatigue, cognition, safety, decreased strength/coordination, balance , activity tolerance and standing tolerance  Patient educated on safe functional transfers techniques, the appropriate use of AE/DME to improve functional performance, and activity modification techniques for energy conservation  Plans for d/c are home with 24* supervision and home OT HOWEVER RECENT NOTES ARE INDICATING SNF PLACEMENT TO [address] [place] [address]  Patient is making gains toward OT goals with continued OT recommended at this time to max tolerance, safety and function for appropriate d/c planning  At end of session patient remains in room seated at recliner with all needs within reach         Goals STG achieved within 2 weeks Performance at Initial Evaluation 1/8/2020  Current Performance (last date completed)   Grooming S  (1/9) Min A 1/10 standing sinkside for handwashing CGA  1/9 Set up with v/c for grooming hair    Patient refused oral care  Daughter indicated that she usually just soaks dentures in evening  1/8 Min A   ADL transfers  S Mod A x1 SBA x1  1/10 STS: mod assist from recliner and min assist from arm chair  SPT min/CGA  1/9 min assist STS, min/CGA SPT + safety v/c  1/8 Mod A x1 SBA x1    Bathroom mobility with appropriate AD S Mod A x1 SBA x1  1/10 CGA + RW  1/9 min assist + RW  1/8 Mod A x1 SBA x1    UB ADL  S  (1/9) Min A 1/9 supervision + verbal cues needed to initiate/follow through  1/8 Min A   LB ADL, AE PRN Min A Max A 1/9 min/mod assist + v/c's  1/8 Max A   Toileting/clothing management and hygiene Min A  (1/9) (1/10) Max A x1 Min A x1  1/10 CGA standing hygiene, CM min assist   1/9 seated hygiene and standing min assist + set up v/c; CM min assist + v/c  1/8 Max A x1 Min A x1    Dynamic standing balance for increased safety when completing purposeful tasks F/F+ P 1/10 Fair-  1/8 P   Increase standing tolerance for inc'd safety with standing purposeful tasks 4-7 min  1 min  1/10 average 3 minutes  1/8 1 min    Participate in therex 1-3x/week for inc'd overall stamina/activity tolerance for purposeful tasks To be completed  (1/10)    1/10 tolerated 2# weighted ball in all planes x10 each    Seated Side/side needed rest after 5 for completion     Participate in further cognitive testing to assist with safe d/c planning To be completed        Shower stall (5-6 inch threshold) (+) GBs and shower chair  S Unable to assess 1/10 plans to d/c to SNF vs home   1/8 Unable to assess   Bed mobility with HOB flat  S Unable to assess 1/9 supine to sit + v/c and ed min assist       Kavya Villafana  1/10/2020

## 2020-01-10 NOTE — TELEPHONE ENCOUNTER
Note from  stating there was a VM letting us know the pt lancets needs to say- check once daily- due to insurance

## 2020-01-11 LAB
GLUCOSE SERPL-MCNC: 139 MG/DL (ref 65–140)
GLUCOSE SERPL-MCNC: 159 MG/DL (ref 65–140)
GLUCOSE SERPL-MCNC: 244 MG/DL (ref 65–140)
GLUCOSE SERPL-MCNC: 293 MG/DL (ref 65–140)

## 2020-01-11 PROCEDURE — 97110 THERAPEUTIC EXERCISES: CPT

## 2020-01-11 PROCEDURE — 97530 THERAPEUTIC ACTIVITIES: CPT

## 2020-01-11 PROCEDURE — 97535 SELF CARE MNGMENT TRAINING: CPT

## 2020-01-11 PROCEDURE — 82948 REAGENT STRIP/BLOOD GLUCOSE: CPT

## 2020-01-11 RX ADMIN — URSOSIOL 300 MG: 300 CAPSULE ORAL at 17:38

## 2020-01-11 RX ADMIN — DABIGATRAN ETEXILATE MESYLATE 150 MG: 75 CAPSULE ORAL at 09:19

## 2020-01-11 RX ADMIN — EZETIMIBE 10 MG: 10 TABLET ORAL at 22:07

## 2020-01-11 RX ADMIN — INSULIN LISPRO 2 UNITS: 100 INJECTION, SOLUTION INTRAVENOUS; SUBCUTANEOUS at 17:40

## 2020-01-11 RX ADMIN — INSULIN LISPRO 4 UNITS: 100 INJECTION, SOLUTION INTRAVENOUS; SUBCUTANEOUS at 12:25

## 2020-01-11 RX ADMIN — Medication 250 MG: at 09:19

## 2020-01-11 RX ADMIN — SENNOSIDES AND DOCUSATE SODIUM 1 TABLET: 8.6; 5 TABLET ORAL at 09:19

## 2020-01-11 RX ADMIN — URSOSIOL 300 MG: 300 CAPSULE ORAL at 09:18

## 2020-01-11 RX ADMIN — METOPROLOL SUCCINATE 25 MG: 25 TABLET, EXTENDED RELEASE ORAL at 09:19

## 2020-01-11 RX ADMIN — NYSTATIN 1 APPLICATION: 100000 POWDER TOPICAL at 09:19

## 2020-01-11 RX ADMIN — LEVOTHYROXINE SODIUM 75 MCG: 75 TABLET ORAL at 05:18

## 2020-01-11 RX ADMIN — INSULIN GLARGINE 10 UNITS: 100 INJECTION, SOLUTION SUBCUTANEOUS at 22:07

## 2020-01-11 RX ADMIN — SENNOSIDES AND DOCUSATE SODIUM 1 TABLET: 8.6; 5 TABLET ORAL at 17:38

## 2020-01-11 RX ADMIN — Medication 250 MG: at 17:38

## 2020-01-11 RX ADMIN — CYANOCOBALAMIN TAB 1000 MCG 1000 MCG: 1000 TAB at 09:19

## 2020-01-11 RX ADMIN — DABIGATRAN ETEXILATE MESYLATE 150 MG: 75 CAPSULE ORAL at 17:38

## 2020-01-11 RX ADMIN — NYSTATIN 1 APPLICATION: 100000 POWDER TOPICAL at 17:38

## 2020-01-11 NOTE — PHYSICAL THERAPY NOTE
PT Treatment Note (38 mins [id number])     01/11/20 1019   Pain Assessment   Pain Assessment No/denies pain   Pain Score No Pain   Clinical Progression Not changed   Diversional Activities Television   Response to Interventions repositioned, exercise   Restrictions/Precautions   Weight Bearing Precautions Per Order No   Other Precautions Fall Risk;Hard of hearing;Visual impairment   General   Chart Reviewed Yes   Response to Previous Treatment Patient reporting fatigue but able to participate  Family/Caregiver Present No   Cognition   Overall Cognitive Status Impaired   Arousal/Participation Alert   Attention Attends with cues to redirect   Orientation Level Oriented to person;Oriented to place   Memory Decreased recall of precautions;Decreased recall of recent events;Decreased long term memory;Decreased recall of biographical information;Decreased short term memory   Following Commands Follows one step commands with increased time or repetition   Subjective   Subjective pt agreeable to therapy  "what must I do now? Bed Mobility   Additional Comments Pt OOB at start of session in recliner chair   Transfers   Sit to Stand 4  Minimal assistance   Additional items Assist x 1   Stand to Sit 4  Minimal assistance   Additional items Assist x 1   Stand pivot 4  Minimal assistance   Additional items Assist x 1   Toilet transfer 4  Minimal assistance   Additional items Assist x 1  (pt managed clothing without help)   Additional Comments pt managed clothing for toileting CS/CGA   Ambulation/Elevation   Gait pattern Wide KAI; Foward flexed   Gait Assistance 4  Minimal assist   Additional items Assist x 1   Assistive Device Rolling walker   Distance 70ft x 2   Stair Management Assistance Not tested   Balance   Static Sitting Good   Dynamic Sitting Good   Static Standing Fair +   Dynamic Standing Fair   Ambulatory Fair   Endurance Deficit   Endurance Deficit Yes   Activity Tolerance   Activity Tolerance Patient limited by fatigue   Medical Staff Made Aware RN   Nurse Made Aware Yes   Exercises   Hip Flexion 20 reps;15 reps;5 reps; Sitting   Hip Abduction 20 reps;Bilateral;Sitting   Hip Adduction 20 reps;15 reps;5 reps;Bilateral;Sitting   Knee AROM Long Arc Quad 25 reps;5 reps;Bilateral;Sitting   Assessment   Prognosis Good   Problem List Decreased strength;Decreased range of motion;Decreased endurance; Impaired balance;Decreased mobility; Decreased coordination;Decreased cognition; Impaired judgement;Decreased safety awareness; Impaired vision; Impaired hearing;Obesity   Assessment Pt completed session today with increased amb distance and decreased assistance required for toileting  pt was able to doff/osvaldo her pants and under garments in stand with CS/CGA without UE support on a device  Continue to challenge endurance as pt is making signiciant progress  Goals   STG Expiration Date 01/22/20   Short Term Goal #1 see grid   PT Treatment Day 4   Plan   Treatment/Interventions   (Cont POC)   Progress Progressing toward goals   PT Frequency   (5-7x/wk)   Recommendation   Recommendation Home with family support   PT - OK to Discharge No                 Goals STG achieved within 2 weeks  1/22/20 Performance at Initial Evaluation (1/8/20) Current Performance (last date completed)   Bed mobility skills including rolling and repositioning to prevent skin breakdown and decrease caregiver burden   S NT 1/10/20- mod A flat surface   Supine to sit transitions to increase ease of transfer, allow pt to get out of bed, and decrease caregiver burden S (note- pt has ergonomic electric bed available)  NT  1/10/20- modA flat surface   Sit to supine transitions to increase ease of transfer, allow pt to get back into bed, and decrease caregiver burden S NT 1/10/20 modA flat surface   Functional transfers to facilitate safe return to previous living environment   S modA [place] 1/11/20   Ambulation with least restrictive AD; including at least 2 turns for safe household distance ambulation S modA 6' then 10' w/ RW w/ chair follow  [place] w/ RW 70ft w/ RW 1/11/20   Ascend/descend a curb step x2 , using RW and S/ safe  method, no handrails, for safe access to previous living environment CGA of daughter NT- unable/ not appropriate NT unable / not appropriate   1/9/20*- daughter reports may d/c to SNF now?- may d/c goal if SNF is determined d/c plan     compeltion of FT w/ daughter and RUT to facilitate safe d/c to previous home environment w/ 24/ 7 S/A DIL w/ demo appropriate techniques and body mechanics during FT sessions as needed     S/w daughter about potential need for FT prior to d/c home- daughter receptive and agreeable  Not initiated (see prior column)                            Derrick Hawley, PT

## 2020-01-11 NOTE — PLAN OF CARE
Problem: Prexisting or High Potential for Compromised Skin Integrity  Goal: Skin integrity is maintained or improved  Description  INTERVENTIONS:  - Identify patients at risk for skin breakdown  - Assess and monitor skin integrity  - Assess and monitor nutrition and hydration status  - Monitor labs   - Assess for incontinence   - Turn and reposition patient  - Assist with mobility/ambulation  - Relieve pressure over bony prominences  - Avoid friction and shearing  - Provide appropriate hygiene as needed including keeping skin clean and dry  - Evaluate need for skin moisturizer/barrier cream  - Collaborate with interdisciplinary team   - Patient/family teaching  - Consider wound care consult   Outcome: Progressing     Problem: Potential for Falls  Goal: Patient will remain free of falls  Description  INTERVENTIONS:  - Assess patient frequently for physical needs  -  Identify cognitive and physical deficits and behaviors that affect risk of falls  -  [place] fall precautions as indicated by assessment   - Educate patient/family on patient safety including physical limitations  - Instruct patient to call for assistance with activity based on assessment  - Modify environment to reduce risk of injury  - Consider OT/PT consult to assist with strengthening/mobility  Outcome: Progressing     Problem: Nutrition/Hydration-ADULT  Goal: Nutrient/Hydration intake appropriate for improving, restoring or maintaining nutritional needs  Description  Monitor and assess patient's nutrition/hydration status for malnutrition  Collaborate with interdisciplinary team and initiate plan and interventions as ordered  Monitor patient's weight and dietary intake as ordered or per policy  Utilize nutrition screening tool and intervene as necessary  Determine patient's food preferences and provide high-protein, high-caloric foods as appropriate       INTERVENTIONS:  - Monitor oral intake, urinary output, labs, and treatment plans  - Assess nutrition and hydration status and recommend course of action  - Evaluate amount of meals eaten  - Assist patient with eating if necessary   - Allow adequate time for meals  - Recommend/ encourage appropriate diets, oral nutritional supplements, and vitamin/mineral supplements  - Order, calculate, and assess calorie counts as needed  - Recommend, monitor, and adjust tube feedings and TPN/PPN based on assessed needs  - Assess need for intravenous fluids  - Provide specific nutrition/hydration education as appropriate  - Include patient/family/caregiver in decisions related to nutrition  Outcome: Progressing

## 2020-01-11 NOTE — OCCUPATIONAL THERAPY NOTE
Occupational Therapy Treatment Note       Vitals:  Stable t/o session     Pain: Denies pain      Treatment Time: (9:20-8:50) 30 minutes     Cognition: Impaired  Alert and oriented x 3 this morning  [place]- but has B/L hearing aides        Precautions: Cognitive, Chair/Bed alarms, fall risk, pain, hard of hearing, visual impairment (glass eye (R) low vision)  Hypersensitive to B LE and BP cuff      EVALUATION information:  · OT Goal expiration date: 1/22/2020  · Treatment day: 4  · Discharge recommendation: home OT (24/7 supervision/assist from family)  · HOME SET UP:  ? House (2-3 long MIKIE no HR)  ? Able to live on main level with bed/bath, stairs to enter without rails  ? Walk in shower (5-6" threshold (cut out)  ? Standard toiler, + GB in shower and Shower chair  ? + commode  ? DME:  RW, rollator, ergo bed without rails  ? Lives with Daughter and RUT  ? Assist from family-- typically toileting and bathing  ? 24* supervision/assist -- [facility]  ? RECENT SW INFORMATION STATES PATIENT GOING TO SNF PLACEMENT VS HOME WITH DAUGHTER  PLAN TO D/C TO [place] WHEN MEDICALLY CLEARED (APPROX 2-4 DAYS FROM 1/9/2020 NOTES)      Assessment/Session details:  Pt continues to make gains toward OT goals  Improvement noted in ADLs, ADL txfs, stand balance, and overall activity tolerance today  Hindered by vision, but able to follow verbal cues to complete tasks  Required increased assist w/ LB dressing especially footwear management  Pt cooperative and alert/oriented x 3 this morning  Tolerated exercises 2# in all planes but did report fatigue to OT  Continue OT to achieve goals and maximize function  Plans appear to be home w/ daughter vs SNF placement   Pt remains in recliner w/ all needs at end of session and alarm intact       Goals STG achieved within 2 weeks Performance at Initial Evaluation 1/8/2020  Current Performance (last date completed)   Grooming S  (1/9) Min A 1/11-- MI to comb hair; MI to don L hearing aid;  Max to don R hearing aid     ADL transfers  S Mod A x1 SBA x1  1/11-- STS CG from shower chair; SPT & ambulation CG/CloseS using RW      Bathroom mobility with appropriate AD S Mod A x1 SBA x1  1/11-- CG using RW    UB ADL  S  (1/9), 1/11 Min A 1/11-- MI bathing & (S) dressing w/ VC's   LB ADL, AE PRN Min A Max A 1/11-- Min bathing & Mod dressing except socks/shoes Max     Toileting/clothing management and hygiene Min A  (1/9) (1/10) 1/11 Max A x1 Min A x1  1/11-- CGA standing hygiene, CM min assist      Dynamic standing balance for increased safety when completing purposeful tasks F/F+ P 1/11-- F-/F   Increase standing tolerance for inc'd safety with standing purposeful tasks 4-7 min   1/11 1 min  1/11-- 4 mins during shower    Participate in therex 1-3x/week for inc'd overall stamina/activity tolerance for purposeful tasks To be completed  (1/10) 1/11 1/11-- tolerated 2# weighted ball in all planes x10 each       Participate in further cognitive testing to assist with safe d/c planning To be completed    1/11-- Alert & oriented x 3     Shower stall (5-6 inch threshold) (+) GBs and shower chair  S Unable to assess 1/10 plans to d/c to SNF vs home   1/8 Unable to assess   Bed mobility with HOB flat  S Unable to assess 1/9 supine to sit + v/c and ed min assist       Rose MS, OTR/L

## 2020-01-11 NOTE — PLAN OF CARE
Problem: PHYSICAL THERAPY ADULT  Goal: Performs mobility at highest level of function for planned discharge setting  See evaluation for individualized goals  Description  Treatment/Interventions: Functional transfer training, LE strengthening/ROM, Elevations, Therapeutic exercise, Endurance training, Cognitive reorientation, Patient/family training, Equipment eval/education, Bed mobility, Gait training, Compensatory technique education, Spoke to nursing, Spoke to case management, OT, Family  Equipment Recommended: (TBD w/ rehab progress)       See flowsheet documentation for full assessment, interventions and recommendations  Outcome: Progressing  Note:   Prognosis: Good  Problem List: Decreased strength, Decreased range of motion, Decreased endurance, Impaired balance, Decreased mobility, Decreased coordination, Decreased cognition, Impaired judgement, Decreased safety awareness, Impaired vision, Impaired hearing, Obesity  Assessment: Pt completed session today with increased amb distance and decreased assistance required for toileting  pt was able to doff/osvaldo her pants and under garments in stand with CS/CGA without UE support on a device  Continue to challenge endurance as pt is making signiciant progress  Barriers to Discharge: Inaccessible home environment  Barriers to Discharge Comments: may not require stair training prior to d/c as she may d/c to SNF/ LTC as long term plan-   Recommendation: Home with family support     PT - OK to Discharge: No    See flowsheet documentation for full assessment

## 2020-01-11 NOTE — PLAN OF CARE
Problem: OCCUPATIONAL THERAPY ADULT  Goal: Performs self-care activities at highest level of function for planned discharge setting  See evaluation for individualized goals  Description  Treatment Interventions: ADL retraining, Visual perceptual retraining, Functional transfer training, UE strengthening/ROM, Endurance training, Cognitive reorientation, Patient/family training, Equipment evaluation/education, Neuromuscular reeducation, Fine motor coordination activities, Compensatory technique education, Continued evaluation, Energy conservation, Activityengagement          See flowsheet documentation for full assessment, interventions and recommendations  Outcome: Progressing  Note:   Limitation: Decreased ADL status, Decreased UE ROM, Decreased UE strength, Decreased Safe judgement during ADL, Decreased cognition, Decreased endurance, Decreased sensation, Visual deficit, Decreased fine motor control, Decreased self-care trans, Decreased high-level ADLs  Prognosis: Good, Fair    Pt continues to make gains toward OT goals  Improvement noted in ADLs, ADL txfs, stand balance, and overall activity tolerance today  Hindered by vision, but able to follow verbal cues to complete tasks  Required increased assist w/ LB dressing especially footwear management  Pt cooperative and alert/oriented x 3 this morning  Tolerated exercises 2# in all planes but did report fatigue to OT  Continue OT to achieve goals and maximize function  Plans appear to be home w/ daughter vs SNF placement  Pt remains in recliner w/ all needs at end of session and alarm intact       OT Discharge Recommendation: Home OT(with 24/7 supervision/assist from family ) vs SNF placement     OT - OK to Discharge: No

## 2020-01-12 LAB
GLUCOSE SERPL-MCNC: 148 MG/DL (ref 65–140)
GLUCOSE SERPL-MCNC: 187 MG/DL (ref 65–140)
GLUCOSE SERPL-MCNC: 275 MG/DL (ref 65–140)
GLUCOSE SERPL-MCNC: 286 MG/DL (ref 65–140)

## 2020-01-12 PROCEDURE — 82948 REAGENT STRIP/BLOOD GLUCOSE: CPT

## 2020-01-12 RX ADMIN — INSULIN GLARGINE 10 UNITS: 100 INJECTION, SOLUTION SUBCUTANEOUS at 21:12

## 2020-01-12 RX ADMIN — Medication 250 MG: at 08:55

## 2020-01-12 RX ADMIN — INSULIN LISPRO 6 UNITS: 100 INJECTION, SOLUTION INTRAVENOUS; SUBCUTANEOUS at 12:12

## 2020-01-12 RX ADMIN — METOPROLOL SUCCINATE 25 MG: 25 TABLET, EXTENDED RELEASE ORAL at 08:55

## 2020-01-12 RX ADMIN — NYSTATIN 1 APPLICATION: 100000 POWDER TOPICAL at 17:43

## 2020-01-12 RX ADMIN — SENNOSIDES AND DOCUSATE SODIUM 1 TABLET: 8.6; 5 TABLET ORAL at 17:43

## 2020-01-12 RX ADMIN — URSOSIOL 300 MG: 300 CAPSULE ORAL at 08:55

## 2020-01-12 RX ADMIN — DABIGATRAN ETEXILATE MESYLATE 150 MG: 75 CAPSULE ORAL at 17:43

## 2020-01-12 RX ADMIN — Medication 250 MG: at 17:43

## 2020-01-12 RX ADMIN — URSOSIOL 300 MG: 300 CAPSULE ORAL at 17:43

## 2020-01-12 RX ADMIN — EZETIMIBE 10 MG: 10 TABLET ORAL at 21:12

## 2020-01-12 RX ADMIN — DABIGATRAN ETEXILATE MESYLATE 150 MG: 75 CAPSULE ORAL at 08:57

## 2020-01-12 RX ADMIN — NYSTATIN 1 APPLICATION: 100000 POWDER TOPICAL at 09:00

## 2020-01-12 RX ADMIN — LEVOTHYROXINE SODIUM 75 MCG: 75 TABLET ORAL at 05:08

## 2020-01-12 RX ADMIN — INSULIN LISPRO 2 UNITS: 100 INJECTION, SOLUTION INTRAVENOUS; SUBCUTANEOUS at 08:57

## 2020-01-12 RX ADMIN — CYANOCOBALAMIN TAB 1000 MCG 1000 MCG: 1000 TAB at 08:57

## 2020-01-12 RX ADMIN — SENNOSIDES AND DOCUSATE SODIUM 1 TABLET: 8.6; 5 TABLET ORAL at 08:57

## 2020-01-12 NOTE — PLAN OF CARE
Problem: Prexisting or High Potential for Compromised Skin Integrity  Goal: Skin integrity is maintained or improved  Description  INTERVENTIONS:  - Identify patients at risk for skin breakdown  - Assess and monitor skin integrity  - Assess and monitor nutrition and hydration status  - Monitor labs   - Assess for incontinence   - Turn and reposition patient  - Assist with mobility/ambulation  - Relieve pressure over bony prominences  - Avoid friction and shearing  - Provide appropriate hygiene as needed including keeping skin clean and dry  - Evaluate need for skin moisturizer/barrier cream  - Collaborate with interdisciplinary team   - Patient/family teaching  - Consider wound care consult   Outcome: Progressing     Problem: Potential for Falls  Goal: Patient will remain free of falls  Description  INTERVENTIONS:  - Assess patient frequently for physical needs  -  Identify cognitive and physical deficits and behaviors that affect risk of falls  -  [place] fall precautions as indicated by assessment   - Educate patient/family on patient safety including physical limitations  - Instruct patient to call for assistance with activity based on assessment  - Modify environment to reduce risk of injury  - Consider OT/PT consult to assist with strengthening/mobility  Outcome: Progressing     Problem: Nutrition/Hydration-ADULT  Goal: Nutrient/Hydration intake appropriate for improving, restoring or maintaining nutritional needs  Description  Monitor and assess patient's nutrition/hydration status for malnutrition  Collaborate with interdisciplinary team and initiate plan and interventions as ordered  Monitor patient's weight and dietary intake as ordered or per policy  Utilize nutrition screening tool and intervene as necessary  Determine patient's food preferences and provide high-protein, high-caloric foods as appropriate       INTERVENTIONS:  - Monitor oral intake, urinary output, labs, and treatment plans  - Assess nutrition and hydration status and recommend course of action  - Evaluate amount of meals eaten  - Assist patient with eating if necessary   - Allow adequate time for meals  - Recommend/ encourage appropriate diets, oral nutritional supplements, and vitamin/mineral supplements  - Order, calculate, and assess calorie counts as needed  - Recommend, monitor, and adjust tube feedings and TPN/PPN based on assessed needs  - Assess need for intravenous fluids  - Provide specific nutrition/hydration education as appropriate  - Include patient/family/caregiver in decisions related to nutrition  Outcome: Progressing

## 2020-01-13 DIAGNOSIS — E11.65 TYPE 2 DIABETES MELLITUS WITH HYPERGLYCEMIA, WITHOUT LONG-TERM CURRENT USE OF INSULIN (HCC): Primary | ICD-10-CM

## 2020-01-13 LAB
GLUCOSE SERPL-MCNC: 146 MG/DL (ref 65–140)
GLUCOSE SERPL-MCNC: 226 MG/DL (ref 65–140)
GLUCOSE SERPL-MCNC: 255 MG/DL (ref 65–140)
GLUCOSE SERPL-MCNC: 307 MG/DL (ref 65–140)

## 2020-01-13 PROCEDURE — 97530 THERAPEUTIC ACTIVITIES: CPT

## 2020-01-13 PROCEDURE — 82948 REAGENT STRIP/BLOOD GLUCOSE: CPT

## 2020-01-13 PROCEDURE — 97110 THERAPEUTIC EXERCISES: CPT

## 2020-01-13 PROCEDURE — 97116 GAIT TRAINING THERAPY: CPT

## 2020-01-13 PROCEDURE — 97535 SELF CARE MNGMENT TRAINING: CPT

## 2020-01-13 RX ORDER — BLOOD-GLUCOSE METER
EACH MISCELLANEOUS DAILY
Qty: 1 KIT | Refills: 0 | Status: SHIPPED | OUTPATIENT
Start: 2020-01-13

## 2020-01-13 RX ADMIN — INSULIN GLARGINE 10 UNITS: 100 INJECTION, SOLUTION SUBCUTANEOUS at 21:33

## 2020-01-13 RX ADMIN — METOPROLOL SUCCINATE 25 MG: 25 TABLET, EXTENDED RELEASE ORAL at 09:36

## 2020-01-13 RX ADMIN — LEVOTHYROXINE SODIUM 75 MCG: 75 TABLET ORAL at 05:38

## 2020-01-13 RX ADMIN — EZETIMIBE 10 MG: 10 TABLET ORAL at 21:33

## 2020-01-13 RX ADMIN — SENNOSIDES AND DOCUSATE SODIUM 1 TABLET: 8.6; 5 TABLET ORAL at 09:35

## 2020-01-13 RX ADMIN — URSOSIOL 300 MG: 300 CAPSULE ORAL at 09:35

## 2020-01-13 RX ADMIN — Medication 250 MG: at 09:35

## 2020-01-13 RX ADMIN — INSULIN LISPRO 4 UNITS: 100 INJECTION, SOLUTION INTRAVENOUS; SUBCUTANEOUS at 16:41

## 2020-01-13 RX ADMIN — NYSTATIN 1 APPLICATION: 100000 POWDER TOPICAL at 09:36

## 2020-01-13 RX ADMIN — DABIGATRAN ETEXILATE MESYLATE 150 MG: 75 CAPSULE ORAL at 17:19

## 2020-01-13 RX ADMIN — INSULIN LISPRO 8 UNITS: 100 INJECTION, SOLUTION INTRAVENOUS; SUBCUTANEOUS at 11:52

## 2020-01-13 RX ADMIN — DABIGATRAN ETEXILATE MESYLATE 150 MG: 75 CAPSULE ORAL at 09:36

## 2020-01-13 RX ADMIN — CYANOCOBALAMIN TAB 1000 MCG 1000 MCG: 1000 TAB at 09:35

## 2020-01-13 RX ADMIN — Medication 250 MG: at 17:19

## 2020-01-13 RX ADMIN — NYSTATIN 1 APPLICATION: 100000 POWDER TOPICAL at 17:20

## 2020-01-13 RX ADMIN — URSOSIOL 300 MG: 300 CAPSULE ORAL at 17:19

## 2020-01-13 NOTE — SOCIAL WORK
MOSES reached out to [facility] SN[facility] admissions to determine when bed would be available  Attending confirmed pt would be medically cleared for transfer  Son Unknown Hurry called shortly after confirming he also left  for admissions about bed status  SW to update son when more information available

## 2020-01-13 NOTE — TELEPHONE ENCOUNTER
Stick note form - pt needs meter + test strips to be sent to [facility] ed  I called pharm to see what would be covered and she stated lancets ready for , they had a script on file for test strips KIKI- but needs a new one due to not having test once a day   Per pharm pt will need a meter

## 2020-01-13 NOTE — PLAN OF CARE
Problem: PHYSICAL THERAPY ADULT  Goal: Performs mobility at highest level of function for planned discharge setting  See evaluation for individualized goals  Description  Treatment/Interventions: Functional transfer training, LE strengthening/ROM, Elevations, Therapeutic exercise, Endurance training, Cognitive reorientation, Patient/family training, Equipment eval/education, Bed mobility, Gait training, Compensatory technique education, Spoke to nursing, Spoke to case management, OT, Family  Equipment Recommended: (TBD w/ rehab progress)       See flowsheet documentation for full assessment, interventions and recommendations  Note:   Prognosis: Good  Problem List: Decreased strength, Decreased range of motion, Decreased endurance, Impaired balance, Decreased coordination, Decreased cognition, Decreased mobility, Impaired judgement, Decreased safety awareness, Impaired vision, Impaired hearing, Obesity, Orthopedic restrictions  Assessment: Pt with good progress in skilled PT today  Improvement assessed with: functional strength, activity tolerance, bed mobility, transfers, and gait  Per conversation with pt's daughter, daughter reported that the pt is definitely transferring to [facility], for long-term placement  Therefore elevation goal and family training goal is no longer indicated, per daughter report  Goals on grid discharged below, to reflect new information provided  Pt is currently waiting for a bed to become available at SNF  Barriers to Discharge: Inaccessible home environment, Decreased caregiver support(Pt is now being discharged to SNF)  Barriers to Discharge Comments: may not require stair training prior to d/c as she may d/c to SNF/ LTC as long term plan-   Recommendation: Home with family support     PT - OK to Discharge: (Yes -> when bed becomes available at [facility])    See flowsheet documentation for full assessment

## 2020-01-13 NOTE — PHYSICAL THERAPY NOTE
Physical Therapy Daily Treatment Note       01/13/20: 42 minutes (15:06 to 15:48)   Pain Assessment   Pain Assessment No/denies pain   Pain Score No Pain   Restrictions/Precautions   Weight Bearing Precautions Per Order No   Other Precautions Cognitive; Fall Risk;Visual impairment;Hard of hearing  (Very [place] ( wear bilateral hearing aides), R prosthetic eyes)   General   Chart Reviewed Yes   Response to Previous Treatment Patient reporting fatigue but able to participate  Family/Caregiver Present Yes  Mario Ratliff (daughter), son, and DIL)   Cognition   Overall Cognitive Status Impaired   Arousal/Participation Cooperative   Attention Attends with cues to redirect   Orientation Level Oriented to person;Oriented to time;Disoriented to situation;Disoriented to place   Memory Decreased recall of precautions;Decreased recall of recent events;Decreased short term memory   Following Commands Follows one step commands with increased time or repetition   Bed Mobility   Rolling R 6  Modified independent  (HOB flat, + rail)   Additional items Increased time required   Rolling L   (CG A)   Additional items LE management;Verbal cues; Increased time required  (CG A (for RLE placement and trunk management))   Supine to Sit 5  Supervision  (HOB flat, no rail)   Additional items Increased time required;Comment   Sit to Supine 5  Supervision  (HOB flat, + rail, (-) log roll)   Additional items Increased time required   Transfers   Sit to Stand   (CG A)   Additional items Increased time required  (Cues for hand placement)   Stand to Sit   (CG A)   Additional items Increased time required;Verbal cues  (Cues for hand placement and to control descent)   Stand pivot   (CGA -> SPT with RW)   Additional items Increased time required;Verbal cues; Other  (Cues for RW management)   Toilet transfer Unable to assess  (Pt reported not needing to use bathroom)   Additional Comments   (Transfer: EOB, recliner)   Ambulation/Elevation   Gait pattern Forward Flexion; Improper Weight shift;Decreased foot clearance;Shuffling;Excessively slow; Short stride  (Cues to stay closer to RW)   Gait Assistance   (CG A)   Assistive Device Rolling walker   Distance 8 feet x 2; 75 feet x 2  (Negotiated multiple turns/obstacles in pathway)   Curbs Elevation goal discharged   Balance   Static Sitting Good   Dynamic Sitting   (Good (-) )   Static Standing   (Fair  with RW)   Dynamic Standing   (Fair (-) with RW)   Ambulatory   (Fair (-) with RW)   Higher level balance Side stepping   Higher level balance rep range to the L and to the R  (Fair (-) with RW)   Endurance Deficit   Endurance Deficit Yes  (Provided with monitored rest breaks prn, between activities)   Activity Tolerance   Activity Tolerance Patient limited by fatigue   Assessment   Prognosis Good   Problem List Decreased strength;Decreased range of motion;Decreased endurance; Impaired balance;Decreased coordination;Decreased cognition;Decreased mobility; Impaired judgement;Decreased safety awareness; Impaired vision; Impaired hearing;Obesity;Orthopedic restrictions   Assessment Pt with good progress in skilled PT today  Improvement assessed with: functional strength, activity tolerance, bed mobility, transfers, and gait  Per conversation with pt's daughter, daughter reported that the pt is definitely transferring to [facility], for long-term placement  Therefore elevation goal and family training goal is no longer indicated, per daughter report  Goals on grid discharged below, to reflect new information provided  Pt is currently waiting for a bed to become available at [facility]     Barriers to Discharge Inaccessible home environment;Decreased caregiver support  (Pt is now being discharged to [facility])   Goals   Patient Goals   (" I want to walk ")   STG Expiration Date 01/22/20   Short Term Goal #1 See grid   PT Treatment Day 5   Plan   Treatment/Interventions ADL retraining;Functional transfer training;LE strengthening/ROM; Elevations; Therapeutic exercise; Endurance training;Cognitive reorientation;Patient/family training;Equipment eval/education;Gait training;Bed mobility; Compensatory technique education;Continued evaluation;Spoke to case management;Spoke to MD;Spoke to nursing;Spoke to advanced practitioner;OT;Family   Progress Progressing toward goals   PT Frequency   (5 to 7x/week)   Recommendation   Equipment Recommended   (SNF to provide any necessary DME)   PT - OK to Discharge   (Yes -> when bed becomes available at [facility])       Goals STG achieved within 2 weeks  1/22/20 Performance at Initial Evaluation (1/8/20) Current Performance (last date completed)   Bed mobility skills including rolling and repositioning to prevent skin breakdown and decrease caregiver burden     S NT 1/13/20   Supine to sit transitions to increase ease of transfer, allow pt to get out of bed, and decrease caregiver burden   S (note- pt has ergonomic electric bed available)  NT  1/13/20   Sit to supine transitions to increase ease of transfer, allow pt to get back into bed, and decrease caregiver burden     S NT 1/13/20   Functional transfers to facilitate safe return to previous living environment     S Margarette Avalos 1/13/20   Ambulation with least restrictive AD; including at least 2 turns for safe household distance ambulation   S modA 6' then 10' w/ RW w/ chair follow  1/13/20       DISCHARGE GOAL ( effective 1/13/20) -> Goal no longer indicated; pt with plans for SNF placement                DISCHARGE GOAL ( effective 1/13/20) -> Goal no longer indicated; pt with plans for SNF placement

## 2020-01-14 VITALS
HEIGHT: 62 IN | RESPIRATION RATE: 18 BRPM | TEMPERATURE: 97.2 F | BODY MASS INDEX: 33.63 KG/M2 | WEIGHT: 182.76 LBS | HEART RATE: 77 BPM | DIASTOLIC BLOOD PRESSURE: 59 MMHG | SYSTOLIC BLOOD PRESSURE: 114 MMHG | OXYGEN SATURATION: 97 %

## 2020-01-14 LAB — GLUCOSE SERPL-MCNC: 270 MG/DL (ref 65–140)

## 2020-01-14 PROCEDURE — 97530 THERAPEUTIC ACTIVITIES: CPT

## 2020-01-14 PROCEDURE — 97116 GAIT TRAINING THERAPY: CPT

## 2020-01-14 PROCEDURE — 99316 NF DSCHRG MGMT 30 MIN+: CPT | Performed by: FAMILY MEDICINE

## 2020-01-14 PROCEDURE — 97535 SELF CARE MNGMENT TRAINING: CPT

## 2020-01-14 PROCEDURE — 97110 THERAPEUTIC EXERCISES: CPT

## 2020-01-14 PROCEDURE — 82948 REAGENT STRIP/BLOOD GLUCOSE: CPT

## 2020-01-14 RX ORDER — EZETIMIBE 10 MG/1
10 TABLET ORAL
Refills: 0
Start: 2020-01-14

## 2020-01-14 RX ORDER — URSODIOL 300 MG/1
300 CAPSULE ORAL 2 TIMES DAILY
Refills: 0 | Status: ON HOLD
Start: 2020-01-14 | End: 2020-09-24 | Stop reason: CLARIF

## 2020-01-14 RX ORDER — INSULIN GLARGINE 100 [IU]/ML
18 INJECTION, SOLUTION SUBCUTANEOUS
Refills: 0
Start: 2020-01-14 | End: 2020-09-08

## 2020-01-14 RX ORDER — NYSTATIN 100000 [USP'U]/G
1 POWDER TOPICAL 2 TIMES DAILY
Qty: 15 G | Refills: 0
Start: 2020-01-14

## 2020-01-14 RX ORDER — POLYETHYLENE GLYCOL 3350 17 G/17G
17 POWDER, FOR SOLUTION ORAL DAILY PRN
Qty: 14 EACH | Refills: 0
Start: 2020-01-14

## 2020-01-14 RX ORDER — AMOXICILLIN 250 MG
1 CAPSULE ORAL 2 TIMES DAILY
Refills: 0
Start: 2020-01-14

## 2020-01-14 RX ADMIN — URSOSIOL 300 MG: 300 CAPSULE ORAL at 09:21

## 2020-01-14 RX ADMIN — METOPROLOL SUCCINATE 25 MG: 25 TABLET, EXTENDED RELEASE ORAL at 09:21

## 2020-01-14 RX ADMIN — SENNOSIDES AND DOCUSATE SODIUM 1 TABLET: 8.6; 5 TABLET ORAL at 09:21

## 2020-01-14 RX ADMIN — CYANOCOBALAMIN TAB 1000 MCG 1000 MCG: 1000 TAB at 09:21

## 2020-01-14 RX ADMIN — Medication 250 MG: at 09:21

## 2020-01-14 RX ADMIN — NYSTATIN 1 APPLICATION: 100000 POWDER TOPICAL at 09:21

## 2020-01-14 RX ADMIN — LEVOTHYROXINE SODIUM 75 MCG: 75 TABLET ORAL at 05:18

## 2020-01-14 RX ADMIN — INSULIN LISPRO 6 UNITS: 100 INJECTION, SOLUTION INTRAVENOUS; SUBCUTANEOUS at 12:28

## 2020-01-14 RX ADMIN — DABIGATRAN ETEXILATE MESYLATE 150 MG: 75 CAPSULE ORAL at 09:21

## 2020-01-14 NOTE — ASSESSMENT & PLAN NOTE
Lab Results   Component Value Date    HGBA1C 8 5 (H) 01/05/2020   Blood sugars are  uncontrolled  Continue Lantus and insulin sliding scale for now but increase Lantus dose to 18 units daily at bedtime  Placed on metformin 500 mg daily  Further adjust medications at SNF    as patient is now eating better her blood sugar are also getting worse

## 2020-01-14 NOTE — PLAN OF CARE
Problem: Prexisting or High Potential for Compromised Skin Integrity  Goal: Skin integrity is maintained or improved  Description  INTERVENTIONS:  - Identify patients at risk for skin breakdown  - Assess and monitor skin integrity  - Assess and monitor nutrition and hydration status  - Monitor labs   - Assess for incontinence   - Turn and reposition patient  - Assist with mobility/ambulation  - Relieve pressure over bony prominences  - Avoid friction and shearing  - Provide appropriate hygiene as needed including keeping skin clean and dry  - Evaluate need for skin moisturizer/barrier cream  - Collaborate with interdisciplinary team   - Patient/family teaching  - Consider wound care consult   Outcome: Progressing     Problem: Potential for Falls  Goal: Patient will remain free of falls  Description  INTERVENTIONS:  - Assess patient frequently for physical needs  -  Identify cognitive and physical deficits and behaviors that affect risk of falls  -  [place] fall precautions as indicated by assessment   - Educate patient/family on patient safety including physical limitations  - Instruct patient to call for assistance with activity based on assessment  - Modify environment to reduce risk of injury  - Consider OT/PT consult to assist with strengthening/mobility  Outcome: Progressing     Problem: Nutrition/Hydration-ADULT  Goal: Nutrient/Hydration intake appropriate for improving, restoring or maintaining nutritional needs  Description  Monitor and assess patient's nutrition/hydration status for malnutrition  Collaborate with interdisciplinary team and initiate plan and interventions as ordered  Monitor patient's weight and dietary intake as ordered or per policy  Utilize nutrition screening tool and intervene as necessary  Determine patient's food preferences and provide high-protein, high-caloric foods as appropriate       INTERVENTIONS:  - Monitor oral intake, urinary output, labs, and treatment plans  - Assess nutrition and hydration status and recommend course of action  - Evaluate amount of meals eaten  - Assist patient with eating if necessary   - Allow adequate time for meals  - Recommend/ encourage appropriate diets, oral nutritional supplements, and vitamin/mineral supplements  - Order, calculate, and assess calorie counts as needed  - Recommend, monitor, and adjust tube feedings and TPN/PPN based on assessed needs  - Assess need for intravenous fluids  - Provide specific nutrition/hydration education as appropriate  - Include patient/family/caregiver in decisions related to nutrition  Outcome: Progressing

## 2020-01-14 NOTE — ASSESSMENT & PLAN NOTE
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

## 2020-01-14 NOTE — OCCUPATIONAL THERAPY NOTE
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    AE/DME: N/A       DISCHARGE SUMMARY: Pt discharged to [facility] on 1/14/2020  Participated in a total of 6/6 OT sessions  Pt achieved 8/11 goals  Deficits remain in  fatigue, safety, decreased strength/coordination, balance , activity tolerance and standing tolerance  Recommend continued therapy focus on above noted deficits and further achieve independence in ADL related tasks in new environment  Pt is safe to [facility] at [facility]  No further concerns noted  Goals STG achieved within 2 weeks Performance at Initial Evaluation 1/8/2020  Current Performance (last date completed)   Grooming S  MET Min A 1/14 MI comb hair  1/11-- MI to comb hair; MI to don L hearing aid;  Max to don R hearing aid      ADL transfers  S  MET Mod A x1 SBA x1  1/14 Supervision + RW (occational min assist from low surfaces)  1/13 STS Supervision with improved hand placement; does plop with stand to sit needing cues   SPT Supervision + RW  1/11-- STS CG from shower chair; SPT & ambulation CG/CloseS using RW       Bathroom mobility with appropriate AD S  MET Mod A x1 SBA x1  1/14 Supervision + RW  1/13-- CS + RW   UB ADL  S  MET Min A 1/14-- MI bathing & (S) dressing w/ VC's   LB ADL, AE PRN Min A  NOT MET Max A 1/14-- Min bathing & Mod dressing except socks/shoes Max      Toileting/clothing management and hygiene Min A  MET Max A x1 Min A x1  1/13 CM min assist (full management at back); hygiene supervision (seated and stance- self set up)  1/11-- CGA standing hygiene, CM min assist       Dynamic standing balance for increased safety when completing purposeful tasks F/F+  NOT MET  P 1/14-- F-/F with dynamic item retrieval task  V/c assist PRN due to vision  Increase standing tolerance for inc'd safety with standing purposeful tasks 4-7 min   MET  1 min  1/14-- 4 minutes average with mobility and handwashing   Participate in therex 1-3x/week for inc'd overall stamina/activity tolerance for purposeful tasks To be completed  MET   1/14-- tolerated 2# bilateral dowel in all planes 2x10 each    1 min bilateral hands with digiflex       Participate in further cognitive testing to assist with safe d/c planning To be completed  MET    1/13 12/22 MOCA- Blind (see scanned sheet for details)   GOAL D/C with anticipated d/c to SNF       Bed mobility with HOB flat  S  NOT MET  Unable to assess 1/9 supine to sit + v/c and ed min assist          Monster Bose, MS, OTR/L

## 2020-01-14 NOTE — PHYSICAL THERAPY NOTE
Physical Therapy Daily Treatment Note and Discharge Summary       01/14/20: 54 minutes ( 13:19 to 14:13)   Pain Assessment   Pain Assessment No/denies pain   Pain Score No Pain   Restrictions/Precautions   Other Precautions Cognitive; Bed Alarm; Fall Risk;Hard of hearing;Visual impairment  (R prosthetic eye, bilateral hearing aids)   Cognition   Overall Cognitive Status Impaired   Attention Attends with cues to redirect   Memory Decreased recall of recent events;Decreased short term memory   Following Commands Follows one step commands with increased time or repetition   Bed Mobility   Rolling R 6  Modified independent  (HOB flat, + rail)   Additional items Increased time required   Rolling L 6  Modified independent  (HOB flat, + rail)   Additional items Increased time required   Supine to Sit 6  Modified independent  (HOB sklightly elevated , + rail, + log roll)   Additional items Increased time required   Sit to Supine 5  Supervision  (HOB flat, + rail; poor technique (-) log roll)   Transfers   Sit to Stand 6  Modified independent   Additional items Increased time required  (Good hand placement)   Stand to Sit 6  Modified independent   Additional items Increased time required  (Good hand placement; + controlled descent)   Stand pivot 5  Supervision  (SPT with RW)   Additional items Increased time required  (Good RW management)   Toilet transfer 5  Supervision  (Sit <->stand and SPT with RW)   Additional items Increased time required;Standard toilet  (Good RW management)   Additional Comments   (Transfers: recliner, toilet, unsecured chair, EOB)   Ambulation/Elevation   Gait pattern Improper Weight shift;Decreased foot clearance; Forward Flexion; Excessively slow; Short stride; Shuffling  (+ M/L sway)   Gait Assistance 5  Supervision   Additional items   (Slight fatigue s/p 80 feet ambulation trial)   Assistive Device Rolling walker   Distance 20 feet x 4, 8 feet x 2, 80 feet  (Negotiated 3 turns/obstacles during 80 feet gait trial)   Stair Management Assistance Not tested  (Pt does not have steps to negotiate)   Curbs Curb goals discharged   Balance   Static Sitting Good   Dynamic Sitting   (Good (-))   Static Standing   (Fair/Fair + with RW)   Dynamic Standing   (F with RW: toileted 2 x during  PT tx session with S)   Ambulatory   (Fair with RW)   Higher level balance Side stepping   Higher level balance rep range to the L and to the R  (Fair with RW)   Endurance Deficit   Endurance Deficit Yes   Endurance Deficit Description   (Provided with monitored rest breaks prn, between activities)   Activity Tolerance   Activity Tolerance Patient limited by fatigue   Assessment   Prognosis Good   Problem List Decreased strength;Decreased range of motion;Decreased endurance; Impaired balance;Decreased mobility; Decreased cognition;Obesity; Decreased skin integrity;Orthopedic restrictions; Impaired hearing; Impaired vision   Assessment Pt is discharged from skilled PT today, 1/14/20  Pt with plans to transfer to [facility] this afternoon, for long-term placement  Since 1/8/20  PT Bhavna,  pt was seen for 6 skilled PT tx sessions for therex, theract, and gait training  Pt with excellent progress in skilled PT, this past week  Improvement assessed with: Barthel Index Score, functional strength, sit and stand balance, activity tolerance, safety, bed mobility, transfers, and gait  All STG's achieved  Please see grid below and flowsheet, for details on pt's functional mobility status at discharge  Barriers to Discharge Inaccessible home environment;Decreased caregiver support  (Below functional baseline)   Goals   Patient Goals   (" I want to get stronger and get my health back ")   STG Expiration Date 01/22/20   Short Term Goal #1 See grid   PT Treatment Day 6   Plan   Treatment/Interventions ADL retraining;Functional transfer training;LE strengthening/ROM; Elevations; Therapeutic exercise; Endurance training;Cognitive reorientation;Patient/family training;Equipment eval/education; Bed mobility;Gait training; Compensatory technique education;Continued evaluation;Spoke to MD;Spoke to nursing;Spoke to case management;Spoke to advanced practitioner;OT;Family   PT Frequency   (5 to 7x/week)   Recommendation   Recommendation Long-term skilled nursing home placement;D/C PT;Long-term skilled PT;24 hour supervision/assist   Equipment Recommended   (Pt appears to have all necessary DME)   PT - OK to Discharge Yes   Barthel Index   Feeding 10   Bathing 0   Grooming Score 5   Dressing Score 5   Bladder Score 10   Bowels Score 10   Toilet Use Score 5   Transfers (Bed/Chair) Score 10   Mobility (Level Surface) Score 0   Stairs Score 0   Barthel Index Score 55         Goals STG achieved within 2 weeks  1/22/20 Performance at Initial Evaluation (1/8/20) Current Performance (last date completed)   Bed mobility skills including rolling and repositioning to prevent skin breakdown and decrease caregiver burden      S     ACHIEVED NT 1/14/20   Supine to sit transitions to increase ease of transfer, allow pt to get out of bed, and decrease caregiver burden    S (note- pt has ergonomic electric bed available)      ACHIEVED    NT  1/14/20   Sit to supine transitions to increase ease of transfer, allow pt to get back into bed, and decrease caregiver burden       S     ACHIEVED NT 1/14/20   Functional transfers to facilitate safe return to previous living environment     S     ACHIEVED modA 1/14/20   Ambulation with least restrictive AD; including at least 2 turns for safe household distance ambulation    S        ACHIEVED modA 6' then 10' w/ RW w/ chair follow  1/14/20        DISCHARGE GOAL ( effective 1/13/20) -> Goal no longer indicated; pt with plans for SNF placement                      DISCHARGE GOAL ( effective 1/13/20) -> Goal no longer indicated; pt with plans for SNF placement

## 2020-01-14 NOTE — PLAN OF CARE
Problem: PHYSICAL THERAPY ADULT  Goal: Performs mobility at highest level of function for planned discharge setting  See evaluation for individualized goals  Description  Treatment/Interventions: Functional transfer training, LE strengthening/ROM, Elevations, Therapeutic exercise, Endurance training, Cognitive reorientation, Patient/family training, Equipment eval/education, Bed mobility, Gait training, Compensatory technique education, Spoke to nursing, Spoke to case management, OT, Family  Equipment Recommended: (TBD w/ rehab progress)       See flowsheet documentation for full assessment, interventions and recommendations  Outcome: Adequate for Discharge  Note:   Prognosis: Good  Problem List: Decreased strength, Decreased range of motion, Decreased endurance, Impaired balance, Decreased mobility, Decreased cognition, Obesity, Decreased skin integrity, Orthopedic restrictions, Impaired hearing, Impaired vision  Assessment: Pt is discharged from skilled PT today, 1/14/20  Pt with plans to transfer to [facility] this afternoon, for long-term placement  Since 1/8/20  PT Eval,  pt was seen for 6 skilled PT tx sessions for therex, theract, and gait training  Pt with excellent progress in skilled PT, this past week  Improvement assessed with: Barthel Index Score, functional strength, sit and stand balance, activity tolerance, safety, bed mobility, transfers, and gait  All STG's achieved  Please see grid below and flowsheet, for details on pt's functional mobility status at discharge  Barriers to Discharge: Inaccessible home environment, Decreased caregiver support(Below functional baseline)  Recommendation: Long-term skilled nursing home placement, D/C PT, Long-term skilled PT, 24 hour supervision/assist     PT - OK to Discharge: Yes    See flowsheet documentation for full assessment

## 2020-01-14 NOTE — OCCUPATIONAL THERAPY NOTE
Occupational Therapy Treatment Note       Vitals:  135/60BP, 70HR, 99%O2     Pain: no reports of pain     Treatment Time: ([id number]) 60 minutes     Cognition: Impaired  Alert and oriented x 3 this morning  [place]- but has B/L hearing aides        Precautions: Cognitive, Chair/Bed alarms, fall risk, pain, hard of hearing, visual impairment (glass eye (R) low vision)  Hypersensitive to B LE and BP cuff      EVALUATION information:  · OT Goal expiration date: 1/22/2020  · Treatment day: 6  · Discharge recommendation: home OT (24/7 supervision/assist from family)  · HOME SET UP:  ? House (2-3 long MIKIE no HR)  ? Able to live on main level with bed/bath, stairs to enter without rails  ? Walk in shower (5-6" threshold (cut out)  ? Standard toiler, + GB in shower and Shower chair  ? + commode  ? DME:  RW, rollator, ergo bed without rails  ? Lives with Daughter and RUT  ? Assist from family-- typically toileting and bathing  ? 24* supervision/assist -- [facility] to senior center 2x[facility]  ? RECENT SW INFORMATION STATES PATIENT GOING TO SNF PLACEMENT VS HOME WITH DAUGHTER   PLAN TO D/C TO [facility] WHEN MEDICALLY CLEARED (APPROX 2-4 DAYS FROM 1/9/2020 NOTES)      Assessment/Session details:  Patient seen this date for OT with focus on goals as set by OTR  Patient agreeable to skilled OT session with focus on ADL's (bathing, dressing, toileting), Transfers (STS, SPT), Standing tolerance/balance, Strength/ROM/HEP, Cognition, Home management, Activity tolerance, Education on safety, fall prevention and energy conservation techniques, Bathroom/kitchen/home mobility, Self feeding and Fine motor coordination/hand strength     Barriers to treatment include fatigue, safety, decreased strength/coordination, balance , activity tolerance and standing tolerance  Patient educated on safe functional transfers techniques, the appropriate use of AE/DME to improve functional performance, and activity modification techniques for energy conservation  Plans for d/c are to SNF placement  Patient is making gains toward OT goals with continued OT recommended at this time to max tolerance, safety and function for appropriate d/c planning  At end of session patient remains in room seated at recliner with all needs within reach      Family continues to [facility] which is D/C plan at this time-- addendum:  Plans to d/c this PM at 1600  Session focused on eating (Ind ), oral hygiene (S), toileting min assist CM & Supervision hygiene, bathing min/mod, UB dressing Supervision, LB dressing (Min/Mod A), and don/doff footwear (Max A)  Goals STG achieved within 2 weeks Performance at Initial Evaluation 1/8/2020  Current Performance (last date completed)   Grooming S  (1/9) (1/14) Min A 1/14 MI comb hair  1/11-- MI to comb hair; MI to don L hearing aid; Max to don R hearing aid      ADL transfers  S  (1/13) (1/14) Mod A x1 SBA x1  1/14 Supervision + RW (occational min assist from low surfaces)  1/13 STS Supervision with improved hand placement; does plop with stand to sit needing cues  SPT Supervision + RW  1/11-- STS CG from shower chair; SPT & ambulation CG/CloseS using RW       Bathroom mobility with appropriate AD S  (1/14) Mod A x1 SBA x1  1/14 Supervision + RW  1/13-- CS + RW   UB ADL  S  (1/9), 1/11, 1/14 Min A 1/14-- MI bathing & (S) dressing w/ VC's   LB ADL, AE PRN Min A Max A 1/14-- Min bathing & Mod dressing except socks/shoes Max      Toileting/clothing management and hygiene Min A  (1/9) (1/10) 1/11, (1/13) Max A x1 Min A x1  1/13 CM min assist (full management at back); hygiene supervision (seated and stance- self set up)  1/11-- CGA standing hygiene, CM min assist       Dynamic standing balance for increased safety when completing purposeful tasks F/F+ P 1/14-- F-/F with dynamic item retrieval task  V/c assist PRN due to vision     Increase standing tolerance for inc'd safety with standing purposeful tasks 4-7 min   1/11, 1/13, 1/14 1 min  1/14-- 4 minutes average with mobility and handwashing   Participate in therex 1-3x/week for inc'd overall stamina/activity tolerance for purposeful tasks To be completed  (1/10) 1/11, 1/13, 1/14 1/14-- tolerated 2# bilateral dowel in all planes 2x10 each    1 min bilateral hands with digiflex       Participate in further cognitive testing to assist with safe d/c planning To be completed    1/13 12/22 MOCA- Blind (see scanned sheet for details)   Shower stall (5-6 inch threshold) (+) GBs and shower chair  S Unable to assess 1/10 plans to d/c to SNF vs home       Bed mobility with HOB flat  S Unable to assess 1/9 supine to sit + v/c and ed min assist       [place]  1/14/2020

## 2020-01-14 NOTE — PLAN OF CARE
Problem: Prexisting or High Potential for Compromised Skin Integrity  Goal: Skin integrity is maintained or improved  Description  INTERVENTIONS:  - Identify patients at risk for skin breakdown  - Assess and monitor skin integrity  - Assess and monitor nutrition and hydration status  - Monitor labs   - Assess for incontinence   - Turn and reposition patient  - Assist with mobility/ambulation  - Relieve pressure over bony prominences  - Avoid friction and shearing  - Provide appropriate hygiene as needed including keeping skin clean and dry  - Evaluate need for skin moisturizer/barrier cream  - Collaborate with interdisciplinary team   - Patient/family teaching  - Consider wound care consult   1/14/2020 1635 by Jeni Councilman, RN  Outcome: Adequate for Discharge  1/14/2020 1400 by Jeni Councilman, RN  Outcome: Progressing     Problem: Potential for Falls  Goal: Patient will remain free of falls  Description  INTERVENTIONS:  - Assess patient frequently for physical needs  -  Identify cognitive and physical deficits and behaviors that affect risk of falls  -  [place] fall precautions as indicated by assessment   - Educate patient/family on patient safety including physical limitations  - Instruct patient to call for assistance with activity based on assessment  - Modify environment to reduce risk of injury  - Consider OT/PT consult to assist with strengthening/mobility  1/14/2020 1635 by Jeni Councilman, RN  Outcome: Adequate for Discharge  1/14/2020 1400 by Jeni Councilman, RN  Outcome: Progressing     Problem: Nutrition/Hydration-ADULT  Goal: Nutrient/Hydration intake appropriate for improving, restoring or maintaining nutritional needs  Description  Monitor and assess patient's nutrition/hydration status for malnutrition  Collaborate with interdisciplinary team and initiate plan and interventions as ordered  Monitor patient's weight and dietary intake as ordered or per policy   Utilize nutrition screening tool and intervene as necessary  Determine patient's food preferences and provide high-protein, high-caloric foods as appropriate       INTERVENTIONS:  - Monitor oral intake, urinary output, labs, and treatment plans  - Assess nutrition and hydration status and recommend course of action  - Evaluate amount of meals eaten  - Assist patient with eating if necessary   - Allow adequate time for meals  - Recommend/ encourage appropriate diets, oral nutritional supplements, and vitamin/mineral supplements  - Order, calculate, and assess calorie counts as needed  - Recommend, monitor, and adjust tube feedings and TPN/PPN based on assessed needs  - Assess need for intravenous fluids  - Provide specific nutrition/hydration education as appropriate  - Include patient/family/caregiver in decisions related to nutrition  1/14/2020 1635 by Mary Jane Samano RN  Outcome: Adequate for Discharge  1/14/2020 1400 by Mary Jane Samano RN  Outcome: Progressing

## 2020-01-14 NOTE — ASSESSMENT & PLAN NOTE
Patient has history of transaminitis  Currently liver numbers are stable    Avoid statins as she had reaction to them in the past  Continue Ursodiol and Zetia

## 2020-01-14 NOTE — DISCHARGE SUMMARY
Discharge- Paige Aleman 11/3/1927, 80 y o  female MRN: [id number]    Unit/Bed#: -01 Encounter: [id number]    Primary Care Provider: Silver Chilel PA-C   Date and time admitted to hospital: 1/7/2020  6:31 PM        * Toxic metabolic encephalopathy  Assessment & Plan  Patient recently had acute toxic metabolic encephalopathy and acute urinary tract infection  Patient is elderly with known history of dementia without behavioral disturbance  Continue to observe  Recent MRI brain showed moderate to advanced chronic microangiopathic changes however no acute stroke or mass    Clinically improving  More awake and alert today compared to on day of admission  Will transition her to skilled nursing facility for longer-term rehab    UTI (urinary tract infection)  Assessment & Plan  Patient recently had a urinary tract infection with E coli  Completed course of Levaquin    Transaminitis  Assessment & Plan  Patient has history of transaminitis  Currently liver numbers are stable  Avoid statins as she had reaction to them in the past  Continue Ursodiol and Zetia    Chronic atrial fibrillation  Assessment & Plan  Patient has known history of AFib  Rate controlled with Toprol-XL and anticoagulated with Pradaxa    Mixed hyperlipidemia  Assessment & Plan  Patient has uncontrolled hyperlipidemia  Avoid statin secondary to previous transaminitis history  Placed on Zetia 10 mg daily    Hypertensive heart disease without heart failure  Assessment & Plan  Continue Toprol-XL  Blood pressure control is acceptable for age    Acquired hypothyroidism  Assessment & Plan   Continue Synthroid    Type 2 diabetes mellitus with hyperglycemia, without long-term current use of insulin [facility])  Assessment & Plan    Lab Results   Component Value Date    HGBA1C 8 5 (H) 01/05/2020   Blood sugars are  uncontrolled  Continue Lantus and insulin sliding scale for now but increase Lantus dose to 18 units daily at bedtime    Placed on metformin 500 mg daily  Further adjust medications at SNF  as patient is now eating better her blood sugar are also getting worse      Discharging Physician / Practitioner: Randy Pollack MD  PCP: Rosario Lubin PA-C  Admission Date:   Admission Orders (From admission, onward)     Ordered        01/07/20 [address]  Admit Patient to  Once                   Discharge Date: 01/14/20    Resolved Problems  Date Reviewed: 1/14/2020    None          Consultations During Hospital Stay:  · Physical therapy and occupational therapy    Procedures Performed:   · None    Significant Findings / Test Results:   · None    Incidental Findings:   · None     Test Results Pending at Discharge (will require follow up): · None     Outpatient Tests Requested:  · None    Complications:  None    Reason for Admission:  Confusion    Hospital Course:     Abdi Boateng is a 80 y o  female patient who originally presented to the hospital on 1/7/2020 due to confusion encephalopathy  Patient was recently diagnosed with UTI and encephalopathy following which she was sent here for course of rehab which she has been doing well  She however is 80years old and has a need for longer-term rehab and hence is being transition to skilled nursing facility  Initially when she came in she was very slow lethargic and somnolent and is now doing much better  Her appetite is improving and her blood sugars also more elevated than before and needs further blood sugar management while at skilled nursing facility  Please see above list of diagnoses and related plan for additional information  Condition at Discharge: good     Discharge Day Visit / Exam:     Subjective:  Patient denies any chest pain or shortness of breath or abdominal pain    She states she feels well  Vitals: Blood Pressure: 159/73 (01/14/20 0746)  Pulse: 95 (01/14/20 0746)  Temperature: (!) 96 3 °F (35 7 °C) (01/14/20 0746)  Temp Source: Temporal (01/14/20 0746)  Respirations: 16 (01/14/20 0746)  Height: 5' 2" (157 5 cm) (01/07/20 1845)  Weight - Scale: 82 9 kg (182 lb 12 2 oz) (01/09/20 0046)  SpO2: 92 % (01/14/20 0746)  Exam:   Physical Exam   Constitutional: She appears well-developed and well-nourished  HENT:   Head: Normocephalic and atraumatic  Right Ear: External ear normal    Left Ear: External ear normal    Mouth/Throat: Oropharynx is clear and moist    Neck: Normal range of motion  Neck supple  Cardiovascular: Normal rate, regular rhythm and normal heart sounds  Pulmonary/Chest: Effort normal and breath sounds normal    Abdominal: Soft  Bowel sounds are normal  She exhibits no mass  There is no tenderness  There is no rebound and no guarding  Genitourinary:   Genitourinary Comments: deferred   Musculoskeletal: She exhibits edema  Neurological: She is alert  She has normal reflexes  Cranial nerves 2-12 are normal   Normal neurological exam  Oriented to person place   Skin: Skin is warm and dry  No rash noted  Psychiatric: She has a normal mood and affect  Nursing note and vitals reviewed  Discussion with Family:  Discussed with daughter at bedside    Discharge instructions/Information to patient and family:   See after visit summary for information provided to patient and family  Provisions for Follow-Up Care:  See after visit summary for information related to follow-up care and any pertinent home health orders  Disposition:     Other [facility] to [facility] SNF:   · Not Applicable to this Patient - Not Applicable to this Patient    Planned Readmission:  None     Discharge Statement:  I spent 35 minutes discharging the patient  This time was spent on the day of discharge  I had direct contact with the patient on the day of discharge   Greater than 50% of the total time was spent examining patient, answering all patient questions, arranging and discussing plan of care with patient as well as directly providing post-discharge instructions  Additional time then spent on discharge activities  Discharge Medications:  See after visit summary for reconciled discharge medications provided to patient and family        ** Please Note: This note has been constructed using a voice recognition system **

## 2020-01-14 NOTE — SOCIAL WORK
Pt medically cleared for discharge to SNF  SW notified [facility] and able to accept 1/14  SW notified attending and patient's family, all agreeable to discharge  SW arranged WC van transportation at 4:00 PM  Transfer letter provided in discharge folder, no [address] needed as pt will transfer under SNF Medicare benefit initially  Fax cover sheet made for discharge information

## 2020-01-14 NOTE — PROGRESS NOTES
RECREATIONAL THERAPY PARTICIPATION LOG      ACTIVITY:    GAMES:        BINGO:        MUSIC STIM:        ARTS & CRAFTS:        EXERCISE:        CLUBS & MEETING:        SOCIALS: Resident participated in a small group "Lunch [place]" in the [place], along with her daughter, another resident, and recreational therapy staff member  Resident attempted to answer numerous Trivial Pursuit questions throughout the social gathering  SPIRITUAL: Resident listened to a prayer said before the lunch meal           INDEPENDENT:        1:1: Resident's daughter was given the Preferences for Customary Routine and Activities assessment to determine what is important to her Mother while she is here in the Transitional Care Unit; it was explained to her that her answers will help in providing caregivers with a way to advocate for her Mother's preferences if she was ever here in our Unit again or in another care facility and especially if she did not have a family member present to advocate for her  Daughter returned the completed survey  Daughter was reminded how important it is for her Mother to keep up with her leisure pursuits upon her D/C tomorrow  STACI Vasquez

## 2020-01-15 ENCOUNTER — NURSING HOME VISIT (OUTPATIENT)
Dept: GERIATRICS | Facility: REHABILITATION | Age: 85
End: 2020-01-15
Payer: MEDICARE

## 2020-01-15 VITALS
BODY MASS INDEX: 33.84 KG/M2 | SYSTOLIC BLOOD PRESSURE: 112 MMHG | WEIGHT: 185 LBS | TEMPERATURE: 97.8 F | OXYGEN SATURATION: 98 % | DIASTOLIC BLOOD PRESSURE: 81 MMHG | RESPIRATION RATE: 20 BRPM | HEART RATE: 88 BPM

## 2020-01-15 DIAGNOSIS — E11.65 TYPE 2 DIABETES MELLITUS WITH HYPERGLYCEMIA, WITHOUT LONG-TERM CURRENT USE OF INSULIN (HCC): ICD-10-CM

## 2020-01-15 DIAGNOSIS — H35.3120 NONEXUDATIVE AGE-RELATED MACULAR DEGENERATION OF LEFT EYE, UNSPECIFIED STAGE: ICD-10-CM

## 2020-01-15 DIAGNOSIS — E03.9 ACQUIRED HYPOTHYROIDISM: ICD-10-CM

## 2020-01-15 DIAGNOSIS — I48.20 CHRONIC ATRIAL FIBRILLATION (HCC): Chronic | ICD-10-CM

## 2020-01-15 DIAGNOSIS — R74.01 TRANSAMINITIS: ICD-10-CM

## 2020-01-15 DIAGNOSIS — N18.30 CKD (CHRONIC KIDNEY DISEASE) STAGE 3, GFR 30-59 ML/MIN (HCC): ICD-10-CM

## 2020-01-15 DIAGNOSIS — Z71.89 ADVANCE CARE PLANNING: ICD-10-CM

## 2020-01-15 DIAGNOSIS — G92.8 TOXIC METABOLIC ENCEPHALOPATHY: Primary | ICD-10-CM

## 2020-01-15 PROBLEM — H35.30 MACULAR DEGENERATION: Status: ACTIVE | Noted: 2020-01-15

## 2020-01-15 PROBLEM — R60.0 LOCALIZED EDEMA: Status: ACTIVE | Noted: 2020-01-15

## 2020-01-15 PROCEDURE — 99305 1ST NF CARE MODERATE MDM 35: CPT | Performed by: FAMILY MEDICINE

## 2020-01-15 NOTE — ASSESSMENT & PLAN NOTE
Will continue to monitor CMP  Avoid statins as they are most likely the cause of elevated liver enzymes

## 2020-01-15 NOTE — ASSESSMENT & PLAN NOTE
Code status discussed with the patient and family  Code status is  DNR  POLST signed and placed in the paper chart

## 2020-01-15 NOTE — ASSESSMENT & PLAN NOTE
Lab Results   Component Value Date    HGBA1C 8 5 (H) 01/05/2020     For her age and her functional status Hba1c is at goal currently  Will continue same regimen with insulin and metformin for now, monitor accuchecks and will attempt to simplify her regimen

## 2020-01-15 NOTE — PROGRESS NOTES
[address], [place] [address]  History and Physical  POS: 31    Records Reviewed include: Hospital records      Chief Complaint/ Reason for Admission:   Acute encephalopathy, A fib, HTN, dementia    History of Present Illness:         Ms Alka Bailey is a 81 yo female who was recently admitted to [facility] for toxic metabolic encephalopathy, UTI- was treated with antibiotic and currently admitted to [facility] for [address] [place]  Encephalopathy is improved , will continue to monitor CBC, CMP, reorient as needed, maintain a good sleep- wake cycle  Denies any urinary symptoms at this time, no fever, she has good appetite, will encourage po hydration  A fib- rate controlled, no chest pain, palpitations, SOB, dizziness  She reports b/l pitting edema chronic and deconditioning, advised to keep legs elevated  Ambulatory dysfunction- will continue PT/OT , goal is to transition to LTC  Patient mentioned that she is legally blind, has an artificial right eye and has macular degeneration on left  Her son and daughters were present during the visit and provided some of the history  Family mentioned that patient was diagnosed with dementia , usually she is AAox2                  Allergies    Allergies   Allergen Reactions    Aspirin GI Intolerance    Penicillins        Past Medical History  Past Medical History:   Diagnosis Date    Atrial fibrillation ([address] )     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    Cholecystitis     Disease of tonsils     Glaucoma 2008    NOS        Past Surgical History:   Procedure Laterality Date    CHOLECYSTECTOMY      EYE SURGERY Right 03/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    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       Family History  Family History   Problem Relation Age of Onset    Breast cancer Sister     No Known Problems Mother     No Known Problems Father        Social History  Social History     Tobacco Use   Smoking Status Never Smoker   Smokeless Tobacco Never Used      Social History Substance and Sexual Activity   Alcohol Use Not Currently      Social History     Substance and Sexual Activity   Drug Use Never        Lives: Home,  Social Support: family  Fall in the past 12 months: no  Use of assistance Device: Walker    Physical Exam    Vital Signs    Vitals:    01/15/20 1502   BP: 112/81   Pulse: 88   Resp: 20   Temp: 97 8 °F (36 6 °C)   SpO2: 98%           Constitutional: Frail appearing patient       Physical Exam   Constitutional: No distress  HENT:   Head: Normocephalic and atraumatic  Artificial right eye  Blurry vison left eye   Eyes: No scleral icterus  Neck: No JVD present  Cardiovascular: Normal rate and normal heart sounds  An irregularly irregular rhythm present  No murmur heard  Pulmonary/Chest: Effort normal and breath sounds normal  No respiratory distress  She has no wheezes  Abdominal: Soft  There is no tenderness  There is no guarding  Musculoskeletal: She exhibits edema  Neurological: She is alert  Oriented to person, place with cues and knows the year and the month  Has difficulty saying the days of the week backwards   Skin: Skin is warm and dry  She is not diaphoretic  Psychiatric: She has a normal mood and affect  Nursing note and vitals reviewed  Review of Systems:  Review of Systems   Constitutional: Negative for chills and fever  HENT: Positive for hearing loss  Negative for congestion and rhinorrhea  Eyes: Positive for visual disturbance  Respiratory: Negative for cough, shortness of breath and wheezing  Cardiovascular: Negative for chest pain, palpitations and leg swelling  Gastrointestinal: Positive for constipation  Negative for abdominal pain  Endocrine: Negative for cold intolerance  Genitourinary: Negative for difficulty urinating, dysuria, frequency and hematuria  Musculoskeletal: Positive for gait problem  Skin: Negative for wound  Allergic/Immunologic: Negative for environmental allergies     Neurological: Negative for dizziness and seizures  Hematological: Does not bruise/bleed easily  Psychiatric/Behavioral: Negative for behavioral problems and sleep disturbance  List of Current Medications:    Medication reviewed  All orders signed  Complete list is in the paper chart  Allergies    Allergies   Allergen Reactions    Aspirin GI Intolerance    Penicillins        Labs/Diagnostics (reviewed by this provider): I personally reviewed lab results and imaging studies  Full reports are in the paper chart  Assessment/Plan:    Toxic metabolic encephalopathy  Improving  Will continue to monitor CBC, CMP  Reorient as needed  Maintain a good sleep- wake cycle  Avoid antihistaminics, benzos, NSAIDS  Continue PT/OT  Maintain engaged in activities  Chronic atrial fibrillation  Currently rate controlled, asymptomatic  Continue same regimen and monitor  Continue anticoagulation with pradaxa    Type 2 diabetes mellitus with hyperglycemia, without long-term current use of insulin (HCC)    Lab Results   Component Value Date    HGBA1C 8 5 (H) 01/05/2020     For her age and her functional status Hba1c is at goal currently  Will continue same regimen with insulin and metformin for now, monitor accuchecks and will attempt to simplify her regimen  Acquired hypothyroidism  Continue synthroid  Asymptomatic  Will continue to monitor TSH  Transaminitis  Will continue to monitor CMP  Avoid statins as they are most likely the cause of elevated liver enzymes  CKD (chronic kidney disease) stage 3, GFR 30-59 ml/min ([facility])  Creatinine stable  Will avoid nephrotoxic medication  Encourage po hydration  Will monitor BMP  Advance care planning  Code status discussed with the patient and family  Code status is  DNR  POLST signed and placed in the paper chart  Localized edema  B/l LE  Advised to keep LE elevated    Macular degeneration  Will continue to follow up with Ophthalmology         Pain: no  Rehab Potential:Good  Patient Informed of Medical Condition: yes   Patient is Capable of Understanding Their Right: yes   Discharge Plan: LTCF  Vaccination:   Immunization History   Administered Date(s) Administered    INFLUENZA 09/22/2015, 10/20/2016, 10/27/2017, 12/05/2018    Influenza Split 10/01/2010, 10/07/2011, 11/07/2013    Influenza Split High Dose Preservative Free IM 10/11/2019    Influenza TIV (IM) 09/14/2007, 10/16/2008    Influenza, high dose seasonal 0 5 mL 12/05/2018    Pneumococcal Conjugate 13-Valent 04/23/2015    Pneumococcal Polysaccharide PPV23 10/06/2003    Tdap 01/11/2019    Tuberculin Skin Test-PPD Intradermal 01/07/2020     Advanced Directives: yes: Yes   Code status:DNR (Per discussion with resident or POA)      Marily Ritter MD  Geriatric Medicine  5/80/81453:12 PM

## 2020-01-15 NOTE — ASSESSMENT & PLAN NOTE
Improving  Will continue to monitor CBC, CMP  Reorient as needed  Maintain a good sleep- wake cycle  Avoid antihistaminics, benzos, NSAIDS  Continue PT/OT  Maintain engaged in activities

## 2020-01-17 ENCOUNTER — NURSING HOME VISIT (OUTPATIENT)
Dept: GERIATRICS | Facility: OTHER | Age: 85
End: 2020-01-17
Payer: MEDICARE

## 2020-01-17 DIAGNOSIS — R26.2 AMBULATORY DYSFUNCTION: ICD-10-CM

## 2020-01-17 DIAGNOSIS — E11.65 TYPE 2 DIABETES MELLITUS WITH HYPERGLYCEMIA, WITHOUT LONG-TERM CURRENT USE OF INSULIN (HCC): ICD-10-CM

## 2020-01-17 DIAGNOSIS — I10 ESSENTIAL (PRIMARY) HYPERTENSION: Primary | ICD-10-CM

## 2020-01-17 DIAGNOSIS — R60.0 LOCALIZED EDEMA: ICD-10-CM

## 2020-01-17 PROCEDURE — 99309 SBSQ NF CARE MODERATE MDM 30: CPT | Performed by: NURSE PRACTITIONER

## 2020-01-17 NOTE — PROGRESS NOTES
[facility]  [address]  [phone]  [address]    NAME: Jessee Slade  AGE: 80 y o  SEX: female    DATE OF ENCOUNTER: 1/17/2020    Assessment and Plan     Type 2 diabetes mellitus with hyperglycemia, without long-term current use of insulin (HCC)    Lab Results   Component Value Date    HGBA1C 8 5 (H) 01/05/2020   Accu-Cheks running high  Patient has a bag of sugar candy and several bags of sugar free candy at bedside  She presently is on Lantus 18 units subcu at bedtime, metformin 500 mg in the morning  Presently she has sliding scale coverage  Will increase Lantus to 20 units at bedtime  Continue Accu-Cheks at this time  Continue metformin 500 mg in the morning with breakfast   Due to elevated renal function test, will not increase metformin at this time ,    Essential (primary) hypertension  Controlled  There was no episodes of hypotension  Discontinue atorvastatin  Discontinue omega-3  Continue Toprol XL 25 mg every 12 hours with parameters  Monitor vital signs  Ambulatory dysfunction  Fall precautions  Wheelchair for mobility  Therapy  Localized edema  Elevate legs  Jhonny hose to bilateral lower extremities while out of bed and off when in bed  Chief Complaint     Follow-up Note    History of Present Illness     Patient seen examined at bedside  She is in stable condition and denies chest pain, shortness of breath, abdominal pain, nausea, vomiting, diarrhea, headache, dizziness, pain  Her daughter is with her at bedside visiting  She states that she is eating and sleeping adequately  She is just starting therapy and participating  She was admitted with toxic metabolic encephalopathy which is improving  She is oriented x3  Blood sugars have been running from 114 to a high of 327  Patient has 1 small bag of candy that has sugar and several bags of sugar free candy at bedside  Hypertension-controlled  There is no episodes of hypotension noted  Labs and medications reviewed  Review of Systems     ROS as per noted in HPI  Objective     Vitals:  Blood pressure 111/67, pulse 72, respirations 16, temperature 97 7°  Pulse ox 97% on room air  Physical Exam   Constitutional: She is oriented to person, place, and time  HENT:   Head: Normocephalic  Hard of hearing  Cardiovascular: Normal rate and normal heart sounds  Exam reveals no gallop and no friction rub  No murmur heard  Irregularly irregular rhythm  Pulmonary/Chest: Effort normal and breath sounds normal  She has no wheezes  She has no rales  Abdominal: Soft  Bowel sounds are normal  There is no tenderness  Musculoskeletal: She exhibits edema  Trace lower extremity edema bilaterally  Jhonny hose intact bilateral lower extremities  Patient presently has her legs in a dependent position  Neurological: She is alert and oriented to person, place, and time  Skin: Skin is warm and dry  Nursing note and vitals reviewed  Current Medications   Medications were reviewed and updated in facility chart       ERNESTO Teixeira  1/17/2020 1:35 PM

## 2020-01-17 NOTE — ASSESSMENT & PLAN NOTE
Controlled  There was no episodes of hypotension  Discontinue atorvastatin  Discontinue omega-3  Continue Toprol XL 25 mg every 12 hours with parameters  Monitor vital signs

## 2020-01-17 NOTE — ASSESSMENT & PLAN NOTE
Lab Results   Component Value Date    HGBA1C 8 5 (H) 01/05/2020   Accu-Cheks running high  Patient has a bag of sugar candy and several bags of sugar free candy at bedside  She presently is on Lantus 18 units subcu at bedtime, metformin 500 mg in the morning  Presently she has sliding scale coverage  Will increase Lantus to 20 units at bedtime  Continue Accu-Cheks at this time  Continue metformin 500 mg in the morning with breakfast   Due to elevated renal function test, will not increase metformin at this time  ,

## 2020-01-20 ENCOUNTER — NURSING HOME VISIT (OUTPATIENT)
Dept: GERIATRICS | Facility: OTHER | Age: 85
End: 2020-01-20
Payer: MEDICARE

## 2020-01-20 DIAGNOSIS — R26.2 AMBULATORY DYSFUNCTION: Primary | ICD-10-CM

## 2020-01-20 DIAGNOSIS — E11.65 TYPE 2 DIABETES MELLITUS WITH HYPERGLYCEMIA, WITHOUT LONG-TERM CURRENT USE OF INSULIN (HCC): ICD-10-CM

## 2020-01-20 PROBLEM — G92.8 TOXIC METABOLIC ENCEPHALOPATHY: Status: RESOLVED | Noted: 2020-01-06 | Resolved: 2020-01-20

## 2020-01-20 PROCEDURE — 99308 SBSQ NF CARE LOW MDM 20: CPT | Performed by: NURSE PRACTITIONER

## 2020-01-20 NOTE — PROGRESS NOTES
[facility]  [address]  [phone]  [facility] [place] [facility]      NAME: Beryle Gillis  AGE: 80 y o  SEX: female    DATE OF ENCOUNTER: 1/20/2020    Assessment and Plan     Type 2 diabetes mellitus with hyperglycemia, without long-term current use of insulin ([facility])    Lab Results   Component Value Date    HGBA1C 8 5 (H) 01/05/2020   Increase Lantus to 22 units subcutaneously at bedtime  Continue Accu-Cheks  Discussed once again with patient the importance of controlling her blood sugars  Discussed the the effect of eating large amounts of carbohydrates  Patient did not seem to be aware the carbohydrate would raise her blood sugars  Continue metformin 500 mg in the morning with breakfast     Ambulatory dysfunction  Fall precautions  [facility] with assistance for ambulation  Therapy, which is going well  Chief Complaint     Follow-up Note     History of Present Illness     Follow-up on uncontrolled blood sugars and ambulatory dysfunction  Patient was seen and examined at bedside  She is in stable condition and denies chest pain, shortness of breath, abdominal pain, nausea, vomiting, diarrhea, constipation, headache, dizziness, pain  She has been using a walker with assistance  She reports therapy is going well  Her blood sugars are remaining uncontrolled despite the increase of Lantus to 20 units at bedtime  There have been no episodes of hypoglycemia  Patient has food brought in  Presently she it was noted that she has a bag of cookies, pretzels, crackers as well as a bag of sugar free life-savers at bedside  There have been no new labs since last visit  Medications have been reviewed  Review of Systems     ROS as per noted in HPI  Objective     Vitals:  Blood pressure 130/80, pulse 82, respirations 16, temperature 97 4°  Pulse ox 99% on room air  Physical Exam   Constitutional: She is oriented to person, place, and time     Cardiovascular: Normal rate and normal heart sounds  Exam reveals no gallop and no friction rub  No murmur heard  Pulmonary/Chest: Effort normal and breath sounds normal  She has no rales  Abdominal: Soft  Bowel sounds are normal  There is no tenderness  Musculoskeletal: She exhibits edema  Patient is sitting in bedside recliner with legs elevated  There is trace to +1 bilateral lower extremity edema  Neurological: She is alert and oriented to person, place, and time  Skin: Skin is warm and dry  Nursing note and vitals reviewed  Current Medications   Medications were reviewed and updated in facility chart       ERNESTO Weiner  1/20/2020 2:56 PM

## 2020-01-20 NOTE — ASSESSMENT & PLAN NOTE
Lab Results   Component Value Date    HGBA1C 8 5 (H) 01/05/2020   Increase Lantus to 22 units subcutaneously at bedtime  Continue Accu-Cheks  Discussed once again with patient the importance of controlling her blood sugars  Discussed the the effect of eating large amounts of carbohydrates  Patient did not seem to be aware the carbohydrate would raise her blood sugars      Continue metformin 500 mg in the morning with breakfast

## 2020-01-23 ENCOUNTER — PATIENT OUTREACH (OUTPATIENT)
Dept: CASE MANAGEMENT | Facility: OTHER | Age: 85
End: 2020-01-23

## 2020-01-23 ENCOUNTER — PATIENT OUTREACH (OUTPATIENT)
Dept: FAMILY MEDICINE CLINIC | Facility: CLINIC | Age: 85
End: 2020-01-23

## 2020-01-23 NOTE — PROGRESS NOTES
Outpatient Care Management Note:  RE:Received call back from [address]  Pt lives with Moses Walker but now since she is in [facility], she may choose to stay there  Daughter states that he mother had asked to go into a nursing home for some time now  There is a brother and another sister to Moses Walker that have [address] [facility]  Family [facility] meeting set for 1/29 this month at [facility] with  to determine the future plan  She will keep my number should any concerns arise

## 2020-01-23 NOTE — PROGRESS NOTES
Outpatient Care Management Note:  RE:Pt had been referred for CM concerns prior to her hospitalization  Now at [facility] for [address]  Called and left message for pt's Doreen Thakur, with whom she lives regarding plan and or needs should pt come back home after STR

## 2020-01-23 NOTE — PROGRESS NOTES
PASHAM MOSES received referral from [address] [place] [address], in regards to long term options for patient  However, patient is now currently at [facility] after a hospital stay will continue to follow and be available, upon discharge

## 2020-02-01 DIAGNOSIS — R47.01 MIXED APHASIA: ICD-10-CM

## 2020-02-02 RX ORDER — ATORVASTATIN CALCIUM 20 MG/1
TABLET, FILM COATED ORAL
Qty: 30 TABLET | Refills: 0 | OUTPATIENT
Start: 2020-02-02

## 2020-02-03 NOTE — TELEPHONE ENCOUNTER
Per Hilda Dupree at [facility] [address] home pt does not need us to fill any medications   They have their own providers

## 2020-02-06 LAB — LEFT EYE DIABETIC RETINOPATHY: NORMAL

## 2020-02-17 ENCOUNTER — NURSING HOME VISIT (OUTPATIENT)
Dept: GERIATRICS | Facility: OTHER | Age: 85
End: 2020-02-17
Payer: MEDICARE

## 2020-02-17 DIAGNOSIS — K59.01 SLOW TRANSIT CONSTIPATION: ICD-10-CM

## 2020-02-17 DIAGNOSIS — R60.0 LOCALIZED EDEMA: ICD-10-CM

## 2020-02-17 DIAGNOSIS — K64.8 OTHER HEMORRHOIDS: Primary | ICD-10-CM

## 2020-02-17 PROCEDURE — 99309 SBSQ NF CARE MODERATE MDM 30: CPT | Performed by: FAMILY MEDICINE

## 2020-02-17 NOTE — PROGRESS NOTES
[facility]  [address]  [phone] [address] of Service: nursing home place of service: POS 32 Unskilled- No Part A Coverage      NAME: Beryle Gillis  AGE: 80 y o  SEX: female [id number]    DATE OF ENCOUNTER: 2/19/2020    Assessment and Plan     Problem List Items Addressed This Visit        Digestive    Other hemorrhoids - Primary     Will start annusol suppositories BID and follow up  Encourage po hydration, offer prune juice and monitor for BM  Ordered CBC, CMP         Slow transit constipation     Continue Senna and miralax daily, encourage po hydration and prune juice  Will continue to monitor             Other    Localized edema     Family concerned about leg swelling  Advised to keep LE elevated, continue JEZ stockings and will monitor  If no improvement will consider diuresis  Chief Complaint     Acute visit anal bleeding , hemorrhoids     History of Present Illness     Beryle Gillis is a 80 y o  female who was seen today for follow up  I saw the patient at family ad staff request  Rectal bleeding noted patient has hemorrhoids  She reported constipation, no nausea, vomiting, constipation  Denies dizziness, lightheadedness, abdominal pain  The following portions of the patient's history were reviewed and updated as appropriate: allergies, current medications, past family history, past medical history, past social history, past surgical history and problem list     Review of Systems     Review of Systems   Constitutional: Negative for chills and fever  Frail looking   HENT: Negative for congestion, drooling and rhinorrhea  Eyes: Negative for discharge  Respiratory: Negative for cough and shortness of breath  Cardiovascular: Positive for leg swelling  Negative for chest pain and palpitations  Gastrointestinal: Positive for anal bleeding and constipation  Negative for abdominal pain  Endocrine: Negative for cold intolerance  Genitourinary: Negative for dysuria and hematuria  Musculoskeletal: Positive for gait problem  Skin: Negative for rash  Allergic/Immunologic: Negative for environmental allergies  Neurological: Negative for dizziness, seizures and headaches  Hematological: Does not bruise/bleed easily  Psychiatric/Behavioral: Negative for behavioral problems and hallucinations  Active Problem List     Patient Active Problem List   Diagnosis    Type 2 diabetes mellitus with hyperglycemia, without long-term current use of insulin ([facility] )    Acquired hypothyroidism    Hypertensive heart disease without heart failure    Mixed hyperlipidemia    Vitamin D deficiency    Leg ulcer, right, limited to breakdown of skin ([facility] )    Type 2 diabetes mellitus with foot ulcer (CODE) ([facility])    Chronic atrial fibrillation    Transaminitis    Generalized osteoarthritis    CKD (chronic kidney disease) stage 3, GFR 30-59 ml/min ([facility])    Mixed aphasia, global    Change in mental status    UTI (urinary tract infection)    Advance care planning    Localized edema    Macular degeneration    Essential (primary) hypertension    Ambulatory dysfunction    Other hemorrhoids    Slow transit constipation       Objective     Vital Signs:     Blood pressure 102/88 Heart Rate: 82 Respiratory Rate 18   Temperature 97 5 Oxygen Saturation 95 %RA     Physical Exam   Constitutional: No distress  HENT:   Head: Normocephalic and atraumatic  Eyes: Pupils are equal, round, and reactive to light  EOM are normal  Right eye exhibits no discharge  Left eye exhibits no discharge  Neck: Normal range of motion  Neck supple  Cardiovascular: Normal rate, regular rhythm and normal heart sounds  No murmur heard  Pulmonary/Chest: Effort normal and breath sounds normal  No respiratory distress  She has no wheezes  Abdominal: Soft  There is no tenderness  There is no rebound and no guarding  Hemorrhoids noted 5 oclock, no active bleeding currently    Musculoskeletal: She exhibits edema and tenderness  Wheelchair/walker   Neurological: She is alert  Skin: Skin is warm and dry  She is not diaphoretic  Psychiatric: Her behavior is normal    Nursing note and vitals reviewed  Pertinent Laboratory/Diagnostic Studies:  Laboratory and Imaging studies reviewed  Full report in the paper chart  Current Medications   Medications reviewed and updated in facility chart      Name: Aaron Howe  : 11/3/1927  MRN: [id number]  DOS: 2020      Dean Santos MD  Geriatric Medicine  2020 2:51 PM

## 2020-02-19 ENCOUNTER — NURSING HOME VISIT (OUTPATIENT)
Dept: GERIATRICS | Facility: OTHER | Age: 85
End: 2020-02-19
Payer: MEDICARE

## 2020-02-19 DIAGNOSIS — R74.01 TRANSAMINITIS: ICD-10-CM

## 2020-02-19 DIAGNOSIS — I48.20 CHRONIC ATRIAL FIBRILLATION (HCC): Chronic | ICD-10-CM

## 2020-02-19 DIAGNOSIS — Z79.4 TYPE 2 DIABETES MELLITUS WITH OTHER SPECIFIED COMPLICATION, WITH LONG-TERM CURRENT USE OF INSULIN (HCC): Primary | ICD-10-CM

## 2020-02-19 DIAGNOSIS — R60.0 LOCALIZED EDEMA: ICD-10-CM

## 2020-02-19 DIAGNOSIS — E11.69 TYPE 2 DIABETES MELLITUS WITH OTHER SPECIFIED COMPLICATION, WITH LONG-TERM CURRENT USE OF INSULIN (HCC): Primary | ICD-10-CM

## 2020-02-19 PROBLEM — K64.8 OTHER HEMORRHOIDS: Status: ACTIVE | Noted: 2020-02-19

## 2020-02-19 PROBLEM — K59.01 SLOW TRANSIT CONSTIPATION: Status: ACTIVE | Noted: 2020-02-19

## 2020-02-19 PROBLEM — E11.9 TYPE 2 DIABETES MELLITUS, WITH LONG-TERM CURRENT USE OF INSULIN (HCC): Status: ACTIVE | Noted: 2020-02-19

## 2020-02-19 PROCEDURE — 99310 SBSQ NF CARE HIGH MDM 45: CPT | Performed by: NURSE PRACTITIONER

## 2020-02-19 NOTE — ASSESSMENT & PLAN NOTE
Elevated AlK   Phosp/AST and TBilirubin (2/19/2020)  Will D/C Ursodiol BID  Liver Function Test on 2/24/2020  BMP on 2/24/2020

## 2020-02-19 NOTE — ASSESSMENT & PLAN NOTE
Family concerned about leg swelling  Advised to keep LE elevated, continue JEZ stockings and will monitor  If no improvement will consider diuresis

## 2020-02-19 NOTE — ASSESSMENT & PLAN NOTE
HbA1C: 6 8 (2/19/2020) <= 8 5 (1/5/2020)  FBG range (2/1-19/2020) = 54 to 129  Pre-lunch BG range (2/1-19/2020) = 189 to 306  Pre dinner BG range (2/1-18/2020) =  105 to 238  2100 BG range (2/1-18/2020) = 106 to 268  Reduce Lantus to 18 units at bedtime  D/C Humalog SSI  D/C QID BG check  Continue Metformin 500mg daily in AM  Start BG checks BID (6AM and 9PM)

## 2020-02-19 NOTE — ASSESSMENT & PLAN NOTE
Tachycardic  Not in distress  Continue Pradaxa 150mg BID  BP controlled and stable  NO anemia   Platelet count WNL (7/49/2477)

## 2020-02-19 NOTE — ASSESSMENT & PLAN NOTE
Reported severe pain to LLE  Increased BLE edema: (+3) pitting with slight erythema  Will Order Venous Doppler to BLE to rule out DVT  Elevate BLE as often as possible  Continue TEDS stockings before OOB/ OFF at bedtime  Will start Furosemide 20mg daily in AM - HOLD for SBP less than 110    BMP on 2/24/2020

## 2020-02-19 NOTE — ASSESSMENT & PLAN NOTE
Will start annusol suppositories BID and follow up  Encourage po hydration, offer prune juice and monitor for BM   Ordered CBC, CMP

## 2020-02-27 ENCOUNTER — TELEPHONE (OUTPATIENT)
Dept: OTHER | Facility: OTHER | Age: 85
End: 2020-02-27

## 2020-02-27 NOTE — TELEPHONE ENCOUNTER
PT 's daughter called in stating she needs a doctor associated with the home the the PT is in,to allow her mother to have eye drops in the morning

## 2020-04-09 ENCOUNTER — TELEMEDICINE (OUTPATIENT)
Dept: GERIATRICS | Facility: OTHER | Age: 85
End: 2020-04-09
Payer: MEDICARE

## 2020-04-09 ENCOUNTER — DOCUMENTATION (OUTPATIENT)
Dept: GERIATRICS | Facility: OTHER | Age: 85
End: 2020-04-09

## 2020-04-09 DIAGNOSIS — Z79.4 TYPE 2 DIABETES MELLITUS WITH OTHER SPECIFIED COMPLICATION, WITH LONG-TERM CURRENT USE OF INSULIN (HCC): Primary | ICD-10-CM

## 2020-04-09 DIAGNOSIS — R26.2 AMBULATORY DYSFUNCTION: ICD-10-CM

## 2020-04-09 DIAGNOSIS — E03.9 ACQUIRED HYPOTHYROIDISM: ICD-10-CM

## 2020-04-09 DIAGNOSIS — I48.91 ATRIAL FIBRILLATION, UNSPECIFIED TYPE (HCC): ICD-10-CM

## 2020-04-09 DIAGNOSIS — N18.30 CKD (CHRONIC KIDNEY DISEASE) STAGE 3, GFR 30-59 ML/MIN (HCC): ICD-10-CM

## 2020-04-09 DIAGNOSIS — E11.69 TYPE 2 DIABETES MELLITUS WITH OTHER SPECIFIED COMPLICATION, WITH LONG-TERM CURRENT USE OF INSULIN (HCC): Primary | ICD-10-CM

## 2020-04-09 DIAGNOSIS — I11.9 HYPERTENSIVE HEART DISEASE WITHOUT HEART FAILURE: ICD-10-CM

## 2020-04-09 PROCEDURE — 99309 SBSQ NF CARE MODERATE MDM 30: CPT | Performed by: FAMILY MEDICINE

## 2020-06-05 ENCOUNTER — NURSING HOME VISIT (OUTPATIENT)
Dept: GERIATRICS | Facility: OTHER | Age: 85
End: 2020-06-05
Payer: MEDICARE

## 2020-06-05 DIAGNOSIS — E11.69 TYPE 2 DIABETES MELLITUS WITH OTHER SPECIFIED COMPLICATION, WITH LONG-TERM CURRENT USE OF INSULIN (HCC): Primary | ICD-10-CM

## 2020-06-05 DIAGNOSIS — I48.20 CHRONIC ATRIAL FIBRILLATION (HCC): Chronic | ICD-10-CM

## 2020-06-05 DIAGNOSIS — N18.30 CKD (CHRONIC KIDNEY DISEASE) STAGE 3, GFR 30-59 ML/MIN (HCC): ICD-10-CM

## 2020-06-05 DIAGNOSIS — I10 ESSENTIAL (PRIMARY) HYPERTENSION: ICD-10-CM

## 2020-06-05 DIAGNOSIS — R74.01 TRANSAMINITIS: ICD-10-CM

## 2020-06-05 DIAGNOSIS — Z79.4 TYPE 2 DIABETES MELLITUS WITH OTHER SPECIFIED COMPLICATION, WITH LONG-TERM CURRENT USE OF INSULIN (HCC): Primary | ICD-10-CM

## 2020-06-05 DIAGNOSIS — E03.9 ACQUIRED HYPOTHYROIDISM: ICD-10-CM

## 2020-06-05 PROCEDURE — 99309 SBSQ NF CARE MODERATE MDM 30: CPT | Performed by: NURSE PRACTITIONER

## 2020-07-07 ENCOUNTER — NURSING HOME VISIT (OUTPATIENT)
Dept: GERIATRICS | Facility: OTHER | Age: 85
End: 2020-07-07
Payer: MEDICARE

## 2020-07-07 DIAGNOSIS — Z79.4 TYPE 2 DIABETES MELLITUS WITH OTHER SPECIFIED COMPLICATION, WITH LONG-TERM CURRENT USE OF INSULIN (HCC): Primary | ICD-10-CM

## 2020-07-07 DIAGNOSIS — E03.9 ACQUIRED HYPOTHYROIDISM: ICD-10-CM

## 2020-07-07 DIAGNOSIS — E11.69 TYPE 2 DIABETES MELLITUS WITH OTHER SPECIFIED COMPLICATION, WITH LONG-TERM CURRENT USE OF INSULIN (HCC): Primary | ICD-10-CM

## 2020-07-07 DIAGNOSIS — R26.2 AMBULATORY DYSFUNCTION: ICD-10-CM

## 2020-07-07 DIAGNOSIS — N18.30 CKD (CHRONIC KIDNEY DISEASE) STAGE 3, GFR 30-59 ML/MIN (HCC): ICD-10-CM

## 2020-07-07 DIAGNOSIS — I48.20 CHRONIC ATRIAL FIBRILLATION (HCC): Chronic | ICD-10-CM

## 2020-07-07 DIAGNOSIS — R60.0 LOCALIZED EDEMA: ICD-10-CM

## 2020-07-07 PROCEDURE — 99309 SBSQ NF CARE MODERATE MDM 30: CPT | Performed by: FAMILY MEDICINE

## 2020-07-07 NOTE — PROGRESS NOTES
[facility]  [address]  [phone] [address] of Service: nursing home place of service: POS 32 Unskilled- No Part A Coverage      NAME: Zackary Rowe  AGE: 80 y o  SEX: female [phone]    DATE OF ENCOUNTER: 7/10/2020    Assessment and Plan     Problem List Items Addressed This Visit        Endocrine    Acquired hypothyroidism     Continue synthroid  Will continue to monitor TSH  Type 2 diabetes mellitus, with long-term current use of insulin ([facility]) - Primary       Last Hb A1c was 6 3 one month ago  Will decrease lantus to 16 units daily and increase metformin to 500 mg BID  Goal HbA1c for her age and comorbidity would be<9  Continue to monitor accuchecks while on insulin  Goal is to transition to po medication and discontinue insulin in the future            Cardiovascular and Mediastinum    Chronic atrial fibrillation (Chronic)     Currently rate controlled, asymptomatic  Continue same regimen and monitor  Continue anticoagulation with pradaxa            Genitourinary    CKD (chronic kidney disease) stage 3, GFR 30-59 ml/min ([facility])     Creatinine stable  Will avoid nephrotoxic medication  Encourage po hydration  Will monitor BMP  Other    Localized edema     Continue diuretics  Will check weights weekly instead of daily  Was was stable for the past month         Ambulatory dysfunction     Currently using wheelchair  Continue Pt/OT as needed                       Chief Complaint     Follow up chronic conditions    History of Present Illness     Zackary Rowe is a 80 y o  female who was seen today for follow up  Patient seen and examined at bedside, states she feels well, no acute complaints at this time  As per staff no acute events to report  Has good appetite, blood glucose well controlled  No difficulty sleeping  She denies CP, SOB, cough, fever, chills, abdominal pain, diarrhea            The following portions of the patient's history were reviewed and updated as appropriate: allergies, current medications, past family history, past medical history, past social history, past surgical history and problem list     Review of Systems     Review of Systems   Constitutional: Negative for chills and fever  HENT: Positive for hearing loss  Negative for congestion and rhinorrhea  Eyes: Positive for visual disturbance  Respiratory: Negative for cough, shortness of breath and wheezing  Cardiovascular: Positive for leg swelling  Negative for chest pain and palpitations  Gastrointestinal: Negative for abdominal pain and constipation  Endocrine: Negative for cold intolerance  Genitourinary: Negative for difficulty urinating, dysuria and hematuria  Musculoskeletal: Positive for gait problem  Skin: Negative for wound  Allergic/Immunologic: Negative for environmental allergies  Neurological: Negative for dizziness and seizures  Hematological: Bruises/bleeds easily  Psychiatric/Behavioral: Negative for behavioral problems and sleep disturbance         Active Problem List     Patient Active Problem List   Diagnosis    Type 2 diabetes mellitus with hyperglycemia, without long-term current use of insulin ([facility] )    Acquired hypothyroidism    Hypertensive heart disease without heart failure    Mixed hyperlipidemia    Vitamin D deficiency    Leg ulcer, right, limited to breakdown of skin ([facility] )    Type 2 diabetes mellitus with foot ulcer (CODE) ([facility])    Chronic atrial fibrillation    Transaminitis    Generalized osteoarthritis    CKD (chronic kidney disease) stage 3, GFR 30-59 ml/min ([facility])    Mixed aphasia, global    Change in mental status    UTI (urinary tract infection)    Advance care planning    Localized edema    Macular degeneration    Essential (primary) hypertension    Ambulatory dysfunction    Other hemorrhoids    Slow transit constipation    Type 2 diabetes mellitus, with long-term current use of insulin (HCC)       Objective     Vital Signs:     Blood pressure 126/62 Heart Rate: 72 Respiratory Rate 20   Temperature 98 8 Oxygen Saturation 95% Weight 191 4lbs    Physical Exam   Constitutional: No distress  obese   HENT:   Head: Normocephalic and atraumatic  [place]   Eyes: No scleral icterus  Neck: No JVD present  Cardiovascular: Normal rate and normal heart sounds  An irregularly irregular rhythm present  No murmur heard  Pulmonary/Chest: Effort normal and breath sounds normal  No respiratory distress  She has no wheezes  Abdominal: Soft  There is no tenderness  There is no guarding  Musculoskeletal: She exhibits edema  She exhibits no tenderness  wheelchair   Neurological: She is alert  AAOx2-3   Skin: Skin is warm and dry  She is not diaphoretic  Psychiatric: She has a normal mood and affect  Her behavior is normal    Nursing note and vitals reviewed  Pertinent Laboratory/Diagnostic Studies:  Laboratory and Imaging studies reviewed  Full report in the paper chart  Current Medications   Medications reviewed and updated in facility chart      Name: Misbah Maki  : 11/3/1927  MRN: [id number]  DOS: 7/10/2020      Talib Lewis MD  Geriatric Medicine  7/10/2020 11:17 AM

## 2020-07-10 NOTE — ASSESSMENT & PLAN NOTE
Last Hb A1c was 6 3 one month ago  Will decrease lantus to 16 units daily and increase metformin to 500 mg BID  Goal HbA1c for her age and comorbidity would be<9  Continue to monitor accuchecks while on insulin   Goal is to transition to po medication and discontinue insulin in the future

## 2020-07-10 NOTE — ASSESSMENT & PLAN NOTE
Currently rate controlled, asymptomatic  Continue same regimen and monitor    Continue anticoagulation with pradaxa

## 2020-08-13 ENCOUNTER — NURSING HOME VISIT (OUTPATIENT)
Dept: GERIATRICS | Facility: OTHER | Age: 85
End: 2020-08-13
Payer: MEDICARE

## 2020-08-13 DIAGNOSIS — E11.69 TYPE 2 DIABETES MELLITUS WITH OTHER SPECIFIED COMPLICATION, WITH LONG-TERM CURRENT USE OF INSULIN (HCC): Primary | ICD-10-CM

## 2020-08-13 DIAGNOSIS — I10 ESSENTIAL (PRIMARY) HYPERTENSION: ICD-10-CM

## 2020-08-13 DIAGNOSIS — I48.20 CHRONIC ATRIAL FIBRILLATION (HCC): Chronic | ICD-10-CM

## 2020-08-13 DIAGNOSIS — Z79.4 TYPE 2 DIABETES MELLITUS WITH OTHER SPECIFIED COMPLICATION, WITH LONG-TERM CURRENT USE OF INSULIN (HCC): Primary | ICD-10-CM

## 2020-08-13 DIAGNOSIS — E03.9 ACQUIRED HYPOTHYROIDISM: ICD-10-CM

## 2020-08-13 DIAGNOSIS — N18.30 CKD (CHRONIC KIDNEY DISEASE) STAGE 3, GFR 30-59 ML/MIN (HCC): ICD-10-CM

## 2020-08-13 PROCEDURE — 99309 SBSQ NF CARE MODERATE MDM 30: CPT | Performed by: NURSE PRACTITIONER

## 2020-08-13 NOTE — ASSESSMENT & PLAN NOTE
TSH and Free T4 WNL (6/15/2020)  Continue Levothyroxine 75mcg daily  TSH and Free T4 Q6 months: December 15, 2020

## 2020-08-13 NOTE — PROGRESS NOTES
Progress Note    Serg Riggs 96  POS: 32 ([facility])    Assessment/Plan:    Type 2 diabetes mellitus, with long-term current use of insulin ([facility])  Improved HbA1C: 6 3 (6/15/2020) <= 8 5 (H: 1/5/2020)  FBG range (8/1-13/2020) = 77 to 108  2100 BG range (8/1-12/2020) =  125 to 209  Continue the following meds:  * Lantus 15 units at bedtime  * Metformin 500mg daily in AM  * Vit B12 1000mcg daily in AM  Check labs every 6 months: CBC w/o diff, CMP, TSH, Free T4, HbA1C  = Next due date: 12/15/2020  Essential (primary) hypertension  BP range (8/1-13/2020) = 106/71 to 146/91  Goal: < 140 - 150/90  Continue the following meds:  * Furosemide 20mg daily  * Metoprolol Succ ER25m g Q12 hors - with HOLD parameters  Renal and Hepatic functions WNL (6/15/2020)    CKD (chronic kidney disease) stage 3, GFR 30-59 ml/min ([facility])  Renal and hepatic Functions WNL (6/15/2020)  At baseline creatinine: 0 83 to 1 00 (2020)  Nursing to actively offer oral fluids ans max  daily restrictions  Avoid hypotensive episode  Avoid nephrotoxic medications  Continue daily diuretic     Chronic atrial fibrillation  BP and HR stable on current medication  HR range (8/1-13/2020) = 68 to 98/min  Continue Eliquis 5mg Q12 hours  No cardiopulmonary symptoms    Acquired hypothyroidism  TSH and Free T4 WNL (6/15/2020)  Continue Levothyroxine 75mcg daily  TSH and Free T4 Q6 months: December 15, 2020      Chief complaint / Reason for visit: Follow-up visit (30 day)    History of Present Illness: This is a 80 y o  Female patient admitted at [facility] for debility  Patient is seen and examined today as a 30 day follow-up for any acute and chronic medical conditions: DM Type 2, Hypothyroidism, CKD3, HTN and Chronic Atrial Fibrillation      Patient is OOB, sitting on her in room lounge chair - appear comfortable and relaxed - alert, cooperative, pleasant, verbal with clear coherent speech   Noted significant hearing and vision impairment - have to repeat questions at least twice on this visit  Patient stated, " I'm really fine" - denied any acute medical concerns for this visit including new or worsening pain, chest pain, SOB/ difficulty breathing, orthopnea, headaches, GI/ related concerns, fever, chills, malaise/fatigue, appetite change or sleep pattern change  Per nursing, no acute medical concerns for this visit - described as at baseline in mood/ behavior, appetite and sleep patterns      Examination is unremarkable  V/S: T97 7F -P68-R20 BP: 124/70 SpO2: 97% RA  Patient presents to be medically stable on this visit  Reviewed recent labs done on 6/15/2020 - benign results  Please see abnormal values in the lab section of this note  Review of Systems:  Per history of present illness, all other systems reviewed and negative  HISTORY:  Medical Hx: Reviewed, unchanged  Family Hx: Reviewed, unchanged  Soc Hx: Reviewed,  Unchanged    ALLERGY: Reviewed, unchanged  Allergies   Allergen Reactions    Aspirin GI Intolerance    Penicillins        PHYSICAL EXAM:  Vital Signs: T97 7F -P68-R20 BP: 124/70 SpO2: 97% RA  Weight: 191 0 lbs (8/12/2020) <= 190 4 lbs (7/12/2020) <= 190 6 lbs (6/11/2020)    Physical Exam  Vitals signs and nursing note reviewed  Constitutional:       General: She is not in acute distress  Appearance: Normal appearance  She is obese  She is not ill-appearing, toxic-appearing or diaphoretic  HENT:      Head: Normocephalic and atraumatic  Right Ear: Tympanic membrane, ear canal and external ear normal  There is no impacted cerumen  Left Ear: Tympanic membrane, ear canal and external ear normal  There is no impacted cerumen  Ears:      Comments: Hearing impaired  Nose: Nose normal  No congestion or rhinorrhea  Mouth/Throat:      Mouth: Mucous membranes are moist       Pharynx: Oropharynx is clear  No oropharyngeal exudate or posterior oropharyngeal erythema  Eyes:      General: No scleral icterus  Right eye: No discharge  Conjunctiva/sclera: Conjunctivae normal       Pupils: Pupils are equal, round, and reactive to light  Comments: Extropia to left eye   Neck:      Musculoskeletal: Neck supple  No neck rigidity or muscular tenderness  Vascular: No carotid bruit  Cardiovascular:      Rate and Rhythm: Normal rate and regular rhythm  Heart sounds: No murmur  No friction rub  No gallop  Pulmonary:      Effort: Pulmonary effort is normal  No respiratory distress  Breath sounds: No stridor  No wheezing, rhonchi or rales  Chest:      Chest wall: No tenderness  Abdominal:      General: Bowel sounds are normal  There is no distension  Palpations: Abdomen is soft  There is no mass  Tenderness: There is no abdominal tenderness  There is no right CVA tenderness, left CVA tenderness, guarding or rebound  Hernia: No hernia is present  Comments: Obese abdomen  Soft non tender  Genitourinary:     Comments: Non bladder - uses adult brief for incontinence  Musculoskeletal:         General: No swelling, tenderness, deformity or signs of injury  Right lower leg: No edema  Left lower leg: No edema  Lymphadenopathy:      Cervical: No cervical adenopathy  Skin:     General: Skin is warm and dry  Coloration: Skin is not jaundiced or pale  Findings: No bruising, erythema, lesion or rash  Neurological:      Mental Status: She is alert  Comments: Oriented to name and birthday  Verbal with clear coherent speech   Psychiatric:         Mood and Affect: Mood normal          Behavior: Behavior normal        Laboratory results / Imaging reivewed: Hard copies in medical chart:    * CBC with diff (6/15/2020) = WNL except:  RBC: 4 97 (H)    * CMP (6/15/2020) = WNL except:  Ca: 8 4 (L)  Alb: 2 7 (L)  TPro: 5 4 (L)    * Free T4 (6/15/2020) = 1 21 (WNL)    * TSH (6/15/2020) = 2 49 (WNL)    * HbA1C (6/15/2200) = 6 3 (H)      Current Medications:   All medications reviewed and updated in [address] [place] [address],  [id number]  8/13/2020

## 2020-08-13 NOTE — ASSESSMENT & PLAN NOTE
Renal and hepatic Functions WNL (6/15/2020)  At baseline creatinine: 0 83 to 1 00 (2020)  Nursing to actively offer oral fluids ans max   daily restrictions  Avoid hypotensive episode  Avoid nephrotoxic medications  Continue daily diuretic

## 2020-08-13 NOTE — ASSESSMENT & PLAN NOTE
BP and HR stable on current medication  HR range (8/1-13/2020) = 68 to 98/min  Continue Eliquis 5mg Q12 hours  No cardiopulmonary symptoms

## 2020-08-13 NOTE — ASSESSMENT & PLAN NOTE
Improved HbA1C: 6 3 (6/15/2020) <= 8 5 (H: 1/5/2020)  FBG range (8/1-13/2020) = 77 to 108  2100 BG range (8/1-12/2020) =  125 to 209  Continue the following meds:  * Lantus 15 units at bedtime  * Metformin 500mg daily in AM  * Vit B12 1000mcg daily in AM  Check labs every 6 months: CBC w/o diff, CMP, TSH, Free T4, HbA1C  = Next due date: 12/15/2020

## 2020-08-13 NOTE — ASSESSMENT & PLAN NOTE
BP range (8/1-13/2020) = 106/71 to 146/91  Goal: < 140 - 150/90  Continue the following meds:  * Furosemide 20mg daily  * Metoprolol Succ ER25m g Q12 hors - with HOLD parameters  Renal and Hepatic functions WNL (6/15/2020)

## 2020-09-07 ENCOUNTER — TELEPHONE (OUTPATIENT)
Dept: OTHER | Facility: OTHER | Age: 85
End: 2020-09-07

## 2020-09-08 ENCOUNTER — NURSING HOME VISIT (OUTPATIENT)
Dept: GERIATRICS | Facility: OTHER | Age: 85
End: 2020-09-08
Payer: MEDICARE

## 2020-09-08 DIAGNOSIS — E11.69 TYPE 2 DIABETES MELLITUS WITH OTHER SPECIFIED COMPLICATION, WITH LONG-TERM CURRENT USE OF INSULIN (HCC): ICD-10-CM

## 2020-09-08 DIAGNOSIS — M79.604 PAIN IN BOTH LOWER EXTREMITIES: ICD-10-CM

## 2020-09-08 DIAGNOSIS — R60.0 LOCALIZED EDEMA: ICD-10-CM

## 2020-09-08 DIAGNOSIS — M79.605 PAIN IN BOTH LOWER EXTREMITIES: ICD-10-CM

## 2020-09-08 DIAGNOSIS — I10 ESSENTIAL (PRIMARY) HYPERTENSION: ICD-10-CM

## 2020-09-08 DIAGNOSIS — N18.30 CKD (CHRONIC KIDNEY DISEASE) STAGE 3, GFR 30-59 ML/MIN (HCC): ICD-10-CM

## 2020-09-08 DIAGNOSIS — G93.40 ACUTE ENCEPHALOPATHY: Primary | ICD-10-CM

## 2020-09-08 DIAGNOSIS — E87.1 HYPONATREMIA: ICD-10-CM

## 2020-09-08 DIAGNOSIS — I48.20 CHRONIC ATRIAL FIBRILLATION (HCC): Chronic | ICD-10-CM

## 2020-09-08 DIAGNOSIS — Z79.4 TYPE 2 DIABETES MELLITUS WITH OTHER SPECIFIED COMPLICATION, WITH LONG-TERM CURRENT USE OF INSULIN (HCC): ICD-10-CM

## 2020-09-08 DIAGNOSIS — E03.9 ACQUIRED HYPOTHYROIDISM: ICD-10-CM

## 2020-09-08 DIAGNOSIS — E11.65 TYPE 2 DIABETES MELLITUS WITH HYPERGLYCEMIA, WITHOUT LONG-TERM CURRENT USE OF INSULIN (HCC): ICD-10-CM

## 2020-09-08 PROBLEM — M79.606 LOWER EXTREMITY PAIN: Status: ACTIVE | Noted: 2020-09-08

## 2020-09-08 PROBLEM — N39.0 UTI (URINARY TRACT INFECTION): Status: RESOLVED | Noted: 2020-01-06 | Resolved: 2020-09-08

## 2020-09-08 PROCEDURE — 99310 SBSQ NF CARE HIGH MDM 45: CPT | Performed by: FAMILY MEDICINE

## 2020-09-08 RX ORDER — FUROSEMIDE 20 MG/1
20 TABLET ORAL DAILY
Start: 2020-09-08 | End: 2020-09-29 | Stop reason: ALTCHOICE

## 2020-09-08 RX ORDER — INSULIN GLARGINE 100 [IU]/ML
10 INJECTION, SOLUTION SUBCUTANEOUS
Refills: 0 | Status: ON HOLD
Start: 2020-09-08 | End: 2020-09-28 | Stop reason: SDUPTHER

## 2020-09-08 NOTE — ASSESSMENT & PLAN NOTE
Lab Results   Component Value Date    HGBA1C 8 5 (H) 01/05/2020     Well controlled   HbA1c trended down from 8 5 to 6 3 (6/15/2020)   Given patient's age, her HbA1c goal should be around 8  FBG ranges between 90 to 120   This morning fasting BG was over 200   Will decrease Lantus from 15 units QHS to 10 units QHS  Continue with Metformin 500 mg QAM  Monitor blood glucose readings and adjust regimen accordingly   Avoid hypoglycemic events

## 2020-09-08 NOTE — ASSESSMENT & PLAN NOTE
Bilateral knee and ankle pain   Most likely LE tenderness 2/2 to arthritis vs acute fracture from unwitnessed fall 1 day ago   Will schedule Tylenol 650 mg TID to control her sx   Continue with PT/OT

## 2020-09-08 NOTE — TELEPHONE ENCOUNTER
Mati Bird Gravely : 11/3/1927    Msg: Pt fell and has a skin tear  She also has had increased confusion today      On

## 2020-09-08 NOTE — ASSESSMENT & PLAN NOTE
Controlled with Levothyroxine 75 mcg daily   Thyroid workup on 06/15/20 with TSH and Free T4 WNL  Will continue with same regimen  Will get updated TSH and T4

## 2020-09-08 NOTE — ASSESSMENT & PLAN NOTE
Heart rate controlled  Irregularly irregular rhythm, hemodynamically stable   Continue with Eliquis 5 mg Q12 hrs

## 2020-09-08 NOTE — ASSESSMENT & PLAN NOTE
Waxing and waning mentation with decreased attention of 1 day onset prior to suffering unwitnessed fall yesterday afternoon  Per nursing staff, patient did not have loss of consciousness and no head concussion  Differential diagnoses: acute encephalopathy 2/2 to uncontrolled pain in the setting of recent fall, electrolyte abnormalities (hyponatremia), urinary retention, hypoglycemia, stroke  Previous lab workup reviewed showing mild hyponatremia (Na 132)  CBC showed no signs of infection or worsening anemia  Fasting glucose in the 200 range, no hypoglycemic event  Renal function stable at this time  Urinary retention r/o with negative bladder scan  Stroke unlikely given no focal neurological deficits  Family was called to discuss the options for obtaining CT head scan imaging to rule out hemorrhagic bleed given the hx of being on Eliquis  POA (Sandeep Schulte, patient's daughter) did not  the phone, left voicemail  Bilateral knee and ankle tenderness most likely 2/2 to degenerative changes in the setting of arthritis  Low suspicion for fractures s/p fall, will hold off on getting X-Rays at this time     Will get new set of labs today: CBC, CMP, TSH  Will schedule Tylenol 650 mg TID to control her pain     Fall precautions and neuro checks   If patient becomes agitated, reorient patient and consider Gabapentin low dose   Continue with PT/OT   Close monitoring for mental changes

## 2020-09-08 NOTE — ASSESSMENT & PLAN NOTE
Renal function stable   Reviewed most recent labs, Creatinine remains at the baseline range 0 83-1 00  Continue with diuretic use   Avoid nephrotoxic medications   Encourage keeping patient hydrated   Avoid hypotensive events

## 2020-09-08 NOTE — PROGRESS NOTES
[facility]  Claudia Tosc[address]  [phone]  [facility] List: [facility]      NAME: Nicolette Ferguson  AGE: 80 y o  SEX: female [phone]    DATE OF ENCOUNTER: 9/8/2020    Assessment and Plan     Diagnoses and all orders for this visit:    Acute encephalopathy    Chronic atrial fibrillation  -     apixaban (ELIQUIS) 5 mg; Take 1 tablet (5 mg total) by mouth 2 (two) times a day    Hyponatremia    Pain in both lower extremities    Type 2 diabetes mellitus with other specified complication, with long-term current use of insulin ([facility])    Acquired hypothyroidism    Type 2 diabetes mellitus with hyperglycemia, without long-term current use of insulin ([facility])  -     insulin glargine (LANTUS) 100 units/mL subcutaneous injection; Inject 10 Units under the skin daily at bedtime    Essential (primary) hypertension  -     furosemide (LASIX) 20 mg tablet; Take 1 tablet (20 mg total) by mouth daily    Localized edema  -     furosemide (LASIX) 20 mg tablet; Take 1 tablet (20 mg total) by mouth daily    CKD (chronic kidney disease) stage 3, GFR 30-59 ml/min ([facility])      Acute Encephalopathy   Waxing and waning mentation with decreased attention of 1 day onset prior to suffering unwitnessed fall yesterday afternoon  Per nursing staff, patient did not have loss of consciousness and no head concussion  Differential diagnoses: acute encephalopathy 2/2 to uncontrolled pain in the setting of recent fall, electrolyte abnormalities (hyponatremia), urinary retention, hypoglycemia, stroke  Previous lab workup reviewed showing mild hyponatremia (Na 132)  CBC showed no signs of infection or worsening anemia  Fasting glucose in the 200 range, no hypoglycemic event  Renal function stable at this time  Urinary retention r/o with negative bladder scan  Stroke unlikely given no focal neurological deficits   Family was called to discuss the options for obtaining CT head scan imaging to rule out hemorrhagic bleed given the hx of being on Eliquis  POA (Janice Azul, patient's daughter) did not  the phone, left voicemail  Bilateral knee and ankle tenderness most likely 2/2 to degenerative changes in the setting of arthritis  Low suspicion for fractures s/p fall, will hold off on getting X-Rays at this time  Will get new set of labs today: CBC, CMP, TSH  Will schedule Tylenol 650 mg TID to control her pain     Fall precautions and neuro checks   If patient becomes agitated, reorient patient and consider Gabapentin low dose   Continue with PT/OT   Close monitoring for mental changes     Hyponatremia   Mild as noted on most recent lab workup (na 132)  Clinically patient appears hypovolemic, with dry oral mucosa   Will hydrate patient with NS 70cc/hr for a total of 1 L  Recheck CMP tomorrow   Continue with neuro checks and monitor VS    Lower Extremity Edema   Bilateral knee and ankle pain   Most likely LE tenderness 2/2 to arthritis vs acute fracture from unwitnessed fall 1 day ago   Will schedule Tylenol 650 mg TID to control her sx   Continue with PT/OT    Type 2 Diabetes Mellitus with long term current use of insulin   Well controlled   HbA1c trended down from 8 5 to 6 3 (6/15/2020)   Given patient's age, her HbA1c goal should be around 8  FBG ranges between 90 to 120   This morning fasting BG was over 200   Will decrease Lantus from 15 units QHS to 10 units QHS  Continue with Metformin 500 mg QAM  Monitor blood glucose readings and adjust regimen accordingly   Avoid hypoglycemic events     Essential HTN  Controlled   BP goal per JNC 8 criteria <140/90  Continue with Furosemide 20 mg and Metoprolol Succ ER 25 mg Q12 hors - with holding parameters SBP <110 mmHg  Renal function stable, reviewed most recent labs   Monitor for any electrolyte imbalance given diuretic use     Chronic Atrial Fibrillation   Heart rate controlled  Irregularly irregular rhythm, hemodynamically stable   Continue with Eliquis 5 mg Q12 hrs     CKD Stage III  Renal function stable   Reviewed most recent labs, Creatinine remains at the baseline range 0 83-1 00  Continue with diuretic use   Avoid nephrotoxic medications   Encourage keeping patient hydrated   Avoid hypotensive events     Acquired Hypothyroidism   Controlled with Levothyroxine 75 mcg daily   Thyroid workup on 06/15/20 with TSH and Free T4 WNL  Will continue with same regimen  Will get updated TSH and T4       Chief Complaint     Acute visit for acute mental status change  History of Present Illness     HPI    This is a 80year-old female with PMH of DMT2, acquired hypothyroidism, CKD stage 3, HTN, and chronic atrial fibrillation who is seen and examined today for acute visit regarding mental status change  Patient had unwitnessed fall yesterday afternoon per discussion with nursing staff  No LOC, however patient was noted to have alteration in her baseline mental status yesterday prior to the fall  Patient is only able to convey limited history due to her mentation  She mentions pain in her legs  Patient states she made vanilla pudding and is constantly tryig to reach an imaginary object  Per nursing staff, PT/OT sessions and neuro checks were continued since her fall yesterday  VS remained stable and fasting BG this am was reported in the 200 range  Family (Jared Callahan) contacted with unsuccessful attempts to reach her  Medication list reviewed and updated on chart       The following portions of the patient's history were reviewed and updated as appropriate: allergies, current medications, past family history, past medical history, past social history, past surgical history and problem list     Review of Systems     Review of Systems   Unable to perform ROS: Mental status change   Musculoskeletal:        Lower extremity pain        Active Problem List     Patient Active Problem List   Diagnosis    Acquired hypothyroidism    Hypertensive heart disease without heart failure    Mixed hyperlipidemia    Vitamin D deficiency    Leg ulcer, right, limited to breakdown of skin ([address] )    Type 2 diabetes mellitus with foot ulcer (CODE) ([facility]    Chronic atrial fibrillation    Transaminitis    Generalized osteoarthritis    CKD (chronic kidney disease) stage 3, GFR 30-59 ml/min ([facility])    Mixed aphasia, global    Change in mental status    Advance care planning    Localized edema    Macular degeneration    Essential (primary) hypertension    Ambulatory dysfunction    Other hemorrhoids    Slow transit constipation    Type 2 diabetes mellitus, with long-term current use of insulin ([facility])    Acute encephalopathy    Hyponatremia    Lower extremity pain       Objective     Vital Signs   /70, HR: 70 bpm, RR: 18 breaths/min  Weight: 191 8 lbs (09/02), 192 lbs (08/20), 191 lbs (08/12), 191 lbs (08/05) and 190 8 lbs (07/29)  Physical Exam  Constitutional:       Appearance: She is obese  She is not diaphoretic  HENT:      Head: Normocephalic and atraumatic  Nose: Nose normal       Mouth/Throat:      Mouth: Mucous membranes are dry  Eyes:      General:         Right eye: No discharge  Left eye: No discharge  Extraocular Movements: Extraocular movements intact  Conjunctiva/sclera: Conjunctivae normal       Comments: Mydriasis of right eye     Neck:      Musculoskeletal: Normal range of motion and neck supple  Cardiovascular:      Rate and Rhythm: Normal rate  Rhythm irregular  Comments: Irregularly irregular rhythm   Pulmonary:      Effort: Pulmonary effort is normal  No respiratory distress  Breath sounds: Normal breath sounds  Abdominal:      General: Bowel sounds are normal       Palpations: Abdomen is soft  Tenderness: There is no abdominal tenderness  Comments: Obese abdomen    Musculoskeletal: Normal range of motion  Comments: Strength 5/5 BLE    Skin:     General: Skin is warm     Neurological:      Mental Status: She is disoriented  Sensory: No sensory deficit  Motor: No weakness  Comments: Oriented x 0, delirious state  Patient only recalls her name  Does not know her age, date of birth, location, year  Patient unable to count days of the week backwards     No focal neurological deficits, follows commands          Pertinent Laboratory/Diagnostic Studies:  CBC:   Lab Results   Component Value Date/Time    WBC 4 90 01/09/2020 05:39 AM    WBC 5 8 05/22/2018 01:47 PM    RBC 4 89 01/09/2020 05:39 AM    RBC 5 35 (H) 05/22/2018 01:47 PM    HGB 13 5 01/09/2020 05:39 AM    HGB 14 8 05/22/2018 01:47 PM    HCT 40 9 01/09/2020 05:39 AM    HCT 45 1 05/22/2018 01:47 PM    MCV 84 01/09/2020 05:39 AM    MCV 84 05/22/2018 01:47 PM    MCH 27 6 01/09/2020 05:39 AM    MCH 27 7 05/22/2018 01:47 PM    MCHC 33 0 01/09/2020 05:39 AM    MCHC 32 8 05/22/2018 01:47 PM    RDW 15 8 (H) 01/09/2020 05:39 AM    RDW 14 7 05/22/2018 01:47 PM    MPV 8 6 (L) 01/09/2020 05:39 AM     01/09/2020 05:39 AM     05/22/2018 01:47 PM    NRBC 0 01/05/2020 06:10 AM    NEUTOPHILPCT 79 (H) 12/16/2019 11:59 AM    NEUTOPHILPCT 73 (H) 05/22/2018 01:47 PM    LYMPHOPCT 11 (L) 01/04/2020 12:43 PM    LYMPHOPCT 7 (L) 12/16/2019 11:59 AM    LYMPHOPCT 14 (L) 05/22/2018 01:47 PM    MONOPCT 7 01/04/2020 12:43 PM    MONOPCT 11 12/16/2019 11:59 AM    MONOPCT 11 (H) 05/22/2018 01:47 PM    EOSPCT 1 01/04/2020 12:43 PM    EOSPCT 1 12/16/2019 11:59 AM    EOSPCT 1 05/22/2018 01:47 PM    BASOPCT 0 01/04/2020 12:43 PM    BASOPCT 1 12/16/2019 11:59 AM    BASOPCT 1 05/22/2018 01:47 PM    NEUTROABS 8 01 (H) 12/16/2019 11:59 AM    NEUTROABS 4 3 05/22/2018 01:47 PM    LYMPHSABS 0 72 12/16/2019 11:59 AM    MONOSABS 1 09 12/16/2019 11:59 AM    MONOSABS 0 6 05/22/2018 01:47 PM    EOSABS 0 07 01/04/2020 12:43 PM    EOSABS 0 07 12/16/2019 11:59 AM    EOSABS 0 1 05/22/2018 01:47 PM     Chemistry Profile:   Lab Results   Component Value Date/Time     (L) 05/22/2018 01:47 PM K 4 1 01/09/2020 05:39 AM    K 4 6 05/22/2018 01:47 PM     01/09/2020 05:39 AM     05/22/2018 01:47 PM    CO2 30 01/09/2020 05:39 AM    CO2 27 05/22/2018 01:47 PM    ANIONGAP 7 05/22/2018 01:47 PM    BUN 16 01/09/2020 05:39 AM    BUN 10 05/22/2018 01:47 PM    CREATININE 0 97 01/09/2020 05:39 AM    GLUC 110 (H) 01/09/2020 05:39 AM    GLUF 110 (H) 01/09/2020 05:39 AM    GLUCOSE 181 (H) 05/22/2018 01:47 PM    CALCIUM 8 8 01/09/2020 05:39 AM    CALCIUM 9 1 05/22/2018 01:47 PM    MG 1 6 01/05/2020 06:10 AM    PHOS 2 7 01/05/2020 06:10 AM    AST 34 01/09/2020 05:39 AM    AST 19 05/22/2018 01:47 PM    ALT 37 01/09/2020 05:39 AM    ALT 20 05/22/2018 01:47 PM    ALKPHOS 171 (H) 01/09/2020 05:39 AM    ALKPHOS 75 05/22/2018 01:47 PM    PROT 6 1 05/22/2018 01:47 PM    BILITOT 0 5 05/22/2018 01:47 PM    EGFR 51 (L) 01/09/2020 05:39 AM     Endocrine Studies:   Lab Results   Component Value Date/Time    HGBA1C 8 5 (H) 01/05/2020 06:10 AM    HGBA1C 7 3 (H) 05/22/2018 01:47 PM    [id number] 3 000 01/09/2020 05:39 AM    IUM0PCPGHKCG 2 310 05/22/2018 01:47 PM    FREET4 1 39 12/16/2019 11:59 AM    TRIG 92 01/05/2020 06:10 AM    CHOLESTEROL 234 (H) 01/05/2020 06:10 AM    HDL 32 (L) 01/05/2020 06:10 AM    LDLCALC 184 (H) 01/05/2020 06:10 AM    NONHDLC 214 12/16/2019 11:59 AM     CBC:   Results from Last 12 Months   Lab Units 01/09/20  0539  01/05/20  0610 01/04/20  1243 12/16/19  1159   WBC Thousand/uL 4 90   < > 7 24 7 45 10 06   RBC Million/uL 4 89   < > 4 47 4 91 5 38*   HEMOGLOBIN g/dL 13 5   < > 12 1 13 4 14 8   HEMATOCRIT % 40 9   < > 38 1 42 1 47 1*   MCV fL 84   < > 85 86 88   MCH pg 27 6   < > 27 1 27 3 27 5   MCHC g/dL 33 0   < > 31 8 31 8 31 4   RDW % 15 8*   < > 14 4 14 4 13 8   MPV fL 8 6*   < > 10 6 11 3 11 1   PLATELETS Thousands/uL 230   < > 203 226 221   NRBC AUTO /100 WBCs  --   --  0 0 0   NEUTROS PCT %  --   --   --   --  79*   LYMPHS PCT %  --   --   --   --  7*   LYMPHO PCT %  --   --   --  11*  -- MONOS PCT %  --   --   --   --  11   MONO PCT %  --   --   --  7  --    EOS PCT %  --   --   --  1 1   BASOS PCT %  --   --   --  0 1   NEUTROS ABS Thousands/µL  --   --   --   --  8 01*   LYMPHS ABS Thousands/µL  --   --   --   --  0 72   MONOS ABS Thousand/µL  --   --   --   --  1 09   EOS ABS Thousand/uL  --   --   --  0 07 0 07    < > = values in this interval not displayed  Chemistry Profile:   Results from Last 12 Months   Lab Units 20  0539  20  0610   POTASSIUM mmol/L 4 1   < > 4 2   CHLORIDE mmol/L 100   < > 100   CO2 mmol/L 30   < > 25   BUN mg/dL 16   < > 10   CREATININE mg/dL 0 97   < > 0 87   GLUCOSE FASTING mg/dL 110*  --   --    GLUCOSE RANDOM mg/dL 110*   < > 134   CALCIUM mg/dL 8 8   < > 8 6   MAGNESIUM mg/dL  --   --  1 6   PHOSPHORUS mg/dL  --   --  2 7   AST U/L 34  --  31   ALT U/L 37  --  28   ALK PHOS U/L 171*  --  183*   EGFR ml/min/1 73sq m 51*   < > 58    < > = values in this interval not displayed  Coagulation Studies: No results for input(s): PROTIME, INR, PTT in the last 8784 hours  Endocrine Studies:   Results from Last 12 Months   Lab Units 20  0539 20  0610 19  1159   HEMOGLOBIN A1C %  --  8 5* 8 3*   TSH 3RD GENERATON uIU/mL 3 000  --  4 390*   FREE T4 ng/dL  --   --  1 39   TRIGLYCERIDES mg/dL  --  92 100   CHOLESTEROL mg/dL  --  234* 273*   HDL mg/dL  --  32* 59   LDL CALC mg/dL  --  184* 194*     Urinalysis:   Results from Last 12 Months   Lab Units 20  1407   COLOR UA  Sweta   CLARITY UA  Slightly Cloudy   SPEC GRAV UA  1 020   PH UA  7 0   LEUKOCYTES UA  Negative   NITRITE UA  Positive*   GLUCOSE UA mg/dl 500 (1/2%)*   KETONES UA mg/dl Trace*   BILIRUBIN UA  Interference- unable to analyze*   BLOOD UA  Trace*        Current Medications   Medications reviewed and updated in facility chart      Name: Alexa Murillo YOB: 1927  MRN: [id number]  DOS: 2020  Facility: [address] [place] [address] List: [address] [address] [facility],  BILLING CODE: [address] of Service: nursing home place of service: POS [employer] Unskilled- No Part A Coverage  Diagnoses:   Diagnosis ICD-10-CM Associated Orders   1  Acute encephalopathy  G93 40    2  Chronic atrial fibrillation  I48 20 apixaban (ELIQUIS) 5 mg   3  Hyponatremia  E87 1    4  Pain in both lower extremities  M79 604     M79 605    5  Type 2 diabetes mellitus with other specified complication, with long-term current use of insulin ([facility])  E11 69     Z79 4    6  Acquired hypothyroidism  E03 9    7  Type 2 diabetes mellitus with hyperglycemia, without long-term current use of insulin ([facility])  E11 65 insulin glargine (LANTUS) 100 units/mL subcutaneous injection   8  Essential (primary) hypertension  I10 furosemide (LASIX) 20 mg tablet   9  Localized edema  R60 0 furosemide (LASIX) 20 mg tablet   10  CKD (chronic kidney disease) stage 3, GFR 30-59 ml/min ([facility])  N18 3        MABLE Boss    Family Medicine PGY-2  9/8/2020 6:14 PM

## 2020-09-08 NOTE — ASSESSMENT & PLAN NOTE
Controlled   BP goal per JNC 8 criteria <140/90  Continue with Furosemide 20 mg and Metoprolol Succ ER 25 mg Q12 hors - with holding parameters SBP <110 mmHg  Renal function stable, reviewed most recent labs   Monitor for any electrolyte imbalance given diuretic use

## 2020-09-09 ENCOUNTER — HOSPITAL ENCOUNTER (INPATIENT)
Facility: HOSPITAL | Age: 85
LOS: 3 days | Discharge: NURSING, REHAB OR LONG-TERM CARE | DRG: 640 | End: 2020-09-12
Attending: EMERGENCY MEDICINE | Admitting: INTERNAL MEDICINE
Payer: MEDICARE

## 2020-09-09 ENCOUNTER — NURSING HOME VISIT (OUTPATIENT)
Dept: GERIATRICS | Facility: OTHER | Age: 85
End: 2020-09-09
Payer: MEDICARE

## 2020-09-09 ENCOUNTER — APPOINTMENT (EMERGENCY)
Dept: CT IMAGING | Facility: HOSPITAL | Age: 85
DRG: 640 | End: 2020-09-09
Payer: MEDICARE

## 2020-09-09 DIAGNOSIS — R41.82 ALTERED MENTAL STATUS: Primary | ICD-10-CM

## 2020-09-09 DIAGNOSIS — G93.40 ACUTE ENCEPHALOPATHY: ICD-10-CM

## 2020-09-09 DIAGNOSIS — E87.1 HYPONATREMIA: ICD-10-CM

## 2020-09-09 DIAGNOSIS — G93.40 ACUTE ENCEPHALOPATHY: Primary | ICD-10-CM

## 2020-09-09 PROBLEM — I48.20 CHRONIC ATRIAL FIBRILLATION (HCC): Status: ACTIVE | Noted: 2019-07-09

## 2020-09-09 PROBLEM — S32.010A CLOSED COMPRESSION FRACTURE OF BODY OF L1 VERTEBRA (HCC): Status: ACTIVE | Noted: 2020-09-09

## 2020-09-09 LAB
ALBUMIN SERPL BCP-MCNC: 3.4 G/DL (ref 3.5–5)
ALP SERPL-CCNC: 82 U/L (ref 46–116)
ALT SERPL W P-5'-P-CCNC: 17 U/L (ref 12–78)
ANION GAP SERPL CALCULATED.3IONS-SCNC: 5 MMOL/L (ref 4–13)
ANION GAP SERPL CALCULATED.3IONS-SCNC: 8 MMOL/L (ref 4–13)
APTT PPP: 30 SECONDS (ref 23–37)
AST SERPL W P-5'-P-CCNC: 31 U/L (ref 5–45)
ATRIAL RATE: 288 BPM
BACTERIA UR QL AUTO: ABNORMAL /HPF
BASOPHILS # BLD AUTO: 0.07 THOUSANDS/ΜL (ref 0–0.1)
BASOPHILS NFR BLD AUTO: 1 % (ref 0–1)
BILIRUB SERPL-MCNC: 1.24 MG/DL (ref 0.2–1)
BILIRUB UR QL STRIP: NEGATIVE
BUN SERPL-MCNC: 14 MG/DL (ref 5–25)
BUN SERPL-MCNC: 16 MG/DL (ref 5–25)
CALCIUM SERPL-MCNC: 8.8 MG/DL (ref 8.3–10.1)
CALCIUM SERPL-MCNC: 9.2 MG/DL (ref 8.3–10.1)
CHLORIDE SERPL-SCNC: 95 MMOL/L (ref 100–108)
CHLORIDE SERPL-SCNC: 96 MMOL/L (ref 100–108)
CLARITY UR: CLEAR
CO2 SERPL-SCNC: 24 MMOL/L (ref 21–32)
CO2 SERPL-SCNC: 30 MMOL/L (ref 21–32)
COLOR UR: YELLOW
CREAT SERPL-MCNC: 0.98 MG/DL (ref 0.6–1.3)
CREAT SERPL-MCNC: 1.04 MG/DL (ref 0.6–1.3)
EOSINOPHIL # BLD AUTO: 0.1 THOUSAND/ΜL (ref 0–0.61)
EOSINOPHIL NFR BLD AUTO: 1 % (ref 0–6)
ERYTHROCYTE [DISTWIDTH] IN BLOOD BY AUTOMATED COUNT: 13.2 % (ref 11.6–15.1)
GFR SERPL CREATININE-BSD FRML MDRD: 47 ML/MIN/1.73SQ M
GFR SERPL CREATININE-BSD FRML MDRD: 50 ML/MIN/1.73SQ M
GLUCOSE SERPL-MCNC: 114 MG/DL (ref 65–140)
GLUCOSE SERPL-MCNC: 128 MG/DL (ref 65–140)
GLUCOSE UR STRIP-MCNC: NEGATIVE MG/DL
HCT VFR BLD AUTO: 49.8 % (ref 34.8–46.1)
HGB BLD-MCNC: 16.2 G/DL (ref 11.5–15.4)
HGB UR QL STRIP.AUTO: ABNORMAL
IMM GRANULOCYTES # BLD AUTO: 0.13 THOUSAND/UL (ref 0–0.2)
IMM GRANULOCYTES NFR BLD AUTO: 2 % (ref 0–2)
INR PPP: 1.45 (ref 0.84–1.19)
KETONES UR STRIP-MCNC: NEGATIVE MG/DL
LEUKOCYTE ESTERASE UR QL STRIP: ABNORMAL
LIPASE SERPL-CCNC: 120 U/L (ref 73–393)
LYMPHOCYTES # BLD AUTO: 1.28 THOUSANDS/ΜL (ref 0.6–4.47)
LYMPHOCYTES NFR BLD AUTO: 18 % (ref 14–44)
MCH RBC QN AUTO: 27.6 PG (ref 26.8–34.3)
MCHC RBC AUTO-ENTMCNC: 32.5 G/DL (ref 31.4–37.4)
MCV RBC AUTO: 85 FL (ref 82–98)
MONOCYTES # BLD AUTO: 0.75 THOUSAND/ΜL (ref 0.17–1.22)
MONOCYTES NFR BLD AUTO: 11 % (ref 4–12)
NEUTROPHILS # BLD AUTO: 4.76 THOUSANDS/ΜL (ref 1.85–7.62)
NEUTS SEG NFR BLD AUTO: 67 % (ref 43–75)
NITRITE UR QL STRIP: NEGATIVE
NON-SQ EPI CELLS URNS QL MICRO: ABNORMAL /HPF
NRBC BLD AUTO-RTO: 0 /100 WBCS
PH UR STRIP.AUTO: 6.5 [PH] (ref 4.5–8)
PLATELET # BLD AUTO: 226 THOUSANDS/UL (ref 149–390)
PMV BLD AUTO: 9.2 FL (ref 8.9–12.7)
POTASSIUM SERPL-SCNC: 4.2 MMOL/L (ref 3.5–5.3)
POTASSIUM SERPL-SCNC: 4.5 MMOL/L (ref 3.5–5.3)
PROT SERPL-MCNC: 7.5 G/DL (ref 6.4–8.2)
PROT UR STRIP-MCNC: NEGATIVE MG/DL
PROTHROMBIN TIME: 17.4 SECONDS (ref 11.6–14.5)
QRS AXIS: 136 DEGREES
QRSD INTERVAL: 128 MS
QT INTERVAL: 396 MS
QTC INTERVAL: 445 MS
RBC # BLD AUTO: 5.86 MILLION/UL (ref 3.81–5.12)
RBC #/AREA URNS AUTO: ABNORMAL /HPF
SARS-COV-2 RNA RESP QL NAA+PROBE: NEGATIVE
SODIUM SERPL-SCNC: 127 MMOL/L (ref 136–145)
SODIUM SERPL-SCNC: 131 MMOL/L (ref 136–145)
SP GR UR STRIP.AUTO: 1.01 (ref 1–1.03)
T WAVE AXIS: -27 DEGREES
TSH SERPL DL<=0.05 MIU/L-ACNC: 2.07 UIU/ML (ref 0.36–3.74)
UROBILINOGEN UR QL STRIP.AUTO: 0.2 E.U./DL
VENTRICULAR RATE: 76 BPM
WBC # BLD AUTO: 7.09 THOUSAND/UL (ref 4.31–10.16)
WBC #/AREA URNS AUTO: ABNORMAL /HPF

## 2020-09-09 PROCEDURE — 93005 ELECTROCARDIOGRAM TRACING: CPT

## 2020-09-09 PROCEDURE — 83690 ASSAY OF LIPASE: CPT | Performed by: EMERGENCY MEDICINE

## 2020-09-09 PROCEDURE — 99309 SBSQ NF CARE MODERATE MDM 30: CPT | Performed by: FAMILY MEDICINE

## 2020-09-09 PROCEDURE — 80053 COMPREHEN METABOLIC PANEL: CPT | Performed by: EMERGENCY MEDICINE

## 2020-09-09 PROCEDURE — G1004 CDSM NDSC: HCPCS

## 2020-09-09 PROCEDURE — 99285 EMERGENCY DEPT VISIT HI MDM: CPT

## 2020-09-09 PROCEDURE — 85730 THROMBOPLASTIN TIME PARTIAL: CPT | Performed by: EMERGENCY MEDICINE

## 2020-09-09 PROCEDURE — 90471 IMMUNIZATION ADMIN: CPT

## 2020-09-09 PROCEDURE — 93010 ELECTROCARDIOGRAM REPORT: CPT | Performed by: INTERNAL MEDICINE

## 2020-09-09 PROCEDURE — 70450 CT HEAD/BRAIN W/O DYE: CPT

## 2020-09-09 PROCEDURE — 36415 COLL VENOUS BLD VENIPUNCTURE: CPT | Performed by: EMERGENCY MEDICINE

## 2020-09-09 PROCEDURE — 87635 SARS-COV-2 COVID-19 AMP PRB: CPT | Performed by: EMERGENCY MEDICINE

## 2020-09-09 PROCEDURE — 99285 EMERGENCY DEPT VISIT HI MDM: CPT | Performed by: EMERGENCY MEDICINE

## 2020-09-09 PROCEDURE — 84443 ASSAY THYROID STIM HORMONE: CPT | Performed by: EMERGENCY MEDICINE

## 2020-09-09 PROCEDURE — 85025 COMPLETE CBC W/AUTO DIFF WBC: CPT | Performed by: EMERGENCY MEDICINE

## 2020-09-09 PROCEDURE — 72125 CT NECK SPINE W/O DYE: CPT

## 2020-09-09 PROCEDURE — 81001 URINALYSIS AUTO W/SCOPE: CPT

## 2020-09-09 PROCEDURE — 80048 BASIC METABOLIC PNL TOTAL CA: CPT | Performed by: INTERNAL MEDICINE

## 2020-09-09 PROCEDURE — 90715 TDAP VACCINE 7 YRS/> IM: CPT | Performed by: EMERGENCY MEDICINE

## 2020-09-09 PROCEDURE — 85610 PROTHROMBIN TIME: CPT | Performed by: EMERGENCY MEDICINE

## 2020-09-09 PROCEDURE — 74177 CT ABD & PELVIS W/CONTRAST: CPT

## 2020-09-09 PROCEDURE — 71260 CT THORAX DX C+: CPT

## 2020-09-09 PROCEDURE — 99222 1ST HOSP IP/OBS MODERATE 55: CPT | Performed by: INTERNAL MEDICINE

## 2020-09-09 RX ORDER — SODIUM CHLORIDE 9 MG/ML
75 INJECTION, SOLUTION INTRAVENOUS CONTINUOUS
Status: DISCONTINUED | OUTPATIENT
Start: 2020-09-09 | End: 2020-09-12 | Stop reason: HOSPADM

## 2020-09-09 RX ORDER — FENTANYL CITRATE 50 UG/ML
50 INJECTION, SOLUTION INTRAMUSCULAR; INTRAVENOUS ONCE AS NEEDED
Status: DISCONTINUED | OUTPATIENT
Start: 2020-09-09 | End: 2020-09-12 | Stop reason: HOSPADM

## 2020-09-09 RX ORDER — LEVOTHYROXINE SODIUM 0.07 MG/1
75 TABLET ORAL
Status: DISCONTINUED | OUTPATIENT
Start: 2020-09-10 | End: 2020-09-12 | Stop reason: HOSPADM

## 2020-09-09 RX ORDER — EZETIMIBE 10 MG/1
10 TABLET ORAL
Status: DISCONTINUED | OUTPATIENT
Start: 2020-09-09 | End: 2020-09-12 | Stop reason: HOSPADM

## 2020-09-09 RX ADMIN — IOHEXOL 100 ML: 350 INJECTION, SOLUTION INTRAVENOUS at 14:30

## 2020-09-09 RX ADMIN — EZETIMIBE 10 MG: 10 TABLET ORAL at 21:20

## 2020-09-09 RX ADMIN — TETANUS TOXOID, REDUCED DIPHTHERIA TOXOID AND ACELLULAR PERTUSSIS VACCINE, ADSORBED 0.5 ML: 5; 2.5; 8; 8; 2.5 SUSPENSION INTRAMUSCULAR at 15:13

## 2020-09-09 RX ADMIN — APIXABAN 5 MG: 5 TABLET, FILM COATED ORAL at 18:04

## 2020-09-09 RX ADMIN — SODIUM CHLORIDE 75 ML/HR: 0.9 INJECTION, SOLUTION INTRAVENOUS at 17:54

## 2020-09-09 NOTE — ED PROVIDER NOTES
Final Diagnosis:  1  Altered mental status    2  Acute encephalopathy    3  Hyponatremia        Chief Complaint   Patient presents with    Altered Mental Status     pt confused,repsonses to painful stimuli, which is abnormal from pts basline     HPI  72-year-old woman history of AFib on Eliquis, baseline blindness, recent history of encephalopathy likely component of of UTI in January this year    Patient comes from her skilled nursing facility with confusion since Monday  She had an unwitnessed fall Monday night  She has an abrasion on her left elbow but no other external signs of trauma  Reportedly yesterday she got labs which showed hyponatremia Na tried to give her normal saline unclear how much volume  At baseline she is alert and oriented x2 will carry on conversation  Right now she wakes up in response to pain  She has some moaning removing her around examining her  She says her belly hurts  She is basically tender everywhere but more over the abdomen  Nonfocal     - No language barrier    - History obtained from [facility] has [facility]   - There are altered mental status related limitations to the history obtained  - Previous charting was reviewed    PMH:   has a past medical history of Atrial fibrillation ([facility] [address] ), Blindness (2008), Cholecystitis, Disease of tonsils, Glaucoma (2008), and Toxic metabolic encephalopathy (5/4/2041)  PSH:   has a past surgical history that includes Cholecystectomy; Replacement total knee (Bilateral, 01/2006); and Eye surgery (Right, 03/2004)  ROS:  Review of Systems   Unable to perform ROS: Mental status change        PE:   Vitals:    09/09/20 1450 09/09/20 1546 09/09/20 1706 09/09/20 1744   BP: 158/67 139/65 153/76 116/82   BP Location: Right arm Right arm Right arm Right arm   Pulse: 84 76 62 74   Resp: 20 16 16 18   Temp:    (!) 97 2 °F (36 2 °C)   TempSrc:    Temporal   SpO2: 100% 97% 100% 100%   Weight:         Vitals reviewed by me  Physical Exam  Vitals signs and nursing note reviewed  Constitutional:       General: She is not in acute distress  Appearance: Normal appearance  She is well-developed  She is obese  She is ill-appearing  She is not toxic-appearing or diaphoretic  HENT:      Head: Normocephalic and atraumatic  Right Ear: External ear normal       Left Ear: External ear normal       Nose: Nose normal       Mouth/Throat:      Mouth: Mucous membranes are moist    Eyes:      General: No scleral icterus  Right eye: No discharge  Left eye: No discharge  Extraocular Movements: Extraocular movements intact  Conjunctiva/sclera: Conjunctivae normal       Comments: Asymmetric pupils blind in right eye   Neck:      Musculoskeletal: Normal range of motion and neck supple  No neck rigidity  Cardiovascular:      Rate and Rhythm: Normal rate  Pulses: Normal pulses  Comments: Irregularly irregular  Pulmonary:      Effort: Pulmonary effort is normal  No respiratory distress  Breath sounds: No stridor  Abdominal:      General: There is no distension  Palpations: Abdomen is soft  There is no mass  Tenderness: There is abdominal tenderness (Diffuse)  There is no right CVA tenderness, left CVA tenderness, guarding or rebound  Musculoskeletal: Normal range of motion  General: No tenderness, deformity or signs of injury  Lymphadenopathy:      Cervical: No cervical adenopathy  Skin:     General: Skin is warm and dry  Capillary Refill: Capillary refill takes less than 2 seconds  Coloration: Skin is not jaundiced or pale  Findings: No bruising or rash  Comments: Patient has no external signs of trauma on her head she has a small 2 cm abrasion on her left elbow  She has no skin breakdown in her sacrum   Neurological:      Mental Status: She is alert  Cranial Nerves: No cranial nerve deficit  Sensory: No sensory deficit        Coordination: Coordination normal       Comments: Patient will respond to her name but only moans and says that her abdomen hurts    Reportedly baseline A O times two and will make conversation   Psychiatric:         Mood and Affect: Mood normal          Behavior: Behavior normal          Thought Content: Thought content normal          Judgment: Judgment normal           A:  - Nursing note reviewed  will send CBC CMP lipase given abdominal pain    Sodium comes back at 127 down from 130s 8 months ago slow change possible component of metabolic encephalopathy    Given altered mental status will check a CT head patient had unwitnessed fall on Eliquis rule out intracranial hemorrhage will check CT C-spine same time  Given abdominal pain in pan positive pain on trauma evaluation with a CT chest abdomen pelvis with contrast    Patient has incidental noncontributory findings possible new L1 compression fracture cannot correla clinically at this time       patient recent admission in January for UTI associated with encephalopathy also with metabolic component will check a straight cath urine he  Initially [facility] wait for micro given no fever           ED Course as of Sep 09 2056   Wed Sep 09, 2020   1321 Procedure Note: EKG  Date/Time: 09/09/20 1:21 PM   ECG reviewed by me, the ED Provider: yes   The EKG demonstrates:  Rhythm: afib  Intervals: normal intervals  Axis: right axis  QRS/Blocks: RBBB QRS  ST Changes: No acute ST Changes, no STD/MIKIE              CT head without contrast   Final Result      No acute intracranial hemorrhage seen   No mass effect or midline shift   6 moderate to severe periventricular and white matter hypodensity related to chronic small vessel ischemic changes   Prominence of the ventricular system which is out of proportion to the sulcal spaces, raises concern for normal pressure hydrocephalus in the appropriate clinical setting   Scattered the parenchymal calcification, unchanged Workstation performed: [id number]         CT spine cervical without contrast   Final Result      No cervical spine fracture or traumatic malalignment  Workstation performed: [id number]         CT chest abdomen pelvis w contrast   Final Result      Indeterminate age compression deformity involving the superior endplate at L1  Correlate for focal tenderness  Consider MRI if there is any clinical ambiguity  Stable cardiomegaly  Workstation performed: [id number]           Orders Placed This Encounter   Procedures    Novel Coronavirus (Covid-19),PCR [facility]    CT head without contrast    CT spine cervical without contrast    CT chest abdomen pelvis w contrast    CBC and differential    Comprehensive metabolic panel    Lipase    Protime-INR    APTT    TSH, 3rd generation with Free T4 reflex    Urine Microscopic    CBC and differential    Comprehensive metabolic panel    Osmolality-"If this is regarding a toxic alcohol, STOP  Test is not routinely indicated  Please consult medical  on call for further guidance "    Osmolality, urine    Basic metabolic panel    Diet NPO; Sips with meds    Straight cath   [facility] Nursing communication Continue IV as ordered     [facility] Notify admitting physician    Notify admitting physician on arrival    Vital Signs per unit routine    Level 3 - DNAR (Do Not Attempt Resuscitation) and DNI (Do Not Intubate)    Inpatient consult to Neurology    Inpatient consult to Case Management    OT eval and treat    PT eval and treat    EKG RESULTS    POCT urinalysis dipstick    ECG 12 lead    ECG 12 lead    Inpatient Admission (expected length of stay for this patient Order details is greater than two midnights)    Fall precautions     Labs Reviewed   CBC AND DIFFERENTIAL - Abnormal       Result Value Ref Range Status    WBC 7 09  4 31 - 10 16 Thousand/uL Final    RBC 5 86 (*) 3 81 - 5 12 Million/uL Final    Hemoglobin 16 2 (*) 11 5 - 15 4 g/dL Final    Hematocrit 49 8 (*) 34 8 - 46 1 % Final    MCV 85  82 - 98 fL Final    MCH 27 6  26 8 - 34 3 pg Final    MCHC 32 5  31 4 - 37 4 g/dL Final    RDW 13 2  11 6 - 15 1 % Final    MPV 9 2  8 9 - 12 7 fL Final    Platelets 683  845 - 390 Thousands/uL Final    nRBC 0  /100 WBCs Final    Neutrophils Relative 67  43 - 75 % Final    Immat GRANS % 2  0 - 2 % Final    Lymphocytes Relative 18  14 - 44 % Final    Monocytes Relative 11  4 - 12 % Final    Eosinophils Relative 1  0 - 6 % Final    Basophils Relative 1  0 - 1 % Final    Neutrophils Absolute 4 76  1 85 - 7 62 Thousands/µL Final    Immature Grans Absolute 0 13  0 00 - 0 20 Thousand/uL Final    Lymphocytes Absolute 1 28  0 60 - 4 47 Thousands/µL Final    Monocytes Absolute 0 75  0 17 - 1 22 Thousand/µL Final    Eosinophils Absolute 0 10  0 00 - 0 61 Thousand/µL Final    Basophils Absolute 0 07  0 00 - 0 10 Thousands/µL Final   COMPREHENSIVE METABOLIC PANEL - Abnormal    Sodium 127 (*) 136 - 145 mmol/L Final    Potassium 4 5  3 5 - 5 3 mmol/L Final    Comment: Slightly Hemolyzed; Results May be Affected    Chloride 95 (*) 100 - 108 mmol/L Final    CO2 24  21 - 32 mmol/L Final    ANION GAP 8  4 - 13 mmol/L Final    BUN 16  5 - 25 mg/dL Final    Creatinine 0 98  0 60 - 1 30 mg/dL Final    Comment: Standardized to IDMS reference method    Glucose 128  65 - 140 mg/dL Final    Comment: If the patient is fasting, the ADA then defines impaired fasting glucose as > 100 mg/dL and diabetes as > or equal to 123 mg/dL  Specimen collection should occur prior to Sulfasalazine administration due to the potential for falsely depressed results  Specimen collection should occur prior to Sulfapyridine administration due to the potential for falsely elevated results  Calcium 9 2  8 3 - 10 1 mg/dL Final    AST 31  5 - 45 U/L Final    Comment: Slightly Hemolyzed;  Results May be Affected  Specimen collection should occur prior to Sulfasalazine administration due to the potential for falsely depressed results  ALT 17  12 - 78 U/L Final    Comment: Specimen collection should occur prior to Sulfasalazine administration due to the potential for falsely depressed results  Alkaline Phosphatase 82  46 - 116 U/L Final    Total Protein 7 5  6 4 - 8 2 g/dL Final    Albumin 3 4 (*) 3 5 - 5 0 g/dL Final    Total Bilirubin 1 24 (*) 0 20 - 1 00 mg/dL Final    Comment: Use of this assay is not recommended for patients undergoing treatment with eltrombopag due to the potential for falsely elevated results      eGFR 50  ml/min/1 73sq m Final    Narrative:     National Kidney Disease Foundation guidelines for Chronic Kidney Disease (CKD):     Stage 1 with normal or high GFR (GFR > 90 mL/min/1 73 square meters)    Stage 2 Mild CKD (GFR = 60-89 mL/min/1 73 square meters)    Stage 3A Moderate CKD (GFR = 45-59 mL/min/1 73 square meters)    Stage 3B Moderate CKD (GFR = 30-44 mL/min/1 73 square meters)    Stage 4 Severe CKD (GFR = 15-29 mL/min/1 73 square meters)    Stage 5 End Stage CKD (GFR <15 mL/min/1 73 square meters)  Note: GFR calculation is accurate only with a steady state creatinine   PROTIME-INR - Abnormal    Protime 17 4 (*) 11 6 - 14 5 seconds Final    INR 1 45 (*) 0 84 - 1 19 Final   URINE MICROSCOPIC - Abnormal    RBC, UA 1-2 (*) None Seen, 0-5 /hpf Final    WBC, UA 4-10 (*) None Seen, 0-5, 5-55, 5-65 /hpf Final    Epithelial Cells Occasional  None Seen, Occasional /hpf Final    Bacteria, UA Innumerable (*) None Seen, Occasional /hpf Final   POCT URINALYSIS DIPSTICK - Abnormal   URINE MACROSCOPIC, POC - Abnormal    Color, UA Yellow   Final    Clarity, UA Clear   Final    pH, UA 6 5  4 5 - 8 0 Final    Leukocytes, UA Trace (*) Negative Final    Nitrite, UA Negative  Negative Final    Protein, UA Negative  Negative mg/dl Final    Glucose, UA Negative  Negative mg/dl Final    Ketones, UA Negative  Negative mg/dl Final    Urobilinogen, UA 0 2  0 2, 1 0 E U /dl E U /dl Final Bilirubin, UA Negative  Negative Final    Blood, UA Trace (*) Negative Final    Specific [place], UA 1 010  1 003 - 1 030 Final    Narrative:     CLINITEK RESULT   NOVEL CORONAVIRUS (COVID-19), PCR [facility] - Normal    SARS-CoV-2 Negative  Negative Final    Narrative: The specimen collection materials, transport medium, and/or testing methodology utilized in the production of these test results have been proven to be reliable in a limited validation with an abbreviated program under the Emergency Utilization Authorization provided by the FDA  Testing reported as "Presumptive positive" will be confirmed with secondary testing with a reference laboratory to ensure result accuracy  Clinical caution and judgement should be used with the interpretation of these results with consideration of the clinical impression and other laboratory testing  Testing reported as "Positive" or "Negative" has been proven to be accurate according to standard laboratory validation requirements  All testing is performed with control materials showing appropriate reactivity at standard intervals  LIPASE - Normal    Lipase 120  73 - 393 u/L Final   APTT - Normal    PTT 30  23 - 37 seconds Final    Comment: Therapeutic Heparin Range =  60-90 seconds   TSH, 3RD GENERATION WITH FREE T4 REFLEX - Normal    TSH 3RD GENERATON 2 070  0 358 - 3 740 uIU/mL Final    Comment: The recommended reference ranges for TSH during pregnancy are as follows:   First trimester 0 1 to 2 5 uIU/mL   Second trimester  0 2 to 3 0 uIU/mL   Third trimester 0 3 to 3 0 uIU/m    Note: Normal ranges may not apply to patients who are transgender, non-binary, or whose legal sex, sex at birth, and gender identity differ  Using supplements with high doses of biotin 20 to more than 300 times greater than the adequate daily intake for adults of 30 mcg/day as established by the [place] [employer], can cause falsely depress results      Narrative:     Patients undergoing fluorescein dye angiography may retain small amounts of fluorescein in the body for 48-72 hours post procedure  Samples containing fluorescein can produce falsely depressed TSH values  If the patient had this procedure,a specimen should be resubmitted post fluorescein clearance  OSMOLALITY   OSMOLALITY, URINE         Final Diagnosis:  1  Altered mental status    2  Acute encephalopathy    3  Hyponatremia        P:  - admit to medicine for acute encephalopathy, hyponatremia, possible NPH, possible new compression fracture lumbar spine    Medications   fentanyl citrate (PF) 100 MCG/2ML 50 mcg (has no administration in time range)   apixaban (ELIQUIS) tablet 5 mg (5 mg Oral Given 9/9/20 1804)   ezetimibe (ZETIA) tablet 10 mg (has no administration in time range)   levothyroxine tablet 75 mcg (has no administration in time range)   sodium chloride 0 9 % infusion (75 mL/hr Intravenous New Bag 9/9/20 1754)   tetanus-diphtheria-acellular pertussis (BOOSTRIX) IM injection 0 5 mL (0 5 mL Intramuscular Given 9/9/20 1513)   iohexol (OMNIPAQUE) 350 MG/ML injection (SINGLE-DOSE) 100 mL (100 mL Intravenous Given 9/9/20 1430)     Time reflects when diagnosis was documented in both MDM as applicable and the Disposition within this note     Time User Action Codes Description Comment    9/9/2020  3:56 PM Roscoe Gibbons Add [R41 82] Altered mental status     9/9/2020  3:56 PM Roscoe Gibbons Add [G93 40] Acute encephalopathy     9/9/2020  3:56 PM Roscoe Gibbons Add [E87 1] Hyponatremia       ED Disposition     ED Disposition Condition Date/Time Comment    Admit Stable Wed Sep 9, 2020  3:56 PM Case was discussed with MIQUEL and the patient's admission status was agreed to be Admission Status: inpatient status to the service of Dr Aaron Guzman           Follow-up Information    None       Current Discharge Medication List      CONTINUE these medications which have NOT CHANGED    Details   ACCU-CHEK FASTCLIX LANCETS MISC CHECK BG ONCE DAILY  Qty: 204 each, Refills: 0    Associated Diagnoses: Type 2 diabetes mellitus with hyperglycemia, without long-term current use of insulin ([facility])      apixaban (ELIQUIS) 5 mg Take 1 tablet (5 mg total) by mouth 2 (two) times a day  Qty:      Associated Diagnoses: Chronic atrial fibrillation (HCC)      bisacodyl (DULCOLAX) 5 mg EC tablet Take 1 tablet (5 mg total) by mouth daily as needed (constipation if miralax is ineffective)  Qty: 30 tablet, Refills: 0    Associated Diagnoses: Slow transit constipation      Blood Glucose Monitoring Suppl (ACCU-CHEK KIKI PLUS) w/Device KIT by Does not apply route daily TEST BG ONCE DAILY- E11 65  Qty: 1 kit, Refills: 0    Associated Diagnoses: Type 2 diabetes mellitus with hyperglycemia, without long-term current use of insulin ([facility])      Cholecalciferol (VITAMIN D-3) 1000 units CAPS 1 capsule       cyanocobalamin (VITAMIN B-12) 1000 MCG tablet Take 1 tablet (1,000 mcg total) by mouth daily  Refills: 0    Associated Diagnoses: Disorientation      ezetimibe (ZETIA) 10 mg tablet Take 1 tablet (10 mg total) by mouth daily at bedtime  Refills: 0    Associated Diagnoses: Mixed hyperlipidemia      furosemide (LASIX) 20 mg tablet Take 1 tablet (20 mg total) by mouth daily    Associated Diagnoses: Essential (primary) hypertension;  Localized edema      glucose blood test strip Test once daily- E11 65  Qty: 100 each, Refills: 1    Associated Diagnoses: Type 2 diabetes mellitus with hyperglycemia, without long-term current use of insulin ([facility])      insulin glargine (LANTUS) 100 units/mL subcutaneous injection Inject 10 Units under the skin daily at bedtime  Refills: 0    Associated Diagnoses: Type 2 diabetes mellitus with hyperglycemia, without long-term current use of insulin ([facility])      levothyroxine 75 mcg tablet Take 1 tablet (75 mcg total) by mouth daily  Qty: 90 tablet, Refills: 1    Associated Diagnoses: Hypothyroidism, unspecified type      metFORMIN (GLUCOPHAGE) 500 mg tablet Take 1 tablet (500 mg total) by mouth daily with breakfast  Refills: 0    Associated Diagnoses: Type 2 diabetes mellitus with hyperglycemia, without long-term current use of insulin ([facility])      metoprolol succinate (TOPROL-XL) 25 mg 24 hr tablet Take 1 tablet (25 mg total) by mouth every 12 (twelve) hours  Qty: 180 tablet, Refills: 3    Associated Diagnoses: Hypothyroidism, unspecified type      senna-docusate sodium (SENOKOT S) 8 6-50 mg per tablet Take 1 tablet by mouth 2 (two) times a day  Refills: 0    Associated Diagnoses: Slow transit constipation      atorvastatin (LIPITOR) 20 mg tablet Take 1 tablet (20 mg total) by mouth daily with dinner  Qty: 30 tablet, Refills: 0    Associated Diagnoses: Mixed aphasia      insulin lispro (HumaLOG) 100 units/mL injection Inject 2-12 Units under the skin 3 (three) times a day before meals  Refills: 0    Associated Diagnoses: Type 2 diabetes mellitus with hyperglycemia, without long-term current use of insulin ([facility])      nystatin (MYCOSTATIN) powder Apply 1 application topically 2 (two) times a day  Qty: 15 g, Refills: 0    Associated Diagnoses: Disorientation      OMEGA-3 FATTY ACIDS PO 2 (two) times a day       polyethylene glycol (MIRALAX) 17 g packet Take 17 g by mouth daily as needed (constipation)  Qty: 14 each, Refills: 0    Associated Diagnoses: Slow transit constipation      ursodiol (ACTIGALL) 300 mg capsule Take 1 capsule (300 mg total) by mouth 2 (two) times a day  Refills: 0    Associated Diagnoses: Mixed hyperlipidemia           No discharge procedures on file  Prior to Admission Medications   Prescriptions Last Dose Informant Patient Reported? Taking?    ACCU-CHEK FASTCLIX LANCETS MISC 9/9/2020 at Unknown time  No Yes   Sig: CHECK BG ONCE DAILY   Blood Glucose Monitoring Suppl (ACCU-CHEK KIKI PLUS) w/Device KIT 9/9/2020 at Unknown time  No Yes   Sig: by Does not apply route daily TEST BG ONCE DAILY- E11 65   Cholecalciferol (VITAMIN D-3) 1000 units CAPS 2020 at Unknown time  Yes Yes   Si capsule    OMEGA-3 FATTY ACIDS PO Unknown at Unknown time  Yes No   Si (two) times a day    apixaban (ELIQUIS) 5 mg 2020 at Unknown time  No Yes   Sig: Take 1 tablet (5 mg total) by mouth 2 (two) times a day   atorvastatin (LIPITOR) 20 mg tablet Not Taking at Unknown time  No No   Sig: Take 1 tablet (20 mg total) by mouth daily with dinner   Patient not taking: Reported on 2020   bisacodyl (DULCOLAX) 5 mg EC tablet 2020 at Unknown time  No Yes   Sig: Take 1 tablet (5 mg total) by mouth daily as needed (constipation if miralax is ineffective)   cyanocobalamin (VITAMIN B-12) 1000 MCG tablet 2020 at Unknown time  No Yes   Sig: Take 1 tablet (1,000 mcg total) by mouth daily   ezetimibe (ZETIA) 10 mg tablet 2020 at Unknown time  No Yes   Sig: Take 1 tablet (10 mg total) by mouth daily at bedtime   furosemide (LASIX) 20 mg tablet 2020 at Unknown time  No Yes   Sig: Take 1 tablet (20 mg total) by mouth daily   glucose blood test strip 2020 at Unknown time  No Yes   Sig: Test once daily- E11 65   insulin glargine (LANTUS) 100 units/mL subcutaneous injection 2020 at Unknown time  No Yes   Sig: Inject 10 Units under the skin daily at bedtime   Patient taking differently: Inject 15 Units under the skin daily at bedtime    insulin lispro (HumaLOG) 100 units/mL injection Unknown at Unknown time  No No   Sig: Inject 2-12 Units under the skin 3 (three) times a day before meals   levothyroxine 75 mcg tablet 2020 at Unknown time  No Yes   Sig: Take 1 tablet (75 mcg total) by mouth daily   metFORMIN (GLUCOPHAGE) 500 mg tablet 2020 at Unknown time  No Yes   Sig: Take 1 tablet (500 mg total) by mouth daily with breakfast   metoprolol succinate (TOPROL-XL) 25 mg 24 hr tablet 2020 at Unknown time  No Yes   Sig: Take 1 tablet (25 mg total) by mouth every 12 (twelve) hours   nystatin (MYCOSTATIN) powder Unknown at Unknown time  No No   Sig: Apply 1 application topically 2 (two) times a day   polyethylene glycol (MIRALAX) 17 g packet Unknown at Unknown time  No No   Sig: Take 17 g by mouth daily as needed (constipation)   senna-docusate sodium (SENOKOT S) 8 6-50 mg per tablet 9/9/2020 at Unknown time  No Yes   Sig: Take 1 tablet by mouth 2 (two) times a day   ursodiol (ACTIGALL) 300 mg capsule Unknown at Unknown time  No No   Sig: Take 1 capsule (300 mg total) by mouth 2 (two) times a day      Facility-Administered Medications: None       Portions of the record may have been created with voice recognition software  Occasional wrong word or "sound a like" substitutions may have occurred due to the inherent limitations of voice recognition software  Read the chart carefully and recognize, using context, where substitutions have occurred      Electronically signed by:  Kika Watkisn, PGY 2, MD Thao Chang MD  09/09/20 2056

## 2020-09-09 NOTE — ASSESSMENT & PLAN NOTE
Hypoactive today, responding to tactile stimuli only during the visit  Decline noted since yesterday  Received her morning dose of gabapentin in am, which could contribute to her somnolence  Called family again and they decided to sent her to ER for evaluation  Blood results pending  Gave report to the ER attending

## 2020-09-09 NOTE — PROGRESS NOTES
[facility]  [address]  [phone] [id number] of Service: nursing home place of service: POS 32 Unskilled- No Part A Coverage      NAME: Warden Goodman  AGE: 80 y o  SEX: female [id number]    DATE OF ENCOUNTER: 9/9/2020    Assessment and Plan     Problem List Items Addressed This Visit        Nervous and Auditory    Acute encephalopathy - Primary     Hypoactive today, responding to tactile stimuli only during the visit  Decline noted since yesterday  Received her morning dose of gabapentin in am, which could contribute to her somnolence  Called family again and they decided to sent her to ER for evaluation  Blood results pending  Gave report to the ER attending  Other    Hyponatremia     Sodium 132 yesterday, blood results still pending  Not sure how much NS she received over night , as per staff she was found with lots of fluid in her chair, leaking from her arm earlier today  Chief Complaint     Acute visit AMS    History of Present Illness     Warden Goodman is a 80 y o  female who was seen today for follow up  As per nursing staff she is somnolent today, was more alert early in am and she did not sleep all night  No fever, cough, hypoxia reported  Did not have any meal today, however she took her morning medication today  Discussed with family today and they decided to send her to ER  Called report to ER attending CHERRY            The following portions of the patient's history were reviewed and updated as appropriate: allergies, current medications, past family history, past medical history, past social history, past surgical history and problem list     Review of Systems     Review of Systems   Unable to perform ROS: Acuity of condition       Active Problem List     Patient Active Problem List   Diagnosis    Acquired hypothyroidism    Hypertensive heart disease without heart failure    Mixed hyperlipidemia    Vitamin D deficiency    Leg ulcer, right, limited to breakdown of skin ([address] )    Type 2 diabetes mellitus with foot ulcer (CODE) ([facility])    Chronic atrial fibrillation    Transaminitis    Generalized osteoarthritis    CKD (chronic kidney disease) stage 3, GFR 30-59 ml/min ([facility])    Mixed aphasia, global    Change in mental status    Advance care planning    Localized edema    Macular degeneration    Essential (primary) hypertension    Ambulatory dysfunction    Other hemorrhoids    Slow transit constipation    Type 2 diabetes mellitus, with long-term current use of insulin ([facility])    Acute encephalopathy    Hyponatremia    Lower extremity pain       Objective     Vital Signs:     Blood pressure 136/76 Heart Rate: 68 Respiratory Rate 18   Temperature 98 0 Oxygen Saturation 94%RA     Physical Exam  Vitals signs and nursing note reviewed  Constitutional:       General: She is not in acute distress  Appearance: She is not diaphoretic  HENT:      Head: Normocephalic and atraumatic  Eyes:      General: No scleral icterus  Neck:      Vascular: No JVD  Cardiovascular:      Rate and Rhythm: Normal rate  Rhythm irregularly irregular  Heart sounds: Murmur present  Pulmonary:      Effort: Pulmonary effort is normal  No respiratory distress  Breath sounds: Normal breath sounds  No wheezing  Abdominal:      Palpations: Abdomen is soft  Tenderness: There is no abdominal tenderness  There is no guarding  Musculoskeletal:      Right lower leg: Edema present  Left lower leg: Edema present  Skin:     General: Skin is warm and dry  Neurological:      Mental Status: She is alert  Comments: AAOx0  Responds to tactile stimuli         Pertinent Laboratory/Diagnostic Studies:  Laboratory and Imaging studies reviewed  Full report in the paper chart  Current Medications   Medications reviewed and updated in facility chart      Name: Gwen Bernard  : 11/3/1927  MRN: [id number]  DOS: 9/9/2020      Chloe Nguyen MD  Geriatric Medicine  9/9/2020 2:58 PM

## 2020-09-09 NOTE — ASSESSMENT & PLAN NOTE
· Chads Vasc score 5 secondary to age, gender, diabetes, hypertension  · She was fully anticoagulated on Eliquis and rate controlled with metoprolol  · One she is fully awake, resume Eliquis and metoprolol

## 2020-09-09 NOTE — ED ATTENDING ATTESTATION
9/9/2020  I, Fernanda Enriquez MD, saw and evaluated the patient  I have discussed the patient with the resident/non-physician practitioner and agree with the resident's/non-physician practitioner's findings, Plan of Care, and MDM as documented in the resident's/non-physician practitioner's note, except where noted  All available labs and Radiology studies were reviewed  I was present for key portions of any procedure(s) performed by the resident/non-physician practitioner and I was immediately available to provide assistance  At this point I agree with the current assessment done in the Emergency Department  I have conducted an independent evaluation of this patient a history and physical is as follows:      Final Diagnosis:  1  Altered mental status    2  Acute encephalopathy    3  Hyponatremia      Chief Complaint   Patient presents with    Altered Mental Status     pt confused,repsonses to painful stimuli, which is abnormal from pts basline       This is a 42-year-old female with a history of AFib on Eliquis who presents from her skilled nursing facility with confusion  The patient had an unwitnessed fall Monday night and since that time, she has been experiencing a change in mental status from her baseline  Per report, she did get labs yesterday which showed hyponatremia for which she was given saline at the nursing facility  At baseline, the patient is alert and oriented times to and can carry on a conversation  However, she now moans to painful stimuli        PMH:  - AFib on Eliquis, dementia  PSH:  - not applicable    PE:   Vitals:    09/09/20 1300 09/09/20 1320 09/09/20 1450 09/09/20 1546   BP: (!) 182/100  158/67 139/65   BP Location: Left arm  Right arm Right arm   Pulse: 85 72 84 76   Resp: 20 14 20 16   Temp: 97 6 °F (36 4 °C)      TempSrc: Rectal      SpO2: 98%  100% 97%   Weight: 90 kg (198 lb 6 6 oz)            Constitutional: Vital signs are normal   Chronically ill-appearing female  HENT:   Mouth/Throat: Uvula is midline, oropharynx is clear and moist and mucous membranes are normal    Eyes: On reactive right pupil   (she does have a history of blindness)  Conjunctivae and EOM are normal    Neck: Trachea normal  No thyroid mass and no thyromegaly present  Cardiovascular: Normal rate, regular rhythm, normal heart sounds, intact distal pulses and normal pulses  No murmur heard  Pulmonary/Chest: Effort normal and breath sounds normal    Abdominal: Soft  Normal appearance and bowel sounds are normal  There is no tenderness  There is no rebound, no guarding and no CVA tenderness  Neurological:  Patient is somnolent  GCS 7  MSK:  Abrasion to left elbow  Skin: Skin is warm, dry and intact  A:  - this is an otherwise ill appearing 78-year-old female who is DNR/DNI who presents with altered mental status  P:  - trauma verses UTI  Plan to check labs, CT head to pelvis, urinalysis  Patient will be admitted to the hospital     - 13 point ROS was performed and all are normal unless stated in the history above  - Nursing note reviewed  Vitals reviewed  - Orders placed by myself and/or advanced practitioner / resident     - Previous chart was reviewed  - No language barrier    - History obtained from chart review and EMS  - There are limitations to the history obtained  Altered mental status  - Critical care time: Not applicable for this patient  ED Course as of Sep 09 1601   Wed Sep 09, 2020   1450 Cholangitis? Appears to have pneumobilia and dilated CBD on CT scan          Medications   fentanyl citrate (PF) 100 MCG/2ML 50 mcg (has no administration in time range)   tetanus-diphtheria-acellular pertussis (BOOSTRIX) IM injection 0 5 mL (0 5 mL Intramuscular Given 9/9/20 1513)   iohexol (OMNIPAQUE) 350 MG/ML injection (SINGLE-DOSE) 100 mL (100 mL Intravenous Given 9/9/20 1430)     CT head without contrast   Final Result      No acute intracranial hemorrhage seen No mass effect or midline shift   6 moderate to severe periventricular and white matter hypodensity related to chronic small vessel ischemic changes   Prominence of the ventricular system which is out of proportion to the sulcal spaces, raises concern for normal pressure hydrocephalus in the appropriate clinical setting   Scattered the parenchymal calcification, unchanged               Workstation performed: [id number]         CT spine cervical without contrast   Final Result      No cervical spine fracture or traumatic malalignment  Workstation performed: [id number]         CT chest abdomen pelvis w contrast   Final Result      Indeterminate age compression deformity involving the superior endplate at L1  Correlate for focal tenderness  Consider MRI if there is any clinical ambiguity  Stable cardiomegaly                    Workstation performed: [id number]           Orders Placed This Encounter   Procedures    Novel Coronavirus (Covid-19),PCR [facility]    CT head without contrast    CT spine cervical without contrast    CT chest abdomen pelvis w contrast    CBC and differential    Comprehensive metabolic panel    Lipase    Protime-INR    APTT    TSH, 3rd generation with Free T4 reflex    Urine Microscopic    Straight cath    EKG RESULTS    POCT urinalysis dipstick    ECG 12 lead    ECG 12 lead    Inpatient Admission (expected length of stay for this patient Order details is greater than two midnights)     Labs Reviewed   CBC AND DIFFERENTIAL - Abnormal       Result Value Ref Range Status    WBC 7 09  4 31 - 10 16 Thousand/uL Final    RBC 5 86 (*) 3 81 - 5 12 Million/uL Final    Hemoglobin 16 2 (*) 11 5 - 15 4 g/dL Final    Hematocrit 49 8 (*) 34 8 - 46 1 % Final    MCV 85  82 - 98 fL Final    MCH 27 6  26 8 - 34 3 pg Final    MCHC 32 5  31 4 - 37 4 g/dL Final    RDW 13 2  11 6 - 15 1 % Final    MPV 9 2  8 9 - 12 7 fL Final    Platelets 565  349 - 390 Thousands/uL Final nRBC 0  /100 WBCs Final    Neutrophils Relative 67  43 - 75 % Final    Immat GRANS % 2  0 - 2 % Final    Lymphocytes Relative 18  14 - 44 % Final    Monocytes Relative 11  4 - 12 % Final    Eosinophils Relative 1  0 - 6 % Final    Basophils Relative 1  0 - 1 % Final    Neutrophils Absolute 4 76  1 85 - 7 62 Thousands/µL Final    Immature Grans Absolute 0 13  0 00 - 0 20 Thousand/uL Final    Lymphocytes Absolute 1 28  0 60 - 4 47 Thousands/µL Final    Monocytes Absolute 0 75  0 17 - 1 22 Thousand/µL Final    Eosinophils Absolute 0 10  0 00 - 0 61 Thousand/µL Final    Basophils Absolute 0 07  0 00 - 0 10 Thousands/µL Final   COMPREHENSIVE METABOLIC PANEL - Abnormal    Sodium 127 (*) 136 - 145 mmol/L Final    Potassium 4 5  3 5 - 5 3 mmol/L Final    Comment: Slightly Hemolyzed; Results May be Affected    Chloride 95 (*) 100 - 108 mmol/L Final    CO2 24  21 - 32 mmol/L Final    ANION GAP 8  4 - 13 mmol/L Final    BUN 16  5 - 25 mg/dL Final    Creatinine 0 98  0 60 - 1 30 mg/dL Final    Comment: Standardized to IDMS reference method    Glucose 128  65 - 140 mg/dL Final    Comment: If the patient is fasting, the ADA then defines impaired fasting glucose as > 100 mg/dL and diabetes as > or equal to 123 mg/dL  Specimen collection should occur prior to Sulfasalazine administration due to the potential for falsely depressed results  Specimen collection should occur prior to Sulfapyridine administration due to the potential for falsely elevated results  Calcium 9 2  8 3 - 10 1 mg/dL Final    AST 31  5 - 45 U/L Final    Comment: Slightly Hemolyzed; Results May be Affected  Specimen collection should occur prior to Sulfasalazine administration due to the potential for falsely depressed results  ALT 17  12 - 78 U/L Final    Comment: Specimen collection should occur prior to Sulfasalazine administration due to the potential for falsely depressed results       Alkaline Phosphatase 82  46 - 116 U/L Final    Total Protein 7 5 6 4 - 8 2 g/dL Final    Albumin 3 4 (*) 3 5 - 5 0 g/dL Final    Total Bilirubin 1 24 (*) 0 20 - 1 00 mg/dL Final    Comment: Use of this assay is not recommended for patients undergoing treatment with eltrombopag due to the potential for falsely elevated results  eGFR 50  ml/min/1 73sq m Final    Narrative:     National Kidney Disease Foundation guidelines for Chronic Kidney Disease (CKD):     Stage 1 with normal or high GFR (GFR > 90 mL/min/1 73 square meters)    Stage 2 Mild CKD (GFR = 60-89 mL/min/1 73 square meters)    Stage 3A Moderate CKD (GFR = 45-59 mL/min/1 73 square meters)    Stage 3B Moderate CKD (GFR = 30-44 mL/min/1 73 square meters)    Stage 4 Severe CKD (GFR = 15-29 mL/min/1 73 square meters)    Stage 5 End Stage CKD (GFR <15 mL/min/1 73 square meters)  Note: GFR calculation is accurate only with a steady state creatinine   PROTIME-INR - Abnormal    Protime 17 4 (*) 11 6 - 14 5 seconds Final    INR 1 45 (*) 0 84 - 1 19 Final   POCT URINALYSIS DIPSTICK - Abnormal   URINE MACROSCOPIC, POC - Abnormal    Color, UA Yellow   Final    Clarity, UA Clear   Final    pH, UA 6 5  4 5 - 8 0 Final    Leukocytes, UA Trace (*) Negative Final    Nitrite, UA Negative  Negative Final    Protein, UA Negative  Negative mg/dl Final    Glucose, UA Negative  Negative mg/dl Final    Ketones, UA Negative  Negative mg/dl Final    Urobilinogen, UA 0 2  0 2, 1 0 E U /dl E U /dl Final    Bilirubin, UA Negative  Negative Final    Blood, UA Trace (*) Negative Final    Specific [place], UA 1 010  1 003 - 1 030 Final    Narrative:     CLINITEK RESULT   NOVEL CORONAVIRUS (COVID-19), PCR [facility] - Normal    SARS-CoV-2 Negative  Negative Final    Narrative:      The specimen collection materials, transport medium, and/or testing methodology utilized in the production of these test results have been proven to be reliable in a limited validation with an abbreviated program under the Emergency Utilization Authorization provided by the FDA  Testing reported as "Presumptive positive" will be confirmed with secondary testing with a reference laboratory to ensure result accuracy  Clinical caution and judgement should be used with the interpretation of these results with consideration of the clinical impression and other laboratory testing  Testing reported as "Positive" or "Negative" has been proven to be accurate according to standard laboratory validation requirements  All testing is performed with control materials showing appropriate reactivity at standard intervals  LIPASE - Normal    Lipase 120  73 - 393 u/L Final   APTT - Normal    PTT 30  23 - 37 seconds Final    Comment: Therapeutic Heparin Range =  60-90 seconds   TSH, 3RD GENERATION WITH FREE T4 REFLEX - Normal    TSH 3RD GENERATON 2 070  0 358 - 3 740 uIU/mL Final    Comment: The recommended reference ranges for TSH during pregnancy are as follows:   First trimester 0 1 to 2 5 uIU/mL   Second trimester  0 2 to 3 0 uIU/mL   Third trimester 0 3 to 3 0 uIU/m    Note: Normal ranges may not apply to patients who are transgender, non-binary, or whose legal sex, sex at birth, and gender identity differ  Using supplements with high doses of biotin 20 to more than 300 times greater than the adequate daily intake for adults of 30 mcg/day as established by the [place] [employer], can cause falsely depress results  Narrative:     Patients undergoing fluorescein dye angiography may retain small amounts of fluorescein in the body for 48-72 hours post procedure  Samples containing fluorescein can produce falsely depressed TSH values  If the patient had this procedure,a specimen should be resubmitted post fluorescein clearance       URINE MICROSCOPIC     Time reflects when diagnosis was documented in both MDM as applicable and the Disposition within this note     Time User Action Codes Description Comment    9/9/2020  3:56 PM Sandip Rush Add [R49 52] Altered mental status 2020  3:56 PM Deya Bingham Add [G93 40] Acute encephalopathy     2020  3:56 PM Deya Bingham Add [E87 1] Hyponatremia       ED Disposition     ED Disposition Condition Date/Time Comment    Admit Stable Wed Sep 9, 2020  3:56 PM Case was discussed with MIQUEL and the patient's admission status was agreed to be Admission Status: inpatient status to the service of Dr Serenity Leavitt   Follow-up Information    None       Patient's Medications   Discharge Prescriptions    No medications on file     No discharge procedures on file  Prior to Admission Medications   Prescriptions Last Dose Informant Patient Reported? Taking?    ACCU-CHEK FASTCLIX LANCETS MISC 2020 at Unknown time  No Yes   Sig: CHECK BG ONCE DAILY   Blood Glucose Monitoring Suppl (ACCU-CHEK KIKI PLUS) w/Device KIT 2020 at Unknown time  No Yes   Sig: by Does not apply route daily TEST BG ONCE DAILY- E11 65   Cholecalciferol (VITAMIN D-3) 1000 units CAPS 2020 at Unknown time  Yes Yes   Si capsule    OMEGA-3 FATTY ACIDS PO Unknown at Unknown time  Yes No   Si (two) times a day    apixaban (ELIQUIS) 5 mg 2020 at Unknown time  No Yes   Sig: Take 1 tablet (5 mg total) by mouth 2 (two) times a day   atorvastatin (LIPITOR) 20 mg tablet Not Taking at Unknown time  No No   Sig: Take 1 tablet (20 mg total) by mouth daily with dinner   Patient not taking: Reported on 2020   bisacodyl (DULCOLAX) 5 mg EC tablet 2020 at Unknown time  No Yes   Sig: Take 1 tablet (5 mg total) by mouth daily as needed (constipation if miralax is ineffective)   cyanocobalamin (VITAMIN B-12) 1000 MCG tablet 2020 at Unknown time  No Yes   Sig: Take 1 tablet (1,000 mcg total) by mouth daily   ezetimibe (ZETIA) 10 mg tablet 2020 at Unknown time  No Yes   Sig: Take 1 tablet (10 mg total) by mouth daily at bedtime   furosemide (LASIX) 20 mg tablet 2020 at Unknown time  No Yes   Sig: Take 1 tablet (20 mg total) by mouth daily   glucose blood test strip 9/9/2020 at Unknown time  No Yes   Sig: Test once daily- E11 65   insulin glargine (LANTUS) 100 units/mL subcutaneous injection 9/9/2020 at Unknown time  No Yes   Sig: Inject 10 Units under the skin daily at bedtime   Patient taking differently: Inject 15 Units under the skin daily at bedtime    insulin lispro (HumaLOG) 100 units/mL injection Unknown at Unknown time  No No   Sig: Inject 2-12 Units under the skin 3 (three) times a day before meals   levothyroxine 75 mcg tablet 9/9/2020 at Unknown time  No Yes   Sig: Take 1 tablet (75 mcg total) by mouth daily   metFORMIN (GLUCOPHAGE) 500 mg tablet 9/9/2020 at Unknown time  No Yes   Sig: Take 1 tablet (500 mg total) by mouth daily with breakfast   metoprolol succinate (TOPROL-XL) 25 mg 24 hr tablet 9/9/2020 at Unknown time  No Yes   Sig: Take 1 tablet (25 mg total) by mouth every 12 (twelve) hours   nystatin (MYCOSTATIN) powder Unknown at Unknown time  No No   Sig: Apply 1 application topically 2 (two) times a day   polyethylene glycol (MIRALAX) 17 g packet Unknown at Unknown time  No No   Sig: Take 17 g by mouth daily as needed (constipation)   senna-docusate sodium (SENOKOT S) 8 6-50 mg per tablet 9/9/2020 at Unknown time  No Yes   Sig: Take 1 tablet by mouth 2 (two) times a day   ursodiol (ACTIGALL) 300 mg capsule Unknown at Unknown time  No No   Sig: Take 1 capsule (300 mg total) by mouth 2 (two) times a day      Facility-Administered Medications: None       Portions of the record may have been created with voice recognition software  Occasional wrong word or "sound a like" substitutions may have occurred due to the inherent limitations of voice recognition software  Read the chart carefully and recognize, using context, where substitutions have occurred  ED Course  ED Course as of Sep 09 1601   Wed Sep 09, 2020   1450 Cholangitis? Appears to have pneumobilia and dilated CBD on CT scan              Critical Care Time  Procedures

## 2020-09-09 NOTE — ASSESSMENT & PLAN NOTE
· Check serum Osm  · Check urine osmolality  · Start normal saline at 75 mL/hour  · Repeat labs in a m  · Avoid rapid correction  · Goal correction 8 to 10 mg per dL in 24  · Check BMP tonight at 10:00 p m  And tomorrow at 6:00 a m

## 2020-09-09 NOTE — ASSESSMENT & PLAN NOTE
Lab Results   Component Value Date    HGBA1C 8 5 (H) 01/05/2020     Placed on sliding scale insulin  Hold Lantus

## 2020-09-09 NOTE — ASSESSMENT & PLAN NOTE
· Unclear etiology, from the current workup, this could be due to symptomatic hyponatremia, possible NPH  Per nursing home records, she had no concussion or head trauma  · Urinalysis is benign    · CT of head negative for acute stroke or bleed  · Placed on telemetry, rule out tachy or bradyarrhythmia  · Fall precaution  · Consult neurology

## 2020-09-09 NOTE — ASSESSMENT & PLAN NOTE
· Age indeterminate on CT, patient is currently lethargic  Unable to correlate clinically    · Possible acute due to recent fall  · Consult PT and OT  · She resides in a facility

## 2020-09-09 NOTE — PLAN OF CARE
Problem: Potential for Falls  Goal: Patient will remain free of falls  Description: INTERVENTIONS:  - Assess patient frequently for physical needs  -  Identify cognitive and physical deficits and behaviors that affect risk of falls    -  Arp fall precautions as indicated by assessment   - Educate patient/family on patient safety including physical limitations  - Instruct patient to call for assistance with activity based on assessment  - Modify environment to reduce risk of injury  - Consider OT/PT consult to assist with strengthening/mobility  Outcome: Progressing

## 2020-09-09 NOTE — ASSESSMENT & PLAN NOTE
Sodium 132 yesterday, blood results still pending  Not sure how much NS she received over night , as per staff she was found with lots of fluid in her chair, leaking from her arm earlier today

## 2020-09-10 LAB
ALBUMIN SERPL BCP-MCNC: 2.7 G/DL (ref 3.5–5)
ALP SERPL-CCNC: 62 U/L (ref 46–116)
ALT SERPL W P-5'-P-CCNC: 14 U/L (ref 12–78)
ANION GAP SERPL CALCULATED.3IONS-SCNC: 5 MMOL/L (ref 4–13)
AST SERPL W P-5'-P-CCNC: 17 U/L (ref 5–45)
BASOPHILS # BLD AUTO: 0.06 THOUSANDS/ΜL (ref 0–0.1)
BASOPHILS NFR BLD AUTO: 1 % (ref 0–1)
BILIRUB SERPL-MCNC: 1.1 MG/DL (ref 0.2–1)
BUN SERPL-MCNC: 13 MG/DL (ref 5–25)
CALCIUM SERPL-MCNC: 8.2 MG/DL (ref 8.3–10.1)
CHLORIDE SERPL-SCNC: 100 MMOL/L (ref 100–108)
CO2 SERPL-SCNC: 28 MMOL/L (ref 21–32)
CREAT SERPL-MCNC: 0.99 MG/DL (ref 0.6–1.3)
EOSINOPHIL # BLD AUTO: 0.14 THOUSAND/ΜL (ref 0–0.61)
EOSINOPHIL NFR BLD AUTO: 2 % (ref 0–6)
ERYTHROCYTE [DISTWIDTH] IN BLOOD BY AUTOMATED COUNT: 13.4 % (ref 11.6–15.1)
GFR SERPL CREATININE-BSD FRML MDRD: 50 ML/MIN/1.73SQ M
GLUCOSE SERPL-MCNC: 84 MG/DL (ref 65–140)
HCT VFR BLD AUTO: 41.9 % (ref 34.8–46.1)
HGB BLD-MCNC: 13.6 G/DL (ref 11.5–15.4)
IMM GRANULOCYTES # BLD AUTO: 0.08 THOUSAND/UL (ref 0–0.2)
IMM GRANULOCYTES NFR BLD AUTO: 1 % (ref 0–2)
LYMPHOCYTES # BLD AUTO: 1.35 THOUSANDS/ΜL (ref 0.6–4.47)
LYMPHOCYTES NFR BLD AUTO: 23 % (ref 14–44)
MCH RBC QN AUTO: 27.3 PG (ref 26.8–34.3)
MCHC RBC AUTO-ENTMCNC: 32.5 G/DL (ref 31.4–37.4)
MCV RBC AUTO: 84 FL (ref 82–98)
MONOCYTES # BLD AUTO: 0.84 THOUSAND/ΜL (ref 0.17–1.22)
MONOCYTES NFR BLD AUTO: 14 % (ref 4–12)
NEUTROPHILS # BLD AUTO: 3.54 THOUSANDS/ΜL (ref 1.85–7.62)
NEUTS SEG NFR BLD AUTO: 59 % (ref 43–75)
NRBC BLD AUTO-RTO: 0 /100 WBCS
OSMOLALITY UR: 566 MMOL/KG
PLATELET # BLD AUTO: 175 THOUSANDS/UL (ref 149–390)
PMV BLD AUTO: 9.2 FL (ref 8.9–12.7)
POTASSIUM SERPL-SCNC: 3.8 MMOL/L (ref 3.5–5.3)
PROT SERPL-MCNC: 5.7 G/DL (ref 6.4–8.2)
RBC # BLD AUTO: 4.98 MILLION/UL (ref 3.81–5.12)
SODIUM SERPL-SCNC: 133 MMOL/L (ref 136–145)
WBC # BLD AUTO: 6.01 THOUSAND/UL (ref 4.31–10.16)

## 2020-09-10 PROCEDURE — 99232 SBSQ HOSP IP/OBS MODERATE 35: CPT | Performed by: HOSPITALIST

## 2020-09-10 PROCEDURE — 83935 ASSAY OF URINE OSMOLALITY: CPT | Performed by: HOSPITALIST

## 2020-09-10 PROCEDURE — 80053 COMPREHEN METABOLIC PANEL: CPT | Performed by: INTERNAL MEDICINE

## 2020-09-10 PROCEDURE — 97163 PT EVAL HIGH COMPLEX 45 MIN: CPT

## 2020-09-10 PROCEDURE — 85025 COMPLETE CBC W/AUTO DIFF WBC: CPT | Performed by: INTERNAL MEDICINE

## 2020-09-10 PROCEDURE — 99223 1ST HOSP IP/OBS HIGH 75: CPT | Performed by: PSYCHIATRY & NEUROLOGY

## 2020-09-10 PROCEDURE — 97167 OT EVAL HIGH COMPLEX 60 MIN: CPT

## 2020-09-10 RX ADMIN — EZETIMIBE 10 MG: 10 TABLET ORAL at 21:17

## 2020-09-10 RX ADMIN — LEVOTHYROXINE SODIUM 75 MCG: 75 TABLET ORAL at 05:12

## 2020-09-10 RX ADMIN — APIXABAN 5 MG: 5 TABLET, FILM COATED ORAL at 08:22

## 2020-09-10 RX ADMIN — APIXABAN 5 MG: 5 TABLET, FILM COATED ORAL at 17:05

## 2020-09-10 RX ADMIN — SODIUM CHLORIDE 75 ML/HR: 0.9 INJECTION, SOLUTION INTRAVENOUS at 05:13

## 2020-09-10 RX ADMIN — SODIUM CHLORIDE 75 ML/HR: 0.9 INJECTION, SOLUTION INTRAVENOUS at 19:21

## 2020-09-10 NOTE — ASSESSMENT & PLAN NOTE
Lab Results   Component Value Date    HGBA1C 8 5 (H) 01/05/2020     - Euglycemic goal  - Insulin management per primary team

## 2020-09-10 NOTE — PLAN OF CARE
Problem: PHYSICAL THERAPY ADULT  Goal: Performs mobility at highest level of function for planned discharge setting  See evaluation for individualized goals  Description: Treatment/Interventions: Functional transfer training, LE strengthening/ROM, Therapeutic exercise, Endurance training, Patient/family training, Bed mobility, Gait training, Spoke to nursing, OT  Equipment Recommended: Wheelchair, Hector Bethea       See flowsheet documentation for full assessment, interventions and recommendations  Note: Prognosis: Guarded  Problem List: Decreased strength, Decreased endurance, Impaired balance, Decreased mobility, Decreased cognition, Impaired judgement, Decreased safety awareness, Impaired hearing, Impaired vision, Obesity  Assessment: Pt  89 y  o female presented from [facility] w/ altered mental status & s/p fall on 9/7/20  Per chart, pt Ox3 at baseline  Pt admitted for acute encephalopathy w/ Hyponatremia  Also noted to have age indeterminate L1 compression fx per CT  Pt referred to PT for mobility assessment & D/C planning w/ orders of up w/ assistance  Pt resident of [address] [place] [address],8W  Pt poor historian hence unable to obtain information re: PLOF but assumed pt require assistance w/ ADL's & functional mobility at baseline given resident at of a NH  On eval, pt demonstrate dec mobility, balance, endurance & amb  Pt require modAx2 for most functional mobility + cues for techniques  Gait deviations as above, slow & unsteady w/ shuffling gait but no gross LOB noted  Pt [place] w/ impaired cognition, require inc time & repetition to follow commands  No dizziness reported t/o session  Nsg staff most recent vital signs as follows: /69 (BP Location: Right arm)   Pulse 68   Temp (!) 97 4 °F (36 3 °C) (Temporal)   Resp 18   Wt 90 kg (198 lb 6 6 oz)   LMP  (LMP Unknown)   SpO2 99%   BMI 36 29 kg/m²   At end of session, pt OOB in chair w/o issues, call bell & phone in reach, chair alarm activated  Fall precautions reinforced w/ good understanding  Will continue skilled PT to improve function & safety  At this time, will recommend return to SNF when medically cleared  CM to follow  Nsg staff to continue to mobilized pt (OOB in chair for all meals & ambulate in room/unit) as tolerated to prevent further decline in function  Nsg notified  Barriers to Discharge: None     PT Discharge Recommendation: Return to previous environment with social support(return to SNF when medically cleared)     PT - OK to Discharge: Yes(when medically cleared)    See flowsheet documentation for full assessment

## 2020-09-10 NOTE — CONSULTS
Consultation - Neurology   Mark Davis 80 y o  female MRN: [id number]  Unit/Bed#: E5 -01 Encounter: [id number]      Assessment/Plan   Mark Davis is a 80 y o  female with atrial fibrillation on Eliquis, glaucoma, blindness, hearing impairment, cognitive decline, and hypothyroidism who presented to [facility] ED on 9/9/2020 for lethargy and encephalopathy  Hyponatremia (Na 127 on admission), but UA negative for infection and remainder of labs WNL  CT head shows enlarged ventricles, but no acute infarct or hemorrhage  Patient is back to her baseline today  Etiology unclear, but likely TME due to hyponatremia and poor cognitive reserve  * Acute encephalopathy  Assessment & Plan  - At baseline, oriented to person and place only  - On exam, oriented to person only (thought she was at [facility] which is where she resides)  Able to follow some simple commands, but exam slightly inhibited due to significant hearing impairment  - Per daughter at bedside, patient is back to her baseline  - CT head  · No acute intracranial hemorrhage seen  · No mass effect or midline shift  · 6 moderate to severe periventricular and white matter hypodensity related to chronic small vessel ischemic changes  · Prominence of the ventricular system which is out of proportion to the sulcal spaces, raises concern for normal pressure hydrocephalus in the appropriate clinical setting  · Scattered the parenchymal calcification, unchanged  - CT chest/abd/pelvis:  · Indeterminate age compression deformity involving the superior endplate at L1  Correlate for focal tenderness  Consider MRI if there is any clinical ambiguity  - CT cervical spine:  · No cervical spine fracture or traumatic malalignment  - Labs:  · Hyponatremia (Na 127) and elevated hemoglobin, but remainder of CMP, CBC, and TSH WNL    · COVID negative  · UA had trace leukocytes, but negative nitrites; low suspicion for UTI    Plan:  - No further Neuroimaging indicated at this time due to no acute neuro deficits and low suspicion for CVA or seizure  - Conservative management of delirium: minimize noise, maintain day/night cycle, provide frequent redirection, avoid restraints if possible, etc   - No need for follow up with Neurology or NPH clinic  - No further inpatient Neuro recommendations  Please call with questions    Closed compression fracture of body of L1 vertebra (HCC)  Assessment & Plan  - Age indeterminate on CT, but patient is not complaining of pain  - PT/OT    Hyponatremia  Assessment & Plan  - Na 127 on arrival, Urine osmolality WNL  - Na 133 today  - Continue to monitor BMP    Type 2 diabetes mellitus, with long-term current use of insulin [facility])  Assessment & Plan  Lab Results   Component Value Date    HGBA1C 8 5 (H) 01/05/2020     - Euglycemic goal  - Insulin management per primary team    Chronic atrial fibrillation  Assessment & Plan  - Continue home Eliquis 5 mg BID    Acquired hypothyroidism  Assessment & Plan  - TSH WNL  - Continue home levothyroxine      Micky Butler will not need outpatient follow up with Neurology  She will not require outpatient neurological testing  History of Present Illness     Reason for Consult / Principal Problem: encephalopathy and concern for NPH  Hx and PE limited by: encephalopathy and hard of hearing  HPI: Micky Butler is a 80 y o  female with atrial fibrillation on Eliquis, glaucoma, blindness, hearing impairment, and hypothyroidism who presented to [facility] ED on 9/9/2020 for lethargy and encephalopathy  Patient is a resident at [facility]  At baseline she is alert and oriented x2 and will carry on conversation  Previously seen by neurology in January 2020 for encephalopathy thought to be TME in the setting of UTI  On 9/7 (three days ago), patient had an unwitnessed fall at her facility    Staff noted that she was more confused, but patient did not lose consciousness and no head trauma  The next day, she was being evaluated by her geriatrician who noted that the patient was lethargic, had waxing/waning attention span, and was "not acting herself "  Labs were sent and revealed mild hyponatremia  She was reevaluated yesterday and continued to be confused and not at her baseline, but was able to take her medications including gabapentin and was brought to the ED  Upon arrival to the ED, /100, but remainder of vitals stable  Labs revealed hyponatremia (Na 127) and elevated hemoglobin, but remainder of CMP, CBC, and TSH WNL  COVID negative  UA had trace leukocytes, but negative nitrites  CT head revealed severe periventricular and white matter hypodensity likely secondary to small vessel disease and enlarged ventricles, concerning for NPH, but no acute intracranial abnormalities  CT cervical spine unremarkable for fracture or malalignment  CT chest/abd/pelvis revealed age indeterminate closed compression fracture of L1 vertebra, minimal atelectasis, and stable cardiomegaly  On exam, patient is sitting in bedside chair watching TV on full volume  Patient is upset that she has to wear the oxygen  She is very hard of hearing, which makes asking questions challenging  She knows her name, but is disoriented to place and does not know why she is here in the hospital   She is blind in her right eye, and can only see light in the left eye  Denies any pain, but difficulty assessing remainder of ROS due to hearing impairment and mental status  Per daughter at bedside, patient is back to her baseline  Inpatient consult to Neurology  Consult performed by: Claudia Morton PA-C  Consult ordered by: Salma Elaine MD          Review of Systems   Reason unable to perform ROS: Denies any pain, but difficulty obtaining remainder of ROS due to significant hearing impairment and mental status         Historical Information   Past Medical History:   Diagnosis Date    Atrial fibrillation ([address] )     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    Cholecystitis     Disease of tonsils     Glaucoma 2008    NOS    Toxic metabolic encephalopathy 9/3/8824     Past Surgical History:   Procedure Laterality Date    CHOLECYSTECTOMY      EYE SURGERY Right 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    REPLACEMENT TOTAL KNEE Bilateral 2006     Social History   Social History     Substance and Sexual Activity   Alcohol Use Not Currently     Social History     Substance and Sexual Activity   Drug Use Never     E-Cigarette/Vaping     E-Cigarette/Vaping Substances     Social History     Tobacco Use   Smoking Status Never Smoker   Smokeless Tobacco Never Used     Family History:   Family History   Problem Relation Age of Onset    Breast cancer Sister     No Known Problems Mother     No Known Problems Father        Review of previous medical records was completed  Reviewed prior notes, labs, CT head, CT chest/abd/pelvis, CT cervical spine, prior MRI brain    Meds/Allergies   all current active meds have been reviewed, current meds:   Current Facility-Administered Medications   Medication Dose Route Frequency    apixaban (ELIQUIS) tablet 5 mg  5 mg Oral BID    ezetimibe (ZETIA) tablet 10 mg  10 mg Oral HS    fentanyl citrate (PF) 100 MCG/2ML 50 mcg  50 mcg Intravenous Once PRN    levothyroxine tablet 75 mcg  75 mcg Oral Early Morning    sodium chloride 0 9 % infusion  75 mL/hr Intravenous Continuous    and PTA meds:   Prior to Admission Medications   Prescriptions Last Dose Informant Patient Reported? Taking?    ACCU-CHEK FASTCLIX LANCETS MISC 2020 at Unknown time  No Yes   Sig: CHECK BG ONCE DAILY   Blood Glucose Monitoring Suppl (ACCU-CHEK KIKI PLUS) w/Device KIT 2020 at Unknown time  No Yes   Sig: by Does not apply route daily TEST BG ONCE DAILY- E11 65   Cholecalciferol (VITAMIN D-3) 1000 units CAPS 2020 at Unknown time  Yes Yes   Si capsule    OMEGA-3 FATTY ACIDS PO Unknown at Unknown time  Yes No   Si (two) times a day    apixaban (ELIQUIS) 5 mg 9/9/2020 at Unknown time  No Yes   Sig: Take 1 tablet (5 mg total) by mouth 2 (two) times a day   atorvastatin (LIPITOR) 20 mg tablet Not Taking at Unknown time  No No   Sig: Take 1 tablet (20 mg total) by mouth daily with dinner   Patient not taking: Reported on 9/9/2020   bisacodyl (DULCOLAX) 5 mg EC tablet 9/9/2020 at Unknown time  No Yes   Sig: Take 1 tablet (5 mg total) by mouth daily as needed (constipation if miralax is ineffective)   cyanocobalamin (VITAMIN B-12) 1000 MCG tablet 9/9/2020 at Unknown time  No Yes   Sig: Take 1 tablet (1,000 mcg total) by mouth daily   ezetimibe (ZETIA) 10 mg tablet 9/9/2020 at Unknown time  No Yes   Sig: Take 1 tablet (10 mg total) by mouth daily at bedtime   furosemide (LASIX) 20 mg tablet 9/9/2020 at Unknown time  No Yes   Sig: Take 1 tablet (20 mg total) by mouth daily   glucose blood test strip 9/9/2020 at Unknown time  No Yes   Sig: Test once daily- E11 65   insulin glargine (LANTUS) 100 units/mL subcutaneous injection 9/9/2020 at Unknown time  No Yes   Sig: Inject 10 Units under the skin daily at bedtime   Patient taking differently: Inject 15 Units under the skin daily at bedtime    insulin lispro (HumaLOG) 100 units/mL injection Unknown at Unknown time  No No   Sig: Inject 2-12 Units under the skin 3 (three) times a day before meals   levothyroxine 75 mcg tablet 9/9/2020 at Unknown time  No Yes   Sig: Take 1 tablet (75 mcg total) by mouth daily   metFORMIN (GLUCOPHAGE) 500 mg tablet 9/9/2020 at Unknown time  No Yes   Sig: Take 1 tablet (500 mg total) by mouth daily with breakfast   metoprolol succinate (TOPROL-XL) 25 mg 24 hr tablet 9/9/2020 at Unknown time  No Yes   Sig: Take 1 tablet (25 mg total) by mouth every 12 (twelve) hours   nystatin (MYCOSTATIN) powder Unknown at Unknown time  No No   Sig: Apply 1 application topically 2 (two) times a day   polyethylene glycol (MIRALAX) 17 g packet Unknown at Unknown time  No No Sig: Take 17 g by mouth daily as needed (constipation)   senna-docusate sodium (SENOKOT S) 8 6-50 mg per tablet 9/9/2020 at Unknown time  No Yes   Sig: Take 1 tablet by mouth 2 (two) times a day   ursodiol (ACTIGALL) 300 mg capsule Unknown at Unknown time  No No   Sig: Take 1 capsule (300 mg total) by mouth 2 (two) times a day      Facility-Administered Medications: None       Allergies   Allergen Reactions    Aspirin GI Intolerance    Penicillins        Objective   Vitals:Blood pressure 156/69, pulse 68, temperature (!) 97 4 °F (36 3 °C), temperature source Temporal, resp  rate 18, weight 90 kg (198 lb 6 6 oz), SpO2 99 %  ,Body mass index is 36 29 kg/m²  Intake/Output Summary (Last 24 hours) at 9/10/2020 1053  Last data filed at 9/10/2020 0900  Gross per 24 hour   Intake 1000 ml   Output 300 ml   Net 700 ml       Invasive Devices: Invasive Devices     Peripheral Intravenous Line            Peripheral IV 01/06/20 Ventral (anterior); Left Forearm 247 days    Peripheral IV 09/09/20 Left;Proximal;Ventral (anterior) Forearm less than 1 day                Physical Exam  Vitals signs and nursing note reviewed  Constitutional:       Appearance: Normal appearance  Comments: Elderly female sitting in bedside chair in no acute distress  Patient is upset about wearing the oxygen and easily gets agitated throughout the exam    HENT:      Head: Normocephalic and atraumatic  Nose:      Comments: Nasal cannula in place     Mouth/Throat:      Mouth: Mucous membranes are moist       Pharynx: Oropharynx is clear  Eyes:      Comments: R pupil does not react to light  L pupil 3 mm, and reacts to light  Dysconjugate gaze with left exotropia  Patient is blind in the right eye, and can only see light in the left eye  Neck:      Musculoskeletal: Normal range of motion  Pulmonary:      Effort: Pulmonary effort is normal       Comments: On oxygen via nasal cannula  Musculoskeletal: Normal range of motion  Skin:     General: Skin is warm and dry  Neurological:      Mental Status: She is alert  Deep Tendon Reflexes:      Reflex Scores:       Bicep reflexes are 2+ on the right side and 2+ on the left side  Brachioradialis reflexes are 2+ on the right side and 2+ on the left side  Comments: See full neuro exam below  Neurologic Exam     Mental Status   Patient is alert and oriented to person only  Thinks she is at [facility] (where she resides)  Stated the year was "22" and unable to state the month  Able to follow some simple commands, but due to significant hearing impairment, patient had difficulty hearing the commands  Poor attention and concentration  Poor insight  Cranial Nerves   Dysconjugate gaze with left exotropia  Blind in right eye, can only see light in the right eye  Pupils 3 mm, round  Right pupil not reactive to light  Left pupil reactive to light  EOMs appear to be intact without nystagmus  Facial expressions full and symmetric  Facial sensation to light touch intact  Patient is very hard of hearing  Tongue midline without fasciculation  Palate raises symmetrically  Able to turn head left and right and shrug shoulders  Motor Exam   Muscle bulk: normal  5/5 strength in bilateral UEs   4+/5 bilateral hip flexors, 5-/5 bilateral plantarflexion/dorsiflexion  Sensory Exam   Sensation to light touch intact throughout  Patient stated that cold temperature was "hot" in all extremities  Decreased vibration in left great toe, but remainder of vibration testing intact  Gait, Coordination, and Reflexes     Gait  Gait: (deferred for patient's safety)    Reflexes   Right brachioradialis: 2+  Left brachioradialis: 2+  Right biceps: 2+  Left biceps: 2+  Right patellar reflex grade: trace  Left patellar reflex grade: trace  Patient is able to touch her nose without ataxia    No resting or action tremor  No clonus bilaterally       Lab Results:   I have personally reviewed pertinent reports  , CBC:   Results from last 7 days   Lab Units 09/10/20  0455 09/09/20  1316   WBC Thousand/uL 6 01 7 09   RBC Million/uL 4 98 5 86*   HEMOGLOBIN g/dL 13 6 16 2*   HEMATOCRIT % 41 9 49 8*   MCV fL 84 85   PLATELETS Thousands/uL 175 226   , BMP/CMP:   Results from last 7 days   Lab Units 09/10/20  0455 09/09/20  2256 09/09/20  1316   SODIUM mmol/L 133* 131* 127*   POTASSIUM mmol/L 3 8 4 2 4 5   CHLORIDE mmol/L 100 96* 95*   CO2 mmol/L 28 30 24   BUN mg/dL 13 14 16   CREATININE mg/dL 0 99 1 04 0 98   CALCIUM mg/dL 8 2* 8 8 9 2   AST U/L 17  --  31   ALT U/L 14  --  17   ALK PHOS U/L 62  --  82   EGFR ml/min/1 73sq m 50 47 50   , Vitamin B12:   , HgBA1C:   , TSH:   Results from last 7 days   Lab Units 09/09/20  1316   TSH 3RD GENERATON uIU/mL 2 070   , Coagulation:   Results from last 7 days   Lab Units 09/09/20  1441   INR  1 45*   , Lipid Profile:   , Ammonia:   , Urinalysis:   Results from last 7 days   Lab Units 09/09/20  1450   COLOR UA  Yellow   CLARITY UA  Clear   SPEC GRAV UA  1 010   PH UA  6 5   LEUKOCYTES UA  Trace*   NITRITE UA  Negative   GLUCOSE UA mg/dl Negative   KETONES UA mg/dl Negative   BILIRUBIN UA  Negative   BLOOD UA  Trace*     Imaging Studies: I have personally reviewed pertinent reports  and I have personally reviewed pertinent films in PACS  EKG, Pathology, and Other Studies: I have personally reviewed pertinent reports  and I have personally reviewed pertinent films in PACS  VTE Prophylaxis: Sequential compression device (Venodyne) and Eliquis    Code Status: Level 3 - DNAR and DNI    Counseling / Coordination of Care  Total time spent today 47 minutes  Greater than 50% of total time was spent with the patient and/or family counseling and/or coordination of care  A description of the counseling/coordination of care:  Patient was seen and evaluated  Discussed with attending  Chart reviewed thoroughly including laboratory and imaging studies  Plan of care discussed with patient and primary team

## 2020-09-10 NOTE — OCCUPATIONAL THERAPY NOTE
Occupational Therapy Evaluation     Patient Name: Misbah Maki  LKHNJ'N Date: 9/10/2020  Problem List  Active Problems:    Acquired hypothyroidism    Mixed hyperlipidemia    Chronic atrial fibrillation    CKD (chronic kidney disease) stage 3, GFR 30-59 ml/min ([facility])    Essential (primary) hypertension    Type 2 diabetes mellitus, with long-term current use of insulin ([facility])    Acute encephalopathy    Hyponatremia    Closed compression fracture of body of L1 vertebra ([facility] [address] )    Past Medical History  Past Medical History:   Diagnosis Date    Atrial fibrillation ([facility] [address] )     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    Cholecystitis     Disease of tonsils     Glaucoma 2008    NOS    Toxic metabolic encephalopathy 1/3/6400     Past Surgical History  Past Surgical History:   Procedure Laterality Date    CHOLECYSTECTOMY      EYE SURGERY Right 03/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    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             09/10/20 1011   OT Last Visit   OT Visit Date 09/10/20   Note Type   Note type Eval/Treat   Restrictions/Precautions   Weight Bearing Precautions Per Order No   Other Precautions Chair Alarm;Cognitive; Bed Alarm;Pain; Fall Risk;Multiple lines;O2;Telemetry;Hard of hearing;Visual impairment   Pain Assessment   Pain Assessment Tool Pain Assessment not indicated - pt denies pain   Pain Score No Pain   Home Living   Type of Home SNF  ([facility])   Home Layout One level   Bathroom Shower/Tub Walk-in shower   Bathroom Toilet Raised   Bathroom Equipment Grab bars in shower; Shower chair;Grab bars around toilet   [facility]  (pt stated yes to walker, then states I think I stay in bed)   Additional Comments pt poor historian and hard of hearing unable to obtain information from patient   Prior Function   Level of [place] Needs assistance with ADLs and functional mobility; Needs assistance with IADLs; Independent with ADLs and functional mobility   Lives With Facility staff   Receives Help From Personal care attendant   ADL Assistance Needs assistance   IADLs Needs assistance   Falls in the last 6 months 1 to 4  (pt unable to report, per chart atleast one on Monday)   Vocational Retired   Comments pt poor historian and unable to provide history; per chart pt A&Ox3 at baseline   Lifestyle   Autonomy per pt independent w/ UB ADLs, independent w/ grooming and self-feeding, assist LB ADLs, assist w/ IADLs, assist transport   Reciprocal Relationships facility staff   Service to Others retired unable to report   Intrinsic Gratification listen to music   Subjective   Subjective "I can't hear you"   ADL   Where Assessed Chair   Eating Assistance 4  Minimal Assistance   Grooming Assistance 3  Moderate Assistance   UB Bathing Assistance 3  Moderate Assistance   LB Bathing Assistance 2  Maximal Assistance   [address] 3  Moderate Assistance   LB Dressing Assistance 2  Maximal [address] [place] [address]  2  Maximal Assistance   Functional Assistance 2  Maximal Assistance   Bed Mobility   Rolling R 4  Minimal assistance   Additional items Assist x 1;HOB elevated; Bedrails; Increased time required;Verbal cues   Supine to Sit 3  Moderate assistance   Additional items Assist x 2; Increased time required;Verbal cues;LE management   Additional Comments increased time to complete   Transfers   Sit to Stand 3  Moderate assistance   Additional items Assist x 2; Increased time required;Verbal cues;Armrests   Stand to Sit 3  Moderate assistance   Additional items Assist x 2; Increased time required;Verbal cues;Armrests   Additional Comments cues for hand placement and positioning   Functional Mobility   Functional Mobility 3  Moderate assistance   Additional Comments assist x2 w/ increased time to complete and cues for sequencing   Additional items Rolling walker   Balance   Static Sitting Fair   Dynamic Sitting Fair -   Static Standing Poor +   Dynamic Standing Poor   Ambulatory Poor   Activity Tolerance   Activity Tolerance Patient limited by fatigue;Patient limited by pain;Treatment limited secondary to medical complications (Comment)   Nurse Made Aware appropriate to see per Nancy BALDWIN Assessment   RUE Assessment WFL  (4-/5)   LUE Assessment   LUE Assessment WFL  (3+/5)   Hand Function   Gross Motor Coordination Functional   Fine Motor Coordination Functional   Sensation   Light Touch No apparent deficits   Proprioception   Proprioception No apparent deficits   Vision-Basic Assessment   Current Vision Other (Comment)   Vision - Complex Assessment   Ocular Range of Motion WFL   Acuity   (pt reports can see shapes and shape of clock not numbers)   Perception   Inattention/Neglect Appears intact   Cognition   Overall Cognitive Status Impaired   Arousal/Participation Responsive; Cooperative   Attention Difficulty attending to directions   Orientation Level Oriented to person;Disoriented to situation;Disoriented to time;Disoriented to place   Memory Decreased recall of recent events;Decreased recall of precautions;Decreased short term memory   Following Commands Follows one step commands with increased time or repetition   Comments pt very [place], increased processing time, poor historian, impaired insight   Assessment   Limitation Decreased ADL status; Decreased UE strength;Decreased Safe judgement during ADL;Decreased cognition;Decreased endurance;Decreased self-care trans;Decreased high-level ADLs; Decreased sensation   Prognosis Fair;Good   Assessment Pt is a 80 y o  female seen for OT evaluation s/p admit to [facility] on 9/9/2020 w/ change in mental status, fall at facility on Monday  Comorbidities affecting pt's functional performance at time of assessment include: blindness, hyponatremia, a-fib, DM II, hypothyroidism  CT chest and abdomen: Indeterminate age compression deformity involving the superior endplate at L1, no treatment per MD/RN  CT head: No acute intracranial hemorrhage seen  Personal factors affecting pt at time of IE include:lives at [facility]; pt poor historian and unable to provide history as pt states "I think I stay in bed all the time"; will await CM note for clarification of prior level of functioning  Upon evaluation: Pt requires MIN assist x1 rolling to R side, MOD assist x2 supine>sit bed mobility, MOD assist x2 sit<>stand w/ VCs for hand placement and positioning, MOD assist x2 functional mobility w/ RW w/ cues for sequencing, MAX assist LB ADLs, MOD assist UB ADLs 2* the following deficits impacting occupational performance: impaired balance, impaired activity tolerance, fall risk, multiple lines, decreased strength and endurance, impaired cognition (oriented to self only), visual impairment, hard of hearing, decreased insight, increased time coordination, decreased UE strength L weaker than R  Pt to benefit from continued skilled OT tx while in the hospital to address deficits as defined above and maximize level of functional independence w ADL's and functional mobility  Occupational Performance areas to address include: eating, grooming, bathing/shower, toilet hygiene, dressing, health maintenance, functional mobility and clothing management, formal cognitive assesment  From OT standpoint, recommendation at time of d/c would be return to facility w/ OT services  Goals   Patient Goals none expressed at this time 2* cognition   LTG Time Frame 10-14   Long Term Goal please see below goals   Plan   Treatment Interventions ADL retraining;Functional transfer training;UE strengthening/ROM; Endurance training;Cognitive reorientation;Patient/family training;Equipment evaluation/education; Compensatory technique education; Energy conservation; Activityengagement   Goal Expiration Date 09/24/20   OT Frequency 3-5x/wk   Recommendation   OT Discharge Recommendation Post-Acute Rehabilitation Services  (return to SNF w/ therapy)   Barthel Index   Feeding 5   Bathing 0 Grooming Score 0   Dressing Score 0   Bladder Score 5   Bowels Score 10   Toilet Use Score 5   Transfers (Bed/Chair) Score 5   Mobility (Level Surface) Score 0   Stairs Score 0   Barthel Index Score 30   Modified McLennan Scale   Modified Krystyna Scale 4     Occupational Therapy Goals to be met in 10-14 days:  1) Pt will improve activity tolerance to F+ for 20 min txment sessions to enhance ADLs  2) Pt will complete UB ADLs/self care w/ supervision and min assist LB ADLs  3) Pt will complete toileting w/ supervision w/ G hygiene/thoroughness using DME PRN  4) Pt will improve functional transfers on/off all surfaces using DME PRN w/ G balance/safety including toileting w/ supervision  5) Pt will improve fx'l mobility during I/ADl/leisure tasks using DME PRN w/ g balance/safety w/ min assist  6) Pt will engage in ongoing cognitive assessment w/ G participation to A w/ safe d/c planning/recommendations  7) Pt will demonstrate G carryover of pt/caregiver education and training as appropriate w/ mod I  w/ G tolerance  8) Pt will engage in depression screen/leisure interest checklist w/ G participation to monitor s/s depression and ID 3 positive coping strategies to A w/ emotional regulation and management  9) Pt will demonstrate 100% carryover of E C  techniques w/ mod I t/o fx'l I/ADL/leisure tasks w/o cues s/p skilled education  10) Pt will demonstrate improved bed mobility to MIN assist to enhance ADLs  11) Pt will demonstrate improved standing tolerance to 3-5 minutes during functional tasks w/ no LOB to enhance ADL performance  12) Pt will demonstrate improved b/l UE strength by 1 MMT grade to enhance ADLS and functional transfers    Documentation completed by: Sandoval Panchal MS, OTR/L

## 2020-09-10 NOTE — PLAN OF CARE
Problem: OCCUPATIONAL THERAPY ADULT  Goal: Performs self-care activities at highest level of function for planned discharge setting  See evaluation for individualized goals  Description: Treatment Interventions: ADL retraining, Functional transfer training, UE strengthening/ROM, Endurance training, Cognitive reorientation, Patient/family training, Equipment evaluation/education, Compensatory technique education, Energy conservation, Activityengagement          See flowsheet documentation for full assessment, interventions and recommendations  Note: Limitation: Decreased ADL status, Decreased UE strength, Decreased Safe judgement during ADL, Decreased cognition, Decreased endurance, Decreased self-care trans, Decreased high-level ADLs, Decreased sensation  Prognosis: Fair, Good  Assessment: Pt is a 80 y o  female seen for OT evaluation s/p admit to [facility] on 9/9/2020 w/ change in mental status, fall at facility on Monday  Comorbidities affecting pt's functional performance at time of assessment include: blindness, hyponatremia, a-fib, DM II, hypothyroidism  CT chest and abdomen: Indeterminate age compression deformity involving the superior endplate at L1, no treatment per MD/RN  CT head: No acute intracranial hemorrhage seen  Personal factors affecting pt at time of IE include:lives at [facility]; pt poor historian and unable to provide history as pt states "I think I stay in bed all the time"; will await CM note for clarification of prior level of functioning   Upon evaluation: Pt requires MIN assist x1 rolling to R side, MOD assist x2 supine>sit bed mobility, MOD assist x2 sit<>stand w/ VCs for hand placement and positioning, MOD assist x2 functional mobility w/ RW w/ cues for sequencing, MAX assist LB ADLs, MOD assist UB ADLs 2* the following deficits impacting occupational performance: impaired balance, impaired activity tolerance, fall risk, multiple lines, decreased strength and endurance, impaired cognition (oriented to self only), visual impairment, hard of hearing, decreased insight, increased time coordination, decreased UE strength L weaker than R  Pt to benefit from continued skilled OT tx while in the hospital to address deficits as defined above and maximize level of functional independence w ADL's and functional mobility  Occupational Performance areas to address include: eating, grooming, bathing/shower, toilet hygiene, dressing, health maintenance, functional mobility and clothing management, formal cognitive assesment  From OT standpoint, recommendation at time of d/c would be return to facility w/ OT services        OT Discharge Recommendation: Post-Acute Rehabilitation Services(return to SNF w/ therapy)

## 2020-09-10 NOTE — PHYSICAL THERAPY NOTE
PT EVALUATION    Pt  Name: Warden Goodman  Pt  Age: 80 y o    MRN: [id number]  LENGTH OF STAY: 1    Patient Active Problem List   Diagnosis    Acquired hypothyroidism    Hypertensive heart disease without heart failure    Mixed hyperlipidemia    Vitamin D deficiency    Leg ulcer, right, limited to breakdown of skin ([facility] [address] )    Type 2 diabetes mellitus with foot ulcer (CODE) ([facility]    Chronic atrial fibrillation    Transaminitis    Generalized osteoarthritis    CKD (chronic kidney disease) stage 3, GFR 30-59 ml/min ([facility])    Mixed aphasia, global    Change in mental status    Advance care planning    Localized edema    Macular degeneration    Essential (primary) hypertension    Ambulatory dysfunction    Other hemorrhoids    Slow transit constipation    Type 2 diabetes mellitus, with long-term current use of insulin ([facility])    Acute encephalopathy    Hyponatremia    Lower extremity pain    Closed compression fracture of body of L1 vertebra ([facility])       Admitting Diagnoses:   Hyponatremia [E87 1]  Altered mental status [R41 82]  Acute encephalopathy [G93 40]    Past Medical History:   Diagnosis Date    Atrial fibrillation ([facility] [address] )     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    Cholecystitis     Disease of tonsils     Glaucoma 2008    NOS    Toxic metabolic encephalopathy 1/1/3472       Past Surgical History:   Procedure Laterality Date    CHOLECYSTECTOMY      EYE SURGERY Right 03/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    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       Imaging Studies:  CT head without contrast   Final Result by Cindy Guzman MD (09/09 4023)      No acute intracranial hemorrhage seen   No mass effect or midline shift   6 moderate to severe periventricular and white matter hypodensity related to chronic small vessel ischemic changes   Prominence of the ventricular system which is out of proportion to the sulcal spaces, raises concern for normal pressure hydrocephalus in the appropriate clinical setting Scattered the parenchymal calcification, unchanged               Workstation performed: [id number]         CT spine cervical without contrast   Final Result by Winston Hoyt MD (09/09 1449)      No cervical spine fracture or traumatic malalignment  Workstation performed: [id number]         CT chest abdomen pelvis w contrast   Final Result by Winston Hoyt MD (09/09 1513)      Indeterminate age compression deformity involving the superior endplate at L1  Correlate for focal tenderness  Consider MRI if there is any clinical ambiguity  Stable cardiomegaly  Workstation performed: [id number]              09/10/20 1010   PT Last Visit   PT Visit Date 09/10/20   Note Type   Note type Eval only   Pain Assessment   Pain Score No Pain   Home Living   Type of Home SNF  [facility])   Home Layout One level   Bathroom Shower/Tub Walk-in shower   Bathroom Toilet Raised   Bathroom Equipment Grab bars in shower; Shower chair;Grab bars around toilet   [address] [facility]; Wheelchair-manual   Additional Comments Pt temo arroyo & CRISTINE, unable to provide accurate information  Pt states she stays in bed all the time but also said yes that she uses a walker  Prior Function   Level of [place] Needs assistance with ADLs and functional mobility   Lives With Facility staff   Receives Help From Personal care attendant   ADL Assistance Needs assistance   Falls in the last 6 months 1 to 4  (pt denies falls but per chart at least 1 on Monday)   Comments Pt [place]o [place], unable to provide accurate information; per chart, pt Ox3 at baseline   Restrictions/Precautions   Weight Bearing Precautions Per Order No   Other Precautions Cognitive; Chair Alarm; Bed Alarm;Multiple lines;O2;Fall Risk;Hard of hearing;Visual impairment  (2L O2 NC)   General   Family/Caregiver Present No   Cognition   Overall Cognitive Status Impaired   Arousal/Participation Alert   Attention Difficulty attending to directions   Orientation Level Oriented to person;Disoriented to place; Disoriented to time;Disoriented to situation   Following Commands Follows one step commands with increased time or repetition   RUE Assessment   RUE Assessment   (refer to OT)   LUE Assessment   LUE Assessment   (refer to OT)   RLE Assessment   RLE Assessment WFL  (4-/5 grossly)   LLE Assessment   LLE Assessment WFL  (4-/5 grossly)   Bed Mobility   Supine to Sit 3  Moderate assistance   Additional items Assist x 2;HOB elevated; Bedrails; Increased time required;Verbal cues;LE management   Additional Comments cues for techniques & safety   Transfers   Sit to Stand 3  Moderate assistance   Additional items Assist x 2; Increased time required;Verbal cues   Stand to Sit 3  Moderate assistance   Additional items Assist x 1; Increased time required;Verbal cues   Additional Comments cues for techniques & safety   Ambulation/Elevation   Gait pattern Wide KAI; Decreased foot clearance;Shuffling; Short stride; Step to;Excessively slow   Gait Assistance 3  Moderate assist   Additional items Assist x 2;Verbal cues; Tactile cues   Assistive Device Rolling walker   Distance 2'x1   Balance   Static Sitting Fair   Static Standing Poor +  (w/ RW)   Ambulatory Poor  (w/ RW)   Activity Tolerance   Activity Tolerance Patient limited by fatigue   Medical Staff Made Aware OT Tayler   Nurse Made Aware RN Jenny   Assessment   Prognosis Guarded   Problem List Decreased strength;Decreased endurance; Impaired balance;Decreased mobility; Decreased cognition; Impaired judgement;Decreased safety awareness; Impaired hearing; Impaired vision;Obesity   Assessment Pt  89 y  o female presented from [facility] w/ altered mental status & s/p fall on 9/7/20  Per chart, pt Ox3 at baseline  Pt admitted for acute encephalopathy w/ Hyponatremia  Also noted to have age indeterminate L1 compression fx per CT   Pt referred to PT for mobility assessment & D/C planning w/ orders of up w/ assistance  Pt resident of [address] [facility]  Pt poor historian hence unable to obtain information re: PLOF but assumed pt require assistance w/ ADL's & functional mobility at baseline given resident at of a NH  On eval, pt demonstrate dec mobility, balance, endurance & amb  Pt require modAx2 for most functional mobility + cues for techniques  Gait deviations as above, slow & unsteady w/ shuffling gait but no gross LOB noted  Pt [place] w/ impaired cognition, require inc time & repetition to follow commands  No dizziness reported t/o session  Nsg staff most recent vital signs as follows: /69 (BP Location: Right arm)   Pulse 68   Temp (!) 97 4 °F (36 3 °C) (Temporal)   Resp 18   Wt 90 kg (198 lb 6 6 oz)   LMP  (LMP Unknown)   SpO2 99%   BMI 36 29 kg/m²   At end of session, pt OOB in chair w/o issues, call bell & phone in reach, chair alarm activated  Fall precautions reinforced w/ good understanding  Will continue skilled PT to improve function & safety  At this time, will recommend return to SNF when medically cleared  CM to follow  Nsg staff to continue to mobilized pt (OOB in chair for all meals & ambulate in room/unit) as tolerated to prevent further decline in function  Nsg notified  Barriers to Discharge None   Goals   Patient Goals none stated 2* to impaired cognition   STG Expiration Date 09/24/20   Short Term Goal #1 Goals to be met in 14 days; pt will be able to: 1) inc strength & balance by 1/2 grade to improve overall functional mobility & dec fall risk; 2) inc bed mobility to minAx1 for pt to be able to get in/OOB safely w/ proper techniques 100% of the time, to dec caregiver burden & safely function at home; 3) inc transfers to minAx1 for pt to transition safely from one surface to another w/o % of the time, to dec caregiver burden & safely function at home; 4) inc amb w/ RW approx   150' w/ minAx1 for pt to ambulate household distances w/o any % of the time, to dec caregiver burden & safely function at home; 5) inc barthel score to 50 to decrease overall risk for falls; 6) pt/caregiver ed   PT Treatment Day 0   Plan   Treatment/Interventions Functional transfer training;LE strengthening/ROM; Therapeutic exercise; Endurance training;Patient/family training;Bed mobility;Gait training;Spoke to nursing;OT   PT Frequency 2-3x/wk   Recommendation   PT Discharge Recommendation Return to previous environment with social support  (return to SNF when medically cleared)   Equipment Recommended Wheelchair;Walker   PT - OK to Discharge Yes  (when medically cleared)   Barthel Index   Feeding 5   Bathing 0   Grooming Score 0   Dressing Score 0   Bladder Score 5   Bowels Score 10   Toilet Use Score 5   Transfers (Bed/Chair) Score 5   Mobility (Level Surface) Score 0   Stairs Score 0   Barthel Index Score 30   Hx/personal factors: co-morbidities, advanced age, mutliple lines, use of AD, dec cognition, h/o of falls, fall risk, assist w/ ADL's, obesity and O2  Examination: dec mobility, dec balance, dec endurance, dec amb, high fall risk, dec cognition  Clinical: unpredictable (ongoing medical status, abnormal lab values and high fall risk)  Complexity: high     Elayne De Leon, PT

## 2020-09-10 NOTE — PROGRESS NOTES
Progress Note - Warden Goodman 11/3/1927, 80 y o  female MRN: [id number]    Unit/Bed#: E5 -01 Encounter: [id number]    Primary Care Provider: Yaneli Valiente MD   Date and time admitted to hospital: 2020 12:45 PM        * Acute encephalopathy  Assessment & Plan  No clear etiology  It has resolved  Daughter thinks it is likely loneliness as she can't have visitors at Nursing home  Mental status at baseline per daughter          Subjective:   Feels better  She is blind and hard of hearing  She answers questions appropriately  Objective:     Vitals:   Temp (24hrs), Av 3 °F (36 3 °C), Min:97 °F (36 1 °C), Max:97 4 °F (36 3 °C)    Temp:  [97 °F (36 1 °C)-97 4 °F (36 3 °C)] 97 4 °F (36 3 °C)  HR:  [68-83] 83  Resp:  [18] 18  BP: (116-156)/(65-82) 150/65  SpO2:  [98 %-100 %] 100 %  Body mass index is 36 29 kg/m²  Input and Output Summary (last 24 hours): Intake/Output Summary (Last 24 hours) at 9/10/2020 1742  Last data filed at 9/10/2020 1257  Gross per 24 hour   Intake 1120 ml   Output    Net 1120 ml       Physical Exam:     Physical Exam  Vitals signs and nursing note reviewed  HENT:      Head: Normocephalic and atraumatic  Eyes:      Pupils: Pupils are equal, round, and reactive to light  Cardiovascular:      Rate and Rhythm: Normal rate and regular rhythm  Heart sounds: No murmur  No friction rub  No gallop  Pulmonary:      Effort: Pulmonary effort is normal       Breath sounds: Normal breath sounds  No wheezing or rales  Abdominal:      General: Bowel sounds are normal       Palpations: Abdomen is soft  Tenderness: There is no abdominal tenderness                 Additional Data:     Labs:    Results from last 7 days   Lab Units 09/10/20  0455   WBC Thousand/uL 6 01   HEMOGLOBIN g/dL 13 6   HEMATOCRIT % 41 9   PLATELETS Thousands/uL 175   NEUTROS PCT % 59   LYMPHS PCT % 23   MONOS PCT % 14*   EOS PCT % 2     Results from last 7 days   Lab Units 09/10/20  0455   POTASSIUM mmol/L 3 8   CHLORIDE mmol/L 100   CO2 mmol/L 28   BUN mg/dL 13   CREATININE mg/dL 0 99   CALCIUM mg/dL 8 2*   ALK PHOS U/L 62   ALT U/L 14   AST U/L 17     Results from last 7 days   Lab Units 09/09/20  1441   INR  1 45*                   * I Have Reviewed All Lab Data     Recent Cultures (last 7 days):             Last 24 Hours Medication List:   Current Facility-Administered Medications   Medication Dose Route Frequency Provider Last Rate    apixaban  5 mg Oral BID Alexander Gutierrez MD      ezetimibe  10 mg Oral HS Alexander Gutierrez MD      fentanyl citrate (PF)  50 mcg Intravenous Once PRN Rubin Livingston MD      levothyroxine  75 mcg Oral Early Morning Alexander Gutierrez MD      sodium chloride  75 mL/hr Intravenous Continuous Alexander Gutierrez MD 75 mL/hr (09/10/20 1949)         VTE Pharmacologic Prophylaxis:   Pharmacologic: Apixaban (Eliquis)      Current Length of Stay: 1 day(s)    Current Patient Status: Inpatient       Discharge Plan: NH tomorrow    Code Status: Level 3 - DNAR and DNI           Today, Patient Was Seen By: Misbah Arnett DO    ** Please Note: Dictation voice to text software may have been used in the creation of this document   **

## 2020-09-10 NOTE — ASSESSMENT & PLAN NOTE
No clear etiology  It has resolved  Daughter thinks it is likely loneliness as she can't have visitors at Nursing home      Mental status at baseline per daughter

## 2020-09-10 NOTE — PLAN OF CARE
Problem: Potential for Falls  Goal: Patient will remain free of falls  Description: INTERVENTIONS:  - Assess patient frequently for physical needs  -  Identify cognitive and physical deficits and behaviors that affect risk of falls    -  [place] fall precautions as indicated by assessment   - Educate patient/family on patient safety including physical limitations  - Instruct patient to call for assistance with activity based on assessment  - Modify environment to reduce risk of injury  - Consider OT/PT consult to assist with strengthening/mobility  Outcome: Progressing     Problem: Prexisting or High Potential for Compromised Skin Integrity  Goal: Skin integrity is maintained or improved  Description: INTERVENTIONS:  - Identify patients at risk for skin breakdown  - Assess and monitor skin integrity  - Assess and monitor nutrition and hydration status  - Monitor labs   - Assess for incontinence   - Turn and reposition patient  - Assist with mobility/ambulation  - Relieve pressure over bony prominences  - Avoid friction and shearing  - Provide appropriate hygiene as needed including keeping skin clean and dry  - Evaluate need for skin moisturizer/barrier cream  - Collaborate with interdisciplinary team   - Patient/family teaching  - Consider wound care consult   Outcome: Progressing     Problem: PAIN - ADULT  Goal: Verbalizes/displays adequate comfort level or baseline comfort level  Description: Interventions:  - Encourage patient to monitor pain and request assistance  - Assess pain using appropriate pain scale  - Administer analgesics based on type and severity of pain and evaluate response  - Implement non-pharmacological measures as appropriate and evaluate response  - Consider cultural and social influences on pain and pain management  - Notify physician/advanced practitioner if interventions unsuccessful or patient reports new pain  Outcome: Progressing     Problem: INFECTION - ADULT  Goal: Absence or prevention of progression during hospitalization  Description: INTERVENTIONS:  - Assess and monitor for signs and symptoms of infection  - Monitor lab/diagnostic results  - Monitor all insertion sites, i e  indwelling lines, tubes, and drains  - Monitor endotracheal if appropriate and nasal secretions for changes in amount and color  - [place] appropriate cooling/warming therapies per order  - Administer medications as ordered  - Instruct and encourage patient and family to use good hand hygiene technique  - Identify and instruct in appropriate isolation precautions for identified infection/condition  Outcome: Progressing  Goal: Absence of fever/infection during neutropenic period  Description: INTERVENTIONS:  - Monitor WBC    Outcome: Progressing     Problem: SAFETY ADULT  Goal: Patient will remain free of falls  Description: INTERVENTIONS:  - Assess patient frequently for physical needs  -  Identify cognitive and physical deficits and behaviors that affect risk of falls    -  [place] fall precautions as indicated by assessment   - Educate patient/family on patient safety including physical limitations  - Instruct patient to call for assistance with activity based on assessment  - Modify environment to reduce risk of injury  - Consider OT/PT consult to assist with strengthening/mobility  Outcome: Progressing  Goal: Maintain or return to baseline ADL function  Description: INTERVENTIONS:  -  Assess patient's ability to carry out ADLs; assess patient's baseline for ADL function and identify physical deficits which impact ability to perform ADLs (bathing, care of mouth/teeth, toileting, grooming, dressing, etc )  - Assess/evaluate cause of self-care deficits   - Assess range of motion  - Assess patient's mobility; develop plan if impaired  - Assess patient's need for assistive devices and provide as appropriate  - Encourage maximum independence but intervene and supervise when necessary  - Involve family in performance of ADLs  - Assess for home care needs following discharge   - Consider OT consult to assist with ADL evaluation and planning for discharge  - Provide patient education as appropriate  Outcome: Progressing  Goal: Maintain or return mobility status to optimal level  Description: INTERVENTIONS:  - Assess patient's baseline mobility status (ambulation, transfers, stairs, etc )    - Identify cognitive and physical deficits and behaviors that affect mobility  - Identify mobility aids required to assist with transfers and/or ambulation (gait belt, sit-to-stand, lift, walker, cane, etc )  - [place] fall precautions as indicated by assessment  - Record patient progress and toleration of activity level on Mobility SBAR; progress patient to next Phase/Stage  - Instruct patient to call for assistance with activity based on assessment  - Consider rehabilitation consult to assist with strengthening/weightbearing, etc   Outcome: Progressing     Problem: DISCHARGE PLANNING  Goal: Discharge to home or other facility with appropriate resources  Description: INTERVENTIONS:  - Identify barriers to discharge w/patient and caregiver  - Arrange for needed discharge resources and transportation as appropriate  - Identify discharge learning needs (meds, wound care, etc )  - Arrange for interpretive services to assist at discharge as needed  - Refer to Case Management Department for coordinating discharge planning if the patient needs post-hospital services based on physician/advanced practitioner order or complex needs related to functional status, cognitive ability, or social support system  Outcome: Progressing     Problem: Knowledge Deficit  Goal: Patient/family/caregiver demonstrates understanding of disease process, treatment plan, medications, and discharge instructions  Description: Complete learning assessment and assess knowledge base    Interventions:  - Provide teaching at level of understanding  - Provide teaching via preferred learning methods  Outcome: Progressing

## 2020-09-10 NOTE — ASSESSMENT & PLAN NOTE
- At baseline, oriented to person and place only  - On exam, oriented to person only (thought she was at [facility] which is where she resides)  Able to follow some simple commands, but exam slightly inhibited due to significant hearing impairment  - Per daughter at bedside, patient is back to her baseline  - CT head  · No acute intracranial hemorrhage seen  · No mass effect or midline shift  · 6 moderate to severe periventricular and white matter hypodensity related to chronic small vessel ischemic changes  · Prominence of the ventricular system which is out of proportion to the sulcal spaces, raises concern for normal pressure hydrocephalus in the appropriate clinical setting  · Scattered the parenchymal calcification, unchanged  - CT chest/abd/pelvis:  · Indeterminate age compression deformity involving the superior endplate at L1  Correlate for focal tenderness  Consider MRI if there is any clinical ambiguity  - CT cervical spine:  · No cervical spine fracture or traumatic malalignment  - Labs:  · Hyponatremia (Na 127) and elevated hemoglobin, but remainder of CMP, CBC, and TSH WNL  · COVID negative  · UA had trace leukocytes, but negative nitrites; low suspicion for UTI    Plan:  - No further Neuroimaging indicated at this time due to no acute neuro deficits and low suspicion for CVA or seizure  - Conservative management of delirium: minimize noise, maintain day/night cycle, provide frequent redirection, avoid restraints if possible, etc   - No need for follow up with Neurology or NPH clinic  - No further inpatient Neuro recommendations    Please call with questions

## 2020-09-11 LAB — OSMOLALITY UR/SERPL-RTO: 285 MMOL/KG (ref 282–298)

## 2020-09-11 PROCEDURE — 99232 SBSQ HOSP IP/OBS MODERATE 35: CPT | Performed by: HOSPITALIST

## 2020-09-11 PROCEDURE — 83930 ASSAY OF BLOOD OSMOLALITY: CPT | Performed by: HOSPITALIST

## 2020-09-11 RX ADMIN — SODIUM CHLORIDE 75 ML/HR: 0.9 INJECTION, SOLUTION INTRAVENOUS at 22:02

## 2020-09-11 RX ADMIN — LEVOTHYROXINE SODIUM 75 MCG: 75 TABLET ORAL at 05:46

## 2020-09-11 RX ADMIN — APIXABAN 5 MG: 5 TABLET, FILM COATED ORAL at 08:36

## 2020-09-11 RX ADMIN — EZETIMIBE 10 MG: 10 TABLET ORAL at 22:02

## 2020-09-11 RX ADMIN — APIXABAN 5 MG: 5 TABLET, FILM COATED ORAL at 17:27

## 2020-09-11 NOTE — CASE MANAGEMENT
This is not a readmission   This is not a bundle  Unplanned Readmission Risk Score:   17    Plan to dc today to [facility]- due to time of day call placed to admissions and s/w Brian  Per Shira Tinajero too late in the day to accept pt today but willing to accept her in the morning tomorrow  TT sent to attending who states ok for dc tomorrow for a 11am dc    CM placed call to pt's daughter Leni Saalberto  Per Leni Burroughs family would like pt to return to [facility] upon d/c  Discussed plan for tent d/c in the a m  Discussed transport options and informed that [facility] transport had potential oop expense  Leni Burroughs stated that she has had to pay for [facility] transport in the past and would like to avoid having to pay for that again so she stated she would  her mother herself  Informed Leni Burroughs that dc would be around 11am per attending  Leni Burroughs stated that she would be at the hospital at 10:00       Message sent to [facility] via [employer] advising family transport at approx 11 am 09/12

## 2020-09-11 NOTE — ASSESSMENT & PLAN NOTE
No clear etiology  It has resolved  Daughter thinks it is likely loneliness as she can't have visitors at Nursing home      Back to nursing home tomorrow

## 2020-09-11 NOTE — PROGRESS NOTES
Progress Note - Ana Georgia 11/3/1927, 80 y o  female MRN: [id number]    Unit/Bed#: E5 -01 Encounter: [id number]    Primary Care Provider: Chloe Nguyen MD   Date and time admitted to hospital: 2020 12:45 PM        * Acute encephalopathy  Assessment & Plan  No clear etiology  It has resolved  Daughter thinks it is likely loneliness as she can't have visitors at Nursing home  Back to nursing home tomorrow          Subjective:   Feels better  Back to baseline mental status  Objective:     Vitals:   Temp (24hrs), Av 5 °F (36 4 °C), Min:96 6 °F (35 9 °C), Max:98 1 °F (36 7 °C)    Temp:  [96 6 °F (35 9 °C)-98 1 °F (36 7 °C)] 97 7 °F (36 5 °C)  HR:  [72-85] 85  Resp:  [18-20] 18  BP: (114-163)/(63-74) 163/74  SpO2:  [91 %-99 %] 99 %  Body mass index is 36 29 kg/m²  Input and Output Summary (last 24 hours): Intake/Output Summary (Last 24 hours) at 2020 1729  Last data filed at 9/10/2020 1921  Gross per 24 hour   Intake 1000 ml   Output    Net 1000 ml       Physical Exam:     Physical Exam  Vitals signs and nursing note reviewed  HENT:      Head: Normocephalic and atraumatic  Eyes:      Pupils: Pupils are equal, round, and reactive to light  Cardiovascular:      Rate and Rhythm: Normal rate and regular rhythm  Heart sounds: No murmur  No friction rub  No gallop  Pulmonary:      Effort: Pulmonary effort is normal       Breath sounds: Normal breath sounds  No wheezing or rales  Abdominal:      General: Bowel sounds are normal       Palpations: Abdomen is soft  Tenderness: There is no abdominal tenderness                 Additional Data:     Labs:    Results from last 7 days   Lab Units 09/10/20  0455   WBC Thousand/uL 6 01   HEMOGLOBIN g/dL 13 6   HEMATOCRIT % 41 9   PLATELETS Thousands/uL 175   NEUTROS PCT % 59   LYMPHS PCT % 23   MONOS PCT % 14*   EOS PCT % 2     Results from last 7 days   Lab Units 09/10/20  0455   POTASSIUM mmol/L 3 8   CHLORIDE mmol/L 100   CO2 mmol/L 28   BUN mg/dL 13   CREATININE mg/dL 0 99   CALCIUM mg/dL 8 2*   ALK PHOS U/L 62   ALT U/L 14   AST U/L 17     Results from last 7 days   Lab Units 09/09/20  1441   INR  1 45*                   * I Have Reviewed All Lab Data     Recent Cultures (last 7 days):             Last 24 Hours Medication List:   Current Facility-Administered Medications   Medication Dose Route Frequency Provider Last Rate    apixaban  5 mg Oral BID Faiza Dailey MD      ezetimibe  10 mg Oral HS Faiza Dailey MD      fentanyl citrate (PF)  50 mcg Intravenous Once PRN Katie Sauer MD      levothyroxine  75 mcg Oral Early Morning Faiza Dailey MD      sodium chloride  75 mL/hr Intravenous Continuous Faiza Dailey MD 75 mL/hr (09/10/20 1921)         VTE Pharmacologic Prophylaxis:   Pharmacologic: Apixaban (Eliquis)      Current Length of Stay: 2 day(s)    Current Patient Status: Inpatient       Discharge Plan: NH tomorrow    Code Status: Level 3 - DNAR and DNI           Today, Patient Was Seen By: Obie Castleman, DO    ** Please Note: Dictation voice to text software may have been used in the creation of this document   **

## 2020-09-11 NOTE — PLAN OF CARE
Problem: Potential for Falls  Goal: Patient will remain free of falls  Description: INTERVENTIONS:  - Assess patient frequently for physical needs  -  Identify cognitive and physical deficits and behaviors that affect risk of falls    -  [place] fall precautions as indicated by assessment   - Educate patient/family on patient safety including physical limitations  - Instruct patient to call for assistance with activity based on assessment  - Modify environment to reduce risk of injury  - Consider OT/PT consult to assist with strengthening/mobility  Outcome: Progressing     Problem: Prexisting or High Potential for Compromised Skin Integrity  Goal: Skin integrity is maintained or improved  Description: INTERVENTIONS:  - Identify patients at risk for skin breakdown  - Assess and monitor skin integrity  - Assess and monitor nutrition and hydration status  - Monitor labs   - Assess for incontinence   - Turn and reposition patient  - Assist with mobility/ambulation  - Relieve pressure over bony prominences  - Avoid friction and shearing  - Provide appropriate hygiene as needed including keeping skin clean and dry  - Evaluate need for skin moisturizer/barrier cream  - Collaborate with interdisciplinary team   - Patient/family teaching  - Consider wound care consult   Outcome: Progressing     Problem: PAIN - ADULT  Goal: Verbalizes/displays adequate comfort level or baseline comfort level  Description: Interventions:  - Encourage patient to monitor pain and request assistance  - Assess pain using appropriate pain scale  - Administer analgesics based on type and severity of pain and evaluate response  - Implement non-pharmacological measures as appropriate and evaluate response  - Consider cultural and social influences on pain and pain management  - Notify physician/advanced practitioner if interventions unsuccessful or patient reports new pain  Outcome: Progressing     Problem: INFECTION - ADULT  Goal: Absence or prevention of progression during hospitalization  Description: INTERVENTIONS:  - Assess and monitor for signs and symptoms of infection  - Monitor lab/diagnostic results  - Monitor all insertion sites, i e  indwelling lines, tubes, and drains  - Monitor endotracheal if appropriate and nasal secretions for changes in amount and color  - [place] appropriate cooling/warming therapies per order  - Administer medications as ordered  - Instruct and encourage patient and family to use good hand hygiene technique  - Identify and instruct in appropriate isolation precautions for identified infection/condition  Outcome: Progressing  Goal: Absence of fever/infection during neutropenic period  Description: INTERVENTIONS:  - Monitor WBC    Outcome: Progressing     Problem: SAFETY ADULT  Goal: Patient will remain free of falls  Description: INTERVENTIONS:  - Assess patient frequently for physical needs  -  Identify cognitive and physical deficits and behaviors that affect risk of falls    -  [place] fall precautions as indicated by assessment   - Educate patient/family on patient safety including physical limitations  - Instruct patient to call for assistance with activity based on assessment  - Modify environment to reduce risk of injury  - Consider OT/PT consult to assist with strengthening/mobility  Outcome: Progressing  Goal: Maintain or return to baseline ADL function  Description: INTERVENTIONS:  -  Assess patient's ability to carry out ADLs; assess patient's baseline for ADL function and identify physical deficits which impact ability to perform ADLs (bathing, care of mouth/teeth, toileting, grooming, dressing, etc )  - Assess/evaluate cause of self-care deficits   - Assess range of motion  - Assess patient's mobility; develop plan if impaired  - Assess patient's need for assistive devices and provide as appropriate  - Encourage maximum independence but intervene and supervise when necessary  - Involve family in performance of ADLs  - Assess for home care needs following discharge   - Consider OT consult to assist with ADL evaluation and planning for discharge  - Provide patient education as appropriate  Outcome: Progressing  Goal: Maintain or return mobility status to optimal level  Description: INTERVENTIONS:  - Assess patient's baseline mobility status (ambulation, transfers, stairs, etc )    - Identify cognitive and physical deficits and behaviors that affect mobility  - Identify mobility aids required to assist with transfers and/or ambulation (gait belt, sit-to-stand, lift, walker, cane, etc )  - [place] fall precautions as indicated by assessment  - Record patient progress and toleration of activity level on Mobility SBAR; progress patient to next Phase/Stage  - Instruct patient to call for assistance with activity based on assessment  - Consider rehabilitation consult to assist with strengthening/weightbearing, etc   Outcome: Progressing     Problem: DISCHARGE PLANNING  Goal: Discharge to home or other facility with appropriate resources  Description: INTERVENTIONS:  - Identify barriers to discharge w/patient and caregiver  - Arrange for needed discharge resources and transportation as appropriate  - Identify discharge learning needs (meds, wound care, etc )  - Arrange for interpretive services to assist at discharge as needed  - Refer to Case Management Department for coordinating discharge planning if the patient needs post-hospital services based on physician/advanced practitioner order or complex needs related to functional status, cognitive ability, or social support system  Outcome: Progressing     Problem: Knowledge Deficit  Goal: Patient/family/caregiver demonstrates understanding of disease process, treatment plan, medications, and discharge instructions  Description: Complete learning assessment and assess knowledge base    Interventions:  - Provide teaching at level of understanding  - Provide teaching via preferred learning methods  Outcome: Progressing

## 2020-09-12 ENCOUNTER — TELEPHONE (OUTPATIENT)
Dept: OTHER | Facility: OTHER | Age: 85
End: 2020-09-12

## 2020-09-12 VITALS
SYSTOLIC BLOOD PRESSURE: 164 MMHG | WEIGHT: 197.53 LBS | HEART RATE: 88 BPM | BODY MASS INDEX: 36.13 KG/M2 | DIASTOLIC BLOOD PRESSURE: 90 MMHG | OXYGEN SATURATION: 98 % | RESPIRATION RATE: 18 BRPM | TEMPERATURE: 96.5 F

## 2020-09-12 PROCEDURE — 99239 HOSP IP/OBS DSCHRG MGMT >30: CPT | Performed by: HOSPITALIST

## 2020-09-12 RX ORDER — HYDRALAZINE HYDROCHLORIDE 20 MG/ML
10 INJECTION INTRAMUSCULAR; INTRAVENOUS ONCE
Status: COMPLETED | OUTPATIENT
Start: 2020-09-12 | End: 2020-09-12

## 2020-09-12 RX ADMIN — HYDRALAZINE HYDROCHLORIDE 10 MG: 20 INJECTION INTRAMUSCULAR; INTRAVENOUS at 10:01

## 2020-09-12 RX ADMIN — APIXABAN 5 MG: 5 TABLET, FILM COATED ORAL at 08:04

## 2020-09-12 RX ADMIN — METOPROLOL TARTRATE 25 MG: 25 TABLET, FILM COATED ORAL at 10:01

## 2020-09-12 RX ADMIN — LEVOTHYROXINE SODIUM 75 MCG: 75 TABLET ORAL at 06:00

## 2020-09-12 NOTE — CASE MANAGEMENT
MD called CM to inform that pt could return today to [facility]  COVID results in and negative  Sent to admission's office via [address], but knowing no one is monitoring [facility] on the weekend, called the floor and spoke to Chalino Huerta to see if she required a copy for pt to come into building; she did not as long as pt was negative, which she was  Dtr was scheduled to come to the hospital at 10:00, to transport pt at 11:00  AVINASH called nursing to provide numbers for report

## 2020-09-12 NOTE — ASSESSMENT & PLAN NOTE
· [place] Vasc score 5 secondary to age, gender, diabetes, hypertension  · She was fully anticoagulated on Eliquis and rate controlled with metoprolol

## 2020-09-12 NOTE — ASSESSMENT & PLAN NOTE
Metabolic encephalopathy in the setting of hyponatremia    But, really the confusion may have been due to isolation in the nursing home as she can only have limited family contact  She is back to her baseline mental status      I will send her back to [facility]

## 2020-09-12 NOTE — DISCHARGE SUMMARY
Discharge- Anu Boucher 11/3/1927, 80 y o  female MRN: [id number]    Unit/Bed#: E5 -01 Encounter: [id number]    Primary Care Provider: Juliann Jimenez MD   Date and time admitted to hospital: 9/9/2020 12:45 PM        Type 2 diabetes mellitus, with long-term current use of insulin ([address] )  Assessment & Plan  Lab Results   Component Value Date    HGBA1C 8 5 (H) 01/05/2020     Resume lantus      Chronic atrial fibrillation  Assessment & Plan  · Chads Vasc score 5 secondary to age, gender, diabetes, hypertension  · She was fully anticoagulated on Eliquis and rate controlled with metoprolol      * Acute encephalopathy  Assessment & Plan  Metabolic encephalopathy in the setting of hyponatremia    But, really the confusion may have been due to isolation in the nursing home as she can only have limited family contact  She is back to her baseline mental status  I will send her back to [address] Physician / Practitioner: Lisandra Del Valle DO  PCP: Juliann Jimenez MD  Admission Date:   Admission Orders (From admission, onward)     Ordered        09/09/20 1557  Inpatient Admission (expected length of stay for this patient Order details is greater than two midnights)  Once                   Discharge Date: 09/12/20    Resolved Problems  Date Reviewed: 9/9/2020    None                Reason for Admission: confusion      Hospital Course:     Anu Boucher is a 80 y o  female patient who originally presented to the hospital on 9/9/2020 due to confusion  She lives in a nursing home and unfortunately has dementia, blindness and difficulty hearing  She hasn't been able to see her family in several months due to [facility]  She came in more confused than normal    No source was found for this  Her daughter was able to spend the entire day with her and she is back to her baseline mental status  [facility] as they are opening up viisiting rules      Please see above list of diagnoses and related plan for additional information  Condition at Discharge: good       Discharge Day Visit / Exam:     Subjective:  Feels better  No complaints        Vitals: Blood Pressure: (!) 196/111 (09/12/20 0742)  Pulse: 88 (09/12/20 0742)  Temperature: (!) 96 5 °F (35 8 °C) (09/12/20 0742)  Temp Source: Temporal (09/12/20 0742)  Respirations: 18 (09/12/20 0742)  Weight - Scale: 89 6 kg (197 lb 8 5 oz) (09/12/20 0606)  SpO2: 98 % (09/12/20 0742)    Exam:     Physical Exam  Vitals signs and nursing note reviewed  HENT:      Head: Normocephalic and atraumatic  Eyes:      Pupils: Pupils are equal, round, and reactive to light  Cardiovascular:      Rate and Rhythm: Normal rate and regular rhythm  Heart sounds: No murmur  No friction rub  No gallop  Pulmonary:      Effort: Pulmonary effort is normal       Breath sounds: Normal breath sounds  No wheezing or rales  Abdominal:      General: Bowel sounds are normal       Palpations: Abdomen is soft  Tenderness: There is no abdominal tenderness  Robert Simple Discharge instructions/Information to patient and family:   See after visit summary for information provided to patient and family  Provisions for Follow-Up Care:  See after visit summary for information related to follow-up care and any pertinent home health orders  Disposition:     Other: [address] [place] [address]       Discharge Statement:  I spent 41 minutes discharging the patient  This time was spent on the day of discharge  I had direct contact with the patient on the day of discharge  Greater than 50% of the total time was spent examining patient, answering all patient questions, arranging and discussing plan of care with patient as well as directly providing post-discharge instructions  Additional time then spent on discharge activities  Discharge Medications:  See after visit summary for reconciled discharge medications provided to patient and family        ** Please Note: This note has been constructed using a voice recognition system **

## 2020-09-12 NOTE — PLAN OF CARE
Problem: Potential for Falls  Goal: Patient will remain free of falls  Description: INTERVENTIONS:  - Assess patient frequently for physical needs  -  Identify cognitive and physical deficits and behaviors that affect risk of falls    -  [place] fall precautions as indicated by assessment   - Educate patient/family on patient safety including physical limitations  - Instruct patient to call for assistance with activity based on assessment  - Modify environment to reduce risk of injury  - Consider OT/PT consult to assist with strengthening/mobility  Outcome: Progressing     Problem: Prexisting or High Potential for Compromised Skin Integrity  Goal: Skin integrity is maintained or improved  Description: INTERVENTIONS:  - Identify patients at risk for skin breakdown  - Assess and monitor skin integrity  - Assess and monitor nutrition and hydration status  - Monitor labs   - Assess for incontinence   - Turn and reposition patient  - Assist with mobility/ambulation  - Relieve pressure over bony prominences  - Avoid friction and shearing  - Provide appropriate hygiene as needed including keeping skin clean and dry  - Evaluate need for skin moisturizer/barrier cream  - Collaborate with interdisciplinary team   - Patient/family teaching  - Consider wound care consult   Outcome: Progressing     Problem: PAIN - ADULT  Goal: Verbalizes/displays adequate comfort level or baseline comfort level  Description: Interventions:  - Encourage patient to monitor pain and request assistance  - Assess pain using appropriate pain scale  - Administer analgesics based on type and severity of pain and evaluate response  - Implement non-pharmacological measures as appropriate and evaluate response  - Consider cultural and social influences on pain and pain management  - Notify physician/advanced practitioner if interventions unsuccessful or patient reports new pain  Outcome: Progressing     Problem: INFECTION - ADULT  Goal: Absence or prevention of progression during hospitalization  Description: INTERVENTIONS:  - Assess and monitor for signs and symptoms of infection  - Monitor lab/diagnostic results  - Monitor all insertion sites, i e  indwelling lines, tubes, and drains  - Monitor endotracheal if appropriate and nasal secretions for changes in amount and color  - [place] appropriate cooling/warming therapies per order  - Administer medications as ordered  - Instruct and encourage patient and family to use good hand hygiene technique  - Identify and instruct in appropriate isolation precautions for identified infection/condition  Outcome: Progressing  Goal: Absence of fever/infection during neutropenic period  Description: INTERVENTIONS:  - Monitor WBC    Outcome: Progressing     Problem: SAFETY ADULT  Goal: Patient will remain free of falls  Description: INTERVENTIONS:  - Assess patient frequently for physical needs  -  Identify cognitive and physical deficits and behaviors that affect risk of falls    -  [place] fall precautions as indicated by assessment   - Educate patient/family on patient safety including physical limitations  - Instruct patient to call for assistance with activity based on assessment  - Modify environment to reduce risk of injury  - Consider OT/PT consult to assist with strengthening/mobility  Outcome: Progressing  Goal: Maintain or return to baseline ADL function  Description: INTERVENTIONS:  -  Assess patient's ability to carry out ADLs; assess patient's baseline for ADL function and identify physical deficits which impact ability to perform ADLs (bathing, care of mouth/teeth, toileting, grooming, dressing, etc )  - Assess/evaluate cause of self-care deficits   - Assess range of motion  - Assess patient's mobility; develop plan if impaired  - Assess patient's need for assistive devices and provide as appropriate  - Encourage maximum independence but intervene and supervise when necessary  - Involve family in performance of ADLs  - Assess for home care needs following discharge   - Consider OT consult to assist with ADL evaluation and planning for discharge  - Provide patient education as appropriate  Outcome: Progressing  Goal: Maintain or return mobility status to optimal level  Description: INTERVENTIONS:  - Assess patient's baseline mobility status (ambulation, transfers, stairs, etc )    - Identify cognitive and physical deficits and behaviors that affect mobility  - Identify mobility aids required to assist with transfers and/or ambulation (gait belt, sit-to-stand, lift, walker, cane, etc )  - [place] fall precautions as indicated by assessment  - Record patient progress and toleration of activity level on Mobility SBAR; progress patient to next Phase/Stage  - Instruct patient to call for assistance with activity based on assessment  - Consider rehabilitation consult to assist with strengthening/weightbearing, etc   Outcome: Progressing     Problem: DISCHARGE PLANNING  Goal: Discharge to home or other facility with appropriate resources  Description: INTERVENTIONS:  - Identify barriers to discharge w/patient and caregiver  - Arrange for needed discharge resources and transportation as appropriate  - Identify discharge learning needs (meds, wound care, etc )  - Arrange for interpretive services to assist at discharge as needed  - Refer to Case Management Department for coordinating discharge planning if the patient needs post-hospital services based on physician/advanced practitioner order or complex needs related to functional status, cognitive ability, or social support system  Outcome: Progressing     Problem: Knowledge Deficit  Goal: Patient/family/caregiver demonstrates understanding of disease process, treatment plan, medications, and discharge instructions  Description: Complete learning assessment and assess knowledge base    Interventions:  - Provide teaching at level of understanding  - Provide teaching via preferred learning methods  Outcome: Progressing

## 2020-09-12 NOTE — PLAN OF CARE
Problem: Potential for Falls  Goal: Patient will remain free of falls  Description: INTERVENTIONS:  - Assess patient frequently for physical needs  -  Identify cognitive and physical deficits and behaviors that affect risk of falls    -  [place] fall precautions as indicated by assessment   - Educate patient/family on patient safety including physical limitations  - Instruct patient to call for assistance with activity based on assessment  - Modify environment to reduce risk of injury  - Consider OT/PT consult to assist with strengthening/mobility  9/12/2020 0940 by Daniel Jacobo RN  Outcome: Completed  9/12/2020 0753 by Daniel Jacobo RN  Outcome: Progressing     Problem: Prexisting or High Potential for Compromised Skin Integrity  Goal: Skin integrity is maintained or improved  Description: INTERVENTIONS:  - Identify patients at risk for skin breakdown  - Assess and monitor skin integrity  - Assess and monitor nutrition and hydration status  - Monitor labs   - Assess for incontinence   - Turn and reposition patient  - Assist with mobility/ambulation  - Relieve pressure over bony prominences  - Avoid friction and shearing  - Provide appropriate hygiene as needed including keeping skin clean and dry  - Evaluate need for skin moisturizer/barrier cream  - Collaborate with interdisciplinary team   - Patient/family teaching  - Consider wound care consult   9/12/2020 0940 by Daniel Jacobo RN  Outcome: Completed  9/12/2020 0753 by Daniel Jacobo RN  Outcome: Progressing     Problem: PAIN - ADULT  Goal: Verbalizes/displays adequate comfort level or baseline comfort level  Description: Interventions:  - Encourage patient to monitor pain and request assistance  - Assess pain using appropriate pain scale  - Administer analgesics based on type and severity of pain and evaluate response  - Implement non-pharmacological measures as appropriate and evaluate response  - Consider cultural and social influences on pain and pain management  - Notify physician/advanced practitioner if interventions unsuccessful or patient reports new pain  9/12/2020 0940 by Lynnette Landaverde RN  Outcome: Completed  9/12/2020 0753 by Lynnette Landaverde RN  Outcome: Progressing     Problem: INFECTION - ADULT  Goal: Absence or prevention of progression during hospitalization  Description: INTERVENTIONS:  - Assess and monitor for signs and symptoms of infection  - Monitor lab/diagnostic results  - Monitor all insertion sites, i e  indwelling lines, tubes, and drains  - Monitor endotracheal if appropriate and nasal secretions for changes in amount and color  - [place] appropriate cooling/warming therapies per order  - Administer medications as ordered  - Instruct and encourage patient and family to use good hand hygiene technique  - Identify and instruct in appropriate isolation precautions for identified infection/condition  9/12/2020 0940 by Lynnette Landaverde RN  Outcome: Completed  9/12/2020 0753 by Lynnette Landaverde RN  Outcome: Progressing  Goal: Absence of fever/infection during neutropenic period  Description: INTERVENTIONS:  - Monitor WBC    9/12/2020 0940 by Lynnette Landaverde RN  Outcome: Completed  9/12/2020 0753 by Lynnette Landaverde RN  Outcome: Progressing     Problem: SAFETY ADULT  Goal: Patient will remain free of falls  Description: INTERVENTIONS:  - Assess patient frequently for physical needs  -  Identify cognitive and physical deficits and behaviors that affect risk of falls    -  [place] fall precautions as indicated by assessment   - Educate patient/family on patient safety including physical limitations  - Instruct patient to call for assistance with activity based on assessment  - Modify environment to reduce risk of injury  - Consider OT/PT consult to assist with strengthening/mobility  9/12/2020 0940 by Lynnette Landaverde RN  Outcome: Completed  9/12/2020 0753 by Lynnette Landaverde RN  Outcome: Progressing  Goal: Maintain or return to baseline ADL function  Description: INTERVENTIONS:  -  Assess patient's ability to carry out ADLs; assess patient's baseline for ADL function and identify physical deficits which impact ability to perform ADLs (bathing, care of mouth/teeth, toileting, grooming, dressing, etc )  - Assess/evaluate cause of self-care deficits   - Assess range of motion  - Assess patient's mobility; develop plan if impaired  - Assess patient's need for assistive devices and provide as appropriate  - Encourage maximum independence but intervene and supervise when necessary  - Involve family in performance of ADLs  - Assess for home care needs following discharge   - Consider OT consult to assist with ADL evaluation and planning for discharge  - Provide patient education as appropriate  9/12/2020 0940 by Laura Barraza RN  Outcome: Completed  9/12/2020 0753 by Laura Barraza RN  Outcome: Progressing  Goal: Maintain or return mobility status to optimal level  Description: INTERVENTIONS:  - Assess patient's baseline mobility status (ambulation, transfers, stairs, etc )    - Identify cognitive and physical deficits and behaviors that affect mobility  - Identify mobility aids required to assist with transfers and/or ambulation (gait belt, sit-to-stand, lift, walker, cane, etc )  - [place] fall precautions as indicated by assessment  - Record patient progress and toleration of activity level on Mobility SBAR; progress patient to next Phase/Stage  - Instruct patient to call for assistance with activity based on assessment  - Consider rehabilitation consult to assist with strengthening/weightbearing, etc   9/12/2020 0940 by Laura Barraza RN  Outcome: Completed  9/12/2020 0753 by Laura Barraza RN  Outcome: Progressing     Problem: DISCHARGE PLANNING  Goal: Discharge to home or other facility with appropriate resources  Description: INTERVENTIONS:  - Identify barriers to discharge w/patient and caregiver  - Arrange for needed discharge resources and transportation as appropriate  - Identify discharge learning needs (meds, wound care, etc )  - Arrange for interpretive services to assist at discharge as needed  - Refer to Case Management Department for coordinating discharge planning if the patient needs post-hospital services based on physician/advanced practitioner order or complex needs related to functional status, cognitive ability, or social support system  9/12/2020 0940 by Mally Rico RN  Outcome: Completed  9/12/2020 0753 by Mally Rico RN  Outcome: Progressing     Problem: Knowledge Deficit  Goal: Patient/family/caregiver demonstrates understanding of disease process, treatment plan, medications, and discharge instructions  Description: Complete learning assessment and assess knowledge base    Interventions:  - Provide teaching at level of understanding  - Provide teaching via preferred learning methods  9/12/2020 0940 by Mally Rico RN  Outcome: Completed  9/12/2020 0753 by Mally Rico RN  Outcome: Progressing

## 2020-09-12 NOTE — COVID-19 HEALTH CARE FACILITY TRANSFER FORM
[facility] to [facility] Transfer - COVID-19 Assessment             Name of Patient: Myles Manuel                : 11/3/1927          Transport Date: 20       Has the patient been laboratory tested for COVID-19? []  NO  If No,Test was not indicated per  CDC Testing Criteria   May Transfer Patient   [x] YES  If Tested Results below     COVID-19 References              SARS-CoV-2   Date/Time Value Ref Range Status   2020 02:41 PM Negative Negative Final            Question is to be completed for any patient who tests positive for COVID-19        1  [] Yes [May Transfer] [] No [May Not Transfer]          Question is to be completed for any patient who is tested for COVID-19            2    [] Yes [May Not Transfer] [] No [May Transfer]          Signature of Physician or Health Care Professional: Anette Page DO 20          Form updated as of 3/24/2020

## 2020-09-13 ENCOUNTER — TELEPHONE (OUTPATIENT)
Dept: OTHER | Facility: OTHER | Age: 85
End: 2020-09-13

## 2020-09-13 NOTE — TELEPHONE ENCOUNTER
[phone]/ENZO/[facility]/PT- Kentrell Nina/ 1927/Regarding unwittnessed fall

## 2020-09-14 ENCOUNTER — NURSING HOME VISIT (OUTPATIENT)
Dept: GERIATRICS | Facility: OTHER | Age: 85
End: 2020-09-14
Payer: MEDICARE

## 2020-09-14 DIAGNOSIS — G93.41 ACUTE METABOLIC ENCEPHALOPATHY: Primary | ICD-10-CM

## 2020-09-14 DIAGNOSIS — E78.2 MIXED HYPERLIPIDEMIA: ICD-10-CM

## 2020-09-14 DIAGNOSIS — I48.20 CHRONIC ATRIAL FIBRILLATION (HCC): ICD-10-CM

## 2020-09-14 DIAGNOSIS — E87.1 HYPONATREMIA: ICD-10-CM

## 2020-09-14 DIAGNOSIS — N18.30 CKD (CHRONIC KIDNEY DISEASE) STAGE 3, GFR 30-59 ML/MIN (HCC): ICD-10-CM

## 2020-09-14 DIAGNOSIS — R60.0 LOCALIZED EDEMA: ICD-10-CM

## 2020-09-14 DIAGNOSIS — E55.9 VITAMIN D DEFICIENCY: Chronic | ICD-10-CM

## 2020-09-14 DIAGNOSIS — Z79.4 TYPE 2 DIABETES MELLITUS WITH OTHER SPECIFIED COMPLICATION, WITH LONG-TERM CURRENT USE OF INSULIN (HCC): ICD-10-CM

## 2020-09-14 DIAGNOSIS — E11.69 TYPE 2 DIABETES MELLITUS WITH OTHER SPECIFIED COMPLICATION, WITH LONG-TERM CURRENT USE OF INSULIN (HCC): ICD-10-CM

## 2020-09-14 DIAGNOSIS — E03.9 ACQUIRED HYPOTHYROIDISM: ICD-10-CM

## 2020-09-14 DIAGNOSIS — I10 ESSENTIAL (PRIMARY) HYPERTENSION: ICD-10-CM

## 2020-09-14 PROBLEM — G93.40 ACUTE ENCEPHALOPATHY: Status: RESOLVED | Noted: 2020-09-08 | Resolved: 2020-09-14

## 2020-09-14 PROCEDURE — 99305 1ST NF CARE MODERATE MDM 35: CPT | Performed by: FAMILY MEDICINE

## 2020-09-14 NOTE — ASSESSMENT & PLAN NOTE
Stable  Will continue Lasix 20 mg daily   Check daily weights  If pt has LE pain 2/2 to arthritis continue with Tylenol PRN

## 2020-09-14 NOTE — ASSESSMENT & PLAN NOTE
Continue Zetia 10 mg tablet daily   Per chart review pt had Lipitor 20 mg on the medication list   To reduce the risk of myalgia, rhabdomyolysis and liver damage will refrain from combination therapy and continue one medication, Zetia

## 2020-09-14 NOTE — ASSESSMENT & PLAN NOTE
Heart rate controlled with Metoprolol  Irregularly irregular rhythm, hemodynamically stable   Continue with Eliquis 5 mg Q12 hrs

## 2020-09-14 NOTE — ASSESSMENT & PLAN NOTE
Na 127 on admission, 133 on discharge   Urine osmolality wnl   Clinically patient appears euvolemic  Will trend Na level, and obtain CMP tomorrow   Continue with neuro checks and monitor VS

## 2020-09-14 NOTE — ASSESSMENT & PLAN NOTE
1/19/18-1st attempt to complete initial assessment for OPCM. Patient answered and stated that he was on the other line trying to get some antibiotics. RN JIMMY informed patient that I would reach out to him at a later time. Patient said OK and ended the call. RN JIMMY will attempt to reach out to patient at a later date.    Continue with Vitamin D3 1000 units daily

## 2020-09-14 NOTE — ASSESSMENT & PLAN NOTE
Lab Results   Component Value Date    HGBA1C 8 5 (H) 01/05/2020     Tightly controlled   HbA1c trended down from 8 5 to 6 3 (6/15/2020)   Given patient's age, her HbA1c goal should be around 8  Continue with Lantus 10 units QHS  Continue with Metformin 500 mg QAM  Patient was discharged from the hospital on Lispro TID   Patient does not need short acting insulin, will d/c  Monitor blood glucose readings and adjust regimen accordingly   Avoid hypoglycemic events

## 2020-09-14 NOTE — ASSESSMENT & PLAN NOTE
Controlled with Levothyroxine 75 mcg daily  Continue with the same regimen   Will check new TSH tomorrow

## 2020-09-14 NOTE — ASSESSMENT & PLAN NOTE
Renal function stable  Creatinine remains at the baseline range 0 83-1 00  Continue with diuretic use   Avoid nephrotoxic medications   Encourage keeping patient hydrated   Avoid hypotensive events

## 2020-09-14 NOTE — ASSESSMENT & PLAN NOTE
Waxing and waning mentation, redirectable   Mental status change most likely 2/2 to hyponatremia state (Na 127 on hospital admission and 133 on discharge)   CT head: "No mass effect or midline shift  6 moderate to severe periventricular and white matter hypodensity related to chronic small vessel ischemic changes  Prominence of the ventricular system which is out of proportion to the sulcal spaces, raises concern for normal pressure hydrocephalus in the appropriate clinical setting  Scattered the parenchymal calcification, unchanged "  CT cervical spine: "No cervical spine fracture or traumatic malalignment "  CT abdomen, pelvis: "Indeterminate age compression deformity involving the superior endplate at L1  Correlate for focal tenderness  Consider MRI if there is any clinical ambiguity  Stable cardiomegaly "    Will continue to trend Na level and obtain new set of labs: CBC, CMP, Mg, TSH  Continue with fall precautions and neuro checks   If patient becomes agitated, reorient patient  Patient was d/c in the hospital from taking Gabapentin due to increased somnolence   Will hold off for now restarting Gabapentin    Continue with PT/OT   Close monitoring for mental changes

## 2020-09-14 NOTE — PROGRESS NOTES
[address]  [phone]  Senior Care SNF List: [facility],   HISTORY & PHYSICAL        NAME: Susu Iglesias  AGE: 80 y o  SEX: female  : 11/3/1927  DATE OF ENCOUNTER: 20  POS: 31 (SNF)  PCP: Shobha Caal MD    Chief Complaint     Seen and examined today for admission to short term rehabilitation unit at [address] [place] [address] facility  History of Present Illness     HPI    Patient was recently hospitalized at [facility] from 20 - 20 for evaluation of acute metabolic encephalopathy which was thought to be 2/2 to hyponatremia (Na 127 on admission) and in the setting of limited family contact in the nursing home  CT head was done showing no acute intracranial hemorrhage, CT spine with no evidence of cervical spine fracture and CT abdomen, pelvis revealed indeterminate age compression deformity at the superior endplate of L1  Patient's Na increased to 133 and her mental status returned to baseline on the day of discharge  Today, patient was seen and examined at bedside  She was noted to sit comfortable in bed, eating her dinner  She denies any pain at this time  Further information unable to obtain due to altered mental status         Review of Systems     Review of Systems   Unable to perform ROS: Mental status change       History     Allergies   Allergen Reactions    Aspirin GI Intolerance    Penicillins        Past Medical History:   Diagnosis Date    Acute encephalopathy 2020    Atrial fibrillation ([address] )     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    Cholecystitis     Disease of tonsils     Glaucoma 2008    NOS    Toxic metabolic encephalopathy 4063     Past Surgical History:   Procedure Laterality Date    CHOLECYSTECTOMY      EYE SURGERY Right 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    REPLACEMENT TOTAL KNEE Bilateral 2006       Family History: non-contributory  Social History     Tobacco Use    Smoking status: Never Smoker    Smokeless tobacco: Never Used   Substance Use Topics    Alcohol use: Not Currently     Frequency: Never    Drug use: Never       Objective   Vital Signs   BP: 135/82    HR:78    T:97 5   RR:18    O2Sat:96%      Physical Exam  Vitals signs and nursing note reviewed  Constitutional:       General: She is not in acute distress  Appearance: She is well-developed  She is obese  She is not ill-appearing, toxic-appearing or diaphoretic  HENT:      Head: Normocephalic and atraumatic  Comments: Hearing impairment      Nose: Nose normal    Eyes:      General:         Right eye: No discharge  Left eye: No discharge  Conjunctiva/sclera: Conjunctivae normal       Comments: Non-reactive right eye, mydriasis present    Neck:      Musculoskeletal: Normal range of motion and neck supple  Cardiovascular:      Rate and Rhythm: Normal rate  Rhythm irregular  Heart sounds: Normal heart sounds  Pulmonary:      Effort: Pulmonary effort is normal  No respiratory distress  Breath sounds: Normal breath sounds  Abdominal:      General: Bowel sounds are normal       Palpations: Abdomen is soft  Tenderness: There is no abdominal tenderness  Comments: Obese abdomen    Musculoskeletal: Normal range of motion  General: No tenderness  Comments: Trace BLE edema   Strength BLE 5/5   Skin:     General: Skin is warm  Findings: No rash  Neurological:      Mental Status: She is alert  She is disoriented  Sensory: No sensory deficit  Motor: No weakness  Comments: Alert, Oriented x 0  Follows commands  Impaired finger to nose test with dysmetria present  Patient only recalls her name and [place]  Does not recall her age, date of birth, year  Patient unable to count days of the week backwards              Pertinent Laboratory/Diagnostic Studies:     Lab Results   Component Value Date    WBC 6 01 09/10/2020    HGB 13 6 09/10/2020    HCT 41 9 09/10/2020    MCV 84 09/10/2020  09/10/2020     Lab Results   Component Value Date    GLUCOSE 181 (H) 05/22/2018    CALCIUM 8 2 (L) 09/10/2020     (L) 05/22/2018    K 3 8 09/10/2020    CO2 28 09/10/2020     09/10/2020    BUN 13 09/10/2020    CREATININE 0 99 09/10/2020     Lab Results   Component Value Date    AIU2QDJHZLES 2 070 09/09/2020     Lab Results   Component Value Date    HGBA1C 8 5 (H) 01/05/2020         Current Medications     Reviewed Medications on the most recent AVS and reconciled the list as follows:    Apixaban 5 mg (Take 1 tablet (5 mg total) by mouth 2 times a day)     Cyanocobalamin 1000 MCG tablet (Take 1 tablet (1,000 mcg total) by mouth daily)    Ezetimibe 10 mg tablet (Take 1 tablet (10 mg total) by mouth daily at bedtime)    Furosemide 20 mg tablet (Take 1 tablet (20 mg total) by mouth daily)    Insulin glargine 100 units/mL subcutaneous (Inject 10 Units under the skin daily at bedtime)    Levothyroxine 75 mcg tablet (Take 1 tablet (75 mcg total) by mouth daily)    MetFORMIN 500 mg tablet (Take 1 tablet (500 mg total) by mouth daily with breakfast)    Metoprolol succinate 25 mg 24 hr tablet (Take 1 tablet (25 mg total) by mouth every 12 hours)    Nystatin powder (Apply 1 application topically 2 times a day)    OMEGA-3 FATTY ACIDS PO (2 times a day)    Senna-docusate sodium 8 6-50 mg per tablet (Take 1 tablet by mouth 2 times a day)    Ursodiol 300 mg capsule (Take 1 capsule (300 mg total) by mouth 2 times a day)    Vitamin D-3 25 MCG (1000 UT) (Take 1 capsule daily)      All medications reviewed and updated in [facility] EMR/Chart  Assessment and Plan     Acute metabolic encephalopathy  Waxing and waning mentation, redirectable   Mental status change most likely 2/2 to hyponatremia state (Na 127 on hospital admission and 133 on discharge)   CT head: "No mass effect or midline shift  6 moderate to severe periventricular and white matter hypodensity related to chronic small vessel ischemic changes   Prominence of the ventricular system which is out of proportion to the sulcal spaces, raises concern for normal pressure hydrocephalus in the appropriate clinical setting  Scattered the parenchymal calcification, unchanged "  CT cervical spine: "No cervical spine fracture or traumatic malalignment "  CT abdomen, pelvis: "Indeterminate age compression deformity involving the superior endplate at L1  Correlate for focal tenderness  Consider MRI if there is any clinical ambiguity  Stable cardiomegaly "    Will continue to trend Na level and obtain new set of labs: CBC, CMP, Mg, TSH  Continue with fall precautions and neuro checks   If patient becomes agitated, reorient patient  Patient was d/c in the hospital from taking Gabapentin due to increased somnolence  Will hold off for now restarting Gabapentin    Continue with PT/OT   Close monitoring for mental changes     Hyponatremia  Na 127 on admission, 133 on discharge  Urine osmolality wnl   Clinically patient appears euvolemic  Will trend Na level, and obtain CMP tomorrow   Continue with neuro checks and monitor VS    Localized edema  Stable  Will continue Lasix 20 mg daily   Check daily weights  If pt has LE pain 2/2 to arthritis continue with Tylenol PRN         Type 2 diabetes mellitus, with long-term current use of insulin ([facility])    Lab Results   Component Value Date    HGBA1C 8 5 (H) 01/05/2020     Tightly controlled   HbA1c trended down from 8 5 to 6 3 (6/15/2020)   Given patient's age, her HbA1c goal should be around 8  Continue with Lantus 10 units QHS  Continue with Metformin 500 mg QAM  Patient was discharged from the hospital on Lispro TID   Patient does not need short acting insulin, will d/c  Monitor blood glucose readings and adjust regimen accordingly   Avoid hypoglycemic events        Essential (primary) hypertension  Controlled   BP goal per JNC 8 criteria <140/90  Continue with Furosemide 20 mg and Metoprolol Succ ER 25 mg Q12 hors - with holding parameters SBP <110 mmHg  Renal function stable, reviewed most recent labs   Monitor for any electrolyte imbalance given diuretic use     Chronic atrial fibrillation  Heart rate controlled with Metoprolol  Irregularly irregular rhythm, hemodynamically stable   Continue with Eliquis 5 mg Q12 hrs        Mixed hyperlipidemia  Continue Zetia 10 mg tablet daily   Per chart review pt had Lipitor 20 mg on the medication list   To reduce the risk of myalgia, rhabdomyolysis and liver damage will refrain from combination therapy and continue one medication, Zetia       Vitamin D deficiency  Continue with Vitamin D3 1000 units daily       CKD (chronic kidney disease) stage 3, GFR 30-59 ml/min (formerly [facility])  Renal function stable  Creatinine remains at the baseline range 0 83-1 00  Continue with diuretic use   Avoid nephrotoxic medications   Encourage keeping patient hydrated   Avoid hypotensive events     Acquired hypothyroidism  Controlled with Levothyroxine 75 mcg daily  Continue with the same regimen   Will check new TSH tomorrow     Slow transit constipation  Continue Senna BID and Miralax PRN   Encourage increased PO intake and adequate hydration       Jian Robles MD  PGY-2 [address]  09/14/20    Please note:  Voice-recognition software may have been used in the preparation of this document  Occasional wrong word or "sound-alike" substitutions may have occurred due to the inherent limitations of voice recognition software  Interpretation should be guided by context

## 2020-09-15 ENCOUNTER — NURSING HOME VISIT (OUTPATIENT)
Dept: GERIATRICS | Facility: OTHER | Age: 85
End: 2020-09-15
Payer: MEDICARE

## 2020-09-15 DIAGNOSIS — R53.81 PHYSICAL DECONDITIONING: ICD-10-CM

## 2020-09-15 DIAGNOSIS — E87.1 HYPONATREMIA: ICD-10-CM

## 2020-09-15 DIAGNOSIS — G93.41 ACUTE METABOLIC ENCEPHALOPATHY: Primary | ICD-10-CM

## 2020-09-15 PROCEDURE — 99308 SBSQ NF CARE LOW MDM 20: CPT | Performed by: NURSE PRACTITIONER

## 2020-09-15 NOTE — PROGRESS NOTES
[place] [address]  POS: 31 (STR)  Acute Med Note    72-year-old female seen and examined for follow-up of acute metabolic encephalopathy with underlying dementia; hyponatremia; hypertension; chronic atrial fibrillation; physical deconditioning  Found patient out of bed in chair and appeared in no distress  Patient is awake, alert,  attentive, and oriented to self and place  She could not recall her birth date, month, or year  She could not state the days of the week backwards  Memory and cognitive impairment noted  No complaints of pain at time of exam   Denies shortness of breath, chest pain, headache, dizziness, lightheadedness, abdominal pain, nausea, vomiting, diarrhea, or constipation  Vital signs:    BP:  118/78   HR:  78   Resp: 16  Subjective:  No complaints    Awaiting lab results from today:  CBC, CMP, magnesium, TSH level  Review of systems:  Per history of present illness, all other systems reviewed and negative  HISTORY:  Medical history: Reviewed, unchanged  Family history: Reviewed, unchanged   Social history: Reviewed, unchanged    Allergy: Reviewed, unchanged  Medications:  Medication orders were reviewed at facility    PHYSICAL EXAM:  Constitutional:  In no distress:  Appears with chronic illness  HEENT:  Head: Normocephalic and atraumatic  Eyes:   Prosthetic right eye  Pupil reacts to light left eye  Cardiovascular:  And rhythm: Normal rate  Rhythm regular  Normal heart sounds  Pulmonary:  Effort:  Pulmonary effort is normal   No respiratory distress  Normal breath sounds  Musculoskeletal:  Able to move all 4 extremities  No tenderness  No edema noted  Neurological:  Cognitive and memory impairment      Assessment/plan:  72-year-old female with:    Acute metabolic encephalopathy  - with underlying dementia  - pleasant,  Cooperative, and attentive during  Exam  - continue delirium precautions  -  continue supportive care, we orientation, and redirection  - Neurontin was discontinued in the hospital due to increased somnolence  Patient is not complaining of pain and will continue to monitor  - hold off on gabapentin for now  - continue close monitoring of mental status    Hyponatremia  -  Sodium trend 133 upon discharge< 127 on admission to hospital  - awaiting  CMP results from today  - continue neuro checks and monitor vital signs    Physical deconditioning  - in setting of recent hospitalization  - continue supportive care with ADLs, meals, and activities  - continue PT/OT  - continue fall precautions  - provide nutritional support  - ensure adequate hydration    **Please note: This follow-up note was constructed using a voice recognition system  **

## 2020-09-15 NOTE — ASSESSMENT & PLAN NOTE
-  Sodium trend 133 upon discharge< 127 on admission to hospital  - awaiting  CMP results from today  - continue neuro checks and monitor vital signs

## 2020-09-15 NOTE — ASSESSMENT & PLAN NOTE
- in setting of recent hospitalization  - continue supportive care with ADLs, meals, and activities  - continue PT/OT  - continue fall precautions  - provide nutritional support  - ensure adequate hydration

## 2020-09-18 ENCOUNTER — NURSING HOME VISIT (OUTPATIENT)
Dept: GERIATRICS | Facility: OTHER | Age: 85
End: 2020-09-18
Payer: MEDICARE

## 2020-09-18 DIAGNOSIS — I48.20 CHRONIC ATRIAL FIBRILLATION (HCC): ICD-10-CM

## 2020-09-18 DIAGNOSIS — R53.81 PHYSICAL DECONDITIONING: ICD-10-CM

## 2020-09-18 DIAGNOSIS — R60.0 LOCALIZED EDEMA: ICD-10-CM

## 2020-09-18 DIAGNOSIS — I10 ESSENTIAL (PRIMARY) HYPERTENSION: ICD-10-CM

## 2020-09-18 DIAGNOSIS — G93.41 ACUTE METABOLIC ENCEPHALOPATHY: Primary | ICD-10-CM

## 2020-09-18 DIAGNOSIS — E87.1 HYPONATREMIA: ICD-10-CM

## 2020-09-18 PROCEDURE — 99309 SBSQ NF CARE MODERATE MDM 30: CPT | Performed by: NURSE PRACTITIONER

## 2020-09-18 NOTE — PROGRESS NOTES
[facility],  POS: 31 (STR)  Progress Note    Unable to obtain from patient due to:  N/A; history obtained from patient, nursing staff in EMR  Chief Complaint/Reason for visit: STR follow up of acute metabolic encephalopathy; hyponatremia; localized edema; essential hypertension; chronic atrial fibrillation; physical deconditioning  History of Present Illness: 35-year-old female seen and examined for STR follow up  Found patient out of bed in wheelchair and appeared in no distress  She is alert and oriented to self and place  Speech is clear and appropriate  She is awaiting face time with 2 of her friends and was excited  Denies having any pain at time of visit  She has occasional right lower back pain  Denies shortness of breath, chest pain, dizziness, lightheadedness, abdominal pain, nausea, vomiting, constipation  C/o diarrhea and requesting stool softeners be discontinued  Past Medical History: unchanged from history and physical  Past Medical History:   Diagnosis Date    Acute encephalopathy 9/8/2020    Atrial fibrillation ([address] )     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    Cholecystitis     Disease of tonsils     Glaucoma 2008    NOS    Toxic metabolic encephalopathy 1/4/3072     Family History: unchanged from history and physical  Social History: unchanged from history and physical  Resident Since:  01/14/2020  Review of systems: Review of Systems   Constitutional: Negative  HENT: Negative  Eyes: Positive for visual disturbance  Respiratory: Negative for cough and shortness of breath  Cardiovascular: Negative for chest pain and palpitations  Gastrointestinal: Negative for abdominal pain and constipation  Genitourinary: Negative  Musculoskeletal: Positive for arthralgias and gait problem  Neurological: Negative for dizziness, syncope, light-headedness and headaches  Psychiatric/Behavioral: Negative for behavioral problems and dysphoric mood  The patient is not nervous/anxious  All other systems reviewed and are negative  Medications: No changes made  Allergies: Reviewed and unchanged  Consults reviewed: Other  Labs/Diagnostics (reviewed by this provider): Copy in Chart  Imaging Reviewed:  None today    Physical Exam    Weight:  188 8 lb Temp:  97 7 BP:  128/76 Pulse:  76 Resp:  20 O2 Sat:  96% on room air  Constitutional: Normocephalic  Orientation:Person, Place and Day     Physical Exam  Vitals signs and nursing note reviewed  Constitutional:       Appearance: She is not ill-appearing, toxic-appearing or diaphoretic  Comments: Elderly female who appears with chronic illness  In no distress  HENT:      Head: Normocephalic  Nose: No congestion or rhinorrhea  Mouth/Throat:      Mouth: Mucous membranes are moist       Pharynx: No oropharyngeal exudate  Eyes:      Extraocular Movements: Extraocular movements intact  Conjunctiva/sclera: Conjunctivae normal       Pupils: Pupils are equal, round, and reactive to light  Comments: Macular degeneration   Neck:      Musculoskeletal: Neck supple  Cardiovascular:      Rate and Rhythm: Normal rate  Rhythm irregular  Pulmonary:      Effort: Pulmonary effort is normal  No respiratory distress  Breath sounds: Normal breath sounds  No wheezing, rhonchi or rales  Abdominal:      General: Bowel sounds are normal  There is no distension  Palpations: Abdomen is soft  Tenderness: There is no abdominal tenderness  There is no guarding  Lymphadenopathy:      Cervical: No cervical adenopathy  Neurological:      Mental Status: She is alert  Comments: Alert and oriented to self and place  Recalled birth date  Attentive and answered appropriately to directed questioning  Assessment/Plan:  72-year-old female with:    Acute metabolic encephalopathy  -mental status change most likely due to hyponatremia (127 on hospital admission and 133 upon discharge)    Most recent sodium level 139 on 09/14/2020  -improved mental status  -no reported confusion or behavioral issues from nursing  -continue to monitor  -delirium precautions    Hyponatremia  -most recent sodium level 139  -recheck BMP on 9/22/2020    Localized edema  -nonpitting edema noted of bilateral lower extremities  -continue furosemide 20 mg daily with hold parameter  -continue to monitor weights  -weight trend 188 8 lb<191 8 lb on 9/2    Essential (primary) hypertension  -BPs controlled  -continue metoprolol succinate ER 25 mg q 12 hours with hold parameter  -continue furosemide 20 mg daily with hold parameter    Chronic atrial fibrillation  -heart rate controlled on exam  -continue metoprolol succinate ER 25 mg q 12 hours with hold parameter  -continue Eliquis 5 mg b i d   -no signs of active bleeding  -follow up with Cardiology as per routine    Physical deconditioning  -in setting of recent hospitalization  -continue care and support at [address] [place] [address] for ADLs  -continue PT/OT  -continue fall precautions  -ensure adequate hydration and nutrition    **Please note: This follow-up note was constructed using a voice recognition system  [address] [place], [facility]  2/98/97774:93 PM

## 2020-09-18 NOTE — ASSESSMENT & PLAN NOTE
-heart rate controlled on exam  -continue metoprolol succinate ER 25 mg q 12 hours with hold parameter  -continue Eliquis 5 mg b i d   -no signs of active bleeding  -follow up with Cardiology as per routine

## 2020-09-18 NOTE — ASSESSMENT & PLAN NOTE
-BPs controlled  -continue metoprolol succinate ER 25 mg q 12 hours with hold parameter  -continue furosemide 20 mg daily with hold parameter

## 2020-09-18 NOTE — ASSESSMENT & PLAN NOTE
-nonpitting edema noted of bilateral lower extremities  -continue furosemide 20 mg daily with hold parameter  -continue to monitor weights  -weight trend 188 8 lb<191 8 lb on 9/2

## 2020-09-18 NOTE — ASSESSMENT & PLAN NOTE
-mental status change most likely due to hyponatremia (127 on hospital admission and 133 upon discharge)    Most recent sodium level 139 on 09/14/2020  -improved mental status  -no reported confusion or behavioral issues from nursing  -continue to monitor  -delirium precautions

## 2020-09-18 NOTE — ASSESSMENT & PLAN NOTE
-in setting of recent hospitalization  -continue care and support at [facility] for ADLs  -continue PT/OT  -continue fall precautions  -ensure adequate hydration and nutrition

## 2020-09-21 ENCOUNTER — TELEPHONE (OUTPATIENT)
Dept: OTHER | Facility: OTHER | Age: 85
End: 2020-09-21

## 2020-09-21 ENCOUNTER — NURSING HOME VISIT (OUTPATIENT)
Dept: GERIATRICS | Facility: OTHER | Age: 85
End: 2020-09-21
Payer: MEDICARE

## 2020-09-21 DIAGNOSIS — M54.2 NECK PAIN: ICD-10-CM

## 2020-09-21 DIAGNOSIS — E87.1 HYPONATREMIA: ICD-10-CM

## 2020-09-21 DIAGNOSIS — J18.9 PNEUMONIA DUE TO INFECTIOUS ORGANISM, UNSPECIFIED LATERALITY, UNSPECIFIED PART OF LUNG: ICD-10-CM

## 2020-09-21 DIAGNOSIS — G93.41 ACUTE METABOLIC ENCEPHALOPATHY: Primary | ICD-10-CM

## 2020-09-21 DIAGNOSIS — I10 ESSENTIAL (PRIMARY) HYPERTENSION: ICD-10-CM

## 2020-09-21 DIAGNOSIS — I48.20 CHRONIC ATRIAL FIBRILLATION (HCC): ICD-10-CM

## 2020-09-21 PROCEDURE — 99310 SBSQ NF CARE HIGH MDM 45: CPT | Performed by: NURSE PRACTITIONER

## 2020-09-21 NOTE — ASSESSMENT & PLAN NOTE
-BP logs reviewed; stable up until this AM   -106/43 this AM with repeat BP of 112/59  -With lower BP from baseline and decreased PO intake, will discontinue lasix for now and re-evaluate restarting once acute illness resolved   -continue metoprolol ER 25 mg BID   -continue diet and lifestyle modifications   -continue to monitor

## 2020-09-21 NOTE — PROGRESS NOTES
[facility]  [address]  [phone]  4 [facility] 31  [facility] note        NAME: Noah Hudson  AGE: 80 y o   SEX: female    : 11/3/1927    DATE OF ENCOUNTER: 2020    Assessment and Plan     Problem List Items Addressed This Visit        Respiratory    Pneumonia due to infectious organism     -change in condition this AM-->1 episode of vomiting, increased restlessness, new wheezes and 90% oxygen saturation   -CXR depicting mild cardiomegaly with mild congestive heart failure (ordered BNP for completeness)  -WBC 10 5   -in consideration of desaturation, adventitious lung sounds, and clinical exam, will treat and start IV Levaquin 500 mg qd x 5 days and metronidazole 500 mg PO TID x 7 days (covering for aspiration PNA in the setting of vomiting)  -start florastor 250 mg PO BID x 7 days   -start duonebs q 6 hours x 2 days then q 12 hours x 2 days   -will repeat CBC on   -maintain oxygen saturations >92 %  -suction PRN   -speech to re-evaluate   -continue to provide safe and supportive environment   -continue to monitor for improvement and or worsening in condition   -discussed with nursing               Cardiovascular and Mediastinum    Chronic atrial fibrillation     -HR logs reviewed and stable   -continue rate control with metoprolol and anticoagulation with eliquis   -collaborate with Cardiology as needed   -continue to monitor          Essential (primary) hypertension     -BP logs reviewed; stable up until this AM   -106/43 this AM with repeat BP of 112/59  -With lower BP from baseline and decreased PO intake, will discontinue lasix for now and re-evaluate restarting once acute illness resolved   -continue metoprolol ER 25 mg BID   -continue diet and lifestyle modifications   -continue to monitor             Nervous and Auditory    Acute metabolic encephalopathy - Primary     -change in mental status; more fatigued in comparison to baseline   -sodium 130 today   -will order gentle hydration IV NSS @ 60 ml/hr x 1 liter and repeat BMP on 9/22  -continue delirium precautions   -with change in MS, will order urine studies for completeness   -continue to monitor             Other    Hyponatremia     -sodium 130 today   -will order gentle hydration IV NSS @ 60 ml/hr x 1 liter and repeat BMP on 9/22  -continue delirium precautions         Neck pain     -nonverbal indicators of pain on exam   -start Bengay topical to left neck BID   -continue Tylenol PRN   -continue to monitor for improvement and or worsening in condition                No orders of the defined types were placed in this encounter  Chief Complaint     Follow-up on clinical decline and results of lab work and CXR    History of Present Illness     81 yo female, resident of [address] [place] [address], seen in collaboration with nursing to evaluate change in condition  Nursing notified on call provider for concern of aspiration after 1 episode of vomiting earlier this AM  Change in condition also accompanied by new wheezes, cough and increased restlessness; CXR and lab work ordered to further evaluate  No other concerns from nursing  Chronic conditions addressed during this visit included but not limited to acute metabolic encephalopathy, atrial fibrillation, HTN and hyponatremia  Upon exam, resident sitting in her wheelchair appearing fatigued but without s/sx of acute respiratory distress or discomfort  ROS limited secondary to acuity in condition         The following portions of the patient's history were reviewed and updated as appropriate: allergies, current medications, past family history, past medical history, past social history, past surgical history and problem list     Review of Systems     Review of Systems   Unable to perform ROS: Acuity of condition       Active Problem List     Patient Active Problem List   Diagnosis    Acquired hypothyroidism    Hypertensive heart disease without heart failure    Mixed hyperlipidemia    Vitamin D deficiency    Leg ulcer, right, limited to breakdown of skin ([facility])    Type 2 diabetes mellitus with foot ulcer (CODE) ([facility])    Chronic atrial fibrillation    Transaminitis    Generalized osteoarthritis    CKD (chronic kidney disease) stage 3, GFR 30-59 ml/min ([facility])    Mixed aphasia, global    Change in mental status    Advance care planning    Localized edema    Macular degeneration    Essential (primary) hypertension    Ambulatory dysfunction    Other hemorrhoids    Slow transit constipation    Type 2 diabetes mellitus, with long-term current use of insulin ([facility])    Hyponatremia    Lower extremity pain    Closed compression fracture of body of L1 vertebra ([facility])    Acute metabolic encephalopathy    Physical deconditioning    Neck pain    Pneumonia due to infectious organism       Objective     VS: /59, HR 85, RR 19, 95% RA, T 97 6 weight: 188 6 lbs,     Physical Exam  Vitals signs and nursing note reviewed  Constitutional:       General: She is not in acute distress  Appearance: She is ill-appearing  She is not diaphoretic  Comments: Appearing chronically ill    HENT:      Head: Normocephalic and atraumatic  Right Ear: External ear normal       Left Ear: External ear normal       Nose: Nose normal       Mouth/Throat:      Mouth: Mucous membranes are moist       Pharynx: Oropharynx is clear  Eyes:      General:         Right eye: No discharge  Left eye: No discharge  Conjunctiva/sclera: Conjunctivae normal    Neck:      Musculoskeletal: Neck rigidity present  Comments: ROM limited secondary to pain   Cardiovascular:      Rate and Rhythm: Normal rate  Rhythm irregular  Pulses: Normal pulses  Heart sounds: Murmur present  Pulmonary:      Effort: No tachypnea, accessory muscle usage or respiratory distress  Breath sounds: Examination of the right-upper field reveals wheezing and rhonchi  Examination of the left-upper field reveals wheezing and rhonchi  Examination of the right-middle field reveals wheezing and rhonchi  Examination of the left-middle field reveals wheezing and rhonchi  Examination of the right-lower field reveals decreased breath sounds  Examination of the left-lower field reveals decreased breath sounds  Decreased breath sounds, wheezing and rhonchi present  Comments: Poor inspiration effort  Abdominal:      General: Abdomen is flat  Bowel sounds are normal  There is no distension  Palpations: Abdomen is soft  Tenderness: There is no abdominal tenderness  There is no guarding  Musculoskeletal:         General: No tenderness or signs of injury  Right lower leg: Edema (trace ankle edema ) present  Left lower leg: Edema (trace ankle edema ) present  Comments: Wheelchair bound    Skin:     General: Skin is warm and dry  Capillary Refill: Capillary refill takes less than 2 seconds  Coloration: Skin is not pale  Findings: Ecchymosis (R thoracic/lumber region s/p fall last week ) present  No erythema or rash  Neurological:      Mental Status: She is lethargic  Motor: Weakness (baseline ) present  Gait: Gait abnormal (baseline )  Comments: Oriented x 0; unable to follow commands appropriately    Psychiatric:         Attention and Perception: She is inattentive  Mood and Affect: Mood is not anxious  Affect is flat  Speech: She is noncommunicative (2/2 to acuity in condition )  Behavior: Behavior is not aggressive  Behavior is cooperative  Cognition and Memory: Cognition is impaired  Pertinent Laboratory/Diagnostic Studies:  Reviewed  CXR results and imaging reviewed via [facility]  CBC hgb 15 2, hct 45 5, WBC 10 5, neutrophils 8 3  CMP glucose 120, creatinine 0 86, GFR 59, sodium 130, magnesium 1 8    Current Medications       Medications reviewed and updated, see facility [facility] for details  ERNESTO Cox   [facility]  9/21/2020 1:57 PM

## 2020-09-21 NOTE — TELEPHONE ENCOUNTER
[phone]/[id number] from [place] /Pt is Marge hunt [birth year]/ Pt is quetaating    Hiram Alexander was paged at [phone]    Please give us a call back if the provider does not get back to you in 20-30 mins

## 2020-09-21 NOTE — ASSESSMENT & PLAN NOTE
-HR logs reviewed and stable   -continue rate control with metoprolol and anticoagulation with eliquis   -collaborate with Cardiology as needed   -continue to monitor

## 2020-09-21 NOTE — ASSESSMENT & PLAN NOTE
-sodium 130 today   -will order gentle hydration IV NSS @ 60 ml/hr x 1 liter and repeat BMP on 9/22  -continue delirium precautions

## 2020-09-21 NOTE — ASSESSMENT & PLAN NOTE
-change in mental status; more fatigued in comparison to baseline   -sodium 130 today   -will order gentle hydration IV NSS @ 60 ml/hr x 1 liter and repeat BMP on 9/22  -continue delirium precautions   -with change in MS, will order urine studies for completeness   -continue to monitor

## 2020-09-21 NOTE — ASSESSMENT & PLAN NOTE
-nonverbal indicators of pain on exam   -start Bengay topical to left neck BID   -continue Tylenol PRN   -continue to monitor for improvement and or worsening in condition

## 2020-09-21 NOTE — ASSESSMENT & PLAN NOTE
-change in condition this AM-->1 episode of vomiting, increased restlessness, new wheezes and 90% oxygen saturation   -CXR depicting mild cardiomegaly with mild congestive heart failure (ordered BNP for completeness)  -WBC 10 5   -in consideration of desaturation, adventitious lung sounds, and clinical exam, will treat and start IV Levaquin 500 mg qd x 5 days and metronidazole 500 mg PO TID x 7 days (covering for aspiration PNA in the setting of vomiting)  -start florastor 250 mg PO BID x 7 days   -start duonebs q 6 hours x 2 days then q 12 hours x 2 days   -will repeat CBC on 9/22  -maintain oxygen saturations >92 %  -suction PRN   -speech to re-evaluate   -continue to provide safe and supportive environment   -continue to monitor for improvement and or worsening in condition   -discussed with nursing

## 2020-09-22 ENCOUNTER — NURSING HOME VISIT (OUTPATIENT)
Dept: GERIATRICS | Facility: OTHER | Age: 85
End: 2020-09-22
Payer: MEDICARE

## 2020-09-22 DIAGNOSIS — J18.9 PNEUMONIA DUE TO INFECTIOUS ORGANISM, UNSPECIFIED LATERALITY, UNSPECIFIED PART OF LUNG: ICD-10-CM

## 2020-09-22 DIAGNOSIS — E11.69 TYPE 2 DIABETES MELLITUS WITH OTHER SPECIFIED COMPLICATION, WITH LONG-TERM CURRENT USE OF INSULIN (HCC): ICD-10-CM

## 2020-09-22 DIAGNOSIS — Z79.4 TYPE 2 DIABETES MELLITUS WITH OTHER SPECIFIED COMPLICATION, WITH LONG-TERM CURRENT USE OF INSULIN (HCC): ICD-10-CM

## 2020-09-22 DIAGNOSIS — N30.00 ACUTE CYSTITIS WITHOUT HEMATURIA: ICD-10-CM

## 2020-09-22 DIAGNOSIS — E87.1 HYPONATREMIA: ICD-10-CM

## 2020-09-22 DIAGNOSIS — G93.41 ACUTE METABOLIC ENCEPHALOPATHY: Primary | ICD-10-CM

## 2020-09-22 PROCEDURE — 99309 SBSQ NF CARE MODERATE MDM 30: CPT | Performed by: FAMILY MEDICINE

## 2020-09-22 NOTE — PROGRESS NOTES
[facility]  [address]  [phone] [address] of Service: nursing home place of service: POS [facility] Coverage      NAME: Warden Goodman  AGE: 80 y o  SEX: female [phone]    DATE OF ENCOUNTER: 9/22/2020    Assessment and Plan     Problem List Items Addressed This Visit        Endocrine    Type 2 diabetes mellitus, with long-term current use of insulin ([facility] )       Lab Results   Component Value Date    HGBA1C 8 5 (H) 01/05/2020     Glucose in lower side for the past day due to decreased po intake  Will encourage po intake , with assistance  Monitor accu checks q 6 hours             Respiratory    Pneumonia due to infectious organism     Remains afebrile, no hypoxia, no cough  Chest congestion much improved  Nervous and Auditory    Acute metabolic encephalopathy - Primary     Most likely secondary to hyponatremia, improved today  Na 134 , will continue one more liter NS  Her mental status could be changed due to pneumonia and or UTI, will continue antibiotics, start florastor  Repeat Cbc, CMp in am  Reorient as needed  Continue tylenol ATC for pain  Genitourinary    Acute cystitis without hematuria     UA done yesterday indicative of UTI  Will continue antibiotics and follow up with sensitivities  Other    Hyponatremia     Na 134 today, will give one more liter NS and repeat CMp in  Am  Consider Na Cl tabs in the future  Follow up on sodium studies  Chief Complaint     Follow up acute encephalopathy    History of Present Illness     Warden Goodman is a 80 y o  female who was seen today for follow up  Pt seen at bedside, was sleeping on arrival  She woke up easily and answered few simple questions, denies any complaints at the time of visit  As per staff she ate 25 % breakfast, slept overnight  More alert and oriented to person today            The following portions of the patient's history were reviewed and updated as appropriate: allergies, current medications, past family history, past medical history, past social history, past surgical history and problem list     Review of Systems     Review of Systems   HENT: Negative for trouble swallowing  Respiratory: Negative for cough and shortness of breath  Cardiovascular: Negative for chest pain  Gastrointestinal: Negative for abdominal pain  Genitourinary: Negative for dysuria  ROS limited due to dementia/acute encephalopathy    Active Problem List     Patient Active Problem List   Diagnosis    Acquired hypothyroidism    Hypertensive heart disease without heart failure    Mixed hyperlipidemia    Vitamin D deficiency    Leg ulcer, right, limited to breakdown of skin ([address] )    Type 2 diabetes mellitus with foot ulcer (CODE) ([facility]    Chronic atrial fibrillation    Transaminitis    Generalized osteoarthritis    CKD (chronic kidney disease) stage 3, GFR 30-59 ml/min ([facility])    Mixed aphasia, global    Change in mental status    Advance care planning    Localized edema    Macular degeneration    Essential (primary) hypertension    Ambulatory dysfunction    Other hemorrhoids    Slow transit constipation    Type 2 diabetes mellitus, with long-term current use of insulin ([facility])    Hyponatremia    Lower extremity pain    Closed compression fracture of body of L1 vertebra ([facility])    Acute metabolic encephalopathy    Physical deconditioning    Neck pain    Pneumonia due to infectious organism    Acute cystitis without hematuria       Objective     Vital Signs:     Blood pressure 124/67 Heart Rate: 85 Respiratory Rate 18   Temperature 98 3 Oxygen Saturation 96%RA     Physical Exam  Vitals signs and nursing note reviewed  Constitutional:       General: She is not in acute distress  Appearance: She is not diaphoretic  HENT:      Head: Normocephalic and atraumatic        Ears:      Comments: CRISTINE Mouth/Throat:      Mouth: Mucous membranes are dry  Comments: edentulous  Eyes:      General:         Right eye: Discharge present  Left eye: No discharge  Neck:      Musculoskeletal: Normal range of motion and neck supple  Cardiovascular:      Rate and Rhythm: Normal rate and regular rhythm  Heart sounds: Murmur present  Pulmonary:      Effort: Pulmonary effort is normal  No respiratory distress  Breath sounds: Normal breath sounds  No wheezing  Abdominal:      Palpations: Abdomen is soft  Tenderness: There is no abdominal tenderness  There is no guarding or rebound  Skin:     General: Skin is warm and dry  Neurological:      Mental Status: She is alert  Comments: AAOx1         Pertinent Laboratory/Diagnostic Studies:  Laboratory and Imaging studies reviewed  Full report in the paper chart  Current Medications   Medications reviewed and updated in facility chart      Name: Chevy Herrera  : 11/3/1927  MRN: [id number]  DOS: 2020      Roberto Qiu MD  Geriatric Medicine  2020 1:23 PM

## 2020-09-22 NOTE — ASSESSMENT & PLAN NOTE
Lab Results   Component Value Date    HGBA1C 8 5 (H) 01/05/2020     Glucose in lower side for the past day due to decreased po intake  Will encourage po intake , with assistance    Monitor accu checks q 6 hours

## 2020-09-22 NOTE — ASSESSMENT & PLAN NOTE
Most likely secondary to hyponatremia, improved today  Na 134 , will continue one more liter NS  Her mental status could be changed due to pneumonia and or UTI, will continue antibiotics, start florastor  Repeat Cbc, CMp in am  Reorient as needed  Continue tylenol ATC for pain

## 2020-09-22 NOTE — ASSESSMENT & PLAN NOTE
Na 134 today, will give one more liter NS and repeat CMp in  Am  Consider Na Cl tabs in the future  Follow up on sodium studies

## 2020-09-23 ENCOUNTER — NURSING HOME VISIT (OUTPATIENT)
Dept: GERIATRICS | Facility: OTHER | Age: 85
End: 2020-09-23
Payer: MEDICARE

## 2020-09-23 DIAGNOSIS — N18.2 STAGE 2 CHRONIC KIDNEY DISEASE: ICD-10-CM

## 2020-09-23 DIAGNOSIS — N30.00 ACUTE CYSTITIS WITHOUT HEMATURIA: ICD-10-CM

## 2020-09-23 DIAGNOSIS — J18.9 PNEUMONIA DUE TO INFECTIOUS ORGANISM, UNSPECIFIED LATERALITY, UNSPECIFIED PART OF LUNG: ICD-10-CM

## 2020-09-23 DIAGNOSIS — E87.1 HYPONATREMIA: Primary | ICD-10-CM

## 2020-09-23 PROCEDURE — 99309 SBSQ NF CARE MODERATE MDM 30: CPT | Performed by: NURSE PRACTITIONER

## 2020-09-23 NOTE — ASSESSMENT & PLAN NOTE
-UA positive for nitrates and bacteria; resident already ordered Levaquin for clinical PNA   -urine culture still pending (collected 9/21)  -will follow up accordingly   -continue to monitor

## 2020-09-23 NOTE — PROGRESS NOTES
[facility]  [address]  [phone]  [address]  Code [address]   STR note      NAME: Carmita Riley  AGE: 80 y o  SEX: female    : 11/3/1927    DATE OF ENCOUNTER: 2020    Assessment and Plan     Problem List Items Addressed This Visit        Respiratory    Pneumonia due to infectious organism     -improving   -VSS, afebrile and pulse ox >95%   -continue supportive management and medication regimen including duo nebs, Levaquin, flagyl and florastor   -continue to monitor             Genitourinary    Stage 2 chronic kidney disease     -stable   -last creatinine 0 83 and GFR 61 (2020)  -continue to renal dose medications and avoid nephrotoxic drugs   -continue periodic lab surveillance          Acute cystitis without hematuria     -UA positive for nitrates and bacteria; resident already ordered Levaquin for clinical PNA   -urine culture still pending (collected )  -will follow up accordingly   -continue to monitor               Other    Hyponatremia - Primary     -improved s/p 2 liters of IVF  -sodium stable at 137 (20)  -urine electrolytes results pending   -ordered repeat BMP on  for continued close monitoring   -continue to monitor for any clinical decline                No orders of the defined types were placed in this encounter  Chief Complaint     Follow-up on acute illness     History of Present Illness     79 yo female, resident of [facility] for short term rehab, seen in collaboration with nursing to follow up on her acute illness in relation to hyponatremia, cystitis and Pneumonia  No new concerns from nursing  Nursing reporting stable VS and that patient is more active with an improved appetite  Upon exam, patient is sitting comfortably in her wheelchair with no s/sx of acute distress or discomfort  She appears clinically stable and neurologically improved from exam two days prior   Patient able to follow commands without difficultly  Thorough ROS limited secondary to acute encephalopathy  The following portions of the patient's history were reviewed and updated as appropriate: allergies, current medications, past family history, past medical history and problem list     Review of Systems     ROS limited secondary to acute encephalopathy   Review of Systems   Constitutional: Positive for activity change (2/2 to chronic medical conditions ) and appetite change (improved )  Negative for diaphoresis and fever  Respiratory: Positive for cough (occasionally)  Negative for chest tightness, shortness of breath and wheezing  Cardiovascular: Negative for chest pain and palpitations  Gastrointestinal: Negative for abdominal distention and abdominal pain  Neurological: Negative for dizziness, light-headedness and headaches         Active Problem List     Patient Active Problem List   Diagnosis    Acquired hypothyroidism    Hypertensive heart disease without heart failure    Mixed hyperlipidemia    Vitamin D deficiency    Leg ulcer, right, limited to breakdown of skin ([address] )    Type 2 diabetes mellitus with foot ulcer (CODE) ([facility])    Chronic atrial fibrillation    Transaminitis    Generalized osteoarthritis    Stage 2 chronic kidney disease    Mixed aphasia, global    Change in mental status    Advance care planning    Localized edema    Macular degeneration    Essential (primary) hypertension    Ambulatory dysfunction    Other hemorrhoids    Slow transit constipation    Type 2 diabetes mellitus, with long-term current use of insulin ([facility])    Hyponatremia    Lower extremity pain    Closed compression fracture of body of L1 vertebra ([facility])    Acute metabolic encephalopathy    Physical deconditioning    Neck pain    Pneumonia due to infectious organism    Acute cystitis without hematuria       Objective     VS: /72, HR 77, RR 20, 95% RA, T 97 4,     Physical Exam  Vitals signs and nursing note reviewed  Constitutional:       General: She is not in acute distress  Appearance: She is not ill-appearing or diaphoretic  Comments: Appearing chronically ill    HENT:      Head: Normocephalic and atraumatic  Eyes:      General:         Right eye: No discharge  Left eye: No discharge  Conjunctiva/sclera: Conjunctivae normal    Cardiovascular:      Rate and Rhythm: Normal rate and regular rhythm  Pulses: Normal pulses  Heart sounds: Murmur present  Pulmonary:      Effort: Pulmonary effort is normal  No respiratory distress  Breath sounds: Decreased breath sounds (at B/L bases but CTA) present  No wheezing or rales  Abdominal:      General: Bowel sounds are normal  There is no distension  Palpations: Abdomen is soft  Tenderness: There is no abdominal tenderness  There is no guarding  Musculoskeletal:      Right lower leg: No edema  Left lower leg: No edema  Skin:     General: Skin is warm and dry  Capillary Refill: Capillary refill takes less than 2 seconds  Coloration: Skin is not pale  Findings: No erythema  Neurological:      Mental Status: She is alert  Comments: Oriented to name and ; able to follow commands      Psychiatric:         Attention and Perception: Attention normal          Mood and Affect: Mood is not anxious or depressed  Behavior: Behavior is cooperative  Comments: Pleasant          Pertinent Laboratory/Diagnostic Studies:  Reviewed     Current Medications       Medications reviewed and updated, see facility [facility] for details         ERNESTO Moreira   [facility]  2020 3:45 PM

## 2020-09-23 NOTE — ASSESSMENT & PLAN NOTE
-improved s/p 2 liters of IVF  -sodium stable at 137 (9/23/20)  -urine electrolytes results pending   -ordered repeat BMP on 9/28 for continued close monitoring   -continue to monitor for any clinical decline

## 2020-09-23 NOTE — ASSESSMENT & PLAN NOTE
-stable   -last creatinine 0 83 and GFR 61 (9/23/2020)  -continue to renal dose medications and avoid nephrotoxic drugs   -continue periodic lab surveillance

## 2020-09-23 NOTE — ASSESSMENT & PLAN NOTE
-improving   -VSS, afebrile and pulse ox >95%   -continue supportive management and medication regimen including duo nebs, Levaquin, flagyl and florastor   -continue to monitor

## 2020-09-24 ENCOUNTER — HOSPITAL ENCOUNTER (INPATIENT)
Facility: HOSPITAL | Age: 85
LOS: 4 days | Discharge: NURSING, REHAB OR LONG-TERM CARE | DRG: 092 | End: 2020-09-28
Attending: EMERGENCY MEDICINE | Admitting: INTERNAL MEDICINE
Payer: MEDICARE

## 2020-09-24 ENCOUNTER — APPOINTMENT (EMERGENCY)
Dept: RADIOLOGY | Facility: HOSPITAL | Age: 85
DRG: 092 | End: 2020-09-24
Payer: MEDICARE

## 2020-09-24 ENCOUNTER — NURSING HOME VISIT (OUTPATIENT)
Dept: GERIATRICS | Facility: OTHER | Age: 85
End: 2020-09-24
Payer: MEDICARE

## 2020-09-24 ENCOUNTER — APPOINTMENT (EMERGENCY)
Dept: CT IMAGING | Facility: HOSPITAL | Age: 85
DRG: 092 | End: 2020-09-24
Payer: MEDICARE

## 2020-09-24 DIAGNOSIS — R44.3 HALLUCINATION: ICD-10-CM

## 2020-09-24 DIAGNOSIS — G93.41 ACUTE METABOLIC ENCEPHALOPATHY: ICD-10-CM

## 2020-09-24 DIAGNOSIS — R41.82 ALTERED MENTAL STATUS: Primary | ICD-10-CM

## 2020-09-24 DIAGNOSIS — S09.90XA CLOSED HEAD INJURY, INITIAL ENCOUNTER: ICD-10-CM

## 2020-09-24 DIAGNOSIS — R77.8 TROPONIN I ABOVE REFERENCE RANGE: ICD-10-CM
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
## 2020-09-24 DIAGNOSIS — R53.81 DEBILITY: ICD-10-CM
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
## 2020-09-24 DIAGNOSIS — E87.1 HYPONATREMIA: ICD-10-CM

## 2020-09-24 DIAGNOSIS — E11.65 TYPE 2 DIABETES MELLITUS WITH HYPERGLYCEMIA, WITHOUT LONG-TERM CURRENT USE OF INSULIN (HCC): ICD-10-CM
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
## 2020-09-24 PROBLEM — J69.0 ASPIRATION PNEUMONIA (HCC): Status: ACTIVE | Noted: 2020-09-24

## 2020-09-24 LAB
ANION GAP SERPL CALCULATED.3IONS-SCNC: 11 MMOL/L (ref 4–13)
APTT PPP: 30 SECONDS (ref 23–37)
BACTERIA UR QL AUTO: NORMAL /HPF
BASOPHILS # BLD AUTO: 0.07 THOUSANDS/ΜL (ref 0–0.1)
BASOPHILS NFR BLD AUTO: 1 % (ref 0–1)
BILIRUB UR QL STRIP: NEGATIVE
BUN SERPL-MCNC: 14 MG/DL (ref 5–25)
CALCIUM SERPL-MCNC: 9.1 MG/DL (ref 8.3–10.1)
CHLORIDE SERPL-SCNC: 100 MMOL/L (ref 100–108)
CLARITY UR: CLEAR
CO2 SERPL-SCNC: 25 MMOL/L (ref 21–32)
COLOR UR: YELLOW
CREAT SERPL-MCNC: 0.89 MG/DL (ref 0.6–1.3)
EOSINOPHIL # BLD AUTO: 0.04 THOUSAND/ΜL (ref 0–0.61)
EOSINOPHIL NFR BLD AUTO: 0 % (ref 0–6)
ERYTHROCYTE [DISTWIDTH] IN BLOOD BY AUTOMATED COUNT: 14.1 % (ref 11.6–15.1)
GFR SERPL CREATININE-BSD FRML MDRD: 56 ML/MIN/1.73SQ M
GLUCOSE SERPL-MCNC: 103 MG/DL (ref 65–140)
GLUCOSE SERPL-MCNC: 121 MG/DL (ref 65–140)
GLUCOSE SERPL-MCNC: 205 MG/DL (ref 65–140)
GLUCOSE UR STRIP-MCNC: NEGATIVE MG/DL
HCT VFR BLD AUTO: 47.4 % (ref 34.8–46.1)
HGB BLD-MCNC: 15.1 G/DL (ref 11.5–15.4)
HGB UR QL STRIP.AUTO: NEGATIVE
IMM GRANULOCYTES # BLD AUTO: 0.13 THOUSAND/UL (ref 0–0.2)
IMM GRANULOCYTES NFR BLD AUTO: 1 % (ref 0–2)
INR PPP: 1.17 (ref 0.84–1.19)
KETONES UR STRIP-MCNC: ABNORMAL MG/DL
LEUKOCYTE ESTERASE UR QL STRIP: NEGATIVE
LYMPHOCYTES # BLD AUTO: 0.61 THOUSANDS/ΜL (ref 0.6–4.47)
LYMPHOCYTES NFR BLD AUTO: 7 % (ref 14–44)
MCH RBC QN AUTO: 27.6 PG (ref 26.8–34.3)
MCHC RBC AUTO-ENTMCNC: 31.9 G/DL (ref 31.4–37.4)
MCV RBC AUTO: 87 FL (ref 82–98)
MONOCYTES # BLD AUTO: 0.99 THOUSAND/ΜL (ref 0.17–1.22)
MONOCYTES NFR BLD AUTO: 11 % (ref 4–12)
NEUTROPHILS # BLD AUTO: 7.33 THOUSANDS/ΜL (ref 1.85–7.62)
NEUTS SEG NFR BLD AUTO: 80 % (ref 43–75)
NITRITE UR QL STRIP: NEGATIVE
NON-SQ EPI CELLS URNS QL MICRO: NORMAL /HPF
NRBC BLD AUTO-RTO: 0 /100 WBCS
NT-PROBNP SERPL-MCNC: 2598 PG/ML
PH UR STRIP.AUTO: 7 [PH] (ref 4.5–8)
PLATELET # BLD AUTO: 222 THOUSANDS/UL (ref 149–390)
PMV BLD AUTO: 9.3 FL (ref 8.9–12.7)
POTASSIUM SERPL-SCNC: 4.1 MMOL/L (ref 3.5–5.3)
PROCALCITONIN SERPL-MCNC: <0.05 NG/ML
PROT UR STRIP-MCNC: ABNORMAL MG/DL
PROTHROMBIN TIME: 14.7 SECONDS (ref 11.6–14.5)
RBC # BLD AUTO: 5.47 MILLION/UL (ref 3.81–5.12)
RBC #/AREA URNS AUTO: NORMAL /HPF
SODIUM SERPL-SCNC: 136 MMOL/L (ref 136–145)
SP GR UR STRIP.AUTO: 1.02 (ref 1–1.03)
TROPONIN I SERPL-MCNC: 0.06 NG/ML
TROPONIN I SERPL-MCNC: 0.07 NG/ML
UROBILINOGEN UR QL STRIP.AUTO: 4 E.U./DL
WBC # BLD AUTO: 9.17 THOUSAND/UL (ref 4.31–10.16)
WBC #/AREA URNS AUTO: NORMAL /HPF

## 2020-09-24 PROCEDURE — 82948 REAGENT STRIP/BLOOD GLUCOSE: CPT

## 2020-09-24 PROCEDURE — 85730 THROMBOPLASTIN TIME PARTIAL: CPT | Performed by: EMERGENCY MEDICINE

## 2020-09-24 PROCEDURE — 84484 ASSAY OF TROPONIN QUANT: CPT | Performed by: EMERGENCY MEDICINE

## 2020-09-24 PROCEDURE — 83880 ASSAY OF NATRIURETIC PEPTIDE: CPT | Performed by: EMERGENCY MEDICINE

## 2020-09-24 PROCEDURE — 85610 PROTHROMBIN TIME: CPT | Performed by: EMERGENCY MEDICINE

## 2020-09-24 PROCEDURE — 85025 COMPLETE CBC W/AUTO DIFF WBC: CPT | Performed by: EMERGENCY MEDICINE

## 2020-09-24 PROCEDURE — 81001 URINALYSIS AUTO W/SCOPE: CPT

## 2020-09-24 PROCEDURE — 72125 CT NECK SPINE W/O DYE: CPT

## 2020-09-24 PROCEDURE — 71045 X-RAY EXAM CHEST 1 VIEW: CPT

## 2020-09-24 PROCEDURE — 99285 EMERGENCY DEPT VISIT HI MDM: CPT | Performed by: EMERGENCY MEDICINE

## 2020-09-24 PROCEDURE — 84145 PROCALCITONIN (PCT): CPT | Performed by: INTERNAL MEDICINE

## 2020-09-24 PROCEDURE — G1004 CDSM NDSC: HCPCS

## 2020-09-24 PROCEDURE — 99223 1ST HOSP IP/OBS HIGH 75: CPT | Performed by: INTERNAL MEDICINE

## 2020-09-24 PROCEDURE — 36415 COLL VENOUS BLD VENIPUNCTURE: CPT | Performed by: EMERGENCY MEDICINE

## 2020-09-24 PROCEDURE — 93005 ELECTROCARDIOGRAM TRACING: CPT

## 2020-09-24 PROCEDURE — 99285 EMERGENCY DEPT VISIT HI MDM: CPT

## 2020-09-24 PROCEDURE — 84484 ASSAY OF TROPONIN QUANT: CPT | Performed by: INTERNAL MEDICINE

## 2020-09-24 PROCEDURE — 99309 SBSQ NF CARE MODERATE MDM 30: CPT | Performed by: NURSE PRACTITIONER

## 2020-09-24 PROCEDURE — 80048 BASIC METABOLIC PNL TOTAL CA: CPT | Performed by: EMERGENCY MEDICINE

## 2020-09-24 PROCEDURE — 70450 CT HEAD/BRAIN W/O DYE: CPT

## 2020-09-24 RX ORDER — IPRATROPIUM BROMIDE AND ALBUTEROL SULFATE 2.5; .5 MG/3ML; MG/3ML
3 SOLUTION RESPIRATORY (INHALATION) 4 TIMES DAILY
COMMUNITY

## 2020-09-24 RX ORDER — LEVOTHYROXINE SODIUM 0.07 MG/1
75 TABLET ORAL
Status: DISCONTINUED | OUTPATIENT
Start: 2020-09-25 | End: 2020-09-28 | Stop reason: HOSPADM

## 2020-09-24 RX ORDER — IPRATROPIUM BROMIDE AND ALBUTEROL SULFATE 2.5; .5 MG/3ML; MG/3ML
3 SOLUTION RESPIRATORY (INHALATION) 4 TIMES DAILY PRN
Status: DISCONTINUED | OUTPATIENT
Start: 2020-09-24 | End: 2020-09-28 | Stop reason: HOSPADM

## 2020-09-24 RX ORDER — ONDANSETRON 2 MG/ML
4 INJECTION INTRAMUSCULAR; INTRAVENOUS EVERY 6 HOURS PRN
Status: DISCONTINUED | OUTPATIENT
Start: 2020-09-24 | End: 2020-09-28 | Stop reason: HOSPADM

## 2020-09-24 RX ORDER — METOPROLOL SUCCINATE 25 MG/1
25 TABLET, EXTENDED RELEASE ORAL EVERY 12 HOURS
Status: DISCONTINUED | OUTPATIENT
Start: 2020-09-24 | End: 2020-09-28 | Stop reason: HOSPADM

## 2020-09-24 RX ORDER — ASPIRIN 81 MG/1
324 TABLET, CHEWABLE ORAL ONCE
Status: COMPLETED | OUTPATIENT
Start: 2020-09-24 | End: 2020-09-24

## 2020-09-24 RX ORDER — INSULIN GLARGINE 100 [IU]/ML
10 INJECTION, SOLUTION SUBCUTANEOUS
Status: DISCONTINUED | OUTPATIENT
Start: 2020-09-24 | End: 2020-09-28 | Stop reason: HOSPADM

## 2020-09-24 RX ORDER — EZETIMIBE 10 MG/1
10 TABLET ORAL
Status: DISCONTINUED | OUTPATIENT
Start: 2020-09-24 | End: 2020-09-28 | Stop reason: HOSPADM

## 2020-09-24 RX ORDER — FUROSEMIDE 20 MG/1
20 TABLET ORAL DAILY
Status: DISCONTINUED | OUTPATIENT
Start: 2020-09-25 | End: 2020-09-28 | Stop reason: HOSPADM

## 2020-09-24 RX ORDER — SODIUM CHLORIDE, SODIUM LACTATE, POTASSIUM CHLORIDE, CALCIUM CHLORIDE 600; 310; 30; 20 MG/100ML; MG/100ML; MG/100ML; MG/100ML
75 INJECTION, SOLUTION INTRAVENOUS CONTINUOUS
Status: DISCONTINUED | OUTPATIENT
Start: 2020-09-24 | End: 2020-09-28 | Stop reason: HOSPADM

## 2020-09-24 RX ORDER — POLYETHYLENE GLYCOL 3350 17 G/17G
17 POWDER, FOR SOLUTION ORAL DAILY PRN
Status: DISCONTINUED | OUTPATIENT
Start: 2020-09-24 | End: 2020-09-28 | Stop reason: HOSPADM

## 2020-09-24 RX ORDER — ACETAMINOPHEN 325 MG/1
650 TABLET ORAL EVERY 6 HOURS PRN
Status: DISCONTINUED | OUTPATIENT
Start: 2020-09-24 | End: 2020-09-28

## 2020-09-24 RX ORDER — AMOXICILLIN 250 MG
1 CAPSULE ORAL 2 TIMES DAILY
Status: DISCONTINUED | OUTPATIENT
Start: 2020-09-24 | End: 2020-09-28 | Stop reason: HOSPADM

## 2020-09-24 RX ADMIN — INSULIN GLARGINE 10 UNITS: 100 INJECTION, SOLUTION SUBCUTANEOUS at 21:33

## 2020-09-24 RX ADMIN — DOCUSATE SODIUM AND SENNOSIDES 1 TABLET: 8.6; 5 TABLET, FILM COATED ORAL at 17:38

## 2020-09-24 RX ADMIN — EZETIMIBE 10 MG: 10 TABLET ORAL at 21:33

## 2020-09-24 RX ADMIN — APIXABAN 5 MG: 5 TABLET, FILM COATED ORAL at 17:39

## 2020-09-24 RX ADMIN — METOPROLOL SUCCINATE 25 MG: 25 TABLET, EXTENDED RELEASE ORAL at 21:36

## 2020-09-24 RX ADMIN — ASPIRIN 81 MG CHEWABLE TABLET 324 MG: 81 TABLET CHEWABLE at 11:15

## 2020-09-24 RX ADMIN — SODIUM CHLORIDE, SODIUM LACTATE, POTASSIUM CHLORIDE, AND CALCIUM CHLORIDE 75 ML/HR: .6; .31; .03; .02 INJECTION, SOLUTION INTRAVENOUS at 15:14

## 2020-09-24 NOTE — PROGRESS NOTES
[facility],  POS: 31 (STR)  Acute Med Note    Unable to obtain from patient due to:  Change in mental status  Chief Complaint/Reason for visit: S/p fall x2 with injury to bridge of nose; Change in mental status; PNA  History of Present Illness: Recently hospitalized for hyponatremia at [facility] 9/9/2020-9/12/2020 for acute metabolic encephalopathy in setting of hyponatremia  Sodium level has been stable since discharge from hospital    80year-old female seen and examined due to s/p fall with injury to face  She was found sitting on the floor again after exam  Patient found in hallway and brought back to her room  She is alert, confused, hallucinating, inattentive, and not following directions  Observed patient looking up to the ceiling and placing her hands grasping at the air  She has a prosthetic right eye  Patient with T2DM; HTN; Chronic atrial fibrillation; CKD stage 3; hypothyroidism; slow transit constipation; debility  Currently being treated with levaquin and flagyl for pneumonia and suspect possible aspiration due to vomiting episode on 9/22/2020  Spoke to daughter Rosmery Mae at length regarding falls, clinical assessment, and goals of care  She initially did not want her mother sent out but changed her mind due to injury to her face caused by fall      Past Medical History: unchanged from history and physical  Past Medical History:   Diagnosis Date    Acute encephalopathy 9/8/2020    Atrial fibrillation ([facility] [address]     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    Cholecystitis     Disease of tonsils     Glaucoma 2008    NOS    Toxic metabolic encephalopathy 1/0/9514     Family History: unchanged from history and physical  Social History: unchanged from history and physical  Resident Since: 1/14/2020  Review of systems: Review of Systems   Unable to perform ROS: Mental status change     Medications: No changes made  Allergies: Reviewed and unchanged  Consults reviewed:Ophthalmology  Labs/Diagnostics (reviewed by this provider): Copy in Chart  Imaging Reviewed: None today    Physical Exam  Vital signs reviewed    Constitutional: Normocephalic  Orientation:Confused and disoriented     Physical Exam  Vitals signs and nursing note reviewed  Constitutional:       General: She is not in acute distress  Appearance: She is obese  She is not ill-appearing, toxic-appearing or diaphoretic  HENT:      Head:      Comments: Hematoma bridge of nose     Nose: No congestion  Mouth/Throat:      Mouth: Mucous membranes are dry  Eyes:      Comments: Prosthetic right eye  Neck:      Musculoskeletal: Neck supple  Cardiovascular:      Rate and Rhythm: Normal rate  Rhythm irregular  Pulmonary:      Effort: Pulmonary effort is normal  No respiratory distress  Breath sounds: No wheezing or rales  Abdominal:      General: Bowel sounds are normal  There is no distension  Palpations: Abdomen is soft  Tenderness: There is no abdominal tenderness  There is no guarding  Musculoskeletal:      Right lower leg: Edema present  Left lower leg: Edema present  Comments: Trace bilateral ankle edema  Lymphadenopathy:      Cervical: No cervical adenopathy  Skin:     General: Skin is warm and dry  Neurological:      Mental Status: She is disoriented  Comments: Confused and disoriented  Psychiatric:      Comments: hallucinating       Assessment/Plan:  60-year-old female with:     S/P fall x2/frequent falls  -unwitnessed fall x2 this am  First fall likely out of bed as reported by nursing as she was found next to bed with injury to bridge of nose  -patient has been confused and disoriented so difficult to determine if fall has caused any change in mental status   Patient not redirectable  -spoke to patient's daughter who requested patient be evaluated at [facility] due to injury to her face to rule out brain bleed    Acute encephalopathy  -recently hospitalized with low sodium which resolved  -multifactorial   -pneumonia, s/p fall, levaquin adding to change in mental status?  -send to ER for evaluation    Hyponatremia  -sodium 137 on 9/23/2020<130 on 9/22  S/p IVF NSS x 1 liter on 9/22    Debility  -multifactorial  -continue PT/OT  -continue supportive care for ADLs, meals    [address]  9/24/20209:03 AM

## 2020-09-25 LAB
ANION GAP SERPL CALCULATED.3IONS-SCNC: 8 MMOL/L (ref 4–13)
ATRIAL RATE: 120 BPM
ATRIAL RATE: 84 BPM
BASOPHILS # BLD AUTO: 0.04 THOUSANDS/ΜL (ref 0–0.1)
BASOPHILS NFR BLD AUTO: 1 % (ref 0–1)
BUN SERPL-MCNC: 11 MG/DL (ref 5–25)
CALCIUM SERPL-MCNC: 8.4 MG/DL (ref 8.3–10.1)
CHLORIDE SERPL-SCNC: 101 MMOL/L (ref 100–108)
CO2 SERPL-SCNC: 28 MMOL/L (ref 21–32)
CREAT SERPL-MCNC: 0.88 MG/DL (ref 0.6–1.3)
EOSINOPHIL # BLD AUTO: 0.14 THOUSAND/ΜL (ref 0–0.61)
EOSINOPHIL NFR BLD AUTO: 3 % (ref 0–6)
ERYTHROCYTE [DISTWIDTH] IN BLOOD BY AUTOMATED COUNT: 14.3 % (ref 11.6–15.1)
GFR SERPL CREATININE-BSD FRML MDRD: 57 ML/MIN/1.73SQ M
GLUCOSE SERPL-MCNC: 129 MG/DL (ref 65–140)
GLUCOSE SERPL-MCNC: 138 MG/DL (ref 65–140)
GLUCOSE SERPL-MCNC: 143 MG/DL (ref 65–140)
GLUCOSE SERPL-MCNC: 72 MG/DL (ref 65–140)
GLUCOSE SERPL-MCNC: 76 MG/DL (ref 65–140)
HCT VFR BLD AUTO: 39.4 % (ref 34.8–46.1)
HGB BLD-MCNC: 12.8 G/DL (ref 11.5–15.4)
IMM GRANULOCYTES # BLD AUTO: 0.07 THOUSAND/UL (ref 0–0.2)
IMM GRANULOCYTES NFR BLD AUTO: 1 % (ref 0–2)
LYMPHOCYTES # BLD AUTO: 1.17 THOUSANDS/ΜL (ref 0.6–4.47)
LYMPHOCYTES NFR BLD AUTO: 24 % (ref 14–44)
MCH RBC QN AUTO: 28 PG (ref 26.8–34.3)
MCHC RBC AUTO-ENTMCNC: 32.5 G/DL (ref 31.4–37.4)
MCV RBC AUTO: 86 FL (ref 82–98)
MONOCYTES # BLD AUTO: 0.63 THOUSAND/ΜL (ref 0.17–1.22)
MONOCYTES NFR BLD AUTO: 13 % (ref 4–12)
NEUTROPHILS # BLD AUTO: 2.8 THOUSANDS/ΜL (ref 1.85–7.62)
NEUTS SEG NFR BLD AUTO: 58 % (ref 43–75)
NRBC BLD AUTO-RTO: 0 /100 WBCS
PLATELET # BLD AUTO: 161 THOUSANDS/UL (ref 149–390)
PMV BLD AUTO: 9.3 FL (ref 8.9–12.7)
POTASSIUM SERPL-SCNC: 3.6 MMOL/L (ref 3.5–5.3)
PROCALCITONIN SERPL-MCNC: <0.05 NG/ML
QRS AXIS: 117 DEGREES
QRS AXIS: 123 DEGREES
QRSD INTERVAL: 130 MS
QRSD INTERVAL: 132 MS
QT INTERVAL: 392 MS
QT INTERVAL: 392 MS
QTC INTERVAL: 466 MS
QTC INTERVAL: 469 MS
RBC # BLD AUTO: 4.57 MILLION/UL (ref 3.81–5.12)
SODIUM SERPL-SCNC: 137 MMOL/L (ref 136–145)
T WAVE AXIS: -12 DEGREES
T WAVE AXIS: -13 DEGREES
VENTRICULAR RATE: 85 BPM
VENTRICULAR RATE: 86 BPM
WBC # BLD AUTO: 4.85 THOUSAND/UL (ref 4.31–10.16)

## 2020-09-25 PROCEDURE — 82948 REAGENT STRIP/BLOOD GLUCOSE: CPT

## 2020-09-25 PROCEDURE — 85025 COMPLETE CBC W/AUTO DIFF WBC: CPT | Performed by: INTERNAL MEDICINE

## 2020-09-25 PROCEDURE — 92610 EVALUATE SWALLOWING FUNCTION: CPT

## 2020-09-25 PROCEDURE — 99232 SBSQ HOSP IP/OBS MODERATE 35: CPT | Performed by: STUDENT IN AN ORGANIZED HEALTH CARE EDUCATION/TRAINING PROGRAM

## 2020-09-25 PROCEDURE — 97167 OT EVAL HIGH COMPLEX 60 MIN: CPT

## 2020-09-25 PROCEDURE — 80048 BASIC METABOLIC PNL TOTAL CA: CPT | Performed by: INTERNAL MEDICINE

## 2020-09-25 PROCEDURE — 84145 PROCALCITONIN (PCT): CPT | Performed by: INTERNAL MEDICINE

## 2020-09-25 PROCEDURE — 93010 ELECTROCARDIOGRAM REPORT: CPT | Performed by: INTERNAL MEDICINE

## 2020-09-25 RX ADMIN — METOPROLOL SUCCINATE 25 MG: 25 TABLET, EXTENDED RELEASE ORAL at 21:13

## 2020-09-25 RX ADMIN — APIXABAN 5 MG: 5 TABLET, FILM COATED ORAL at 09:13

## 2020-09-25 RX ADMIN — APIXABAN 5 MG: 5 TABLET, FILM COATED ORAL at 17:55

## 2020-09-25 RX ADMIN — LEVOTHYROXINE SODIUM 75 MCG: 75 TABLET ORAL at 05:05

## 2020-09-25 RX ADMIN — DOCUSATE SODIUM AND SENNOSIDES 1 TABLET: 8.6; 5 TABLET, FILM COATED ORAL at 09:13

## 2020-09-25 RX ADMIN — SODIUM CHLORIDE, SODIUM LACTATE, POTASSIUM CHLORIDE, AND CALCIUM CHLORIDE 75 ML/HR: .6; .31; .03; .02 INJECTION, SOLUTION INTRAVENOUS at 04:45

## 2020-09-25 RX ADMIN — INSULIN GLARGINE 10 UNITS: 100 INJECTION, SOLUTION SUBCUTANEOUS at 21:13

## 2020-09-25 RX ADMIN — METOPROLOL SUCCINATE 25 MG: 25 TABLET, EXTENDED RELEASE ORAL at 09:13

## 2020-09-25 RX ADMIN — EZETIMIBE 10 MG: 10 TABLET ORAL at 21:14

## 2020-09-25 RX ADMIN — FUROSEMIDE 20 MG: 20 TABLET ORAL at 09:13

## 2020-09-25 RX ADMIN — DOCUSATE SODIUM AND SENNOSIDES 1 TABLET: 8.6; 5 TABLET, FILM COATED ORAL at 17:55

## 2020-09-25 RX ADMIN — SODIUM CHLORIDE, SODIUM LACTATE, POTASSIUM CHLORIDE, AND CALCIUM CHLORIDE 75 ML/HR: .6; .31; .03; .02 INJECTION, SOLUTION INTRAVENOUS at 18:03

## 2020-09-26 PROBLEM — G93.40 ACUTE ENCEPHALOPATHY: Status: ACTIVE | Noted: 2020-01-04

## 2020-09-26 LAB
ANION GAP SERPL CALCULATED.3IONS-SCNC: 12 MMOL/L (ref 4–13)
BASOPHILS # BLD AUTO: 0.03 THOUSANDS/ΜL (ref 0–0.1)
BASOPHILS NFR BLD AUTO: 1 % (ref 0–1)
BUN SERPL-MCNC: 9 MG/DL (ref 5–25)
CALCIUM SERPL-MCNC: 7.4 MG/DL (ref 8.3–10.1)
CHLORIDE SERPL-SCNC: 103 MMOL/L (ref 100–108)
CO2 SERPL-SCNC: 20 MMOL/L (ref 21–32)
CREAT SERPL-MCNC: 0.5 MG/DL (ref 0.6–1.3)
EOSINOPHIL # BLD AUTO: 0.14 THOUSAND/ΜL (ref 0–0.61)
EOSINOPHIL NFR BLD AUTO: 4 % (ref 0–6)
ERYTHROCYTE [DISTWIDTH] IN BLOOD BY AUTOMATED COUNT: 14.4 % (ref 11.6–15.1)
FOLATE SERPL-MCNC: 11.6 NG/ML (ref 3.1–17.5)
GFR SERPL CREATININE-BSD FRML MDRD: 84 ML/MIN/1.73SQ M
GLUCOSE SERPL-MCNC: 128 MG/DL (ref 65–140)
GLUCOSE SERPL-MCNC: 132 MG/DL (ref 65–140)
GLUCOSE SERPL-MCNC: 164 MG/DL (ref 65–140)
GLUCOSE SERPL-MCNC: 56 MG/DL (ref 65–140)
GLUCOSE SERPL-MCNC: 90 MG/DL (ref 65–140)
HCT VFR BLD AUTO: 29.8 % (ref 34.8–46.1)
HGB BLD-MCNC: 9.1 G/DL (ref 11.5–15.4)
IMM GRANULOCYTES # BLD AUTO: 0.07 THOUSAND/UL (ref 0–0.2)
IMM GRANULOCYTES NFR BLD AUTO: 2 % (ref 0–2)
LYMPHOCYTES # BLD AUTO: 1.29 THOUSANDS/ΜL (ref 0.6–4.47)
LYMPHOCYTES NFR BLD AUTO: 33 % (ref 14–44)
MAGNESIUM SERPL-MCNC: 1.1 MG/DL (ref 1.6–2.6)
MCH RBC QN AUTO: 27.4 PG (ref 26.8–34.3)
MCHC RBC AUTO-ENTMCNC: 30.5 G/DL (ref 31.4–37.4)
MCV RBC AUTO: 90 FL (ref 82–98)
MONOCYTES # BLD AUTO: 0.64 THOUSAND/ΜL (ref 0.17–1.22)
MONOCYTES NFR BLD AUTO: 16 % (ref 4–12)
NEUTROPHILS # BLD AUTO: 1.8 THOUSANDS/ΜL (ref 1.85–7.62)
NEUTS SEG NFR BLD AUTO: 44 % (ref 43–75)
NRBC BLD AUTO-RTO: 0 /100 WBCS
PHOSPHATE SERPL-MCNC: 2.2 MG/DL (ref 2.3–4.1)
PLATELET # BLD AUTO: 120 THOUSANDS/UL (ref 149–390)
PMV BLD AUTO: 9.7 FL (ref 8.9–12.7)
POTASSIUM SERPL-SCNC: 4.1 MMOL/L (ref 3.5–5.3)
PROCALCITONIN SERPL-MCNC: <0.05 NG/ML
RBC # BLD AUTO: 3.32 MILLION/UL (ref 3.81–5.12)
SODIUM SERPL-SCNC: 135 MMOL/L (ref 136–145)
TSH SERPL DL<=0.05 MIU/L-ACNC: 1.32 UIU/ML (ref 0.36–3.74)
VIT B12 SERPL-MCNC: 1254 PG/ML (ref 100–900)
WBC # BLD AUTO: 3.97 THOUSAND/UL (ref 4.31–10.16)

## 2020-09-26 PROCEDURE — 84145 PROCALCITONIN (PCT): CPT | Performed by: STUDENT IN AN ORGANIZED HEALTH CARE EDUCATION/TRAINING PROGRAM

## 2020-09-26 PROCEDURE — 86592 SYPHILIS TEST NON-TREP QUAL: CPT | Performed by: PSYCHIATRY & NEUROLOGY

## 2020-09-26 PROCEDURE — 99223 1ST HOSP IP/OBS HIGH 75: CPT | Performed by: PSYCHIATRY & NEUROLOGY

## 2020-09-26 PROCEDURE — 99222 1ST HOSP IP/OBS MODERATE 55: CPT

## 2020-09-26 PROCEDURE — 82948 REAGENT STRIP/BLOOD GLUCOSE: CPT

## 2020-09-26 PROCEDURE — 84443 ASSAY THYROID STIM HORMONE: CPT | Performed by: PSYCHIATRY & NEUROLOGY

## 2020-09-26 PROCEDURE — 84425 ASSAY OF VITAMIN B-1: CPT | Performed by: PSYCHIATRY & NEUROLOGY

## 2020-09-26 PROCEDURE — 82607 VITAMIN B-12: CPT | Performed by: PSYCHIATRY & NEUROLOGY

## 2020-09-26 PROCEDURE — 84100 ASSAY OF PHOSPHORUS: CPT | Performed by: STUDENT IN AN ORGANIZED HEALTH CARE EDUCATION/TRAINING PROGRAM

## 2020-09-26 PROCEDURE — 83735 ASSAY OF MAGNESIUM: CPT | Performed by: STUDENT IN AN ORGANIZED HEALTH CARE EDUCATION/TRAINING PROGRAM

## 2020-09-26 PROCEDURE — 80048 BASIC METABOLIC PNL TOTAL CA: CPT | Performed by: STUDENT IN AN ORGANIZED HEALTH CARE EDUCATION/TRAINING PROGRAM

## 2020-09-26 PROCEDURE — 82746 ASSAY OF FOLIC ACID SERUM: CPT | Performed by: PSYCHIATRY & NEUROLOGY

## 2020-09-26 PROCEDURE — 85025 COMPLETE CBC W/AUTO DIFF WBC: CPT | Performed by: STUDENT IN AN ORGANIZED HEALTH CARE EDUCATION/TRAINING PROGRAM

## 2020-09-26 PROCEDURE — 99232 SBSQ HOSP IP/OBS MODERATE 35: CPT | Performed by: STUDENT IN AN ORGANIZED HEALTH CARE EDUCATION/TRAINING PROGRAM

## 2020-09-26 RX ADMIN — DOCUSATE SODIUM AND SENNOSIDES 1 TABLET: 8.6; 5 TABLET, FILM COATED ORAL at 17:20

## 2020-09-26 RX ADMIN — DOCUSATE SODIUM AND SENNOSIDES 1 TABLET: 8.6; 5 TABLET, FILM COATED ORAL at 08:48

## 2020-09-26 RX ADMIN — SODIUM CHLORIDE, SODIUM LACTATE, POTASSIUM CHLORIDE, AND CALCIUM CHLORIDE 75 ML/HR: .6; .31; .03; .02 INJECTION, SOLUTION INTRAVENOUS at 20:21

## 2020-09-26 RX ADMIN — INSULIN LISPRO 1 UNITS: 100 INJECTION, SOLUTION INTRAVENOUS; SUBCUTANEOUS at 11:27

## 2020-09-26 RX ADMIN — METOPROLOL SUCCINATE 25 MG: 25 TABLET, EXTENDED RELEASE ORAL at 22:20

## 2020-09-26 RX ADMIN — EZETIMIBE 10 MG: 10 TABLET ORAL at 22:20

## 2020-09-26 RX ADMIN — METOPROLOL SUCCINATE 25 MG: 25 TABLET, EXTENDED RELEASE ORAL at 08:48

## 2020-09-26 RX ADMIN — SODIUM CHLORIDE, SODIUM LACTATE, POTASSIUM CHLORIDE, AND CALCIUM CHLORIDE 75 ML/HR: .6; .31; .03; .02 INJECTION, SOLUTION INTRAVENOUS at 06:57

## 2020-09-26 RX ADMIN — INSULIN GLARGINE 10 UNITS: 100 INJECTION, SOLUTION SUBCUTANEOUS at 22:20

## 2020-09-26 RX ADMIN — APIXABAN 5 MG: 5 TABLET, FILM COATED ORAL at 08:58

## 2020-09-26 RX ADMIN — APIXABAN 5 MG: 5 TABLET, FILM COATED ORAL at 17:20

## 2020-09-26 RX ADMIN — FUROSEMIDE 20 MG: 20 TABLET ORAL at 08:48

## 2020-09-26 RX ADMIN — LEVOTHYROXINE SODIUM 75 MCG: 75 TABLET ORAL at 05:13

## 2020-09-27 LAB
ANION GAP SERPL CALCULATED.3IONS-SCNC: 5 MMOL/L (ref 4–13)
BUN SERPL-MCNC: 10 MG/DL (ref 5–25)
CALCIUM SERPL-MCNC: 8.3 MG/DL (ref 8.3–10.1)
CHLORIDE SERPL-SCNC: 101 MMOL/L (ref 100–108)
CO2 SERPL-SCNC: 28 MMOL/L (ref 21–32)
CREAT SERPL-MCNC: 0.73 MG/DL (ref 0.6–1.3)
GFR SERPL CREATININE-BSD FRML MDRD: 72 ML/MIN/1.73SQ M
GLUCOSE SERPL-MCNC: 107 MG/DL (ref 65–140)
GLUCOSE SERPL-MCNC: 107 MG/DL (ref 65–140)
GLUCOSE SERPL-MCNC: 119 MG/DL (ref 65–140)
GLUCOSE SERPL-MCNC: 161 MG/DL (ref 65–140)
GLUCOSE SERPL-MCNC: 183 MG/DL (ref 65–140)
POTASSIUM SERPL-SCNC: 3.7 MMOL/L (ref 3.5–5.3)
PROCALCITONIN SERPL-MCNC: <0.05 NG/ML
SODIUM SERPL-SCNC: 134 MMOL/L (ref 136–145)

## 2020-09-27 PROCEDURE — 99232 SBSQ HOSP IP/OBS MODERATE 35: CPT | Performed by: STUDENT IN AN ORGANIZED HEALTH CARE EDUCATION/TRAINING PROGRAM

## 2020-09-27 PROCEDURE — 97163 PT EVAL HIGH COMPLEX 45 MIN: CPT

## 2020-09-27 PROCEDURE — 97530 THERAPEUTIC ACTIVITIES: CPT

## 2020-09-27 PROCEDURE — 84145 PROCALCITONIN (PCT): CPT | Performed by: STUDENT IN AN ORGANIZED HEALTH CARE EDUCATION/TRAINING PROGRAM

## 2020-09-27 PROCEDURE — 82948 REAGENT STRIP/BLOOD GLUCOSE: CPT

## 2020-09-27 PROCEDURE — 80048 BASIC METABOLIC PNL TOTAL CA: CPT | Performed by: STUDENT IN AN ORGANIZED HEALTH CARE EDUCATION/TRAINING PROGRAM

## 2020-09-27 RX ADMIN — APIXABAN 5 MG: 5 TABLET, FILM COATED ORAL at 09:00

## 2020-09-27 RX ADMIN — SODIUM CHLORIDE, SODIUM LACTATE, POTASSIUM CHLORIDE, AND CALCIUM CHLORIDE 75 ML/HR: .6; .31; .03; .02 INJECTION, SOLUTION INTRAVENOUS at 09:08

## 2020-09-27 RX ADMIN — APIXABAN 5 MG: 5 TABLET, FILM COATED ORAL at 17:59

## 2020-09-27 RX ADMIN — LEVOTHYROXINE SODIUM 75 MCG: 75 TABLET ORAL at 05:23

## 2020-09-27 RX ADMIN — FUROSEMIDE 20 MG: 20 TABLET ORAL at 09:00

## 2020-09-27 RX ADMIN — DOCUSATE SODIUM AND SENNOSIDES 1 TABLET: 8.6; 5 TABLET, FILM COATED ORAL at 17:59

## 2020-09-27 RX ADMIN — INSULIN LISPRO 1 UNITS: 100 INJECTION, SOLUTION INTRAVENOUS; SUBCUTANEOUS at 17:59

## 2020-09-27 RX ADMIN — INSULIN GLARGINE 10 UNITS: 100 INJECTION, SOLUTION SUBCUTANEOUS at 21:38

## 2020-09-27 RX ADMIN — INSULIN LISPRO 1 UNITS: 100 INJECTION, SOLUTION INTRAVENOUS; SUBCUTANEOUS at 13:12

## 2020-09-27 RX ADMIN — METOPROLOL SUCCINATE 25 MG: 25 TABLET, EXTENDED RELEASE ORAL at 21:38

## 2020-09-27 RX ADMIN — DOCUSATE SODIUM AND SENNOSIDES 1 TABLET: 8.6; 5 TABLET, FILM COATED ORAL at 09:00

## 2020-09-27 RX ADMIN — EZETIMIBE 10 MG: 10 TABLET ORAL at 21:38

## 2020-09-27 RX ADMIN — METOPROLOL SUCCINATE 25 MG: 25 TABLET, EXTENDED RELEASE ORAL at 09:00

## 2020-09-28 VITALS
WEIGHT: 204.15 LBS | BODY MASS INDEX: 37.34 KG/M2 | DIASTOLIC BLOOD PRESSURE: 78 MMHG | HEART RATE: 66 BPM | OXYGEN SATURATION: 100 % | TEMPERATURE: 97.5 F | RESPIRATION RATE: 17 BRPM | SYSTOLIC BLOOD PRESSURE: 152 MMHG

## 2020-09-28 LAB
GLUCOSE SERPL-MCNC: 176 MG/DL (ref 65–140)
GLUCOSE SERPL-MCNC: 93 MG/DL (ref 65–140)
RPR SER QL: NORMAL
SARS-COV-2 RNA RESP QL NAA+PROBE: NEGATIVE

## 2020-09-28 PROCEDURE — 99223 1ST HOSP IP/OBS HIGH 75: CPT | Performed by: NURSE PRACTITIONER

## 2020-09-28 PROCEDURE — 99239 HOSP IP/OBS DSCHRG MGMT >30: CPT | Performed by: STUDENT IN AN ORGANIZED HEALTH CARE EDUCATION/TRAINING PROGRAM

## 2020-09-28 PROCEDURE — 87635 SARS-COV-2 COVID-19 AMP PRB: CPT | Performed by: STUDENT IN AN ORGANIZED HEALTH CARE EDUCATION/TRAINING PROGRAM

## 2020-09-28 PROCEDURE — 82948 REAGENT STRIP/BLOOD GLUCOSE: CPT

## 2020-09-28 RX ORDER — INSULIN GLARGINE 100 [IU]/ML
15 INJECTION, SOLUTION SUBCUTANEOUS
Start: 2020-09-28 | End: 2020-09-29 | Stop reason: ALTCHOICE

## 2020-09-28 RX ORDER — ACETAMINOPHEN 325 MG/1
975 TABLET ORAL EVERY 8 HOURS SCHEDULED
Status: DISCONTINUED | OUTPATIENT
Start: 2020-09-28 | End: 2020-09-28 | Stop reason: HOSPADM

## 2020-09-28 RX ADMIN — APIXABAN 5 MG: 5 TABLET, FILM COATED ORAL at 08:14

## 2020-09-28 RX ADMIN — ACETAMINOPHEN 975 MG: 325 TABLET ORAL at 15:00

## 2020-09-28 RX ADMIN — METOPROLOL SUCCINATE 25 MG: 25 TABLET, EXTENDED RELEASE ORAL at 08:14

## 2020-09-28 RX ADMIN — ACETAMINOPHEN 975 MG: 325 TABLET ORAL at 10:32

## 2020-09-28 RX ADMIN — SODIUM CHLORIDE, SODIUM LACTATE, POTASSIUM CHLORIDE, AND CALCIUM CHLORIDE 75 ML/HR: .6; .31; .03; .02 INJECTION, SOLUTION INTRAVENOUS at 02:28

## 2020-09-28 RX ADMIN — FUROSEMIDE 20 MG: 20 TABLET ORAL at 08:15

## 2020-09-28 RX ADMIN — LEVOTHYROXINE SODIUM 75 MCG: 75 TABLET ORAL at 05:23

## 2020-09-28 RX ADMIN — INSULIN LISPRO 1 UNITS: 100 INJECTION, SOLUTION INTRAVENOUS; SUBCUTANEOUS at 13:11

## 2020-09-28 RX ADMIN — DOCUSATE SODIUM AND SENNOSIDES 1 TABLET: 8.6; 5 TABLET, FILM COATED ORAL at 08:14

## 2020-09-29 ENCOUNTER — NURSING HOME VISIT (OUTPATIENT)
Dept: GERIATRICS | Facility: OTHER | Age: 85
End: 2020-09-29
Payer: MEDICARE

## 2020-09-29 DIAGNOSIS — K59.01 SLOW TRANSIT CONSTIPATION: ICD-10-CM

## 2020-09-29 DIAGNOSIS — E03.9 ACQUIRED HYPOTHYROIDISM: ICD-10-CM

## 2020-09-29 DIAGNOSIS — G93.40 ACUTE ENCEPHALOPATHY: Primary | ICD-10-CM

## 2020-09-29 DIAGNOSIS — E11.69 TYPE 2 DIABETES MELLITUS WITH OTHER SPECIFIED COMPLICATION, WITH LONG-TERM CURRENT USE OF INSULIN (HCC): ICD-10-CM

## 2020-09-29 DIAGNOSIS — N18.2 STAGE 2 CHRONIC KIDNEY DISEASE: ICD-10-CM

## 2020-09-29 DIAGNOSIS — Z79.4 TYPE 2 DIABETES MELLITUS WITH OTHER SPECIFIED COMPLICATION, WITH LONG-TERM CURRENT USE OF INSULIN (HCC): ICD-10-CM

## 2020-09-29 DIAGNOSIS — E78.2 MIXED HYPERLIPIDEMIA: ICD-10-CM

## 2020-09-29 DIAGNOSIS — R26.2 AMBULATORY DYSFUNCTION: ICD-10-CM

## 2020-09-29 DIAGNOSIS — R60.0 LOCALIZED EDEMA: ICD-10-CM

## 2020-09-29 DIAGNOSIS — I10 ESSENTIAL (PRIMARY) HYPERTENSION: ICD-10-CM

## 2020-09-29 DIAGNOSIS — E11.65 TYPE 2 DIABETES MELLITUS WITH HYPERGLYCEMIA, WITHOUT LONG-TERM CURRENT USE OF INSULIN (HCC): ICD-10-CM

## 2020-09-29 DIAGNOSIS — J18.9 PNEUMONIA DUE TO INFECTIOUS ORGANISM, UNSPECIFIED LATERALITY, UNSPECIFIED PART OF LUNG: ICD-10-CM

## 2020-09-29 DIAGNOSIS — E87.1 HYPONATREMIA: ICD-10-CM

## 2020-09-29 DIAGNOSIS — I48.21 PERMANENT ATRIAL FIBRILLATION (HCC): ICD-10-CM

## 2020-09-29 DIAGNOSIS — E55.9 VITAMIN D DEFICIENCY: Chronic | ICD-10-CM

## 2020-09-29 DIAGNOSIS — R10.819 SUPRAPUBIC TENDERNESS: ICD-10-CM

## 2020-09-29 PROCEDURE — 99305 1ST NF CARE MODERATE MDM 35: CPT | Performed by: FAMILY MEDICINE

## 2020-09-29 NOTE — PROGRESS NOTES
[place] [id number] [facility]  [phone]  Senior Care SNF List: [facility]     HISTORY & PHYSICAL        NAME: Simi Burns  AGE: 80 y o  SEX: female  : 11/3/1927  DATE OF ENCOUNTER: 20  POS: 31 (SNF)  PCP: Chela Luciano MD    Chief Complaint     Seen and examined today for admission to short term rehabilitation unit at [facility] facility  History of Present Illness     HPI    Patient was recently hospitalized at [facility] from 20 - 20 for evaluation of acute metabolic encephalopathy and recurrent unwitnessed falls  Prior to the hospitalization she was started on Levaquin and Flagyl for concern of aspiration penumonia and UTI  Throughout the hospitalization, CT head was done showing no acute intracranial hemorrhage, CT spine with no evidence of cervical spine fracture and CXR stable with mild cardiomegaly  She was d/c off antibiotics and monitored closely  At the time of discharge she was declared stable, and closer to her baseline mentation       Today, patient was seen and examined at bedside  She was noted to be lethargic, and somnolent on exam  She denied any complaints, however when abdominal exam as performed she noted to be tender and reacted  Further information unable to obtain due to altered mental status        Review of Systems     Review of Systems   Unable to perform ROS: Acuity of condition       History     Allergies   Allergen Reactions    Aspirin GI Intolerance    Penicillins        Past Medical History:   Diagnosis Date    Acute encephalopathy 2020    Atrial fibrillation ([address] )     Blindness     one eye    Cholecystitis     Disease of tonsils     Glaucoma 2008    NOS    Toxic metabolic encephalopathy      Past Surgical History:   Procedure Laterality Date    CHOLECYSTECTOMY      EYE SURGERY Right 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    REPLACEMENT TOTAL KNEE Bilateral 2006       Family History: non-contributory  Social History     Tobacco Use    Smoking status: Never Smoker    Smokeless tobacco: Never Used   Substance Use Topics    Alcohol use: Not Currently     Frequency: Never    Drug use: Never       Objective     Vital Signs   BP: 137/70   HR: 62 bpm  T: 97 F  RR: 18 bpm    O2Sat: 96%     Physical Exam  Vitals signs and nursing note reviewed  Constitutional:       General: She is not in acute distress  Appearance: She is well-developed  She is obese  She is not ill-appearing, toxic-appearing or diaphoretic  Comments: Lethargic appearing    HENT:      Head: Normocephalic and atraumatic  Comments: Impaired hearing      Nose: Nose normal       Comments: Ecchymosis in different stages of healing around the face and nasal bridge s/p fall  Eyes:      Extraocular Movements: Extraocular movements intact  Conjunctiva/sclera: Conjunctivae normal    Neck:      Musculoskeletal: Normal range of motion and neck supple  Cardiovascular:      Rate and Rhythm: Normal rate  Rhythm irregular  Pulses: Normal pulses  Heart sounds: Normal heart sounds  Pulmonary:      Effort: Pulmonary effort is normal  No respiratory distress  Comments: Decreased BS bibasilar most likely due to patient not taking deep breaths and obese habitus  Unable to appreciate crackles, or rhonchi   Abdominal:      General: Bowel sounds are normal       Palpations: Abdomen is soft  Tenderness: There is abdominal tenderness in the suprapubic area  There is no guarding  Musculoskeletal: Normal range of motion  General: No tenderness  Comments: Trace BLE edema    Skin:     General: Skin is warm  Findings: No rash  Neurological:      Mental Status: She is alert  She is disoriented  Comments: Alert, Oriented x 0-1  Follows commands  Patient only recalls her name and [facility]  Does not recall her age, date of birth, year  Patient unable to count days of the week backwards  Psychiatric:         Cognition and Memory: Cognition is impaired  Pertinent Laboratory/Diagnostic Studies:     Lab Results   Component Value Date    WBC 3 97 (L) 09/26/2020    HGB 9 1 (L) 09/26/2020    HCT 29 8 (L) 09/26/2020    MCV 90 09/26/2020     (L) 09/26/2020     Lab Results   Component Value Date    GLUCOSE 181 (H) 05/22/2018    CALCIUM 8 3 09/27/2020     (L) 05/22/2018    K 3 7 09/27/2020    CO2 28 09/27/2020     09/27/2020    BUN 10 09/27/2020    CREATININE 0 73 09/27/2020     Lab Results   Component Value Date    LBU7URMFVWBY 1 318 09/26/2020     Lab Results   Component Value Date    HGBA1C 8 5 (H) 01/05/2020         Current Medications     Apixaban 5 mg (Take 1 tablet (5 mg total) by mouth 2 times a day)     Cyanocobalamin 1000 MCG tablet (Take 1 tablet (1,000 mcg total) by mouth daily)     Ezetimibe 10 mg tablet (Take 1 tablet (10 mg total) by mouth daily at bedtime)    Levothyroxine 75 mcg tablet (Take 1 tablet (75 mcg total) by mouth daily)     MetFORMIN 1000 mg tablet (Take 1 tablet (1000 mg total) by mouth twice daily with meals)     Metoprolol succinate 25 mg 24 hr tablet (Take 1 tablet (25 mg total) by mouth every 12 hours)     Nystatin powder (Apply 1 application topically 2 times a day)     OMEGA-3 FATTY ACIDS PO (2 times a day)     Senna-docusate sodium 8 6-50 mg per tablet (Take 1 tablet by mouth 2 times a day)     Vitamin D-3 25 MCG (1000 UT) (Take 1 capsule daily)       All medications reviewed and updated in [facility] EMR/Chart  Assessment and Plan     1  Acute encephalopathy  Waxing and waning mentation with intermittent hallucinations in the setting of unwhitnessed falls  Encephalopathy most likely of multifactorial causes, including and not limited to hyponatremia, new antibiotic use (Levaquin), source of infection (aspiration pneumonia vs UTI), constipation, visual impairment, worsening cognitive dysfunction, hx of hospitalizations, pain     CT head, and CT spine unremarkable in the hospital   CXR unchanged, stable with mild cardiomegaly   Labs showed improving Na level (134)  B12 level slightly elevated  Folate, TSH, and RPR wnl  Thiamine level pending   Will continue to trend Na level and obtain new set of labs by the end of this week (CBC, CMP, Mg)   Due to suprapubic tenderness on exam today, will bladder scan patient to r/o urinary rentention  Continue with delirium precautions and neuro checks   If patient becomes agitated, reorient patient  Will continue to hold Gabapentin in the setting of lethargy   Continue with PT/OT sessions and have patient sited in the chair during the day   Close monitoring for mental changes and maintain circadian rhythm     2  Pneumonia  Resolved, stable   Thought to be aspiration penumonia on 09/21/20 and was started on dual abx with Levaquin and Flagyl  Hemodynamically stable, not requring O2  CXR done in the hospital which showed stable findings with no signs of pneumonia and procalcitonin level normal   Completed 3 day regimen of Levaquin and Flagyl  Abx d/c when she went to the hospital on 09/24 due to possible offending agent for her AMS  Continue to monitor her respiratory status  Maintain O2 level above 92%    3  Suprapubic tenderness   On today's physical exam patient endorsed suprapubic tenderness  She voided this am without difficulty  Will bladder scan patient to r/o urinary retention   Patient was found a week ago to have acute cystitis and was started on abx  She also had repeat UA done on 09/24 during her hospital stay with no signs of infection   Ensure adequate hydration  Will check CBC for any leukocytosis and consider UA if sx worsen     4  Ambulatory dysfunction  Status post recurrent falls  Inpatient geriatrician recommended holding Lasix and monitoring her status  Continue fall precautions  PT/OT and ambulate with assistance       5   Hyponatremia   Stable, improving  Na 137 on hospital admission (09/24), 134 on discharge  Clinically patient appears euvolemic  Will trend Na level with CMP at the end of the week   Continue with neuro checks and monitor VS     6  Advanced Dementia  Currently patient experiencing hypoactive delirium  Baseline: AO x 1-2, currently AO X 0 - 1  Labs during hospitalization: TSH 1 318 (09/26/2020), Vitamin B12 1254 (09/26/2020)  RPR, folate wnl  Thiamine level pending  Per chart review, MOCA score 12/22 (1/13/2020)  CT head (9/24/2020):"Decreased attenuation is noted in periventricular and subcortical white matter demonstrating an appearance that is statistically most likely to represent moderate microangiopathic change; this appearance is similar when compared to most recent prior examination   Punctate calcifications in the parenchyma unchanged from prior studies  No CT signs of acute infarction   No intracranial mass, mass effect or midline shift   No acute parenchymal hemorrhage "     7  Essential HTN  Controlled   BP goal per JNC 8 criteria <140/90  Continue with Metoprolol Succ ER 25 mg Q12 hors - with holding parameters SBP <110 mmHg  Lasix recommended during hospitalization, per geriatrics team, to be held in the seting of ambulatory dysfunction and recurrent falls  Renal function stable, reviewed most recent labs   Will continue to monitor BP    8  Type 2 DM with long term use of insulin  Tightly controlled   HbA1c trended down from 8 5 to 6 3 (6/15/2020)   Given patient's age her HbA1c goal should be around 8  D/c from hospital on Insulin 15 units QHS and Metformin 500 mg BID  Will d/c Lantus and increase Metformin to 1000 mg BID to prevent hypoglycemia   BG in the low 100 today (115) due to poor food intake   Will continue with Accu-checks Q 6 hrs and check A1c in 1 month  Continue with Dysphagia 2 mechanical soft w/ thin diet   Monitor blood glucose readings and adjust regimen accordingly   Avoid hypoglycemic events     9   Localized Edema  Patient has been on Lasix 20 mg regimen for BLE edema  ECHO (9/55/6393): "Systolic function was normal by visual assessment  Ejection fraction was estimated to be 65 %  There were no regional wall motion abnormalities "  Will d/c Lasix and monitor daily weights     10  Permanent atrial fibrillation   Irregularly irregular rhythm, hemodynamically stable   Heart rate controlled with Metoprolol succinate 25 mg Q 12 hrs  Continue with keeping patient anticoagulated on Eliquis 5 mg Q 12 hrs     11  Mixed hyperlipidemia  Continue Zetia 10 mg tablet daily   Repeat lipid panel annually      12  Vitamin D deficiency  Continue with maintenance therapy with Vitamin D3 1000 units daily      13  CKD (chronic kidney disease) stage 2, GFR 60-89 ml/min  Renal function stable  Creatinine remains at the baseline range 0 73-1 00, most recent GFR 72  Avoid nephrotoxic medications   Encourage PO intake and keep patient hydrated   Avoid hypotensive events      14  Acquired hypothyroidism  Continue Levothyroxine 75 mcg daily  TSH wnl (09/24/20)     15  Slow transit constipation  Continue scheduled Senna BID and Miralax PRN   Encourage increased PO intake and adequate hydration   If patient continues with abdominal tenderness tomorrow, will consider getting an abdominal X-Ray  Monitor closely with abdominal serial exams       Neida Justice MD  PGY-2 Family Medicine  09/29/20    Please note:  Voice-recognition software may have been used in the preparation of this document  Occasional wrong word or "sound-alike" substitutions may have occurred due to the inherent limitations of voice recognition software  Interpretation should be guided by context

## 2020-09-30 ENCOUNTER — NURSING HOME VISIT (OUTPATIENT)
Dept: GERIATRICS | Facility: OTHER | Age: 85
End: 2020-09-30
Payer: MEDICARE

## 2020-09-30 DIAGNOSIS — E87.1 HYPONATREMIA: ICD-10-CM

## 2020-09-30 DIAGNOSIS — R26.2 AMBULATORY DYSFUNCTION: ICD-10-CM

## 2020-09-30 DIAGNOSIS — N18.2 TYPE 2 DIABETES MELLITUS WITH STAGE 2 CHRONIC KIDNEY DISEASE, WITHOUT LONG-TERM CURRENT USE OF INSULIN (HCC): ICD-10-CM

## 2020-09-30 DIAGNOSIS — I10 ESSENTIAL (PRIMARY) HYPERTENSION: Primary | ICD-10-CM

## 2020-09-30 DIAGNOSIS — E11.22 TYPE 2 DIABETES MELLITUS WITH STAGE 2 CHRONIC KIDNEY DISEASE, WITHOUT LONG-TERM CURRENT USE OF INSULIN (HCC): ICD-10-CM

## 2020-09-30 DIAGNOSIS — I48.21 PERMANENT ATRIAL FIBRILLATION (HCC): ICD-10-CM

## 2020-09-30 PROBLEM — Z79.4 TYPE 2 DIABETES MELLITUS, WITH LONG-TERM CURRENT USE OF INSULIN (HCC): Status: RESOLVED | Noted: 2020-02-19 | Resolved: 2020-09-30

## 2020-09-30 PROBLEM — E11.9 TYPE 2 DIABETES MELLITUS, WITH LONG-TERM CURRENT USE OF INSULIN (HCC): Status: RESOLVED | Noted: 2020-02-19 | Resolved: 2020-09-30

## 2020-09-30 PROBLEM — E11.9 TYPE 2 DIABETES MELLITUS, WITHOUT LONG-TERM CURRENT USE OF INSULIN (HCC): Status: ACTIVE | Noted: 2020-09-30

## 2020-09-30 PROCEDURE — 99309 SBSQ NF CARE MODERATE MDM 30: CPT | Performed by: NURSE PRACTITIONER

## 2020-10-01 ENCOUNTER — NURSING HOME VISIT (OUTPATIENT)
Dept: GERIATRICS | Facility: OTHER | Age: 85
End: 2020-10-01
Payer: MEDICARE

## 2020-10-01 DIAGNOSIS — G93.40 ACUTE ENCEPHALOPATHY: Primary | ICD-10-CM

## 2020-10-01 DIAGNOSIS — I48.21 PERMANENT ATRIAL FIBRILLATION (HCC): ICD-10-CM

## 2020-10-01 DIAGNOSIS — R26.2 AMBULATORY DYSFUNCTION: ICD-10-CM

## 2020-10-01 DIAGNOSIS — K59.01 SLOW TRANSIT CONSTIPATION: ICD-10-CM

## 2020-10-01 DIAGNOSIS — F03.90 DEMENTIA WITHOUT BEHAVIORAL DISTURBANCE, UNSPECIFIED DEMENTIA TYPE (HCC): ICD-10-CM

## 2020-10-01 DIAGNOSIS — E03.9 ACQUIRED HYPOTHYROIDISM: ICD-10-CM

## 2020-10-01 DIAGNOSIS — N18.2 TYPE 2 DIABETES MELLITUS WITH STAGE 2 CHRONIC KIDNEY DISEASE, WITHOUT LONG-TERM CURRENT USE OF INSULIN (HCC): ICD-10-CM

## 2020-10-01 DIAGNOSIS — E11.22 TYPE 2 DIABETES MELLITUS WITH STAGE 2 CHRONIC KIDNEY DISEASE, WITHOUT LONG-TERM CURRENT USE OF INSULIN (HCC): ICD-10-CM

## 2020-10-01 PROCEDURE — 99309 SBSQ NF CARE MODERATE MDM 30: CPT | Performed by: NURSE PRACTITIONER

## 2020-10-01 NOTE — ASSESSMENT & PLAN NOTE
Resolving per visit today  WBC/Platelet/differential count WNL (9/29/2020)  Renal and hepatic functions WNL (9/29/2020)  Continue to monitor closely  CBC with diff, CMP and Mg level on 10/2/2020

## 2020-10-01 NOTE — ASSESSMENT & PLAN NOTE
Continue 24/7 SNF supportive care and management  Continue PT/OT/ST as scheduled  Fall precaution  Continue close monitoring and observation

## 2020-10-01 NOTE — ASSESSMENT & PLAN NOTE
Resolved at this time  Current Na level: 137 (9/29/2020)  Will continue to monitor  CBC with diff and CMP on 10/2/2020

## 2020-10-01 NOTE — PROGRESS NOTES
Progress Note    Location: [facility]  POS: 31 ([facility])    Assessment/Plan:    Acute encephalopathy  Resolving per visit today  WBC/Platelet/differential count WNL (9/29/2020)  Renal and hepatic functions WNL (9/29/2020)  Continue to monitor closely  CBC with diff, CMP and Mg level on 10/2/2020    Essential (primary) hypertension  BP range (9/28-30/2020) = 130/72 to 142/85  HR range (9/28-30/2020) = 62 to 91/min  Continue the following Metoprolol Succ ER 25mg Q12 hours - with HOLD parameters  Renal and hepatic functions WNL (9/29/2020)  Repeat labs: CBC with diff, CMP and Mg level on 10/2/2020    Permanent atrial fibrillation (HCC)  BP and HR stable and controlled  Continue Eliquis 5mg BID  Continue Metoprolol Succ ER 25mg BID - with HOLD parameter    Type 2 diabetes mellitus, without long-term current use of insulin ([facility])  FBG range (9/29-30/2020): 76 to 111  2100 BG range (9/28-29/2020): 93 to 101   Recent discontinuation of Insulin (while in acute care)  HbA1C: 6 3 (H: 6/15/2020) <= 8 5 (H: 1/5/2020)  Continue Metformin 1G BID  Continue Vit B12 1000mcg daily in AM  Continue BG checks as scheduled  Ambulatory dysfunction  Continue 24/7 [facility] supportive care and management  Continue PT/OT/ST as scheduled  Fall precaution  Continue close monitoring and observation    Hyponatremia  Resolved at this time  Current Na level: 137 (9/29/2020)  Will continue to monitor  CBC with diff and CMP on 10/2/2020      Chief complaint / Reason for visit: Follow-up visit    History of Present Illness: This is a  80 y o  Female patient currently admitted at [facility] (9/28/2020 to present) following discharge from acute care hospitalization [facility]  [facility]) with Dx of Acute Encephalopathy  Patient is seen and examined today to follow-up acute and chronic medical conditions as mentioned above and HTN, DM Type 2, Permanent Atrial Fibrillation and Ambulatory dysfunction      Patient is still in bed for this visit - groomed and dressed appropriately  Patient is alert, cooperative, pleasant, calm and engaged on this visit  Patient exhibits clear coherent speech, improved orientation: Oriented to name/ birth month and birth day but not the year  Oriented to place, current month and year  Able to recall days of the week (forward/backwards) and count 1-20 without need for cuing  Patient denies any acute medical concerns on this visit including pain, chest pain, SOB, fever, chills, headache, malaise/ fatigue, dizziness/ vertigo, GI/ related concerns  Per CNA that currently provided care to patient today - having difficulty masticating food and often ends up pocketing food then spitting out - had barely ate lunch and breakfast today due to observed difficulty  Per CNA, patient used to have upper dentures that helps with eating but unable to find said dentures since patient got re-admitted to facility on 9/28/2020  Patient however does not have difficulty drinking oral fluids at all  Will consult Speech therapy for possible modification of consistency  Per nursing no other concerns for this visit - patient described as almost back to baseline in mentation and behavior  Currently on close monitoring to reduce/ prevent incidence of fall  Examination in unremarkable  V/S: T97 3F -P78 -R20 BP: 116/73 SpO2: 98% RA      Review of Systems:  Per history of present illness, all other systems reviewed and negative  HISTORY:  Medical Hx: Reviewed, unchanged  Family Hx: Reviewed, unchanged  Soc Hx: Reviewed,  Unchanged    ALLERGY:  Reviewed, unchanged  Allergies   Allergen Reactions    Aspirin GI Intolerance    Penicillins        PHYSICAL EXAM:  Vital Signs: T97 3F -P78 -R20 BP: 116/73 SpO2: 98% RA  Weight: 181 8 lbs (9/30/2020)    Physical Exam  Vitals signs and nursing note reviewed  Constitutional:       General: She is not in acute distress  Appearance: Normal appearance  She is obese  She is not ill-appearing, toxic-appearing or diaphoretic  HENT:      Head: Normocephalic and atraumatic  Right Ear: Tympanic membrane, ear canal and external ear normal  There is no impacted cerumen  Left Ear: Tympanic membrane, ear canal and external ear normal  There is no impacted cerumen  Nose: Nose normal  No congestion or rhinorrhea  Mouth/Throat:      Mouth: Mucous membranes are moist       Pharynx: Oropharynx is clear  No oropharyngeal exudate or posterior oropharyngeal erythema  Comments: Poor dentition  Missing upper dentures  Eyes:      General: No scleral icterus  Right eye: No discharge  Left eye: No discharge  Conjunctiva/sclera: Conjunctivae normal       Pupils: Pupils are equal, round, and reactive to light  Comments: Right eye prosthetic  Neck:      Musculoskeletal: Normal range of motion and neck supple  No neck rigidity or muscular tenderness  Vascular: No carotid bruit  Cardiovascular:      Rate and Rhythm: Normal rate  Rhythm irregular  Heart sounds: Murmur present  No friction rub  No gallop  Pulmonary:      Effort: Pulmonary effort is normal  No respiratory distress  Breath sounds: Normal breath sounds  No stridor  No wheezing, rhonchi or rales  Chest:      Chest wall: No tenderness  Abdominal:      General: Bowel sounds are normal  There is no distension  Palpations: Abdomen is soft  There is no mass  Tenderness: There is no abdominal tenderness  There is no right CVA tenderness, left CVA tenderness, guarding or rebound  Hernia: No hernia is present  Comments: Obese abdomen  Regular bowel movement on review  Last documented BM: 9/30/2020  Genitourinary:     Comments: Non distended bladder  Musculoskeletal:         General: No swelling, tenderness, deformity or signs of injury  Right lower leg: Edema present  Left lower leg: Edema present  Comments: Non ambulatory - wheelchair bound  Minimal non pitting edema to BLE   No erythema or cellulitis seen  Lymphadenopathy:      Cervical: No cervical adenopathy  Skin:     General: Skin is warm  Capillary Refill: Capillary refill takes less than 2 seconds  Coloration: Skin is not jaundiced or pale  Findings: Bruising present  No erythema, lesion or rash  Comments: Resolving ecchymosis across nasal bridge, B/L cheeks, lower [place]-orbital areas and B/L UE (possibly from blood draws)   Neurological:      Mental Status: She is alert and oriented to person, place, and time  Mental status is at baseline  Comments: Verbal with clear coherent speech  Oriented to name/ birth month and birth day but not the year  Oriented to place, current month and year  Able to recall days of the week (forward/backwards) and count 1-20 without need for cuing  Psychiatric:         Mood and Affect: Mood normal          Behavior: Behavior normal       Comments: Pleasant, engaged and calm  Laboratory results / Imaging reivewed: Hard copies in medical chart:    * CMP (9/29/2020) = WNL except:  Glu: 57 (L)  Ca: 8 2 (L)  Alb: 2 7 (L)  TPro: 5 2 (L)    * CBC with diff (9/29/2020) = WNL except:  RBC: 4 90 (H)      Current Medications:   All medications reviewed and updated in [address] [place] [address] [id number]  9/30/2020

## 2020-10-01 NOTE — ASSESSMENT & PLAN NOTE
BP and HR stable and controlled  Continue Eliquis 5mg BID  Continue Metoprolol Succ ER 25mg BID - with HOLD parameter

## 2020-10-01 NOTE — ASSESSMENT & PLAN NOTE
FBG range (9/29-30/2020): 76 to 111  2100 BG range (9/28-29/2020): 93 to 101   Recent discontinuation of Insulin (while in acute care)  HbA1C: 6 3 (H: 6/15/2020) <= 8 5 (H: 1/5/2020)  Continue Metformin 1G BID  Continue Vit B12 1000mcg daily in AM  Continue BG checks as scheduled

## 2020-10-01 NOTE — ASSESSMENT & PLAN NOTE
BP range (9/28-30/2020) = 130/72 to 142/85  HR range (9/28-30/2020) = 62 to 91/min  Continue the following Metoprolol Succ ER 25mg Q12 hours - with HOLD parameters  Renal and hepatic functions WNL (9/29/2020)  Repeat labs: CBC with diff, CMP and Mg level on 10/2/2020

## 2020-10-02 ENCOUNTER — NURSING HOME VISIT (OUTPATIENT)
Dept: GERIATRICS | Facility: OTHER | Age: 85
End: 2020-10-02
Payer: MEDICARE

## 2020-10-02 DIAGNOSIS — R26.2 AMBULATORY DYSFUNCTION: ICD-10-CM

## 2020-10-02 DIAGNOSIS — G93.40 ACUTE ENCEPHALOPATHY: ICD-10-CM

## 2020-10-02 DIAGNOSIS — I48.21 PERMANENT ATRIAL FIBRILLATION (HCC): ICD-10-CM

## 2020-10-02 DIAGNOSIS — D58.2 ELEVATED HEMOGLOBIN (HCC): ICD-10-CM

## 2020-10-02 DIAGNOSIS — F03.90 DEMENTIA WITHOUT BEHAVIORAL DISTURBANCE, UNSPECIFIED DEMENTIA TYPE (HCC): Primary | ICD-10-CM

## 2020-10-02 LAB — VIT B1 BLD-SCNC: 103.7 NMOL/L (ref 66.5–200)

## 2020-10-02 PROCEDURE — 99309 SBSQ NF CARE MODERATE MDM 30: CPT | Performed by: NURSE PRACTITIONER

## 2020-10-03 PROBLEM — D58.2 ELEVATED HEMOGLOBIN (HCC): Status: ACTIVE | Noted: 2020-10-03

## 2020-10-05 ENCOUNTER — TELEPHONE (OUTPATIENT)
Dept: OTHER | Facility: OTHER | Age: 85
End: 2020-10-05

## 2020-10-05 ENCOUNTER — NURSING HOME VISIT (OUTPATIENT)
Dept: GERIATRICS | Facility: OTHER | Age: 85
End: 2020-10-05
Payer: MEDICARE

## 2020-10-05 DIAGNOSIS — Z71.89 GOALS OF CARE, COUNSELING/DISCUSSION: ICD-10-CM

## 2020-10-05 DIAGNOSIS — R26.2 AMBULATORY DYSFUNCTION: ICD-10-CM

## 2020-10-05 DIAGNOSIS — F03.90 DEMENTIA WITHOUT BEHAVIORAL DISTURBANCE, UNSPECIFIED DEMENTIA TYPE (HCC): Primary | ICD-10-CM

## 2020-10-05 DIAGNOSIS — K59.01 SLOW TRANSIT CONSTIPATION: ICD-10-CM

## 2020-10-05 DIAGNOSIS — D58.2 ELEVATED HEMOGLOBIN (HCC): ICD-10-CM
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
## 2020-10-05 DIAGNOSIS — E03.9 ACQUIRED HYPOTHYROIDISM: ICD-10-CM

## 2020-10-05 PROCEDURE — 99309 SBSQ NF CARE MODERATE MDM 30: CPT | Performed by: NURSE PRACTITIONER

## 2020-10-07 ENCOUNTER — NURSING HOME VISIT (OUTPATIENT)
Dept: GERIATRICS | Facility: OTHER | Age: 85
End: 2020-10-07
Payer: MEDICARE

## 2020-10-07 DIAGNOSIS — Z71.89 GOALS OF CARE, COUNSELING/DISCUSSION: ICD-10-CM

## 2020-10-07 DIAGNOSIS — R26.2 AMBULATORY DYSFUNCTION: ICD-10-CM

## 2020-10-07 DIAGNOSIS — N18.2 TYPE 2 DIABETES MELLITUS WITH STAGE 2 CHRONIC KIDNEY DISEASE, WITHOUT LONG-TERM CURRENT USE OF INSULIN (HCC): ICD-10-CM

## 2020-10-07 DIAGNOSIS — E11.22 TYPE 2 DIABETES MELLITUS WITH STAGE 2 CHRONIC KIDNEY DISEASE, WITHOUT LONG-TERM CURRENT USE OF INSULIN (HCC): ICD-10-CM

## 2020-10-07 DIAGNOSIS — E87.1 HYPONATREMIA: ICD-10-CM

## 2020-10-07 DIAGNOSIS — F03.90 DEMENTIA WITHOUT BEHAVIORAL DISTURBANCE, UNSPECIFIED DEMENTIA TYPE (HCC): Primary | ICD-10-CM

## 2020-10-07 PROCEDURE — 99309 SBSQ NF CARE MODERATE MDM 30: CPT | Performed by: NURSE PRACTITIONER

## 2020-10-08 ENCOUNTER — TELEPHONE (OUTPATIENT)
Dept: SURGICAL ONCOLOGY | Facility: CLINIC | Age: 85
End: 2020-10-08

## 2020-10-09 ENCOUNTER — NURSING HOME VISIT (OUTPATIENT)
Dept: GERIATRICS | Facility: OTHER | Age: 85
End: 2020-10-09
Payer: MEDICARE

## 2020-10-09 DIAGNOSIS — N18.2 TYPE 2 DIABETES MELLITUS WITH STAGE 2 CHRONIC KIDNEY DISEASE, WITHOUT LONG-TERM CURRENT USE OF INSULIN (HCC): ICD-10-CM

## 2020-10-09 DIAGNOSIS — D58.2 ELEVATED HEMOGLOBIN (HCC): ICD-10-CM

## 2020-10-09 DIAGNOSIS — R26.2 AMBULATORY DYSFUNCTION: ICD-10-CM

## 2020-10-09 DIAGNOSIS — G93.40 ACUTE ENCEPHALOPATHY: Primary | ICD-10-CM

## 2020-10-09 DIAGNOSIS — E11.22 TYPE 2 DIABETES MELLITUS WITH STAGE 2 CHRONIC KIDNEY DISEASE, WITHOUT LONG-TERM CURRENT USE OF INSULIN (HCC): ICD-10-CM

## 2020-10-09 PROCEDURE — 99309 SBSQ NF CARE MODERATE MDM 30: CPT | Performed by: FAMILY MEDICINE

## 2020-10-12 ENCOUNTER — NURSING HOME VISIT (OUTPATIENT)
Dept: GERIATRICS | Facility: OTHER | Age: 85
End: 2020-10-12
Payer: MEDICARE

## 2020-10-12 DIAGNOSIS — D58.2 ELEVATED HEMOGLOBIN (HCC): ICD-10-CM

## 2020-10-12 DIAGNOSIS — N18.2 STAGE 2 CHRONIC KIDNEY DISEASE: ICD-10-CM

## 2020-10-12 DIAGNOSIS — R26.2 AMBULATORY DYSFUNCTION: ICD-10-CM

## 2020-10-12 DIAGNOSIS — F03.90 DEMENTIA WITHOUT BEHAVIORAL DISTURBANCE, UNSPECIFIED DEMENTIA TYPE (HCC): Primary | ICD-10-CM

## 2020-10-12 DIAGNOSIS — E87.1 HYPONATREMIA: ICD-10-CM

## 2020-10-12 PROCEDURE — 99309 SBSQ NF CARE MODERATE MDM 30: CPT | Performed by: NURSE PRACTITIONER

## 2020-10-15 LAB — LEFT EYE DIABETIC RETINOPATHY: NORMAL

## 2020-10-28 RX ORDER — ACETAMINOPHEN 325 MG/1
650 TABLET ORAL EVERY 4 HOURS PRN
COMMUNITY
End: 2021-01-12 | Stop reason: SDUPTHER

## 2020-10-28 RX ORDER — SODIUM PHOSPHATE, DIBASIC AND SODIUM PHOSPHATE, MONOBASIC 7; 19 G/133ML; G/133ML
1 ENEMA RECTAL DAILY PRN
COMMUNITY

## 2020-10-28 RX ORDER — FUROSEMIDE 20 MG/1
20 TABLET ORAL DAILY
COMMUNITY

## 2020-10-28 RX ORDER — ACETAMINOPHEN 325 MG/1
650 TABLET ORAL EVERY 4 HOURS PRN
COMMUNITY
End: 2020-11-24 | Stop reason: SDUPTHER

## 2020-11-10 ENCOUNTER — NURSING HOME VISIT (OUTPATIENT)
Dept: GERIATRICS | Facility: OTHER | Age: 85
End: 2020-11-10
Payer: COMMERCIAL

## 2020-11-10 DIAGNOSIS — R60.0 LOCALIZED EDEMA: ICD-10-CM

## 2020-11-10 DIAGNOSIS — I48.21 PERMANENT ATRIAL FIBRILLATION (HCC): ICD-10-CM

## 2020-11-10 DIAGNOSIS — M79.605 PAIN IN BOTH LOWER EXTREMITIES: ICD-10-CM

## 2020-11-10 DIAGNOSIS — N18.2 TYPE 2 DIABETES MELLITUS WITH STAGE 2 CHRONIC KIDNEY DISEASE, WITHOUT LONG-TERM CURRENT USE OF INSULIN (HCC): Primary | ICD-10-CM

## 2020-11-10 DIAGNOSIS — E11.22 TYPE 2 DIABETES MELLITUS WITH STAGE 2 CHRONIC KIDNEY DISEASE, WITHOUT LONG-TERM CURRENT USE OF INSULIN (HCC): Primary | ICD-10-CM

## 2020-11-10 DIAGNOSIS — M79.604 PAIN IN BOTH LOWER EXTREMITIES: ICD-10-CM

## 2020-11-10 DIAGNOSIS — F03.90 DEMENTIA WITHOUT BEHAVIORAL DISTURBANCE, UNSPECIFIED DEMENTIA TYPE (HCC): ICD-10-CM

## 2020-11-10 DIAGNOSIS — N18.2 STAGE 2 CHRONIC KIDNEY DISEASE: ICD-10-CM

## 2020-11-10 PROCEDURE — 99309 SBSQ NF CARE MODERATE MDM 30: CPT | Performed by: NURSE PRACTITIONER

## 2020-11-16 ENCOUNTER — NURSING HOME VISIT (OUTPATIENT)
Dept: GERIATRICS | Facility: OTHER | Age: 85
End: 2020-11-16
Payer: COMMERCIAL

## 2020-11-16 DIAGNOSIS — F03.90 DEMENTIA WITHOUT BEHAVIORAL DISTURBANCE, UNSPECIFIED DEMENTIA TYPE (HCC): ICD-10-CM

## 2020-11-16 DIAGNOSIS — R60.0 LOCALIZED EDEMA: Primary | ICD-10-CM

## 2020-11-16 DIAGNOSIS — R26.2 AMBULATORY DYSFUNCTION: ICD-10-CM

## 2020-11-16 DIAGNOSIS — N18.2 STAGE 2 CHRONIC KIDNEY DISEASE: ICD-10-CM

## 2020-11-16 PROCEDURE — 99309 SBSQ NF CARE MODERATE MDM 30: CPT | Performed by: NURSE PRACTITIONER

## 2020-11-24 ENCOUNTER — NURSING HOME VISIT (OUTPATIENT)
Dept: GERIATRICS | Facility: OTHER | Age: 85
End: 2020-11-24
Payer: COMMERCIAL

## 2020-11-24 DIAGNOSIS — E11.22 TYPE 2 DIABETES MELLITUS WITH STAGE 2 CHRONIC KIDNEY DISEASE, WITHOUT LONG-TERM CURRENT USE OF INSULIN (HCC): Primary | ICD-10-CM

## 2020-11-24 DIAGNOSIS — N18.2 TYPE 2 DIABETES MELLITUS WITH STAGE 2 CHRONIC KIDNEY DISEASE, WITHOUT LONG-TERM CURRENT USE OF INSULIN (HCC): Primary | ICD-10-CM

## 2020-11-24 DIAGNOSIS — N18.2 STAGE 2 CHRONIC KIDNEY DISEASE: ICD-10-CM

## 2020-11-24 DIAGNOSIS — R60.0 LOCALIZED EDEMA: ICD-10-CM

## 2020-11-24 DIAGNOSIS — F03.90 DEMENTIA WITHOUT BEHAVIORAL DISTURBANCE, UNSPECIFIED DEMENTIA TYPE (HCC): ICD-10-CM

## 2020-11-24 DIAGNOSIS — E11.65 TYPE 2 DIABETES MELLITUS WITH HYPERGLYCEMIA, WITHOUT LONG-TERM CURRENT USE OF INSULIN (HCC): ICD-10-CM

## 2020-11-24 DIAGNOSIS — R26.2 AMBULATORY DYSFUNCTION: ICD-10-CM

## 2020-11-24 PROCEDURE — 99309 SBSQ NF CARE MODERATE MDM 30: CPT | Performed by: FAMILY MEDICINE

## 2020-11-24 RX ORDER — UREA 10 %
1 LOTION (ML) TOPICAL
Start: 2020-11-24 | End: 2021-08-10 | Stop reason: SDUPTHER

## 2020-12-14 ENCOUNTER — NURSING HOME VISIT (OUTPATIENT)
Dept: GERIATRICS | Facility: OTHER | Age: 85
End: 2020-12-14
Payer: COMMERCIAL

## 2020-12-14 DIAGNOSIS — E78.2 MIXED HYPERLIPIDEMIA: ICD-10-CM

## 2020-12-14 DIAGNOSIS — M79.605 PAIN IN BOTH LOWER EXTREMITIES: ICD-10-CM

## 2020-12-14 DIAGNOSIS — M79.604 PAIN IN BOTH LOWER EXTREMITIES: ICD-10-CM

## 2020-12-14 DIAGNOSIS — I48.21 PERMANENT ATRIAL FIBRILLATION (HCC): ICD-10-CM

## 2020-12-14 DIAGNOSIS — F03.90 DEMENTIA WITHOUT BEHAVIORAL DISTURBANCE, UNSPECIFIED DEMENTIA TYPE (HCC): Primary | ICD-10-CM

## 2020-12-14 DIAGNOSIS — R26.2 AMBULATORY DYSFUNCTION: ICD-10-CM

## 2020-12-14 PROCEDURE — 99309 SBSQ NF CARE MODERATE MDM 30: CPT | Performed by: NURSE PRACTITIONER

## 2021-01-12 ENCOUNTER — NURSING HOME VISIT (OUTPATIENT)
Dept: GERIATRICS | Facility: OTHER | Age: 86
End: 2021-01-12
Payer: COMMERCIAL

## 2021-01-12 DIAGNOSIS — N18.2 STAGE 2 CHRONIC KIDNEY DISEASE: ICD-10-CM

## 2021-01-12 DIAGNOSIS — I48.21 PERMANENT ATRIAL FIBRILLATION (HCC): ICD-10-CM

## 2021-01-12 DIAGNOSIS — R26.2 AMBULATORY DYSFUNCTION: ICD-10-CM

## 2021-01-12 DIAGNOSIS — R60.0 LOWER EXTREMITY EDEMA: ICD-10-CM

## 2021-01-12 DIAGNOSIS — F03.90 DEMENTIA WITHOUT BEHAVIORAL DISTURBANCE, UNSPECIFIED DEMENTIA TYPE (HCC): ICD-10-CM

## 2021-01-12 DIAGNOSIS — N18.2 TYPE 2 DIABETES MELLITUS WITH STAGE 2 CHRONIC KIDNEY DISEASE, WITHOUT LONG-TERM CURRENT USE OF INSULIN (HCC): Primary | ICD-10-CM

## 2021-01-12 DIAGNOSIS — M79.604 PAIN IN BOTH LOWER EXTREMITIES: ICD-10-CM

## 2021-01-12 DIAGNOSIS — I10 ESSENTIAL (PRIMARY) HYPERTENSION: ICD-10-CM

## 2021-01-12 DIAGNOSIS — E78.2 MIXED HYPERLIPIDEMIA: ICD-10-CM

## 2021-01-12 DIAGNOSIS — E03.9 ACQUIRED HYPOTHYROIDISM: ICD-10-CM

## 2021-01-12 DIAGNOSIS — M79.605 PAIN IN BOTH LOWER EXTREMITIES: ICD-10-CM

## 2021-01-12 DIAGNOSIS — E11.22 TYPE 2 DIABETES MELLITUS WITH STAGE 2 CHRONIC KIDNEY DISEASE, WITHOUT LONG-TERM CURRENT USE OF INSULIN (HCC): Primary | ICD-10-CM

## 2021-01-12 PROBLEM — G93.41 ACUTE METABOLIC ENCEPHALOPATHY: Status: RESOLVED | Noted: 2020-09-14 | Resolved: 2021-01-12

## 2021-01-12 PROBLEM — R77.8 TROPONIN I ABOVE REFERENCE RANGE: Status: RESOLVED | Noted: 2020-09-24 | Resolved: 2021-01-12
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
## 2021-01-12 PROBLEM — J18.9 PNEUMONIA DUE TO INFECTIOUS ORGANISM: Status: RESOLVED | Noted: 2020-09-21 | Resolved: 2021-01-12

## 2021-01-12 PROBLEM — M54.2 NECK PAIN: Status: RESOLVED | Noted: 2020-09-21 | Resolved: 2021-01-12

## 2021-01-12 PROBLEM — J69.0 ASPIRATION PNEUMONIA (HCC): Status: RESOLVED | Noted: 2020-09-24 | Resolved: 2021-01-12

## 2021-01-12 PROBLEM — G93.40 ACUTE ENCEPHALOPATHY: Status: RESOLVED | Noted: 2020-01-04 | Resolved: 2021-01-12

## 2021-01-12 PROBLEM — D58.2 ELEVATED HEMOGLOBIN (HCC): Status: RESOLVED | Noted: 2020-10-03 | Resolved: 2021-01-12

## 2021-01-12 PROBLEM — N30.00 ACUTE CYSTITIS WITHOUT HEMATURIA: Status: RESOLVED | Noted: 2020-09-22 | Resolved: 2021-01-12

## 2021-01-12 PROCEDURE — 99309 SBSQ NF CARE MODERATE MDM 30: CPT | Performed by: FAMILY MEDICINE

## 2021-01-12 RX ORDER — ACETAMINOPHEN 325 MG/1
650 TABLET ORAL EVERY 8 HOURS
Start: 2021-01-12

## 2021-01-12 NOTE — PROGRESS NOTES
[address]  [address]  [phone]  [address] [facility],      NAME: Jessica Bowles  AGE: 80 y o   SEX: female [id number]    DATE OF ENCOUNTER: 1/12/2021    Assessment and Plan     Type 2 diabetes mellitus, without long-term current use of insulin  Most recent HbA1c 5 5 done 1 month ago  Her A1c goal 8 5-9   Currently on Metformin 500 mg QD, will d/c  Monitor off medications given no pharmacologic benefit    Continue with accuchecks  Repeat HbA1c in 3 months      Essential HTN  At goal  BP goal per JNC 8 criteria <140/90  Continue with Metoprolol Succ ER 25 mg Q12 hors - with holding parameters SBP <110 mmHg   Renal function stable, reviewed most recent labs   Will continue to monitor VS    Permanent atrial fibrillation   Irregularly irregular rhythm, hemodynamically stable   Heart rate controlled with Metoprolol succinate 25 mg Q 12 hrs  Continue with keeping patient anticoagulated on Eliquis 5 mg Q 12 hrs   Will monitor VS    Mixed Hyperlipidemia  Continue Zetia 10 mg tablet daily   Repeat lipid panel annually     Acquired Hypothyroidism  Continue Levothyroxine 75 mcg daily  Most recent TSH 3 39  Will check TSH     Dementia   At baseline today, AAOX1  Will continue supportive care, maintain sleep/wake cycle, and reorient as needed to prevent acute mental changes   Encourage PO hydration, avoid constipation, PT/OT as needed    Continue with Melatonin 1 mg daily     Localized extremity edema  Stable BLE edema   Continue with Lasix 20 mg daily  Ensure patient stays adequately hydrated   Recent b/l venous duplex with no signs of DVT  Monitor daily weights     Pain in both lower extremities   Most likely 2/2 to neuropathy vs deconditioning and ambulatory dysfunction    Will reduce Tylenol from 975 mg to 650 mg Q 8hrs  Continue to monitor      CKD (chronic kidney disease) stage 2, GFR 60-89 ml/min  Stable  Avoid nephrotoxic medications   Encourage PO intake and keep patient hydrated Avoid hypotensive events   Will repeat CMP to monitor renal function      Ambulatory dysfunction  Hx of multiple falls  Continue with PT/OT as needed   Fall precautions     Chief Complaint     Follow up No chief complaint on file  History of Present Illness     Scott Mathew is a 80 y o  female who was seen today for follow up  She was sitting in her wheelchair  Mentions she has leg pain, and points mostly to her left leg  Otherwise, she endorses no immediate concerns  All medications reviewed and updated in EMR/chart  The following portions of the patient's history were reviewed and updated as appropriate: allergies, current medications, past family history, past medical history, past social history, past surgical history and problem list     Review of Systems     ROS limited 2/2 to dementia state  Review of Systems   Constitutional: Negative for chills and fever  HENT: Negative for sore throat and trouble swallowing  Respiratory: Negative for cough and shortness of breath  Cardiovascular: Positive for leg swelling  Negative for chest pain  Gastrointestinal: Negative for abdominal pain, constipation, diarrhea, nausea and vomiting  Musculoskeletal: Positive for arthralgias and gait problem  Psychiatric/Behavioral: Negative for agitation         Active Problem List     Patient Active Problem List   Diagnosis    Acquired hypothyroidism    Hypertensive heart disease without heart failure    Mixed hyperlipidemia    Vitamin D deficiency    Leg ulcer, right, limited to breakdown of skin ([facility] [address] )    Type 2 diabetes mellitus with foot ulcer (CODE) ([facility] [address] )    Permanent atrial fibrillation (HCC)    Transaminitis    Generalized osteoarthritis    Stage 2 chronic kidney disease    Mixed aphasia, global    Advance care planning    Macular degeneration    Essential (primary) hypertension    Ambulatory dysfunction    Other hemorrhoids    Slow transit constipation    Hyponatremia    Lower extremity pain    Closed compression fracture of body of L1 vertebra ([facility])    Physical deconditioning    Debility    Fall    Type 2 diabetes mellitus, without long-term current use of insulin ([facility])    Dementia ([facility] )    Goals of care, counseling/discussion    Lower extremity edema       Objective     Vital Signs:     Blood pressure 114/75; Heart Rate: 67; Respiratory Rate 18  Temperature 96 8 F; Oxygen Saturation 98%    Physical Exam  Vitals signs and nursing note reviewed  Constitutional:       General: She is not in acute distress  Appearance: Normal appearance  She is well-developed  She is obese  She is not ill-appearing, toxic-appearing or diaphoretic  Comments: Cooperative and engaging    HENT:      Head: Normocephalic and atraumatic  Nose: Nose normal    Eyes:      Extraocular Movements: Extraocular movements intact  Conjunctiva/sclera: Conjunctivae normal       Pupils: Pupils are equal, round, and reactive to light  Neck:      Musculoskeletal: Normal range of motion and neck supple  Cardiovascular:      Rate and Rhythm: Normal rate  Rhythm irregular  Heart sounds: Normal heart sounds  Pulmonary:      Effort: Pulmonary effort is normal  No respiratory distress  Breath sounds: Normal breath sounds  Abdominal:      General: Bowel sounds are normal       Palpations: Abdomen is soft  Tenderness: There is no abdominal tenderness  Musculoskeletal: Normal range of motion  General: Tenderness (mostly of LLE on palpation) present  Right lower leg: Edema (+1 pitting edema) present  Left lower leg: Edema (+1pitting edema) present  Comments: Sitting in wheelchair   Skin:     General: Skin is warm and dry  Findings: Erythema (Erythematous and dry BLEs) present  Neurological:      Mental Status: She is alert  Mental status is at baseline        Comments: AAOX1  Cooperative on exam and interacting with peers    Psychiatric:         Mood and Affect: Mood normal          Behavior: Behavior is not agitated  Behavior is cooperative  Pertinent Laboratory/Diagnostic Studies:  Laboratory and Imaging studies reviewed  Full report in the paper chart  Current Medications   Medications reviewed and updated in facility chart      Name: Merrill Lowe  : 11/3/1927  MRN: [id number]  DOS: 2021      Jonah Somers MD  PGY-2 Family Medicine  2021 8:31 PM

## 2021-02-09 ENCOUNTER — NURSING HOME VISIT (OUTPATIENT)
Dept: GERIATRICS | Facility: OTHER | Age: 86
End: 2021-02-09
Payer: COMMERCIAL

## 2021-02-09 DIAGNOSIS — N18.2 STAGE 2 CHRONIC KIDNEY DISEASE: ICD-10-CM

## 2021-02-09 DIAGNOSIS — R60.0 LOWER EXTREMITY EDEMA: ICD-10-CM

## 2021-02-09 DIAGNOSIS — E11.22 TYPE 2 DIABETES MELLITUS WITH STAGE 2 CHRONIC KIDNEY DISEASE, WITHOUT LONG-TERM CURRENT USE OF INSULIN (HCC): ICD-10-CM

## 2021-02-09 DIAGNOSIS — I10 ESSENTIAL (PRIMARY) HYPERTENSION: Primary | ICD-10-CM

## 2021-02-09 DIAGNOSIS — E03.9 ACQUIRED HYPOTHYROIDISM: ICD-10-CM

## 2021-02-09 DIAGNOSIS — D69.6 THROMBOCYTOPENIA (HCC): ICD-10-CM

## 2021-02-09 DIAGNOSIS — N18.2 TYPE 2 DIABETES MELLITUS WITH STAGE 2 CHRONIC KIDNEY DISEASE, WITHOUT LONG-TERM CURRENT USE OF INSULIN (HCC): ICD-10-CM

## 2021-02-09 PROBLEM — R74.01 TRANSAMINITIS: Status: RESOLVED | Noted: 2019-08-22 | Resolved: 2021-02-09

## 2021-02-09 PROCEDURE — 99309 SBSQ NF CARE MODERATE MDM 30: CPT | Performed by: FAMILY MEDICINE

## 2021-02-09 NOTE — ASSESSMENT & PLAN NOTE
Continue with Lasix 20 mg daily  Ensure patient stays adequately hydrated  Renal function stable   Monitor weights (February wt: 208 2 lbs; January wt: 198 2 lbs)

## 2021-02-09 NOTE — ASSESSMENT & PLAN NOTE
At goal  BP goal per JNC 8 criteria <140/90  Continue with Metoprolol Succ ER 25 mg Q12 hors - with holding parameters SBP <110 mmHg   Renal function stable, reviewed most recent labs

## 2021-02-09 NOTE — ASSESSMENT & PLAN NOTE
Lab Results   Component Value Date    HGBA1C 8 5 (H) 01/05/2020     Most recent HbA1c 5 5 (12/15/20)  Her A1c goal 8 5-9, diet controlled     Fasting   Continue with accuchecks  Repeat HbA1c in 6 months

## 2021-02-09 NOTE — ASSESSMENT & PLAN NOTE
Lab Results   Component Value Date    EGFR 72 09/27/2020    EGFR 84 09/26/2020    EGFR 57 09/25/2020    CREATININE 0 73 09/27/2020    CREATININE 0 50 (L) 09/26/2020    CREATININE 0 88 09/25/2020     Reviewed most recent labs from January 14   Renal function stable (GFR 75)   Avoid nephrotoxic medications   Encourage PO intake and keep patient hydrated   Avoid hypotensive events   Will repeat CMP in 1 month to monitor renal function

## 2021-02-09 NOTE — ASSESSMENT & PLAN NOTE
Stable  Continue Levothyroxine 75 mcg daily in the morning   Most recent TSH on January 14 labs was 3 95 (3 39 in December 2020)

## 2021-02-09 NOTE — PROGRESS NOTES
[facility]  [address]  [address] [facility]  [phone]  [address]        NAME: Tari Johnston  AGE: 80 y o  SEX: female  :  11/3/1927  DATE OF ENCOUNTER: 2021  POS: 28 ([facility])    Assessment and Plan     1  Essential HTN  At goal  BP goal per JNC 8 criteria <140/90  Continue with Metoprolol Succ ER 25 mg Q12 hors - with holding parameters SBP <110 mmHg   Renal function stable, reviewed most recent labs     2  Acquired Hypothyroidism   Stable  Continue Levothyroxine 75 mcg daily in the morning   Most recent TSH on  labs was 3 95 (3 39 in 2020)  3  Localized extremity edema  Stable BLE edema   Continue with Lasix 20 mg daily  Ensure patient stays adequately hydrated   Renal function stable  Monitor weights (February wt: 208 2 lbs; January wt: 198 2 lbs)  4  CKD (chronic kidney disease) stage 2, GFR 60-89 ml/min  Reviewed most recent labs from   Renal function and hemoglobin stable (GFR 75, Hb 12 1)  Avoid nephrotoxic medications   Encourage PO intake and keep patient hydrated   Avoid hypotensive events   Will repeat CMP in 1 month     5  Type 2 diabetes mellitus, without long-term current use of insulin  Most recent HbA1c 5 5 (12/15/20)  Her A1c goal 8 5-9, diet controlled  Fasting   Continue with accuchecks  Repeat HbA1c in 6 months     6  Thrombocytopenia  Stable  Platelets () 176-->144  Will repeat CBC in 1 month       Chief Complaint   Patient seen and examined for follow up on chronic conditions  History of Present Illness     HPI    Patient seen and evaluated today, noted to sit upright in the wheelchair  She was taking a nap when I walked in the room  Offers no immediate concerns  Per staff, she is doing well at baseline  Received 2nd dose of COVID vaccine yesterday  No side effects from vaccine thus far  She denies any pain in her body       The following portions of the patient's history were reviewed and updated as appropriate: allergies, current medications, past family history, past medical history, past social history, past surgical history and problem list     Review of Systems     A complete review of systems was performed  All negative, except as per HPI  History     Past Medical History:   Diagnosis Date    Acute encephalopathy 9/8/2020    Atrial fibrillation ([address] )     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    Cholecystitis     Disease of tonsils     Glaucoma 2008    NOS    Toxic metabolic encephalopathy 0/5/5729     Allergies   Allergen Reactions    Aspirin GI Intolerance    Penicillins        Objective     Vital Signs  BP: 126/74; HR:72 bpm; T:97 2 F; RR: 16; O2Sat:97%; Wt:208 2 lbs (02/05/21)  Physical Exam  Vitals signs and nursing note reviewed  Constitutional:       General: She is not in acute distress  Appearance: Normal appearance  She is well-developed  She is obese  She is not ill-appearing, toxic-appearing or diaphoretic  HENT:      Head: Normocephalic and atraumatic  Nose: Nose normal    Eyes:      Extraocular Movements: Extraocular movements intact  Conjunctiva/sclera: Conjunctivae normal    Neck:      Musculoskeletal: Normal range of motion and neck supple  Cardiovascular:      Rate and Rhythm: Normal rate  Rhythm irregular  Heart sounds: Normal heart sounds  Pulmonary:      Effort: Pulmonary effort is normal  No respiratory distress  Breath sounds: Normal breath sounds  Abdominal:      General: Bowel sounds are normal       Palpations: Abdomen is soft  Tenderness: There is no abdominal tenderness  Musculoskeletal: Normal range of motion  General: No tenderness  Right lower leg: Edema (+1 pitting) present  Left lower leg: Edema (+1 pitting) present  Skin:     General: Skin is warm  Findings: No rash  Neurological:      Mental Status: She is alert  Mental status is at baseline        Comments: AOx1  Cooperative on exam   Psychiatric:         Mood and Affect: Mood normal          Behavior: Behavior normal          Thought Content: Thought content normal          Judgment: Judgment normal          Pertinent Laboratory/Diagnostic Studies     Laboratory and Imaging studies reviewed  Full report in the paper chart       Current Medications     Current Medications Reviewed and updated in EMR  Monthly orders reviewed and signed in chart  Yosvany Macedo MD  Family Medicine PGY-2         Please note:  Voice-recognition software may have been used in the preparation of this document  Occasional wrong word or "sound-alike" substitutions may have occurred due to the inherent limitations of voice recognition software  Interpretation should be guided by context

## 2021-03-09 ENCOUNTER — NURSING HOME VISIT (OUTPATIENT)
Dept: GERIATRICS | Facility: OTHER | Age: 86
End: 2021-03-09
Payer: COMMERCIAL

## 2021-03-09 DIAGNOSIS — I10 ESSENTIAL (PRIMARY) HYPERTENSION: ICD-10-CM

## 2021-03-09 DIAGNOSIS — N18.2 STAGE 2 CHRONIC KIDNEY DISEASE: ICD-10-CM

## 2021-03-09 DIAGNOSIS — R26.2 AMBULATORY DYSFUNCTION: ICD-10-CM

## 2021-03-09 DIAGNOSIS — N18.2 TYPE 2 DIABETES MELLITUS WITH STAGE 2 CHRONIC KIDNEY DISEASE, WITHOUT LONG-TERM CURRENT USE OF INSULIN (HCC): ICD-10-CM

## 2021-03-09 DIAGNOSIS — R60.0 LOWER EXTREMITY EDEMA: Primary | ICD-10-CM

## 2021-03-09 DIAGNOSIS — E11.22 TYPE 2 DIABETES MELLITUS WITH STAGE 2 CHRONIC KIDNEY DISEASE, WITHOUT LONG-TERM CURRENT USE OF INSULIN (HCC): ICD-10-CM

## 2021-03-09 DIAGNOSIS — E03.9 ACQUIRED HYPOTHYROIDISM: ICD-10-CM

## 2021-03-09 DIAGNOSIS — I48.21 PERMANENT ATRIAL FIBRILLATION (HCC): ICD-10-CM

## 2021-03-09 PROCEDURE — 99309 SBSQ NF CARE MODERATE MDM 30: CPT | Performed by: FAMILY MEDICINE

## 2021-03-09 NOTE — PROGRESS NOTES
[facility]  [address]  [phone] [address] of Service: nursing home place of service: [employer] Unskilled- No Part A Coverage      NAME: Kassie Carrero  AGE: 80 y o  SEX: female [id number]    DATE OF ENCOUNTER: 3/9/2021    Assessment and Plan     Acquired hypothyroidism  Stable  Continue Levothyroxine 75 mcg daily in the morning   Most recent TSH on January 14 labs was 3 95 (3 39 in December 2020)    Type 2 diabetes mellitus, without long-term current use of insulin ([facility] [address] )  Most recent HbA1c 5 7 (2/12/2021)  Her A1c goal 8 5-9, diet controlled  Fasting   Continue with accuchecks prn  Repeat HbA1c in 6 months     Essential (primary) hypertension  Borderline today at 140/86  BP goal per JNC 8 criteria <140/90  Continue with Metoprolol Succ ER 25 mg Q12 hors - with holding parameters SBP <110 mmHg   Renal function stable, reviewed most recent labs       Stage 2 chronic kidney disease  Creatinine 0 9 and eGFR 65 which is slightly worsened compared to last check  Patient on Lasix which may also be contributing to worsening kidney function  Encourage PO intake and keep patient hydrated   Avoid hypotensive events   Will repeat CMP in 1 month to monitor renal function       Ambulatory dysfunction  Secondary to chronic medical conditions and obesity   - encourage ambulation   - Consider restarting PT/OT  Lower extremity edema  2+ pitting edema extending from ankles to the knees bilaterally with overlying stasis dermatitis more pronounced on right lower extremity  Patient also had weight gain of 7 lbs since 1 month ago  Currently at 215 lbs  Currently using Lasix 20 mg daily   - Increase lasix to 40 mg daily for 5 days then resume 20 mg daily  - CMP to evaluate kidney function in 5 days   - Encourage leg elevation   - Start compression stockings daily in bilateral lower extremities      Permanent atrial fibrillation (HCC)  HR is 78 today and irregular  Rate controlled with Metoprolol succinate 25 mg q12h  CHADSVASC score of 5 - patient on anticoagulation with eliquis 5 mg BID   - Continue current medications  Diagnoses and all orders for this visit:    Lower extremity edema    Acquired hypothyroidism    Type 2 diabetes mellitus with stage 2 chronic kidney disease, without long-term current use of insulin ([facility] [address] )    Essential (primary) hypertension    Stage 2 chronic kidney disease    Ambulatory dysfunction    Permanent atrial fibrillation [facility])            Chief Complaint     Follow up No chief complaint on file  History of Present Illness     Tari Johnston is a 80 y o  female who was seen today for follow up  Patient is a very pleasant female  She only complains of lower extremity edema that has been worsening  The following portions of the patient's history were reviewed and updated as appropriate: allergies, current medications, past family history, past medical history, past social history, past surgical history and problem list     Review of Systems     Review of Systems   Constitutional: Negative for chills and fever  HENT: Negative for sore throat and trouble swallowing  Respiratory: Negative for cough and shortness of breath  Cardiovascular: Positive for leg swelling  Negative for chest pain  Gastrointestinal: Negative for abdominal pain, constipation, diarrhea, nausea and vomiting  Musculoskeletal: Positive for gait problem  Negative for arthralgias  Psychiatric/Behavioral: Negative for agitation         Active Problem List     Patient Active Problem List   Diagnosis    Acquired hypothyroidism    Hypertensive heart disease without heart failure    Mixed hyperlipidemia    Vitamin D deficiency    Leg ulcer, right, limited to breakdown of skin ([facility] [address] )    Type 2 diabetes mellitus with foot ulcer (CODE) ([facility] )    Permanent atrial fibrillation (HCC)    Generalized osteoarthritis    Stage 2 chronic kidney disease    Mixed aphasia, global    Advance care planning    Macular degeneration    Essential (primary) hypertension    Ambulatory dysfunction    Other hemorrhoids    Slow transit constipation    Hyponatremia    Lower extremity pain    Closed compression fracture of body of L1 vertebra (HCC)    Physical deconditioning    Debility    Fall    Type 2 diabetes mellitus, without long-term current use of insulin ([facility])    Dementia ([facility])    Goals of care, counseling/discussion    Lower extremity edema    Thrombocytopenia ([facility])       Objective     Vital Signs:     Blood pressure 140/86 mmHg Heart Rate: 78 bpm   Temperature 97 1 F Oxygen Saturation 96% on RA Weight 215 lbs    Physical Exam  Vitals signs and nursing note reviewed  Constitutional:       General: She is not in acute distress  Appearance: Normal appearance  She is well-developed  She is obese  She is not ill-appearing, toxic-appearing or diaphoretic  HENT:      Head: Normocephalic and atraumatic  Nose: Nose normal    Eyes:      Extraocular Movements: Extraocular movements intact  Conjunctiva/sclera: Conjunctivae normal    Neck:      Musculoskeletal: Normal range of motion and neck supple  Cardiovascular:      Rate and Rhythm: Normal rate  Rhythm irregular  Heart sounds: Normal heart sounds  Pulmonary:      Effort: Pulmonary effort is normal  No respiratory distress  Breath sounds: Normal breath sounds  Abdominal:      General: Bowel sounds are normal       Palpations: Abdomen is soft  Tenderness: There is no abdominal tenderness  Musculoskeletal: Normal range of motion  General: No tenderness  Right lower leg: Edema (+2 pitting) present  Left lower leg: Edema (+2 pitting) present  Skin:     General: Skin is warm  Findings: No rash  Neurological:      Mental Status: She is alert  Mental status is at baseline        Comments: AOx2  Cooperative on exam   Psychiatric:         Mood and Affect: Mood normal          Behavior: Behavior normal          Thought Content: Thought content normal          Judgment: Judgment normal          Pertinent Laboratory/Diagnostic Studies:  Laboratory and Imaging studies reviewed  Full report in the paper chart  Current Medications   Medications reviewed and updated in facility chart      Name: Enid Silver  : 11/3/1927  MRN: [id number]        Jonah Ybarra MD  Family Medicine  3/9/2021 5:35 PM

## 2021-03-09 NOTE — ASSESSMENT & PLAN NOTE
2+ pitting edema extending from ankles to the knees bilaterally with overlying stasis dermatitis more pronounced on right lower extremity  Patient also had weight gain of 7 lbs since 1 month ago  Currently at 215 lbs  Currently using Lasix 20 mg daily   - Increase lasix to 40 mg daily for 5 days then resume 20 mg daily  - CMP to evaluate kidney function in 5 days   - Encourage leg elevation   - Start compression stockings daily in bilateral lower extremities

## 2021-03-09 NOTE — ASSESSMENT & PLAN NOTE
Most recent HbA1c 5 7 (2/12/2021)  Her A1c goal 8 5-9, diet controlled     Fasting   Continue with accuchecks prn  Repeat HbA1c in 6 months

## 2021-03-09 NOTE — ASSESSMENT & PLAN NOTE
Borderline today at 140/86  BP goal per JNC 8 criteria <140/90  Continue with Metoprolol Succ ER 25 mg Q12 hors - with holding parameters SBP <110 mmHg   Renal function stable, reviewed most recent labs

## 2021-03-09 NOTE — ASSESSMENT & PLAN NOTE
Secondary to chronic medical conditions and obesity   - encourage ambulation   - Consider restarting PT/OT

## 2021-03-09 NOTE — ASSESSMENT & PLAN NOTE
Creatinine 0 9 and eGFR 65 which is slightly worsened compared to last check  Patient on Lasix which may also be contributing to worsening kidney function    Encourage PO intake and keep patient hydrated   Avoid hypotensive events   Will repeat CMP in 1 month to monitor renal function

## 2021-03-09 NOTE — ASSESSMENT & PLAN NOTE
HR is 78 today and irregular  Rate controlled with Metoprolol succinate 25 mg q12h  CHADSVASC score of 5 - patient on anticoagulation with eliquis 5 mg BID   - Continue current medications

## 2021-04-13 ENCOUNTER — NURSING HOME VISIT (OUTPATIENT)
Dept: GERIATRICS | Facility: OTHER | Age: 86
End: 2021-04-13
Payer: COMMERCIAL

## 2021-04-13 VITALS
RESPIRATION RATE: 18 BRPM | DIASTOLIC BLOOD PRESSURE: 73 MMHG | HEART RATE: 63 BPM | SYSTOLIC BLOOD PRESSURE: 142 MMHG | TEMPERATURE: 97.3 F | OXYGEN SATURATION: 94 %

## 2021-04-13 DIAGNOSIS — E03.9 ACQUIRED HYPOTHYROIDISM: Primary | ICD-10-CM

## 2021-04-13 DIAGNOSIS — I10 ESSENTIAL (PRIMARY) HYPERTENSION: ICD-10-CM

## 2021-04-13 DIAGNOSIS — N18.2 TYPE 2 DIABETES MELLITUS WITH STAGE 2 CHRONIC KIDNEY DISEASE, WITHOUT LONG-TERM CURRENT USE OF INSULIN (HCC): ICD-10-CM

## 2021-04-13 DIAGNOSIS — E11.22 TYPE 2 DIABETES MELLITUS WITH STAGE 2 CHRONIC KIDNEY DISEASE, WITHOUT LONG-TERM CURRENT USE OF INSULIN (HCC): ICD-10-CM

## 2021-04-13 DIAGNOSIS — I48.21 PERMANENT ATRIAL FIBRILLATION (HCC): ICD-10-CM

## 2021-04-13 DIAGNOSIS — F03.90 DEMENTIA WITHOUT BEHAVIORAL DISTURBANCE, UNSPECIFIED DEMENTIA TYPE (HCC): ICD-10-CM

## 2021-04-13 DIAGNOSIS — R26.2 AMBULATORY DYSFUNCTION: ICD-10-CM

## 2021-04-13 PROCEDURE — 99309 SBSQ NF CARE MODERATE MDM 30: CPT | Performed by: NURSE PRACTITIONER

## 2021-04-13 NOTE — ASSESSMENT & PLAN NOTE
· Stable  · Most recent A1C 5 7  · Continue on current medication regimen of Ezetimibe  · Encourage dietary and lifestyle modifications  · Continue to monitor and adjust medications as needed

## 2021-04-13 NOTE — ASSESSMENT & PLAN NOTE
· Currently stable  · Asymptomatic and rate controlled, HR 63  · Continue with current medication regimen of metoprolol   · Continue with Eliquis BID  · Follow up with cardiology as needed  · Continue to monitor

## 2021-04-13 NOTE — PROGRESS NOTES
[facility]  [address]  [phone]  Senior Care SNF List: [facility]   Code 32      NAME: Pravin Mccurdy  AGE: 80 y o   SEX: female    DATE OF ENCOUNTER: 4/13/2021    Assessment and Plan     Problem List Items Addressed This Visit        Endocrine    Acquired hypothyroidism - Primary     · Currently stable  · Last TSH 3 95, will recheck tomorrow  · Continue on current medication regimen of Levothyroxine 75mcg  · Continue to monitor and adjust medication as needed         Type 2 diabetes mellitus, without long-term current use of insulin (HCC)     · Stable  · Most recent A1C 5 7  · Continue on current medication regimen of Ezetimibe  · Encourage dietary and lifestyle modifications  · Continue to monitor and adjust medications as needed            Cardiovascular and Mediastinum    Permanent atrial fibrillation (HCC)     · Currently stable  · Asymptomatic and rate controlled, HR 63  · Continue with current medication regimen of metoprolol   · Continue with Eliquis BID  · Follow up with cardiology as needed  · Continue to monitor         Essential (primary) hypertension     · Stable,slightly elevated today 142/72  · Goal BP is less then 140/90  · Encourage dietary and lifestyle modifications   · Continue with current medication regimen of metoprolol  · Periodic lab work, last creatinine 0 68  · Continue to monitor and adjust medications as needed            Nervous and Auditory    Dementia ([address] )     · Currently stable  · No behaviors reported by nursing  · Maintain sleep wake cycles, continue with Melatonin 2mg QHS  · Avoid urinary retention and constipation  · Continue to encourage physical actibity and cognitive challenging exercises as tolerated   · Continue to provide safe and supportive environment   · Continue with 24/7 care at [facility]            Other    Ambulatory dysfunction     · Secondary to chronic medical conditions  · PT/OT as needed  · Currently requires extensive assist x1 for transfers and ambulation with RW  · Fall precautions  · Continue to monitor               No orders of the defined types were placed in this encounter       - Counseling Documentation: patient was counseled regarding: risks and benefits of treatment options and importance of compliance with treatment    Chief Complaint     No chief complaint on file  History of Present Illness     Javan Julien is  80year old female at [facility] for long term care  She is being seen today as a follow up on her chronic medical conditions  Comorbdidities include afib, hypertension, type 2 diabetes, and ambulatory dysfunction  Upon examination pt was OOB in her wheelchair watching tv  She was calm and cooperative throughout exam   She offered no complaints and says she is feeling good  Sleeping well at night  Has a good appetite and is eating/drinking well  She denies headache, dizziness, chest pain, sob, cough, abdominal pain, and gi/gu upset  Per nursing no acute issues or concerns at this time  The following portions of the patient's history were reviewed and updated as appropriate: allergies, current medications, past family history, past medical history, past social history, past surgical history and problem list     Review of Systems     Review of Systems   Constitutional: Negative for activity change, appetite change, chills and fever  HENT: Positive for hearing loss  Eyes: Negative for visual disturbance  Respiratory: Negative for cough and shortness of breath  Cardiovascular: Negative for chest pain and palpitations  Gastrointestinal: Negative for abdominal pain, constipation, diarrhea, nausea and vomiting  Genitourinary: Negative for difficulty urinating and dysuria  Musculoskeletal: Positive for gait problem  Negative for arthralgias and back pain  Skin: Negative for color change and rash  Neurological: Positive for weakness   Negative for dizziness, syncope and headaches  Psychiatric/Behavioral: Negative for sleep disturbance  Active Problem List     Patient Active Problem List   Diagnosis    Acquired hypothyroidism    Hypertensive heart disease without heart failure    Mixed hyperlipidemia    Vitamin D deficiency    Leg ulcer, right, limited to breakdown of skin [facility] )    Type 2 diabetes mellitus with foot ulcer (CODE) ([facility] )    Permanent atrial fibrillation (HCC)    Generalized osteoarthritis    Stage 2 chronic kidney disease    Mixed aphasia, global    Advance care planning    Macular degeneration    Essential (primary) hypertension    Ambulatory dysfunction    Other hemorrhoids    Slow transit constipation    Hyponatremia    Lower extremity pain    Closed compression fracture of body of L1 vertebra (HCC)    Physical deconditioning    Debility    Fall    Type 2 diabetes mellitus, without long-term current use of insulin (HCC)    Dementia ([facility] )    Goals of care, counseling/discussion    Lower extremity edema    Thrombocytopenia ([facility])       Objective     LMP  (LMP Unknown)     Physical Exam  Vitals signs and nursing note reviewed  Constitutional:       General: She is not in acute distress  Appearance: Normal appearance  She is obese  She is not ill-appearing  HENT:      Head: Normocephalic  Ears:      Comments: [place]     Mouth/Throat:      Comments: Missing teeth    Cardiovascular:      Rate and Rhythm: Normal rate  Rhythm irregularly irregular  Pulses: Normal pulses  Heart sounds: Normal heart sounds  No murmur  Pulmonary:      Effort: Pulmonary effort is normal  No respiratory distress  Breath sounds: Normal breath sounds  No wheezing  Abdominal:      General: Bowel sounds are normal  There is no distension  Palpations: Abdomen is soft  Tenderness: There is no abdominal tenderness  Musculoskeletal:      Right lower leg: Edema (+1) present  Left lower leg: Edema (+1) present  Skin:     General: Skin is warm and dry  Coloration: Skin is not jaundiced  Findings: No bruising  Neurological:      General: No focal deficit present  Mental Status: She is alert  Mental status is at baseline  Motor: Weakness present  Gait: Gait abnormal       Comments: Alert to person only  Forgetful   Psychiatric:         Mood and Affect: Mood normal          Behavior: Behavior normal          Pertinent Laboratory/Diagnostic Studies:  Labs reviewed in facility paper chart  Current Medications   Medications were reviewed and updated  Please refer to paper chart for updated list of medications      Stan France [address]  4/13/2021 6:06 PM

## 2021-04-13 NOTE — ASSESSMENT & PLAN NOTE
· Stable,slightly elevated today 142/72  · Goal BP is less then 140/90  · Encourage dietary and lifestyle modifications   · Continue with current medication regimen of metoprolol  · Periodic lab work, last creatinine 0 68  · Continue to monitor and adjust medications as needed

## 2021-04-13 NOTE — ASSESSMENT & PLAN NOTE
· Secondary to chronic medical conditions  · PT/OT as needed  · Currently requires extensive assist x1 for transfers and ambulation with RW  · Fall precautions  · Continue to monitor

## 2021-04-13 NOTE — ASSESSMENT & PLAN NOTE
· Currently stable  · No behaviors reported by nursing  · Maintain sleep wake cycles, continue with Melatonin 2mg QHS  · Avoid urinary retention and constipation  · Continue to encourage physical actibity and cognitive challenging exercises as tolerated   · Continue to provide safe and supportive environment   · Continue with 24/7 care at [facility]

## 2021-04-13 NOTE — ASSESSMENT & PLAN NOTE
· Currently stable  · Last TSH 3 95, will recheck tomorrow  · Continue on current medication regimen of Levothyroxine 75mcg  · Continue to monitor and adjust medication as needed

## 2021-05-11 ENCOUNTER — NURSING HOME VISIT (OUTPATIENT)
Dept: GERIATRICS | Facility: OTHER | Age: 86
End: 2021-05-11
Payer: COMMERCIAL

## 2021-05-11 DIAGNOSIS — N18.2 STAGE 2 CHRONIC KIDNEY DISEASE: ICD-10-CM

## 2021-05-11 DIAGNOSIS — R60.0 LOWER EXTREMITY EDEMA: ICD-10-CM

## 2021-05-11 DIAGNOSIS — E03.9 ACQUIRED HYPOTHYROIDISM: ICD-10-CM

## 2021-05-11 DIAGNOSIS — F03.90 DEMENTIA WITHOUT BEHAVIORAL DISTURBANCE, UNSPECIFIED DEMENTIA TYPE (HCC): ICD-10-CM

## 2021-05-11 DIAGNOSIS — I48.21 PERMANENT ATRIAL FIBRILLATION (HCC): ICD-10-CM

## 2021-05-11 DIAGNOSIS — E11.22 TYPE 2 DIABETES MELLITUS WITH STAGE 2 CHRONIC KIDNEY DISEASE, WITHOUT LONG-TERM CURRENT USE OF INSULIN (HCC): Primary | ICD-10-CM

## 2021-05-11 DIAGNOSIS — N18.2 TYPE 2 DIABETES MELLITUS WITH STAGE 2 CHRONIC KIDNEY DISEASE, WITHOUT LONG-TERM CURRENT USE OF INSULIN (HCC): Primary | ICD-10-CM

## 2021-05-11 DIAGNOSIS — I10 ESSENTIAL (PRIMARY) HYPERTENSION: ICD-10-CM

## 2021-05-11 PROCEDURE — 99309 SBSQ NF CARE MODERATE MDM 30: CPT | Performed by: FAMILY MEDICINE

## 2021-05-11 NOTE — ASSESSMENT & PLAN NOTE
Rate controlled  -Continue Metoprolol tartrate 25 mg q12h  -CHADSVASC score of 5 - patient on anticoagulation with Eliquis 5 mg BID  -Will continue to monitor VS

## 2021-05-11 NOTE — ASSESSMENT & PLAN NOTE
-At baseline today, interacting well and pleasant   -Continue with sleep/ awake cycle and re-orientation  -Melatonin 1 mg daily  -PT/OT as needed

## 2021-05-11 NOTE — ASSESSMENT & PLAN NOTE
-Controlled, at goal  -Most recent HbA1c 5 7 (02/12/2201)   HbA1c goal 8 5  -Repeat in 6 months   -Diet controlled, no indication to start medication  -Continue accuchecks and monitor for signs of hypoglycemia

## 2021-05-11 NOTE — ASSESSMENT & PLAN NOTE
-Renal function stable   -Cr 0 68 and eGFR 76 (03/19/2021) which looks improved from previous blood work  -Encourage PO intake and keep patient hydrated   -Avoid hypotensive events   -Will repeat CMP on 05/14/2021

## 2021-05-11 NOTE — PROGRESS NOTES
[facility]  [address]  [phone] [id number] of Service: nursing home place of service: POS 32 Unskilled- No Part A Coverage      NAME: Anu Boucher  AGE: 80 y o  SEX: female [phone]    DATE OF ENCOUNTER: 5/11/2021    Assessment and Plan        Acquired Hypothyroidism   Stable  TSH 3 95 (03/19/2021)  Continue Levothyroxine 75 mcg  Will check new TSH on May 14, 2021    Type 2 diabetes mellitus, without long-term current use of insulin [facility])           Lab Results   Component Value Date     HGBA1C 5 7 02/12/2021      -Controlled, at goal  -Most recent HbA1c 5 7 (02/12/2201)  HbA1c goal 8 5  -Repeat in 6 months   -Diet controlled, no indication to start medication  -Continue accuchecks and monitor for signs of hypoglycemia     Dementia ([address] )  -At baseline today, interacting well and pleasant   -Continue with sleep/ awake cycle and re-orientation  -Melatonin 1 mg daily  -PT/OT as needed    Essential HTN  At goal  BP goal per JNC 8 criteria <140/90  Continue with Metoprolol Tartrate 25 mg Q12 hors - with holding parameters SBP <110 mmHg   Renal function stable, reviewed most recent labs      Lower Extremity Edema  -Bilateral lower extremity edema, stable and unchanged   -Weight stable at 200 lbs on 05/07/21 (201 lbs on 05/04/21)  -Continue Lasix 20 mg daily  -Reviewed CMP stable  Will obtain new lab on 05/14/2021  -Encourage leg elevation, and continue using compression stockings      Stage 2 chronic kidney disease   -Renal function stable   -Cr 0 68 and eGFR 76 (03/19/2021) which looks improved from previous blood work  -Encourage PO intake and keep patient hydrated   -Avoid hypotensive events   -Will repeat CMP on 05/14/2021      Chief Complaint     Follow up: chronic medical conditions  History of Present Illness     Anu Boucher is a 80 y o  female who was seen today for follow up       Patient seen and examined today in the living room while watching TV  When asked how she's doing, states "I'm kicking, I'm doing well"  No other immediate concerns  She is pleasant and cooperative with exam        The following portions of the patient's history were reviewed and updated as appropriate: allergies, current medications, past family history, past medical history, past social history, past surgical history and problem list     Review of Systems     Review of Systems   Constitutional: Negative for chills and fever  Respiratory: Negative for cough and shortness of breath  Cardiovascular: Negative for chest pain and leg swelling  Gastrointestinal: Negative for abdominal pain, constipation, diarrhea, nausea and vomiting  Musculoskeletal: Positive for arthralgias and gait problem  Skin: Negative for rash  Psychiatric/Behavioral: Negative for agitation  Active Problem List     Patient Active Problem List   Diagnosis    Acquired hypothyroidism    Hypertensive heart disease without heart failure    Mixed hyperlipidemia    Vitamin D deficiency    Leg ulcer, right, limited to breakdown of skin [facility] )    Type 2 diabetes mellitus with foot ulcer (CODE) ([facility] )    Permanent atrial fibrillation (HCC)    Generalized osteoarthritis    Stage 2 chronic kidney disease    Mixed aphasia, global    Advance care planning    Macular degeneration    Essential (primary) hypertension    Ambulatory dysfunction    Other hemorrhoids    Slow transit constipation    Hyponatremia    Lower extremity pain    Closed compression fracture of body of L1 vertebra (HCC)    Physical deconditioning    Debility    Fall    Type 2 diabetes mellitus, without long-term current use of insulin (HCC)    Dementia ([facility] )    Goals of care, counseling/discussion    Lower extremity edema    Thrombocytopenia ([facility])       Objective     Vital Signs:     Blood pressure: 115/82; Heart Rate: 86 bpm; Respiratory Rate:14  Temperature: 97 5 F;  Oxygen Saturation: 95%; Weight: 200 lbs (21)  Physical Exam  Vitals signs and nursing note reviewed  Constitutional:       General: She is not in acute distress  Appearance: Normal appearance  She is well-developed  She is obese  She is not ill-appearing, toxic-appearing or diaphoretic  HENT:      Head: Normocephalic and atraumatic  Nose: Nose normal    Eyes:      General:         Right eye: No discharge  Left eye: No discharge  Conjunctiva/sclera: Conjunctivae normal    Neck:      Musculoskeletal: Normal range of motion and neck supple  Cardiovascular:      Rate and Rhythm: Normal rate  Rhythm irregular  Heart sounds: Normal heart sounds  Comments: Rate controlled  Pulmonary:      Effort: Pulmonary effort is normal  No respiratory distress  Comments: Decreased breath sounds bibasilar   Abdominal:      General: Bowel sounds are normal       Palpations: Abdomen is soft  Tenderness: There is no abdominal tenderness  Musculoskeletal: Normal range of motion  General: Tenderness present  Right lower leg: Edema (trace to +1 pitting, improved) present  Left lower leg: Edema (trace to +1 pitting, improved) present  Skin:     General: Skin is warm  Findings: No rash  Neurological:      Mental Status: She is alert  Mental status is at baseline  Comments: AOX1   Psychiatric:         Mood and Affect: Mood and affect normal          Pertinent Laboratory/Diagnostic Studies:  Laboratory and Imaging studies reviewed  Full report in the paper chart  Current Medications   Medications reviewed and updated in facility chart      Name: Michael Pereira  : 11/3/1927  MRN: [id number]  DOS: 2021      Barrie Villagran MD  Family Medicine, PGY-2  2021 3:31 PM

## 2021-05-11 NOTE — ASSESSMENT & PLAN NOTE
At goal  BP goal per JNC 8 criteria <140/90  Continue with Metoprolol Tartrate 25 mg Q12 hors - with holding parameters SBP <110 mmHg   Renal function stable, reviewed most recent labs

## 2021-06-09 ENCOUNTER — NURSING HOME VISIT (OUTPATIENT)
Dept: GERIATRICS | Facility: OTHER | Age: 86
End: 2021-06-09
Payer: COMMERCIAL

## 2021-06-09 ENCOUNTER — HOSPITAL ENCOUNTER (EMERGENCY)
Facility: HOSPITAL | Age: 86
Discharge: HOME | End: 2021-06-09
Attending: EMERGENCY MEDICINE
Payer: COMMERCIAL

## 2021-06-09 ENCOUNTER — APPOINTMENT (EMERGENCY)
Dept: CT IMAGING | Facility: HOSPITAL | Age: 86
End: 2021-06-09
Payer: COMMERCIAL

## 2021-06-09 ENCOUNTER — APPOINTMENT (EMERGENCY)
Dept: NON INVASIVE DIAGNOSTICS | Facility: HOSPITAL | Age: 86
End: 2021-06-09
Payer: COMMERCIAL

## 2021-06-09 VITALS
RESPIRATION RATE: 18 BRPM | DIASTOLIC BLOOD PRESSURE: 70 MMHG | HEART RATE: 80 BPM | TEMPERATURE: 97 F | SYSTOLIC BLOOD PRESSURE: 127 MMHG | OXYGEN SATURATION: 98 %

## 2021-06-09 VITALS
TEMPERATURE: 98.3 F | DIASTOLIC BLOOD PRESSURE: 69 MMHG | RESPIRATION RATE: 16 BRPM | HEART RATE: 66 BPM | SYSTOLIC BLOOD PRESSURE: 107 MMHG | OXYGEN SATURATION: 100 %

## 2021-06-09 DIAGNOSIS — R41.82 ALTERED MENTAL STATUS, UNSPECIFIED ALTERED MENTAL STATUS TYPE: Primary | ICD-10-CM

## 2021-06-09 DIAGNOSIS — K59.00 CONSTIPATION: Primary | ICD-10-CM

## 2021-06-09 DIAGNOSIS — M79.673 FOOT PAIN: ICD-10-CM

## 2021-06-09 DIAGNOSIS — I99.9 VASCULAR DISEASE: ICD-10-CM

## 2021-06-09 LAB
ALBUMIN SERPL BCP-MCNC: 3.2 G/DL (ref 3.5–5)
ALP SERPL-CCNC: 78 U/L (ref 46–116)
ALT SERPL W P-5'-P-CCNC: 19 U/L (ref 12–78)
ANION GAP SERPL CALCULATED.3IONS-SCNC: 6 MMOL/L (ref 4–13)
AST SERPL W P-5'-P-CCNC: 24 U/L (ref 5–45)
BASOPHILS # BLD AUTO: 0.04 THOUSANDS/ΜL (ref 0–0.1)
BASOPHILS NFR BLD AUTO: 1 % (ref 0–1)
BILIRUB SERPL-MCNC: 0.68 MG/DL (ref 0.2–1)
BUN SERPL-MCNC: 22 MG/DL (ref 5–25)
CALCIUM ALBUM COR SERPL-MCNC: 9.3 MG/DL (ref 8.3–10.1)
CALCIUM SERPL-MCNC: 8.7 MG/DL (ref 8.3–10.1)
CHLORIDE SERPL-SCNC: 97 MMOL/L (ref 100–108)
CO2 SERPL-SCNC: 29 MMOL/L (ref 21–32)
CREAT SERPL-MCNC: 1.03 MG/DL (ref 0.6–1.3)
EOSINOPHIL # BLD AUTO: 0.12 THOUSAND/ΜL (ref 0–0.61)
EOSINOPHIL NFR BLD AUTO: 2 % (ref 0–6)
ERYTHROCYTE [DISTWIDTH] IN BLOOD BY AUTOMATED COUNT: 13.5 % (ref 11.6–15.1)
GFR SERPL CREATININE-BSD FRML MDRD: 47 ML/MIN/1.73SQ M
GLUCOSE SERPL-MCNC: 136 MG/DL (ref 65–140)
HCT VFR BLD AUTO: 43.2 % (ref 34.8–46.1)
HGB BLD-MCNC: 14.1 G/DL (ref 11.5–15.4)
IMM GRANULOCYTES # BLD AUTO: 0.03 THOUSAND/UL (ref 0–0.2)
IMM GRANULOCYTES NFR BLD AUTO: 1 % (ref 0–2)
LIPASE SERPL-CCNC: 174 U/L (ref 73–393)
LYMPHOCYTES # BLD AUTO: 0.72 THOUSANDS/ΜL (ref 0.6–4.47)
LYMPHOCYTES NFR BLD AUTO: 13 % (ref 14–44)
MCH RBC QN AUTO: 28.2 PG (ref 26.8–34.3)
MCHC RBC AUTO-ENTMCNC: 32.6 G/DL (ref 31.4–37.4)
MCV RBC AUTO: 86 FL (ref 82–98)
MONOCYTES # BLD AUTO: 0.68 THOUSAND/ΜL (ref 0.17–1.22)
MONOCYTES NFR BLD AUTO: 12 % (ref 4–12)
NEUTROPHILS # BLD AUTO: 4.08 THOUSANDS/ΜL (ref 1.85–7.62)
NEUTS SEG NFR BLD AUTO: 71 % (ref 43–75)
NRBC BLD AUTO-RTO: 0 /100 WBCS
PLATELET # BLD AUTO: 184 THOUSANDS/UL (ref 149–390)
PMV BLD AUTO: 8.5 FL (ref 8.9–12.7)
POTASSIUM SERPL-SCNC: 4.8 MMOL/L (ref 3.5–5.3)
PROT SERPL-MCNC: 6.6 G/DL (ref 6.4–8.2)
RBC # BLD AUTO: 5 MILLION/UL (ref 3.81–5.12)
SODIUM SERPL-SCNC: 132 MMOL/L (ref 136–145)
WBC # BLD AUTO: 5.67 THOUSAND/UL (ref 4.31–10.16)

## 2021-06-09 PROCEDURE — 93923 UPR/LXTR ART STDY 3+ LVLS: CPT

## 2021-06-09 PROCEDURE — 80053 COMPREHEN METABOLIC PANEL: CPT | Performed by: EMERGENCY MEDICINE

## 2021-06-09 PROCEDURE — 99285 EMERGENCY DEPT VISIT HI MDM: CPT

## 2021-06-09 PROCEDURE — 36415 COLL VENOUS BLD VENIPUNCTURE: CPT | Performed by: EMERGENCY MEDICINE

## 2021-06-09 PROCEDURE — 83690 ASSAY OF LIPASE: CPT | Performed by: EMERGENCY MEDICINE

## 2021-06-09 PROCEDURE — 85025 COMPLETE CBC W/AUTO DIFF WBC: CPT | Performed by: EMERGENCY MEDICINE

## 2021-06-09 PROCEDURE — 74177 CT ABD & PELVIS W/CONTRAST: CPT

## 2021-06-09 PROCEDURE — 96360 HYDRATION IV INFUSION INIT: CPT

## 2021-06-09 PROCEDURE — 99307 SBSQ NF CARE SF MDM 10: CPT | Performed by: NURSE PRACTITIONER

## 2021-06-09 PROCEDURE — 99282 EMERGENCY DEPT VISIT SF MDM: CPT | Performed by: PODIATRIST

## 2021-06-09 PROCEDURE — 99285 EMERGENCY DEPT VISIT HI MDM: CPT | Performed by: EMERGENCY MEDICINE

## 2021-06-09 RX ORDER — GABAPENTIN 100 MG/1
100 CAPSULE ORAL 3 TIMES DAILY
COMMUNITY
End: 2021-08-10 | Stop reason: SDUPTHER

## 2021-06-09 RX ADMIN — SODIUM CHLORIDE 1000 ML: 0.9 INJECTION, SOLUTION INTRAVENOUS at 16:35

## 2021-06-09 RX ADMIN — IOHEXOL 100 ML: 350 INJECTION, SOLUTION INTRAVENOUS at 17:58

## 2021-06-09 NOTE — PROGRESS NOTES
[facility]  [address]  [phone]  Senior Care SNF List: [facility]   Code 32      NAME: Eber Streeter  AGE: 80 y o  SEX: female    DATE OF ENCOUNTER: 6/9/2021    Assessment and Plan     Problem List Items Addressed This Visit        Other    Altered mental state - Primary     · Increased confusion reported by nursing  · She had issues with he insomnia last night  · Was hallucinating per nursing  · Once patient was out of bed this morning, nursing felt like she was acting differently and not like herself  · Stat blood work was ordered  · Stat urinalysis and culture was ordered  · Q 4 vital signs were ordered  · Vital signs were stable and she was afebrile               No orders of the defined types were placed in this encounter       - Counseling Documentation: patient was counseled regarding: risks and benefits of treatment options and importance of compliance with treatment    Chief Complaint     No chief complaint on file  History of Present Illness     Taras Severs  is a 15-year-old female at [facility] for long-term care  She is being seen today per nursing request for change in mental status and hallucinations  Her comorbidities include hypothyroidism, hyperlipidemia, CKD 2, AFib, dementia, and ambulatory dysfunction  Upon examination patient was out of bed in wheelchair, resting comfortably  During examination she appeared to be at her baseline, but was slightly more agitated then usual  nursing said she slept until late morning and when they got her out of bed for lunch she just wanted to go back to bed  Nursing noted that she was more confused than normal   Review of systems is limited due to dementia  She did deny headaches, pain, chest pain, shortness of breath, abdominal pain  Per nursing no issues with fever, cough, diarrhea or constipation  Per nursing no other acute concerns or issues at this time      Addendum:  Was called by nursing about an hour and a half after initial exam around 2:30 p m  for noted changes in her left foot, reported that foot was cold to touch and purple  Went bedside to evaluate patient  Left foot was more purple than right  Both were cold to touch, left more then right and I was unable to palpate pulses  Per nursing and staff when she got dressed in the morning her feet were not that purple  During initial examination she had both socks and shoes on  I was unable to examine her feet during initial examination because at that time patient refused and said if I kept trying she would  "kick me'  Attempted to call patient's daughter to  who is the POA to update her and did not answer phone  Called son who is the  2nd POA and explained to him that I had seen his mother earlier for change in mental status and that I had ordered STAT lab work and urinalysis and that I was called back to see his mother at 1430 to examine her foot  Concern for possible blood clot, although she is on Eliquis  Spoke with the son and explained that we could keep her here to do STAT lab work and Doppler ultrasound or we could send her to the hospital for further evaluation  He wished to have her sent out the hospital for further evaluation  The following portions of the patient's history were reviewed and updated as appropriate: allergies, current medications, past family history, past medical history, past social history, past surgical history and problem list     Review of Systems     Review of Systems   Reason unable to perform ROS: limited by dementia  Constitutional: Positive for activity change and fatigue  Negative for appetite change, diaphoresis and fever  HENT: Positive for hearing loss  Respiratory: Negative for shortness of breath  Cardiovascular: Negative for chest pain  Gastrointestinal: Negative for vomiting  Genitourinary: Negative for difficulty urinating     Musculoskeletal: Positive for arthralgias and gait problem  Neurological: Positive for weakness  Negative for headaches  Active Problem List     Patient Active Problem List   Diagnosis    Acquired hypothyroidism    Hypertensive heart disease without heart failure    Mixed hyperlipidemia    Vitamin D deficiency    Leg ulcer, right, limited to breakdown of skin ([facility])    Type 2 diabetes mellitus with foot ulcer (CODE) ([facility])    Permanent atrial fibrillation ([facility])    Generalized osteoarthritis    Stage 2 chronic kidney disease    Mixed aphasia, global    Advance care planning    Macular degeneration    Essential (primary) hypertension    Ambulatory dysfunction    Other hemorrhoids    Slow transit constipation    Hyponatremia    Lower extremity pain    Closed compression fracture of body of L1 vertebra ([facility])    Physical deconditioning    Debility    Fall    Type 2 diabetes mellitus, without long-term current use of insulin ([facility])    Dementia ([facility])    Goals of care, counseling/discussion    Lower extremity edema    Thrombocytopenia ([facility])    Altered mental state       Objective     /70   Pulse 80   Temp (!) 97 °F (36 1 °C)   Resp 18   LMP  (LMP Unknown)   SpO2 98%     Physical Exam  Vitals signs and nursing note reviewed  Constitutional:       General: She is not in acute distress  Appearance: She is obese  She is not ill-appearing  Comments: Frail appearing   HENT:      Head: Normocephalic and atraumatic  Ears:      Comments: [place]     Mouth/Throat:      Mouth: Mucous membranes are moist       Comments: Missing teeth    Cardiovascular:      Rate and Rhythm: Normal rate  Rhythm irregularly irregular  Heart sounds: Normal heart sounds  No murmur  Pulmonary:      Effort: Pulmonary effort is normal  No respiratory distress  Breath sounds: Normal breath sounds  No wheezing  Abdominal:      General: Bowel sounds are normal  There is distension  Palpations: Abdomen is soft  Tenderness: There is no abdominal tenderness  Musculoskeletal:      Right lower leg: Edema (+1) present  Left lower leg: Edema (+1) present  Skin:     General: Skin is warm and dry  Coloration: Skin is pale  Skin is not jaundiced  Findings: No bruising  Neurological:      General: No focal deficit present  Mental Status: She is alert  Mental status is at baseline  Motor: Weakness present  Gait: Gait abnormal       Comments: Alert to person only  Forgetful  Refuses care at times   Psychiatric:         Mood and Affect: Mood normal          Behavior: Behavior normal          Pertinent Laboratory/Diagnostic Studies:  Labs reviewed in facility paper chart  Current Medications   Medications were reviewed and updated  Please refer to paper chart for updated list of medications      Nam OROZCO  6/9/2021 8:21 PM

## 2021-06-09 NOTE — ASSESSMENT & PLAN NOTE
· Increased confusion reported by nursing  · She had issues with he insomnia last night  · Was hallucinating per nursing  · Once patient was out of bed this morning, nursing felt like she was acting differently and not like herself  · Stat blood work was ordered  · Stat urinalysis and culture was ordered  · Q 4 vital signs were ordered  · Vital signs were stable and she was afebrile

## 2021-06-09 NOTE — ED PROVIDER NOTES
Final Diagnosis:  1  Constipation    2  Foot pain    3  Vascular disease        Chief Complaint   Patient presents with    Altered Mental Status     Per [employer], pt appears to have increased altered mental status and appears to be hallucinating  Pt not having hallicinations at this time  Pt denies pain or symptoms  Pt has hx of dementia     HPI  Patient presents for evaluation of foot discoloration  Daughter is bedside  She states that she was contacted by the nursing home where her mother resides at approximately 1500 when she was told that her foot was more purple than normal and they want to know if she should be sent into emergency department or simply order a vascular ultrasound as an outpatient which could take a couple days  At that time they elected to bring her into the hospital for further evaluation  Patient has relatively severe dementia at baseline and is a poor historian  Denies any falls or pain  Daughter states that she appears to be near her baseline mental status     - No language barrier    - History obtained from patient  - There are no limitations to the history obtained  - Previous charting was reviewed    PMH:   has a past medical history of Acute encephalopathy (9/8/2020), Atrial fibrillation ([facility] [address] ), Blindness (2008), Cholecystitis, Disease of tonsils, Glaucoma (2008), and Toxic metabolic encephalopathy (5/6/1526)  PSH:   has a past surgical history that includes Cholecystectomy; Replacement total knee (Bilateral, 01/2006); and Eye surgery (Right, 03/2004)  ROS:  Review of Systems   Unable to perform ROS: Dementia        PE:   Vitals:    06/09/21 1529 06/09/21 1849   BP: 139/70 107/69   BP Location: Right arm Right arm   Pulse: 61 66   Resp: 16 16   Temp: 98 3 °F (36 8 °C)    TempSrc: Oral    SpO2: 99% 100%     Vitals reviewed by me  Physical Exam  Vitals signs reviewed  Constitutional:       General: She is not in acute distress       Appearance: She is not diaphoretic  HENT:      Head: Normocephalic and atraumatic  Right Ear: External ear normal       Left Ear: External ear normal    Eyes:      General: No scleral icterus  Right eye: No discharge  Left eye: No discharge  Extraocular Movements: Extraocular movements intact  Right eye: Abnormal extraocular motion present  Conjunctiva/sclera: Conjunctivae normal       Pupils:      Right eye: Pupil is not reactive  Comments: Right eye prosthesis, left eye is reactive   Neck:      Musculoskeletal: Normal range of motion and neck supple  Vascular: No JVD  Cardiovascular:      Rate and Rhythm: Normal rate and regular rhythm  Heart sounds: Normal heart sounds  No murmur  No friction rub  No gallop  Pulmonary:      Effort: Pulmonary effort is normal  No respiratory distress  Breath sounds: Normal breath sounds  No wheezing or rales  Abdominal:      General: Bowel sounds are normal  There is no distension  Palpations: Abdomen is soft  There is no mass  Tenderness: There is abdominal tenderness  There is no guarding  Comments: Generalized tenderness with palpation across abdomen  No masses  Musculoskeletal: Normal range of motion  General: Swelling and tenderness present  No deformity  Skin:     General: Skin is warm  Coloration: Skin is cyanotic  Comments: Bilateral feet are cool to the touch, temperature proves around knee bilaterally  Purple discoloration is worse on the ball of the left foot  Patient has sensation diffusely  Complains of pain with palpation  No obvious deformities  No palpable falls  Was able to achieve pulse recognition with Doppler in DP  Neurological:      Mental Status: She is alert  Mental status is at baseline  Cranial Nerves: No cranial nerve deficit  Sensory: No sensory deficit  Motor: No abnormal muscle tone        Coordination: Coordination normal       Comments: Patient interactive in room  Unsure why she has that hospital   Per daughter bedside this is the patient's baseline  Psychiatric:         Behavior: Behavior normal          Thought Content: Thought content normal          Judgment: Judgment normal           A:  - Nursing note reviewed  -                      CT abdomen pelvis with contrast   Final Result      Imaging evidence of fecal impaction  Correlate clinically  Several other chronic and nonemergent findings above              Workstation performed: [id number]         VAS PETER & waveform analysis, multiple levels    (Results Pending)     Orders Placed This Encounter   Procedures    CT abdomen pelvis with contrast    CBC and differential    Comprehensive metabolic panel    Lipase    Inpatient consult to Podiatry     Labs Reviewed   CBC AND DIFFERENTIAL - Abnormal       Result Value Ref Range Status    WBC 5 67  4 31 - 10 16 Thousand/uL Final    RBC 5 00  3 81 - 5 12 Million/uL Final    Hemoglobin 14 1  11 5 - 15 4 g/dL Final    Hematocrit 43 2  34 8 - 46 1 % Final    MCV 86  82 - 98 fL Final    MCH 28 2  26 8 - 34 3 pg Final    MCHC 32 6  31 4 - 37 4 g/dL Final    RDW 13 5  11 6 - 15 1 % Final    MPV 8 5 (*) 8 9 - 12 7 fL Final    Platelets 544  474 - 390 Thousands/uL Final    nRBC 0  /100 WBCs Final    Neutrophils Relative 71  43 - 75 % Final    Immat GRANS % 1  0 - 2 % Final    Lymphocytes Relative 13 (*) 14 - 44 % Final    Monocytes Relative 12  4 - 12 % Final    Eosinophils Relative 2  0 - 6 % Final    Basophils Relative 1  0 - 1 % Final    Neutrophils Absolute 4 08  1 85 - 7 62 Thousands/µL Final    Immature Grans Absolute 0 03  0 00 - 0 20 Thousand/uL Final    Lymphocytes Absolute 0 72  0 60 - 4 47 Thousands/µL Final    Monocytes Absolute 0 68  0 17 - 1 22 Thousand/µL Final    Eosinophils Absolute 0 12  0 00 - 0 61 Thousand/µL Final    Basophils Absolute 0 04  0 00 - 0 10 Thousands/µL Final   COMPREHENSIVE METABOLIC PANEL - Abnormal    Sodium 132 (*) 136 - 145 mmol/L Final    Potassium 4 8  3 5 - 5 3 mmol/L Final    Chloride 97 (*) 100 - 108 mmol/L Final    CO2 29  21 - 32 mmol/L Final    ANION GAP 6  4 - 13 mmol/L Final    BUN 22  5 - 25 mg/dL Final    Creatinine 1 03  0 60 - 1 30 mg/dL Final    Comment: Standardized to IDMS reference method    Glucose 136  65 - 140 mg/dL Final    Comment: If the patient is fasting, the ADA then defines impaired fasting glucose as > 100 mg/dL and diabetes as > or equal to 123 mg/dL  Specimen collection should occur prior to Sulfasalazine administration due to the potential for falsely depressed results  Specimen collection should occur prior to Sulfapyridine administration due to the potential for falsely elevated results  Calcium 8 7  8 3 - 10 1 mg/dL Final    Corrected Calcium 9 3  8 3 - 10 1 mg/dL Final    AST 24  5 - 45 U/L Final    Comment: Specimen collection should occur prior to Sulfasalazine administration due to the potential for falsely depressed results  ALT 19  12 - 78 U/L Final    Comment: Specimen collection should occur prior to Sulfasalazine administration due to the potential for falsely depressed results  Alkaline Phosphatase 78  46 - 116 U/L Final    Total Protein 6 6  6 4 - 8 2 g/dL Final    Albumin 3 2 (*) 3 5 - 5 0 g/dL Final    Total Bilirubin 0 68  0 20 - 1 00 mg/dL Final    Comment: Use of this assay is not recommended for patients undergoing treatment with eltrombopag due to the potential for falsely elevated results      eGFR 47  ml/min/1 73sq m Final    Narrative:     [place] guidelines for Chronic Kidney Disease (CKD):     Stage 1 with normal or high GFR (GFR > 90 mL/min/1 73 square meters)    Stage 2 Mild CKD (GFR = 60-89 mL/min/1 73 square meters)    Stage 3A Moderate CKD (GFR = 45-59 mL/min/1 73 square meters)    Stage 3B Moderate CKD (GFR = 30-44 mL/min/1 73 square meters)    Stage 4 Severe CKD (GFR = 15-29 mL/min/1 73 square meters)    Stage 5 End Stage CKD (GFR <15 mL/min/1 73 square meters)  Note: GFR calculation is accurate only with a steady state creatinine   LIPASE - Normal    Lipase 174  73 - 393 u/L Final         Final Diagnosis:  1  Constipation    2  Foot pain    3  Vascular disease        P:  - vascular study of bilateral lower extremities  Based on apparent pain with palpation of abdomen will get laboratory analysis as well as CT scan  -CT scan shows constipation and possible impaction, patient is still having bowel movements, will discuss with daughter and suggest bowel regimen   -vascular study revealed good pulses down leg with no blood flow to great toe  I discussed this with both vascular surgery as well as Podiatry who evaluated the patient  At this time no acute intervention  Vascular surgery advised to have the patient follow-up outpatient for further evaluation  Podiatry stated that at this time there is no gangrene or ulceration to indicate further intervention   -I discussed all findings with the patient's daughter who is bedside  At this point we feel that she is appropriate to return to her living facility  Return precautions discussed  Medications   sodium chloride 0 9 % bolus 1,000 mL (0 mL Intravenous Stopped 6/9/21 3243)   iohexol (OMNIPAQUE) 350 MG/ML injection (SINGLE-DOSE) 100 mL (100 mL Intravenous Given 6/9/21 0238)     Time reflects when diagnosis was documented in both MDM as applicable and the Disposition within this note     Time User Action Codes Description Comment    6/9/2021  8:51 PM Gissel Nielson Add [K59 00] Constipation     6/9/2021  8:51 PM Gissel Nielson Add [M75 120] Foot pain     6/9/2021  8:51 PM Gissel Nielson Add [I99 9] Vascular disease       ED Disposition     ED Disposition Condition Date/Time Comment    Discharge Stable Wed Jun 9, 2021  8:51 PM Elenor Dense discharge to home/self care              Follow-up Information     Follow up With Specialties Details Why Contact Info Additional Information    [address] [facility] Emergency Department Emergency Medicine   [facility] [address]  [address] [facility] Emergency Department, [address] [facility] , [place], [place], [address] [facility] Vascular Surgery Schedule an appointment as soon as possible for a visit   [place] [address] [phone] The [address] [place] [address] Road, [address] [facility], [place], [place], [address] [phone]        Discharge Medication List as of 6/9/2021  8:52 PM      CONTINUE these medications which have NOT CHANGED    Details   acetaminophen (TYLENOL) 325 mg tablet Take 2 tablets (650 mg total) by mouth every 8 (eight) hours, Starting Tue 1/12/2021, No Print      apixaban (ELIQUIS) 5 mg Take 1 tablet (5 mg total) by mouth 2 (two) times a day, Starting Tue 9/8/2020, No Print      bisacodyl (DULCOLAX) 5 mg EC tablet Take 1 tablet (5 mg total) by mouth daily as needed (constipation if miralax is ineffective), Starting Tue 1/14/2020, No Print      Cholecalciferol (VITAMIN D-3) 1000 units CAPS 1 capsule , Historical Med      cyanocobalamin (VITAMIN B-12) 1000 MCG tablet Take 1 tablet (1,000 mcg total) by mouth daily, Starting Wed 1/15/2020, No Print      ezetimibe (ZETIA) 10 mg tablet Take 1 tablet (10 mg total) by mouth daily at bedtime, Starting Tue 1/14/2020, No Print      furosemide (LASIX) 20 mg tablet Take 20 mg by mouth daily, Historical Med      gabapentin (NEURONTIN) 100 mg capsule Take 100 mg by mouth 3 (three) times a day, Historical Med      levothyroxine 75 mcg tablet Take 1 tablet (75 mcg total) by mouth daily, Starting Tue 12/31/2019, Normal      magnesium hydroxide (MILK OF MAGNESIA) 400 mg/5 mL oral suspension Take 30 mL by mouth daily as needed for constipation, Historical Med      melatonin 1 mg Take 1 tablet (1 mg total) by mouth daily at bedtime, Starting Tue 11/24/2020, No Print metoprolol tartrate (LOPRESSOR) 25 mg tablet Take 1 tablet (25 mg total) by mouth every 12 (twelve) hours, Starting 2021, No Print      nystatin (MYCOSTATIN) powder Apply 1 application topically 2 (two) times a day, Starting 2020, No Print      OMEGA-3 FATTY ACIDS PO 2 (two) times a day , Starting 2005, Historical Med      polyethylene glycol (MIRALAX) 17 g packet Take 17 g by mouth daily as needed (constipation), Starting 2020, No Print      Polyvinyl Alcohol-Povidone (REFRESH OP) Apply to eye, Historical Med      senna-docusate sodium (SENOKOT S) 8 6-50 mg per tablet Take 1 tablet by mouth 2 (two) times a day, Starting 2020, No Print      sodium phosphate-biphosphate (FLEET) 7-19 g 118 mL enema Insert 1 enema into the rectum daily as needed, Historical Med      ACCU-CHEK FASTCLIX LANCETS MISC CHECK BG ONCE DAILY, Normal      Blood Glucose Monitoring Suppl (ACCU-CHEK KIKI PLUS) w/Device KIT by Does not apply route daily TEST BG ONCE DAILY- E11 65, Starting 2020, Normal      glucose blood test strip Test once daily- E11 65, Normal      ipratropium-albuterol (DUO-NEB) 0 5-2 5 mg/3 mL nebulizer solution Take 3 mL by nebulization 4 (four) times a day, Historical Med           No discharge procedures on file  Prior to Admission Medications   Prescriptions Last Dose Informant Patient Reported? Taking?    ACCU-CHEK FASTCLIX LANCETS MISC   No No   Sig: CHECK BG ONCE DAILY   Blood Glucose Monitoring Suppl (ACCU-CHEK KIKI PLUS) w/Device KIT   No No   Sig: by Does not apply route daily TEST BG ONCE DAILY- E11 65   Cholecalciferol (VITAMIN D-3) 1000 units CAPS   Yes Yes   Si capsule    OMEGA-3 FATTY ACIDS PO   Yes Yes   Si (two) times a day    Polyvinyl Alcohol-Povidone (REFRESH OP)   Yes Yes   Sig: Apply to eye   acetaminophen (TYLENOL) 325 mg tablet   No Yes   Sig: Take 2 tablets (650 mg total) by mouth every 8 (eight) hours   apixaban (ELIQUIS) 5 mg   No Yes Sig: Take 1 tablet (5 mg total) by mouth 2 (two) times a day   bisacodyl (DULCOLAX) 5 mg EC tablet   No Yes   Sig: Take 1 tablet (5 mg total) by mouth daily as needed (constipation if miralax is ineffective)   Patient taking differently: Take 10 mg by mouth daily as needed (constipation if miralax is ineffective)    cyanocobalamin (VITAMIN B-12) 1000 MCG tablet   No Yes   Sig: Take 1 tablet (1,000 mcg total) by mouth daily   ezetimibe (ZETIA) 10 mg tablet   No Yes   Sig: Take 1 tablet (10 mg total) by mouth daily at bedtime   furosemide (LASIX) 20 mg tablet   Yes Yes   Sig: Take 20 mg by mouth daily   gabapentin (NEURONTIN) 100 mg capsule  Outside Facility (Specify) Yes Yes   Sig: Take 100 mg by mouth 3 (three) times a day   glucose blood test strip   No No   Sig: Test once daily- E11 65   ipratropium-albuterol (DUO-NEB) 0 5-2 5 mg/3 mL nebulizer solution   Yes No   Sig: Take 3 mL by nebulization 4 (four) times a day   levothyroxine 75 mcg tablet   No Yes   Sig: Take 1 tablet (75 mcg total) by mouth daily   magnesium hydroxide (MILK OF MAGNESIA) 400 mg/5 mL oral suspension   Yes Yes   Sig: Take 30 mL by mouth daily as needed for constipation   melatonin 1 mg   No Yes   Sig: Take 1 tablet (1 mg total) by mouth daily at bedtime   metoprolol tartrate (LOPRESSOR) 25 mg tablet   No Yes   Sig: Take 1 tablet (25 mg total) by mouth every 12 (twelve) hours   nystatin (MYCOSTATIN) powder   No Yes   Sig: Apply 1 application topically 2 (two) times a day   polyethylene glycol (MIRALAX) 17 g packet   No Yes   Sig: Take 17 g by mouth daily as needed (constipation)   senna-docusate sodium (SENOKOT S) 8 6-50 mg per tablet   No Yes   Sig: Take 1 tablet by mouth 2 (two) times a day   sodium phosphate-biphosphate (FLEET) 7-19 g 118 mL enema   Yes Yes   Sig: Insert 1 enema into the rectum daily as needed      Facility-Administered Medications: None       Portions of the record may have been created with voice recognition software  Occasional wrong word or "sound a like" substitutions may have occurred due to the inherent limitations of voice recognition software  Read the chart carefully and recognize, using context, where substitutions have occurred      Electronically signed by:  Darren Whiting, PGY 3, MD Keya Mauricio MD  06/09/21 8204

## 2021-06-09 NOTE — ED ATTENDING ATTESTATION
6/9/2021  IXin, DO, saw and evaluated the patient  I have discussed the patient with the resident/non-physician practitioner and agree with the resident's/non-physician practitioner's findings, Plan of Care, and MDM as documented in the resident's/non-physician practitioner's note, except where noted  All available labs and Radiology studies were reviewed  I was present for key portions of any procedure(s) performed by the resident/non-physician practitioner and I was immediately available to provide assistance  At this point I agree with the current assessment done in the Emergency Department  I have conducted an independent evaluation of this patient a history and physical is as follows:    ED Course     80 y o  F w/h/o dementia, afib (on Eliquis), DM, hypothyroidism, LE edema (on Lasix) p/w foot discoloration  Per daughter, NH called her telling her they noticed pt's left foot was blue this morning and they were concerned for a clot  "I don't know why they waited  to call me now because they said they noticed it this morning "  Daughter reports pt's feet were normal color 3 days  Daughter states both foot look blue, but left foot looks worse  Pt denies pain in her feet, but states "it only hurts if you squeeze or pinch them "  On exam, pt in NAD  Abd soft, generalized TTP  Purplish hue to left foot>right  Both feet cool to touch  Able to obtain DP pulses by doppler  Pt has full ROM of ankle/feet  Plan: Labs, CT A/P, doppler, vascular/podiatry c/s      Critical Care Time  Procedures

## 2021-06-10 PROCEDURE — 93923 UPR/LXTR ART STDY 3+ LVLS: CPT | Performed by: SURGERY

## 2021-06-10 NOTE — CONSULTS
Podiatry - Consultation    Patient Information:   Pravin Mccurdy 80 y o  female MRN: [id number]  Unit/Bed#: ED 15 Encounter: [id number]  PCP: Navarro Ghosh MD  Date of Admission:  6/9/2021  Date of Consultation: 06/09/21  Requesting Physician: Gayle Rosas 24, DO      ASSESSMENT:    Pravin Mccurdy is a 80 y o  female with:    1  Lower extremity ischemic changes    PLAN:      · No wound noted to b/l lower extremity  No gross signs of infection  VSS, afebrile, no leukocytosis  Pt does not require podiatric intervention at this time  · LEADs were reviewed: L:1 01/flattened great toe and metatarsal  R: 1 07/125/86  ED has communicated with Vascular team and no plan per Vascular at this time  · Elevation on green foam wedges or pillows when non-ambulatory  · Rest of care per primary team   · Will discuss this plan with my attending and update as needed  Weightbearing status: as tolerated    SUBJECTIVE:    History of Present Illness:    Pravin Mccurdy is a 80 y o  female who is originally seen in the ED  6/9/2021 due to cold foot and change in temperature  Patient has a past medical history of dementia, afib  We are consulted for b/l cold foot and transient purple discoloration to left foot  Pt daugther is at bedside  She state that today when she went to visit her mon she noticed that her left  foot was some what purple specially the great toe  She adds that her feed is also cold to touch  She reports that since arriving in the ED the purple discoloration has improved significantly  She added that the edema has improved as well since her legs are elevated opposed to "hanging down"  Pt was asleep through out examination  Review of Systems:    Constitutional: Negative  HENT: Negative  Eyes: Negative  Respiratory: Negative  Cardiovascular: Negative  Gastrointestinal: Negative  Musculoskeletal: negative  Skin:color change   Neurological: dementia    Psych: Negative       Past Medical and Surgical History:     Past Medical History:   Diagnosis Date    Acute encephalopathy 9/8/2020    Atrial fibrillation ([address] )     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    Cholecystitis     Disease of tonsils     Glaucoma 2008    NOS    Toxic metabolic encephalopathy 7/9/8421       Past Surgical History:   Procedure Laterality Date    CHOLECYSTECTOMY      EYE SURGERY Right 03/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    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       Meds/Allergies:    (Not in a hospital admission)      Allergies   Allergen Reactions    Aspirin GI Intolerance    Penicillins        Social History:     Marital Status:     Substance Use History:   Social History     Substance and Sexual Activity   Alcohol Use Not Currently    Frequency: Never     Social History     Tobacco Use   Smoking Status Never Smoker   Smokeless Tobacco Never Used     Social History     Substance and Sexual Activity   Drug Use Never       Family History:    Family History   Problem Relation Age of Onset    Breast cancer Sister     No Known Problems Mother     No Known Problems Father          OBJECTIVE:    Vitals:   Blood Pressure: 107/69 (06/09/21 1849)  Pulse: 66 (06/09/21 1849)  Temperature: 98 3 °F (36 8 °C) (06/09/21 1529)  Temp Source: Oral (06/09/21 1529)  Respirations: 16 (06/09/21 1849)  SpO2: 100 % (06/09/21 1849)    Physical Exam:    General Appearance: Alert, cooperative, no distress  HEENT: Head normocephalic, atraumatic, without obvious abnormality  Heart: Normal rate and rhythm  Lungs: Non-labored breathing  No respiratory distress  Abdomen: Without distension  Psychiatric: AAOx3  Lower Extremity:  Vascular:   DP and PT pulses are dopplerble  CRT < 3 seconds at the digits  +2/4 edema noted at bilateral lower extremities  Pedal hair is absent  Skin temperature is cool bilaterally foot  Proximal leg is wnl to touch  Musculoskeletal:  ROM at the 1st MPJ and ankle joint are decreased bilaterally with the leg extended   Pt was asleep during examination; no apparent pain elicited on palpation of foot and legs b/l  Dermatological:  Edema noted  Very mild purple discoloration to left dorsal forefoot,  Per daughter discoloration improved with elevation of legs  No open wound, no erythema, no fluctuance, no crepitus, no gross signs of infection noted at this time  No webspace maceration note to b/l foot  Neurological:  Was not performed secondary to pt dementia       Clinical Images 06/09/21:              Additional data:     Lab Results: I have personally reviewed pertinent labs including:    Results from last 7 days   Lab Units 06/09/21  1634   WBC Thousand/uL 5 67   HEMOGLOBIN g/dL 14 1   HEMATOCRIT % 43 2   PLATELETS Thousands/uL 184   NEUTROS PCT % 71   LYMPHS PCT % 13*   MONOS PCT % 12   EOS PCT % 2     Results from last 7 days   Lab Units 06/09/21  1634   POTASSIUM mmol/L 4 8   CHLORIDE mmol/L 97*   CO2 mmol/L 29   BUN mg/dL 22   CREATININE mg/dL 1 03   CALCIUM mg/dL 8 7   ALK PHOS U/L 78   ALT U/L 19   AST U/L 24           Cultures: I have personally reviewed pertinent cultures including:              Imaging: I have personally reviewed pertinent reports in PACS  EKG, Pathology, and Other Studies: I have personally reviewed pertinent reports  ** Please Note: Portions of the record may have been created with voice recognition software  Occasional wrong word or "sound a like" substitutions may have occurred due to the inherent limitations of voice recognition software  Read the chart carefully and recognize, using context, where substitutions have occurred   **

## 2021-06-10 NOTE — ED NOTES
Per Alondra Monroe at [facility] transport will be here in approx 30 min to transport back to [address] [place] [address] 5&20[facility]  06/09/21 2100

## 2021-06-21 ENCOUNTER — NURSING HOME VISIT (OUTPATIENT)
Dept: GERIATRICS | Facility: OTHER | Age: 86
End: 2021-06-21
Payer: COMMERCIAL

## 2021-06-21 VITALS
RESPIRATION RATE: 18 BRPM | DIASTOLIC BLOOD PRESSURE: 79 MMHG | TEMPERATURE: 97.3 F | OXYGEN SATURATION: 95 % | HEART RATE: 71 BPM | SYSTOLIC BLOOD PRESSURE: 139 MMHG

## 2021-06-21 DIAGNOSIS — E03.9 ACQUIRED HYPOTHYROIDISM: ICD-10-CM

## 2021-06-21 DIAGNOSIS — N18.2 TYPE 2 DIABETES MELLITUS WITH STAGE 2 CHRONIC KIDNEY DISEASE, WITHOUT LONG-TERM CURRENT USE OF INSULIN (HCC): Primary | ICD-10-CM

## 2021-06-21 DIAGNOSIS — E11.22 TYPE 2 DIABETES MELLITUS WITH STAGE 2 CHRONIC KIDNEY DISEASE, WITHOUT LONG-TERM CURRENT USE OF INSULIN (HCC): Primary | ICD-10-CM

## 2021-06-21 DIAGNOSIS — R26.2 AMBULATORY DYSFUNCTION: ICD-10-CM

## 2021-06-21 DIAGNOSIS — N18.2 STAGE 2 CHRONIC KIDNEY DISEASE: ICD-10-CM

## 2021-06-21 DIAGNOSIS — F03.90 DEMENTIA WITHOUT BEHAVIORAL DISTURBANCE, UNSPECIFIED DEMENTIA TYPE (HCC): ICD-10-CM

## 2021-06-21 DIAGNOSIS — I10 ESSENTIAL (PRIMARY) HYPERTENSION: ICD-10-CM

## 2021-06-21 PROCEDURE — 99309 SBSQ NF CARE MODERATE MDM 30: CPT | Performed by: NURSE PRACTITIONER

## 2021-06-21 NOTE — ASSESSMENT & PLAN NOTE
· Currently stable  · Last TSH 3 05  · Continue with current medication regimen of levothyroxine 75 mcg  · Periodic lab work

## 2021-06-21 NOTE — ASSESSMENT & PLAN NOTE
· Currently stable, last creatinine was 0 72  · Avoid nephrotoxic medications  · Renal dose medications as needed  · Encourage p o  hydration  · Periodic lab work

## 2021-06-21 NOTE — ASSESSMENT & PLAN NOTE
· Currently stable  · Goal is less than 140/90  · Continue with metoprolol b i d  with hold parameters  · Cardiac diet  · Periodic lab work

## 2021-06-21 NOTE — ASSESSMENT & PLAN NOTE
· Currently stable and at baseline  · No behaviors reported by nursing  · Maintain sleep-wake cycle, continue with melatonin 2 mg q h s   · Encourage p o  intake  · Avoid urinary retention constipation  · Continue with 24/7 supportive care in long-term care facility

## 2021-06-21 NOTE — ASSESSMENT & PLAN NOTE
· Currently stable, A1c on 06/15/2021 was 6 0   · Currently not on any medication, well controlled with diet  · Repeat lab work in 6 months

## 2021-06-21 NOTE — ASSESSMENT & PLAN NOTE
· Pt/ot as needed  · Currently requires an extensive assist x1 for transfers and ambulation with rolling walker  · Also uses wheelchair to get around  · Fall precautions, fall mats ordered previously  · Frequent turning and repositioning while in bed  · Continue to monitor

## 2021-06-21 NOTE — PROGRESS NOTES
[facility]  [address]  [phone]  Senior Care SNF List: [facility]   Code 32      NAME: Ana Cuellar  AGE: 80 y o   SEX: female    DATE OF ENCOUNTER: 6/21/2021    Assessment and Plan     Problem List Items Addressed This Visit        Endocrine    Acquired hypothyroidism     · Currently stable  · Last TSH 3 05  · Continue with current medication regimen of levothyroxine 75 mcg  · Periodic lab work         Type 2 diabetes mellitus, without long-term current use of insulin (HCC) - Primary     · Currently stable, A1c on 06/15/2021 was 6 0   · Currently not on any medication, well controlled with diet  · Repeat lab work in 6 months              Cardiovascular and Mediastinum    Essential (primary) hypertension     · Currently stable  · Goal is less than 140/90  · Continue with metoprolol b i d  with hold parameters  · Cardiac diet  · Periodic lab work            Nervous and Auditory    Dementia ([address] )     · Currently stable and at baseline  · No behaviors reported by nursing  · Maintain sleep-wake cycle, continue with melatonin 2 mg q h s   · Encourage p o  intake  · Avoid urinary retention constipation  · Continue with 24/7 supportive care in long-term care facility            Genitourinary    Stage 2 chronic kidney disease     · Currently stable, last creatinine was 0 72  · Avoid nephrotoxic medications  · Renal dose medications as needed  · Encourage p o  hydration  · Periodic lab work            Other    Ambulatory dysfunction     · Pt/ot as needed  · Currently requires an extensive assist x1 for transfers and ambulation with rolling walker  · Also uses wheelchair to get around  · Fall precautions, fall mats ordered previously  · Frequent turning and repositioning while in bed  · Continue to monitor               No orders of the defined types were placed in this encounter       - Counseling Documentation: patient was counseled regarding: risks and benefits of treatment options and importance of compliance with treatment    Chief Complaint     No chief complaint on file  History of Present Illness     Irish Canavan is a 80-year-old female at [facility] for long-term care  She is being seen today for follow-up on her chronic medical conditions  Her comorbidities include hypertension, hyperlipidemia, type 2 diabetes, dementia, and ambulatory dysfunction  Upon examination patient was out of bed in her wheelchair, resting  She appeared comfortable and in no acute distress  Review of systems is limited due to dementia  she did deny headaches, dizziness, chest pain, shortness of breath, cough and abdominal pain  Per nursing no issues with cough, fever, diarrhea or constipation  Her appetite is at her baseline per nursing  Nursing no other acute concerns or issues at this time  The following portions of the patient's history were reviewed and updated as appropriate: allergies, current medications, past family history, past medical history, past social history, past surgical history and problem list     Review of Systems     Review of Systems   Reason unable to perform ROS: limited by dementia  Constitutional: Negative for activity change, appetite change, diaphoresis, fatigue and fever  HENT: Positive for hearing loss  Respiratory: Negative for cough and shortness of breath  Cardiovascular: Negative for chest pain  Gastrointestinal: Negative for abdominal pain and vomiting  Genitourinary: Negative for difficulty urinating  Musculoskeletal: Positive for arthralgias and gait problem  Neurological: Positive for weakness  Negative for light-headedness and headaches         Active Problem List     Patient Active Problem List   Diagnosis    Acquired hypothyroidism    Hypertensive heart disease without heart failure    Mixed hyperlipidemia    Vitamin D deficiency    Leg ulcer, right, limited to breakdown of skin (HCC)    Type 2 diabetes mellitus with foot ulcer (CODE) [facility]    Permanent atrial fibrillation (HCC)    Generalized osteoarthritis    Stage 2 chronic kidney disease    Mixed aphasia, global    Advance care planning    Macular degeneration    Essential (primary) hypertension    Ambulatory dysfunction    Other hemorrhoids    Slow transit constipation    Hyponatremia    Lower extremity pain    Closed compression fracture of body of L1 vertebra (HCC)    Physical deconditioning    Debility    Fall    Type 2 diabetes mellitus, without long-term current use of insulin (HCC)    Dementia (HCC)    Goals of care, counseling/discussion    Lower extremity edema    Thrombocytopenia (HCC)    Altered mental state       Objective     /79   Pulse 71   Temp (!) 97 3 °F (36 3 °C)   Resp 18   LMP  (LMP Unknown)   SpO2 95%     Physical Exam  Vitals and nursing note reviewed  Constitutional:       General: She is not in acute distress  Appearance: She is obese  She is not ill-appearing  Comments: Frail appearing   HENT:      Head: Normocephalic and atraumatic  Ears:      Comments: [place]     Mouth/Throat:      Mouth: Mucous membranes are moist       Comments: Missing teeth    Cardiovascular:      Rate and Rhythm: Normal rate  Rhythm irregularly irregular  Heart sounds: Normal heart sounds  No murmur heard  Pulmonary:      Effort: Pulmonary effort is normal  No respiratory distress  Breath sounds: Normal breath sounds  No wheezing  Abdominal:      General: Bowel sounds are normal  There is no distension  Palpations: Abdomen is soft  Tenderness: There is no abdominal tenderness  Musculoskeletal:      Right lower leg: Edema (mild) present  Left lower leg: Edema (mild) present  Skin:     General: Skin is warm and dry  Coloration: Skin is pale  Skin is not jaundiced  Findings: No bruising  Neurological:      General: No focal deficit present        Mental Status: She is alert  Mental status is at baseline  Motor: Weakness present  Gait: Gait abnormal (RW or wheelchiar OOB)  Comments: Alert to person only  Forgetful  Refuses care at times   Psychiatric:         Mood and Affect: Mood normal          Behavior: Behavior normal          Pertinent Laboratory/Diagnostic Studies:  Labs reviewed in facility paper chart  Current Medications   Medications were reviewed and updated  Please refer to paper chart for updated list of medications  Geni Lakhwinder OROZCO  6/21/2021 3:10 PM    Please note:  Voice-recognition software may have been used in the preparation of this document  Occasional wrong word or "sound-alike" substitutions may have occurred due to the inherent limitations of voice recognition software  Interpretation should be guided by context

## 2021-06-23 ENCOUNTER — NURSING HOME VISIT (OUTPATIENT)
Dept: GERIATRICS | Facility: OTHER | Age: 86
End: 2021-06-23
Payer: COMMERCIAL

## 2021-06-23 VITALS
HEART RATE: 60 BPM | OXYGEN SATURATION: 94 % | RESPIRATION RATE: 18 BRPM | SYSTOLIC BLOOD PRESSURE: 137 MMHG | DIASTOLIC BLOOD PRESSURE: 78 MMHG | TEMPERATURE: 97.3 F

## 2021-06-23 DIAGNOSIS — E87.1 HYPONATREMIA: Primary | ICD-10-CM

## 2021-06-23 PROCEDURE — 99307 SBSQ NF CARE SF MDM 10: CPT | Performed by: NURSE PRACTITIONER

## 2021-06-23 NOTE — ASSESSMENT & PLAN NOTE
· Na 131 yesterday, received 1L of NSS via Clysis per [facility] NP orders  · Repeat  today, patient pickering awake and cooperative today  · Repeat labwork in the morning   · Encourage PO fluids  · Continue to monitor closely

## 2021-06-23 NOTE — PROGRESS NOTES
[facility]  [address]  [phone]  Senior Care SNF List: [facility]   Code 32      NAME: Sherita Goldstein  AGE: 80 y o  SEX: female    DATE OF ENCOUNTER: 6/23/2021    Assessment and Plan     Problem List Items Addressed This Visit        Other    Hyponatremia - Primary     · Na 131 yesterday, received 1L of NSS via Clysis per [facility] NP orders  · Repeat  today, patient pickering awake and cooperative today  · Repeat labwork in the morning   · Encourage PO fluids  · Continue to monitor closely               No orders of the defined types were placed in this encounter       - Counseling Documentation: patient was counseled regarding: risks and benefits of treatment options and importance of compliance with treatment    Chief Complaint     No chief complaint on file  History of Present Illness     Yves Ospina is a 60-year-old female [facility] for long-term care  She is being seen today as a follow-up on lethargy and hypo natremia  Was seen by [facility] nurse practitioner yesterday for lethargy and hyponatremia  Blood working and urinaylsis were unremarkable, besides sodium was 133  She was given 1 L normal saline via clysis  She is much more awake today and able to follow commands  Her comorbidities include hyperlipidemia, hypothyroidism, hypertension, dementia, and ambulatory dysfunction  Upon examination patient was out of bed in her wheelchair, resting comfortably  She was napping when initially when I saw her, but woke up easily  She was able answer questions and follow commands  Said she was feeling fine and did not want to be bothered  ROS is limited due to dementia  She did deny headache, dizziness, chest pain, shortness of breath, cough  Per nursing no other acute concerns or issues at this time        The following portions of the patient's history were reviewed and updated as appropriate: allergies, current medications, past family history, past medical history, past social history, past surgical history and problem list     Review of Systems     Review of Systems   Reason unable to perform ROS: limited by dementia  Constitutional: Positive for fatigue  Negative for activity change, appetite change, diaphoresis and fever  HENT: Positive for hearing loss  Respiratory: Negative for cough and shortness of breath  Cardiovascular: Negative for chest pain  Gastrointestinal: Negative for abdominal pain, constipation, diarrhea and vomiting  Genitourinary: Negative for difficulty urinating  Musculoskeletal: Positive for arthralgias and gait problem  Negative for back pain  Neurological: Positive for weakness  Negative for light-headedness and headaches  Active Problem List     Patient Active Problem List   Diagnosis    Acquired hypothyroidism    Hypertensive heart disease without heart failure    Mixed hyperlipidemia    Vitamin D deficiency    Leg ulcer, right, limited to breakdown of skin ([facility])    Type 2 diabetes mellitus with foot ulcer (CODE) ([facility])    Permanent atrial fibrillation ([facility])    Generalized osteoarthritis    Stage 2 chronic kidney disease    Mixed aphasia, global    Advance care planning    Macular degeneration    Essential (primary) hypertension    Ambulatory dysfunction    Other hemorrhoids    Slow transit constipation    Hyponatremia    Lower extremity pain    Closed compression fracture of body of L1 vertebra ([facility])    Physical deconditioning    Debility    Fall    Type 2 diabetes mellitus, without long-term current use of insulin ([facility])    Dementia ([facility])    Goals of care, counseling/discussion    Lower extremity edema    Thrombocytopenia ([facility])    Altered mental state       Objective     /78   Pulse 60   Temp (!) 97 3 °F (36 3 °C)   Resp 18   LMP  (LMP Unknown)   SpO2 94%     Physical Exam  Vitals and nursing note reviewed     Constitutional:       General: She is not in acute distress  Appearance: She is obese  She is not ill-appearing  Comments: Frail appearing   HENT:      Head: Normocephalic and atraumatic  Ears:      Comments: [place]     Mouth/Throat:      Mouth: Mucous membranes are moist       Comments: Missing teeth    Cardiovascular:      Rate and Rhythm: Normal rate  Rhythm irregularly irregular  Heart sounds: Normal heart sounds  No murmur heard  Pulmonary:      Effort: Pulmonary effort is normal  No respiratory distress  Breath sounds: Normal breath sounds  No wheezing  Abdominal:      General: Bowel sounds are normal  There is no distension  Palpations: Abdomen is soft  Tenderness: There is no abdominal tenderness  Musculoskeletal:      Right lower leg: Edema (mild) present  Left lower leg: Edema (mild) present  Skin:     General: Skin is warm and dry  Coloration: Skin is pale  Skin is not jaundiced  Findings: No bruising  Comments: Dry skin b/l LE   Neurological:      General: No focal deficit present  Mental Status: She is alert  Mental status is at baseline  Motor: Weakness present  Gait: Gait abnormal (RW or wheelchiar OOB)  Comments: Alert to person only  Forgetful  Refuses care at times   Psychiatric:         Mood and Affect: Mood normal          Behavior: Behavior normal          Pertinent Laboratory/Diagnostic Studies:  Labs reviewed in facility paper chart  Current Medications   Medications were reviewed and updated  Please refer to paper chart for updated list of medications  Nam OROZCO  6/23/2021 1:34 PM    Please note:  Voice-recognition software may have been used in the preparation of this document  Occasional wrong word or "sound-alike" substitutions may have occurred due to the inherent limitations of voice recognition software  Interpretation should be guided by context

## 2021-07-08 ENCOUNTER — NURSING HOME VISIT (OUTPATIENT)
Dept: GERIATRICS | Facility: OTHER | Age: 86
End: 2021-07-08
Payer: COMMERCIAL

## 2021-07-08 VITALS
RESPIRATION RATE: 18 BRPM | SYSTOLIC BLOOD PRESSURE: 124 MMHG | TEMPERATURE: 96.8 F | OXYGEN SATURATION: 96 % | HEART RATE: 78 BPM | DIASTOLIC BLOOD PRESSURE: 68 MMHG

## 2021-07-08 DIAGNOSIS — F03.90 DEMENTIA WITHOUT BEHAVIORAL DISTURBANCE, UNSPECIFIED DEMENTIA TYPE (HCC): Primary | ICD-10-CM

## 2021-07-08 PROCEDURE — 99307 SBSQ NF CARE SF MDM 10: CPT | Performed by: NURSE PRACTITIONER

## 2021-07-08 NOTE — ASSESSMENT & PLAN NOTE
· Intermittent episodes of yelling/crying noted over the last few days noted by staff  · Patient was noted yesterday to be screaming "help me" and said she was scared  · Labwork done today unremarkable  · Vital signs have been stable   · Will increase gabapentin to 200mg BID  · Maintain sleep wake cycle  · Avoid urinary retention and constipation  · Provide frequent redirection and reorientation  · Encourage p o  intake  · Provide emotional support  · Continue with 24/7 supportive care in long-term care facility

## 2021-07-08 NOTE — PROGRESS NOTES
[facility]  [address]  [phone]  [facility]  Code 32      NAME: Quintin Ventura  AGE: 80 y o  SEX: female    DATE OF ENCOUNTER: 7/8/2021    Assessment and Plan     Problem List Items Addressed This Visit        Nervous and Auditory    Dementia ([address] ) - Primary     · Intermittent episodes of yelling/crying noted over the last few days noted by staff  · Patient was noted yesterday to be screaming "help me" and said she was scared  · Labwork done today unremarkable  · Vital signs have been stable   · Will increase gabapentin to 200mg BID  · Maintain sleep wake cycle  · Avoid urinary retention and constipation  · Provide frequent redirection and reorientation  · Encourage p o  intake  · Provide emotional support  · Continue with 24/7 supportive care in long-term care facility               No orders of the defined types were placed in this encounter  Chief Complaint     No chief complaint on file  History of Present Illness     Igor Sanchez is a 80-year-old female at [facility] for long-term care  She is being seen today per nursing request   Her comorbidities include hypertension, hypothyroidism, CKD, and ambulatory dysfunction  Upon examination patient was out of bed in her wheelchair, resting  She comfortable appeared in no acute distress  Review of systems is limited due to dementia  She said she felt fine and offered no acute complaints  Per nursing her appetite is at her baseline and she has had no reported issues with sleeping  She does state that she does have periodic issues with behaviors where she seems very upset and yells  Encourage staff to bed provide frequent redirection reorientation she did deny headaches, dizziness, chest pain, shortness of breath  Per nursing no other acute concerns or issues at this time        The following portions of the patient's history were reviewed and updated as appropriate: allergies, current medications, past family history, past medical history, past social history, past surgical history and problem list     Review of Systems     Review of Systems   Reason unable to perform ROS: limited by dementia  Constitutional: Negative for activity change, appetite change, diaphoresis, fatigue and fever  HENT: Positive for hearing loss  Respiratory: Negative for cough and shortness of breath  Cardiovascular: Negative for chest pain  Gastrointestinal: Negative for abdominal pain, constipation, diarrhea and vomiting  Genitourinary: Negative for difficulty urinating  Musculoskeletal: Positive for arthralgias and gait problem  Negative for back pain  Neurological: Positive for weakness  Negative for light-headedness and headaches  Psychiatric/Behavioral: Negative for dysphoric mood and sleep disturbance  The patient is nervous/anxious          Active Problem List     Patient Active Problem List   Diagnosis    Acquired hypothyroidism    Hypertensive heart disease without heart failure    Mixed hyperlipidemia    Vitamin D deficiency    Leg ulcer, right, limited to breakdown of skin [facility])    Type 2 diabetes mellitus with foot ulcer (CODE) ([facility])    Permanent atrial fibrillation ([facility])    Generalized osteoarthritis    Stage 2 chronic kidney disease    Mixed aphasia, global    Advance care planning    Macular degeneration    Essential (primary) hypertension    Ambulatory dysfunction    Other hemorrhoids    Slow transit constipation    Hyponatremia    Lower extremity pain    Closed compression fracture of body of L1 vertebra ([facility])    Physical deconditioning    Debility    Fall    Type 2 diabetes mellitus, without long-term current use of insulin (HCC)    Dementia ([facility])    Goals of care, counseling/discussion    Lower extremity edema    Thrombocytopenia ([facility])    Altered mental state       Objective     /68   Pulse 78   Temp (!) 96 8 °F (36 °C)   Resp 18   LMP (LMP Unknown)   SpO2 96%     Physical Exam  Vitals and nursing note reviewed  Constitutional:       General: She is not in acute distress  Appearance: She is obese  She is not ill-appearing  Comments: Frail appearing   HENT:      Head: Normocephalic and atraumatic  Ears:      Comments: [place]     Mouth/Throat:      Mouth: Mucous membranes are moist       Comments: Missing teeth    Cardiovascular:      Rate and Rhythm: Normal rate  Rhythm irregularly irregular  Heart sounds: Normal heart sounds  No murmur heard  Pulmonary:      Effort: Pulmonary effort is normal  No respiratory distress  Breath sounds: Normal breath sounds  No wheezing  Abdominal:      General: Bowel sounds are normal  There is no distension  Palpations: Abdomen is soft  Tenderness: There is no abdominal tenderness  Musculoskeletal:         General: No tenderness, deformity or signs of injury  Right lower leg: Edema (mild) present  Left lower leg: Edema (mild) present  Skin:     General: Skin is warm and dry  Coloration: Skin is pale  Skin is not jaundiced  Findings: No bruising  Comments: Dry skin b/l LE   Neurological:      General: No focal deficit present  Mental Status: She is alert  Mental status is at baseline  Motor: Weakness present  Gait: Gait abnormal (RW or wheelchiar OOB)  Comments: Alert to person only  Forgetful     Psychiatric:         Mood and Affect: Mood is anxious  Affect is flat  Behavior: Behavior normal       Comments: Refuses care at times           Pertinent Laboratory/Diagnostic Studies:  Labs reviewed in facility paper chart  Current Medications   Medications were reviewed and updated  Please refer to paper chart for updated list of medications  Jake OROZCO  7/8/2021 3:09 PM    Please note:  Voice-recognition software may have been used in the preparation of this document    Occasional wrong word or "sound-alike" substitutions may have occurred due to the inherent limitations of voice recognition software  Interpretation should be guided by context

## 2021-08-10 ENCOUNTER — NURSING HOME VISIT (OUTPATIENT)
Dept: GERIATRICS | Facility: OTHER | Age: 86
End: 2021-08-10
Payer: COMMERCIAL

## 2021-08-10 DIAGNOSIS — E03.9 ACQUIRED HYPOTHYROIDISM: ICD-10-CM

## 2021-08-10 DIAGNOSIS — E78.2 MIXED HYPERLIPIDEMIA: ICD-10-CM

## 2021-08-10 DIAGNOSIS — F02.81 LATE ONSET ALZHEIMER'S DEMENTIA WITH BEHAVIORAL DISTURBANCE (HCC): Primary | ICD-10-CM

## 2021-08-10 DIAGNOSIS — N18.2 TYPE 2 DIABETES MELLITUS WITH STAGE 2 CHRONIC KIDNEY DISEASE, WITHOUT LONG-TERM CURRENT USE OF INSULIN (HCC): ICD-10-CM

## 2021-08-10 DIAGNOSIS — M79.604 PAIN IN BOTH LOWER EXTREMITIES: ICD-10-CM

## 2021-08-10 DIAGNOSIS — F03.90 DEMENTIA WITHOUT BEHAVIORAL DISTURBANCE, UNSPECIFIED DEMENTIA TYPE (HCC): ICD-10-CM

## 2021-08-10 DIAGNOSIS — M79.605 PAIN IN BOTH LOWER EXTREMITIES: ICD-10-CM

## 2021-08-10 DIAGNOSIS — G30.1 LATE ONSET ALZHEIMER'S DEMENTIA WITH BEHAVIORAL DISTURBANCE (HCC): Primary | ICD-10-CM

## 2021-08-10 DIAGNOSIS — E87.1 HYPONATREMIA: ICD-10-CM

## 2021-08-10 DIAGNOSIS — E11.22 TYPE 2 DIABETES MELLITUS WITH STAGE 2 CHRONIC KIDNEY DISEASE, WITHOUT LONG-TERM CURRENT USE OF INSULIN (HCC): ICD-10-CM

## 2021-08-10 DIAGNOSIS — I48.21 PERMANENT ATRIAL FIBRILLATION (HCC): ICD-10-CM

## 2021-08-10 DIAGNOSIS — N18.2 STAGE 2 CHRONIC KIDNEY DISEASE: ICD-10-CM

## 2021-08-10 PROCEDURE — 99309 SBSQ NF CARE MODERATE MDM 30: CPT | Performed by: FAMILY MEDICINE

## 2021-08-10 RX ORDER — CHOLECALCIFEROL (VITAMIN D3) 125 MCG
5 CAPSULE ORAL
Start: 2021-08-10

## 2021-08-10 RX ORDER — GABAPENTIN 100 MG/1
200 CAPSULE ORAL 2 TIMES DAILY
Start: 2021-08-10 | End: 2021-09-14 | Stop reason: SDUPTHER

## 2021-08-10 NOTE — ASSESSMENT & PLAN NOTE
Most recent lipid panel (07/08/21): , TG 92, , HDL 52  Continue Zetia 10 mg tablet daily   Repeat lipid panel annually

## 2021-08-10 NOTE — ASSESSMENT & PLAN NOTE
Stable, at baseline   Reviewed most recent labs Na 136 (07/08/21)  Will monitor level with repeat CMP

## 2021-08-10 NOTE — ASSESSMENT & PLAN NOTE
Lab Results   Component Value Date    HGBA1C 6 07/29/2021     Controlled, at goal  HbA1c goal 8 5  Diet controlled, continue to monitor off medication  Continue accuchecks and monitor for signs of hypoglycemia  Repeat labs every 6 months

## 2021-08-10 NOTE — PROGRESS NOTES
[facility]  [address]  [phone] [address] of Service: nursing home place of service: POS 32 Unskilled- No Part A Coverage      NAME: Tom Harley  AGE: 80 y o   SEX: female [phone]    DATE OF ENCOUNTER: 8/10/2021    Assessment and Plan     Dementia with behavioral disturbance   Stable,at baseline today, interacting well and pleasant   Continue sleep/ awake cycle and re-orientation with Melatonin 5 mg HS  Encourage PO intake   Avoid urinary retention and constipation to prevent acute mental status changes  Ensure pain is adequately controlled   PT/OT as needed    Essential HTN  At goal  BP goal per JNC 8 criteria <140/90  Continue Metoprolol Tartrate 25 mg Q12 hours - with holding parameters SBP <110 mmHg   Reviewed renal function,stable    Permanent Atrial Fibrillation   Rate controlled  Continue Metoprolol tartrate 25 mg q12h  CHADSVASC score of 5 - patient on anticoagulation with Eliquis 5 mg BID  Will continue to monitor VS    Acquired Hypothyroidism   Stable  TSH 1 21 (07/08/2021)  Continue Levothyroxine 75 mcg daily   Will check TSH every 3 months     Hyponatremia  Stable, at baseline   Reviewed most recent labs Na 136 (07/08/21)  Will monitor level with repeat CMP    Type 2 diabetes mellitus, without long-term current use of insulin    Lab Results   Component Value Date    HGBA1C 6 07/29/2021     Controlled, at goal  HbA1c goal 8 5  Diet controlled, continue to monitor off medication  Continue accuchecks and monitor for signs of hypoglycemia  Repeat labs every 6 months     Stage 2 chronic kidney disease   Renal function stable   Cr 0 73 and eGFR 71 (07/08/2021)  Encourage PO intake to ensure adequate hydration  Avoid hypotensive events   Will repeat CMP to monitor renal function     Mixed Hyperlipidemia  Most recent lipid panel (07/08/21): , TG 92, , HDL 52  Continue Zetia 10 mg tablet daily   Repeat lipid panel annually     Chief Complaint     Follow up: chronic medical conditions  History of Present Illness     Rene Cheatham is a 80 y o  female who was seen today for follow up  Patient seen and evaluated today, noted to sit upright in the wheelchair taking a nap  Offers no immediate concerns  Patient appeared comfortable, in no apparent distress  Per nursing, patient is stable at baseline with behaviors  The following portions of the patient's history were reviewed and updated as appropriate: allergies, current medications, past family history, past medical history, past social history, past surgical history and problem list     Review of Systems     Review of Systems   Unable to perform ROS: Dementia       Active Problem List     Patient Active Problem List   Diagnosis    Acquired hypothyroidism    Hypertensive heart disease without heart failure    Mixed hyperlipidemia    Vitamin D deficiency    Leg ulcer, right, limited to breakdown of skin ([address] )    Type 2 diabetes mellitus with foot ulcer (CODE) [facility] [address] [facility]    Permanent atrial fibrillation ([address] )    Generalized osteoarthritis    Stage 2 chronic kidney disease    Mixed aphasia, global    Advance care planning    Macular degeneration    Essential (primary) hypertension    Ambulatory dysfunction    Other hemorrhoids    Slow transit constipation    Hyponatremia    Lower extremity pain    Closed compression fracture of body of L1 vertebra (HCC)    Physical deconditioning    Debility    Fall    Type 2 diabetes mellitus, without long-term current use of insulin ([address] )    Dementia ([address] )    Goals of care, counseling/discussion    Lower extremity edema    Thrombocytopenia ([address] )    Altered mental state       Objective     Vital Signs:     Blood pressure: 126/80; Heart Rate: 68 bpm; Respiratory Rate:18  Temperature: 97 3F; Oxygen Saturation: 96%    Physical Exam  Vitals and nursing note reviewed     Constitutional:       General: She is not in acute distress  Appearance: Normal appearance  She is well-developed  She is obese  She is not ill-appearing, toxic-appearing or diaphoretic  HENT:      Head: Normocephalic and atraumatic  Nose: Nose normal    Eyes:      General:         Right eye: No discharge  Left eye: No discharge  Conjunctiva/sclera: Conjunctivae normal    Cardiovascular:      Rate and Rhythm: Normal rate  Rhythm irregular  Heart sounds: Normal heart sounds  Comments: Rate controlled  Pulmonary:      Effort: Pulmonary effort is normal  No respiratory distress  Comments: Decreased breath sounds bibasilar   Abdominal:      General: Bowel sounds are normal       Palpations: Abdomen is soft  Tenderness: There is no abdominal tenderness  Musculoskeletal:         General: No tenderness  Normal range of motion  Cervical back: Normal range of motion and neck supple  Right lower leg: Edema (trace, non-pitting) present  Left lower leg: Edema (trace, non-pitting) present  Skin:     General: Skin is warm and dry  Findings: No rash  Neurological:      Mental Status: She is alert  Mental status is at baseline  Motor: Weakness present  Gait: Gait abnormal       Comments: AOX1   Psychiatric:         Mood and Affect: Mood normal          Pertinent Laboratory/Diagnostic Studies:  Laboratory and Imaging studies reviewed  Full report in the paper chart  Current Medications   Medications reviewed and updated in facility chart      Name: Quintin Ventura  : 11/3/1927  MRN: [id number]        Vito Spring MD  Family Medicine PGY-3  8/10/2021 5:35 PM

## 2021-08-10 NOTE — ASSESSMENT & PLAN NOTE
Rate controlled  Continue Metoprolol tartrate 25 mg q12h  CHADSVASC score of 5 - patient on anticoagulation with Eliquis 5 mg BID  Will continue to monitor VS

## 2021-08-10 NOTE — ASSESSMENT & PLAN NOTE
At goal  BP goal per JNC 8 criteria <140/90  Continue Metoprolol Tartrate 25 mg Q12 hours - with holding parameters SBP <110 mmHg   Reviewed renal function,stable

## 2021-08-10 NOTE — ASSESSMENT & PLAN NOTE
Stable,at baseline today, interacting well and pleasant   Continue sleep/ awake cycle and re-orientation with Melatonin 5 mg HS  Encourage PO intake   Avoid urinary retention and constipation to prevent acute mental status changes  Ensure pain is adequately controlled   PT/OT as needed

## 2021-08-10 NOTE — ASSESSMENT & PLAN NOTE
Renal function stable   Cr 0 73 and eGFR 71 (07/08/2021)  Encourage PO intake to ensure adequate hydration  Avoid hypotensive events   Will repeat CMP to monitor renal function

## 2021-09-14 ENCOUNTER — NURSING HOME VISIT (OUTPATIENT)
Dept: GERIATRICS | Facility: OTHER | Age: 86
End: 2021-09-14
Payer: COMMERCIAL

## 2021-09-14 DIAGNOSIS — E87.1 HYPONATREMIA: Primary | ICD-10-CM

## 2021-09-14 DIAGNOSIS — M79.604 PAIN IN BOTH LOWER EXTREMITIES: ICD-10-CM

## 2021-09-14 DIAGNOSIS — I10 ESSENTIAL (PRIMARY) HYPERTENSION: ICD-10-CM

## 2021-09-14 DIAGNOSIS — E03.9 ACQUIRED HYPOTHYROIDISM: ICD-10-CM

## 2021-09-14 DIAGNOSIS — N18.2 TYPE 2 DIABETES MELLITUS WITH STAGE 2 CHRONIC KIDNEY DISEASE, WITHOUT LONG-TERM CURRENT USE OF INSULIN (HCC): ICD-10-CM

## 2021-09-14 DIAGNOSIS — F02.81 LATE ONSET ALZHEIMER'S DEMENTIA WITH BEHAVIORAL DISTURBANCE (HCC): ICD-10-CM

## 2021-09-14 DIAGNOSIS — E11.22 TYPE 2 DIABETES MELLITUS WITH STAGE 2 CHRONIC KIDNEY DISEASE, WITHOUT LONG-TERM CURRENT USE OF INSULIN (HCC): ICD-10-CM

## 2021-09-14 DIAGNOSIS — M79.605 PAIN IN BOTH LOWER EXTREMITIES: ICD-10-CM

## 2021-09-14 DIAGNOSIS — N18.2 STAGE 2 CHRONIC KIDNEY DISEASE: ICD-10-CM

## 2021-09-14 DIAGNOSIS — G30.1 LATE ONSET ALZHEIMER'S DEMENTIA WITH BEHAVIORAL DISTURBANCE (HCC): ICD-10-CM

## 2021-09-14 PROCEDURE — 99309 SBSQ NF CARE MODERATE MDM 30: CPT | Performed by: FAMILY MEDICINE

## 2021-09-14 RX ORDER — GABAPENTIN 100 MG/1
100 CAPSULE ORAL 3 TIMES DAILY
Start: 2021-09-14

## 2021-09-14 NOTE — PROGRESS NOTES
[facility]  [address]  [phone] [address] of Service: nursing home place of service: POS 32 Unskilled- No Part A Coverage      NAME: Tawana Rueda  AGE: 80 y o  SEX: female [phone]    DATE OF ENCOUNTER: 9/14/2021    Assessment and Plan     Dementia with behavioral disturbance   Stable,at baseline per nursing staff   Continue sleep/ awake cycle and re-orientation with Melatonin 5 mg HS  Encourage PO intake   Avoid urinary retention and constipation to prevent acute mental status changes  Ensure pain is adequately controlled   PT/OT as needed    Essential HTN  BP goal per JNC 8 criteria <140/90, stable at goal   Continue Metoprolol Tartrate 25 mg Q12 hours - with holding parameters SBP <110 mmHg   Reviewed renal function, stable    Acquired Hypothyroidism   Stable  Reviewed TSH 1 21 (07/08/2021)  Continue Levothyroxine 75 mcg daily   Will check TSH in 3 months     Hyponatremia  Stable, no change in behavior per nursing staff   Most recent Na level slightly trending down from 136-->133 (09/10/21)   Will monitor level with repeat CMP on 09/15/21 to ensure Na remains stable     Type 2 diabetes mellitus, without long-term current use of insulin    Lab Results   Component Value Date    HGBA1C 6 07/29/2021     HbA1c goal 8 5, stable at goal  Continue to monitor BG on diet control, no indication to start medication at this time   Continue accuchecks and monitor for signs of hypoglycemia  Repeat labs every 6 months     Stage 2 chronic kidney disease   Renal function stable   Cr 0 71 and eGFR 74 (09/10/2021)  Encourage PO intake to ensure adequate hydration  Avoid hypotensive events   Will repeat CMP on 09/15/21 to monitor renal function given she's on diuretic     Chief Complaint     Follow up: chronic medical conditions  History of Present Illness     Tawana Rueda is a 80 y o  female who was seen today for follow up       Patient seen and evaluated today, she was resting comfortable in bed sleeping  She was in no apparent distress  Offers no immediate concerns  Per nursing, patient is stable at baseline with behaviors  No concerns endorsed  The following portions of the patient's history were reviewed and updated as appropriate: allergies, current medications, past family history, past medical history, past social history, past surgical history and problem list     Review of Systems     Review of Systems   Unable to perform ROS: Dementia       Active Problem List     Patient Active Problem List   Diagnosis    Acquired hypothyroidism    Hypertensive heart disease without heart failure    Mixed hyperlipidemia    Vitamin D deficiency    Leg ulcer, right, limited to breakdown of skin ([facility] )    Type 2 diabetes mellitus with foot ulcer (CODE) ([facility] [address] )    Permanent atrial fibrillation ([facility] )    Generalized osteoarthritis    Stage 2 chronic kidney disease    Mixed aphasia, global    Advance care planning    Macular degeneration    Essential (primary) hypertension    Ambulatory dysfunction    Other hemorrhoids    Slow transit constipation    Hyponatremia    Lower extremity pain    Closed compression fracture of body of L1 vertebra (HCC)    Physical deconditioning    Debility    Fall    Type 2 diabetes mellitus, without long-term current use of insulin ([facility] [facility] )    Dementia ([facility] )    Goals of care, counseling/discussion    Lower extremity edema    Thrombocytopenia ([facility] )    Altered mental state       Objective     Vital Signs:     Blood pressure: 125/73; Heart Rate: 68 bpm; Respiratory Rate:18  Temperature: 96 9 F; Oxygen Saturation: 94%    Physical Exam  Vitals and nursing note reviewed  Constitutional:       General: She is not in acute distress  Appearance: Normal appearance  She is well-developed  She is obese  She is not ill-appearing, toxic-appearing or diaphoretic  HENT:      Head: Normocephalic and atraumatic        Nose: Nose normal    Eyes:      General:         Right eye: No discharge  Left eye: No discharge  Cardiovascular:      Rate and Rhythm: Normal rate  Rhythm irregular  Heart sounds: Normal heart sounds  Comments: Rate controlled  Pulmonary:      Effort: Pulmonary effort is normal  No respiratory distress  Comments: Decreased breath sounds bibasilar   Abdominal:      General: Bowel sounds are normal       Palpations: Abdomen is soft  Tenderness: There is no abdominal tenderness  Musculoskeletal:         General: No tenderness  Normal range of motion  Cervical back: Normal range of motion and neck supple  Right lower leg: Edema (trace, non-pitting) present  Left lower leg: Edema (trace, non-pitting) present  Skin:     General: Skin is warm and dry  Findings: No rash  Neurological:      Mental Status: She is alert  Mental status is at baseline  Motor: No weakness  Comments: AOX1   Psychiatric:         Mood and Affect: Mood normal          Pertinent Laboratory/Diagnostic Studies:  Laboratory and Imaging studies reviewed  Full report in the paper chart  Current Medications   Medications reviewed and updated in facility chart      Name: Sam Esteban  : 11/3/1927  MRN: [id number]        Susan Dan MD  Family Medicine PGY-3  2021 10:28 AM

## 2021-09-14 NOTE — ASSESSMENT & PLAN NOTE
Stable  Reviewed TSH 1 21 (07/08/2021)  Continue Levothyroxine 75 mcg daily   Will check TSH in 3 months

## 2021-09-14 NOTE — ASSESSMENT & PLAN NOTE
Lab Results   Component Value Date    HGBA1C 8 5 (H) 01/05/2020     HbA1c goal 8 5, stable at goal  Continue to monitor BG on diet control, no indication to start medication at this time   Continue accuchecks and monitor for signs of hypoglycemia  Repeat labs every 6 months

## 2021-09-14 NOTE — ASSESSMENT & PLAN NOTE
BP goal per JNC 8 criteria <140/90, stable at goal   Continue Metoprolol Tartrate 25 mg Q12 hours - with holding parameters SBP <110 mmHg   Reviewed renal function, stable

## 2021-09-14 NOTE — ASSESSMENT & PLAN NOTE
Stable,at baseline per nursing staff   Continue sleep/ awake cycle and re-orientation with Melatonin 5 mg HS  Encourage PO intake   Avoid urinary retention and constipation to prevent acute mental status changes  Ensure pain is adequately controlled   PT/OT as neede

## 2021-09-14 NOTE — ASSESSMENT & PLAN NOTE
Stable, no change in behavior per nursing staff   Most recent Na level slightly trending down from 136-->133 (09/10/21)   Will monitor level with repeat CMP on 09/15/21 to ensure Na remains stable

## 2021-09-14 NOTE — ASSESSMENT & PLAN NOTE
Renal function stable   Cr 0 71 and eGFR 74 (09/10/2021)  Encourage PO intake to ensure adequate hydration  Avoid hypotensive events   Will repeat CMP on 09/15/21 to monitor renal function given she's on diuretic

## 2022-12-16 NOTE — ASSESSMENT & PLAN NOTE
-Bilateral lower extremity edema, stable and unchanged   -Weight stable at 200 lbs on 05/07/21 (201 lbs on 05/04/21)  -Continue Lasix 20 mg daily  -Reviewed CMP stable   Will obtain new lab on 05/14/2021  -Encourage leg elevation, and continue using compression stockings Former smoker

## 2023-06-19 NOTE — H&P
H&P- Shauna Sever 11/3/1927, 80 y o  female MRN: [id number]    Unit/Bed#: ED 20 Encounter: [id number]    Primary Care Provider: Silvia So MD   Date and time admitted to hospital: 9/9/2020 12:45 PM        Acute encephalopathy  Assessment & Plan  · Unclear etiology, from the current workup, this could be due to symptomatic hyponatremia, possible NPH  Per nursing home records, she had no concussion or head trauma  · She also received fentany in the ED and gabapentin at the facility prior to admission  · Urinalysis is benign  · CT of head negative for acute stroke or bleed  · Placed on telemetry, rule out tachy or bradyarrhythmia  · Fall precaution  · Consult neurology    Hyponatremia  Assessment & Plan  · Check serum Osm  · Check urine osmolality  · Start normal saline at 75 mL/hour  · Repeat labs in a m  · Avoid rapid correction  · Goal correction 8 to 10 mg per dL in 24  · Check BMP tonight at 10:00 p m  And tomorrow at 6:00 a m  Closed compression fracture of body of L1 vertebra (HCC)  Assessment & Plan  · Age indeterminate on CT, patient is currently lethargic  Unable to correlate clinically  · Possible acute due to recent fall  · Consult PT and OT  · She resides in a facility    Chronic atrial fibrillation  Assessment & Plan  · Chads Vasc score 5 secondary to age, gender, diabetes, hypertension  · She was fully anticoagulated on Eliquis and rate controlled with metoprolol  · One she is fully awake, resume Eliquis and metoprolol    Type 2 diabetes mellitus, with long-term current use of insulin [facility])  Assessment & Plan  Lab Results   Component Value Date    HGBA1C 8 5 (H) 01/05/2020     Placed on sliding scale insulin  Hold Lantus          Essential (primary) hypertension  Assessment & Plan  Restart metoprolol when fully awake and able to take pills    CKD (chronic kidney disease) stage 3, GFR 30-59 ml/min (HCC)  Assessment & Plan  · Creatinine stable and at baseline    Mixed [Yes] : Patient goes to dentist yearly hyperlipidemia  Assessment & Plan  Restart Zetia when fully awake and able to take pills  Not on Lipitor    Acquired hypothyroidism  Assessment & Plan  Restart levothyroxine when fully awake and able to take pills      VTE Prophylaxis: Apixaban (Eliquis)  / sequential compression device   Code Status:  DNR  POLST: POLST form is on file already (pre-hospital)  Discussion with family:  Spoke with daughter's Odalis Montez and George Goltz    Anticipated Length of Stay:  Patient will be admitted on an Inpatient basis with an anticipated length of stay of  at least 2 midnights  Justification for Hospital Stay:  Encephalopathy    Total Time for Visit, including Counseling / Coordination of Care: 45 minutes  Greater than 50% of this total time spent on direct patient counseling and coordination of care  Chief Complaint:   Decreased mental status    History of Present Illness:    Chinmay Shaw is a 80 y o  female who presents with change in mental status  She is a chronic resident of [address] [place] [address]  She was evaluated by her geriatrician and was noted to be lethargic, hypoactive, and not herself  She remain lethargic and was a poor historian at the time of my examination, thus history was obtained through ER and nursing home records  History of present illness started approximately 2 days ago when she had an unwitnessed fall at the facility  Staff also noted that she was more confused  According to staff she did not have loss of consciousness or head trauma  She was evaluated by her geriatrician yesterday and she was still noted to be confused with waxing and waning attention span  Labs were sent and she was noted to have mild hyponatremia  [facility] [employer]     She was re-evaluated today and due to persistent confusion and not being at baseline she was sent to the hospital   Per records, she was able to take a dose of gabapentin prior to coming to the hospital   At the time of my examination she was lethargic but would wake up [Up to date] : Up to date briefly to voice and light stimulation  She was not able to follow commands  She would only moan when her extremities/abdomen is palpated  Review of Systems:    Review of Systems   Unable to perform ROS: Mental status change       Past Medical and Surgical History:     Past Medical History:   Diagnosis Date    Atrial fibrillation ([address] )     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  Authorizing Provider   ACCU-CHEK FASTCLIX LANCETS MISC CHECK BG ONCE DAILY 1/10/20  Yes Lamin Parsosn PA-C   apixaban (ELIQUIS) 5 mg Take 1 tablet (5 mg total) by mouth 2 (two) times a day 9/8/20  Yes Tamiko Bridges MD   bisacodyl (DULCOLAX) 5 mg EC tablet Take 1 tablet (5 mg total) by mouth daily as needed (constipation if miralax is ineffective) 1/14/20  Yes Nadya Howe MD   Blood Glucose Monitoring Suppl (ACCU-CHEK KIKI PLUS) w/Device KIT by Does not apply route daily TEST BG ONCE DAILY- E11 65 1/13/20  Yes Lamin Parsons PA-C   Cholecalciferol (VITAMIN D-3) 1000 units CAPS 1 capsule  5/21/15  Yes Historical Provider, MD   cyanocobalamin (VITAMIN B-12) 1000 MCG tablet Take 1 tablet (1,000 mcg total) by mouth daily 1/15/20  Yes Nadya Howe MD   ezetimibe (ZETIA) 10 mg tablet Take 1 tablet (10 mg total) by mouth daily at bedtime 1/14/20  Yes Nadya Howe MD   furosemide (LASIX) 20 mg tablet Take 1 tablet (20 mg total) by mouth daily 9/8/20  Yes Tamiko Bridges MD   glucose blood test strip Test once daily- E11 65 1/13/20  Yes Lamin Parsons PA-C   insulin glargine (LANTUS) 100 units/mL subcutaneous injection Inject 10 Units under the skin daily at [Normal] : normal bedtime  Patient taking differently: Inject 15 Units under the skin daily at bedtime  9/8/20  Yes Demetra Mistry MD   levothyroxine 75 mcg tablet Take 1 tablet (75 mcg total) by mouth daily 12/31/19  Yes Paige Mayer PA-C   metFORMIN (GLUCOPHAGE) 500 mg tablet Take 1 tablet (500 mg total) by mouth daily with breakfast 1/14/20  Yes Diogo Maddox MD   metoprolol succinate (TOPROL-XL) 25 mg 24 hr tablet Take 1 tablet (25 mg total) by mouth every 12 (twelve) hours 11/7/19  Yes Ming Hernandez MD   senna-docusate sodium (SENOKOT S) 8 6-50 mg per tablet Take 1 tablet by mouth 2 (two) times a day 1/14/20  Yes Diogo Maddox MD   atorvastatin (LIPITOR) 20 mg tablet Take 1 tablet (20 mg total) by mouth daily with dinner  Patient not taking: Reported on 9/9/2020 1/8/20   Bart Perdomo DO   insulin lispro (HumaLOG) 100 units/mL injection Inject 2-12 Units under the skin 3 (three) times a day before meals 1/14/20   Diogo Maddox MD   nystatin (MYCOSTATIN) powder Apply 1 application topically 2 (two) times a day 1/14/20   Diogo Maddox MD   OMEGA-3 FATTY ACIDS PO 2 (two) times a day  6/1/05   Rashaad Carl MD   polyethylene glycol (MIRALAX) 17 g packet Take 17 g by mouth daily as needed (constipation) 1/14/20   Diogo Maddox MD   ursodiol (ACTIGALL) 300 mg capsule Take 1 capsule (300 mg total) by mouth 2 (two) times a day 1/14/20   Diogo Maddox MD     I have reveiwed home medications using records provided by [facility]  Allergies: Allergies   Allergen Reactions    Aspirin GI Intolerance    Penicillins        Social History:     Marital Status:    Occupation:  None  Patient Pre-hospital Living Situation:  Lives at [facility]  Patient Pre-hospital Level of Mobility:  With assistance    Uses roller walker/wheelchair  Patient Pre-hospital Diet Restrictions:  Diabetic  Substance Use History:   Social History     Substance and Sexual Activity   Alcohol Use Not Currently     Social History     Tobacco Use   Smoking Status Never Smoker   Smokeless Tobacco Never Used     Social History     Substance and Sexual Activity   Drug Use Never       Family History:    Family History   Problem Relation Age of Onset    Breast cancer Sister     No Known Problems Mother     No Known Problems Father        Physical Exam:     Vitals:   Blood Pressure: 139/65 (09/09/20 1546)  Pulse: 76 (09/09/20 1546)  Temperature: 97 6 °F (36 4 °C) (09/09/20 1300)  Temp Source: Rectal (09/09/20 1300)  Respirations: 16 (09/09/20 1546)  Weight - Scale: 90 kg (198 lb 6 6 oz) (09/09/20 1300)  SpO2: 97 % (09/09/20 1546)    Physical Exam  Vitals signs and nursing note reviewed  Constitutional:       Appearance: She is obese  She is ill-appearing  HENT:      Head: Normocephalic and atraumatic  Nose: No congestion or rhinorrhea  Eyes:      General: No scleral icterus  Comments: Artificial right eye   Neck:      Musculoskeletal: Neck supple  Cardiovascular:      Rate and Rhythm: Rhythm irregular  Heart sounds: Murmur present  Pulmonary:      Effort: No respiratory distress  Breath sounds: No stridor  No wheezing, rhonchi or rales  Comments: Poor effort  Abdominal:      General: Abdomen is flat  There is no distension  Palpations: Abdomen is soft  Tenderness: There is no guarding  Musculoskeletal:      Right lower leg: No edema  Left lower leg: No edema  Skin:     General: Skin is warm and dry  Neurological:      Comments: Lethargic but wakes to voice and light stimulation  [place]  No myoclonic jerks   Psychiatric:      Comments: Unable to assess     Additional Data:     Lab Results: I have personally reviewed pertinent reports        Results from last 7 days   Lab Units 09/09/20  1316   WBC Thousand/uL 7 09   HEMOGLOBIN g/dL 16 2*   HEMATOCRIT % 49 8*   PLATELETS Thousands/uL 226   NEUTROS PCT % 67   LYMPHS PCT % 18   MONOS PCT % 11   EOS PCT % 1     Results from last 7 days   Lab Units 09/09/20  1316   SODIUM [Eats meals with family] : eats meals with family [Has friends] : has friends mmol/L 127*   POTASSIUM mmol/L 4 5   CHLORIDE mmol/L 95*   CO2 mmol/L 24   BUN mg/dL 16   CREATININE mg/dL 0 98   ANION GAP mmol/L 8   CALCIUM mg/dL 9 2   ALBUMIN g/dL 3 4*   TOTAL BILIRUBIN mg/dL 1 24*   ALK PHOS U/L 82   ALT U/L 17   AST U/L 31   GLUCOSE RANDOM mg/dL 128     Results from last 7 days   Lab Units 09/09/20  1441   INR  1 45*                   Imaging: I have personally reviewed pertinent reports  CT head without contrast   Final Result by Janice Diehl MD (09/09 1443)      No acute intracranial hemorrhage seen   No mass effect or midline shift   6 moderate to severe periventricular and white matter hypodensity related to chronic small vessel ischemic changes   Prominence of the ventricular system which is out of proportion to the sulcal spaces, raises concern for normal pressure hydrocephalus in the appropriate clinical setting   Scattered the parenchymal calcification, unchanged               Workstation performed: [id number]         CT spine cervical without contrast   Final Result by Kamar Khan MD (09/09 1449)      No cervical spine fracture or traumatic malalignment  Workstation performed: [id number]         CT chest abdomen pelvis w contrast   Final Result by Kamar Khan MD (09/09 1513)      Indeterminate age compression deformity involving the superior endplate at L1  Correlate for focal tenderness  Consider MRI if there is any clinical ambiguity  Stable cardiomegaly  Workstation performed: [id number]             EKG, Pathology, and Other Studies Reviewed on Admission:   · EKG:  Atrial fibrillation    Allscripts / Epic Records Reviewed: Yes     ** Please Note: This note has been constructed using a voice recognition system   ** [Screen time (except homework) less than 2 hours a day] : screen time (except homework) less than 2 hours a day [Has interests/participates in community activities/volunteers] : has interests/participates in community activities/volunteers. [At least 1 hour of physical activity a day] : does not do at least 1 hour of physical activity a day [de-identified] : having surgery next Tuesday for her Jaw. Dr. Li at [facility], she will stay overnight [de-identified] : 6-8 week recovery from the jaw surgery. [FreeTextEntry8] : monthly [de-identified] : no exercising at all. Mom drives her, gym in school,  not even walking [de-identified] : always on her phone or tablet [FreeTextEntry1] : will be living in dorm at [facility], knows her roommate from college, studying computer science.\par \par no active eczema or asthma\par \par not sexually active, heterosexual, knows about safe sex\par \par no vaping/smoking/etoh

## 2024-10-20 NOTE — ASSESSMENT & PLAN NOTE
Currently rate controlled, asymptomatic  Continue same regimen and monitor    Continue anticoagulation with pradaxa None